# Patient Record
Sex: MALE | Race: BLACK OR AFRICAN AMERICAN | Employment: OTHER | URBAN - METROPOLITAN AREA
[De-identification: names, ages, dates, MRNs, and addresses within clinical notes are randomized per-mention and may not be internally consistent; named-entity substitution may affect disease eponyms.]

---

## 2017-03-08 ENCOUNTER — OFFICE VISIT (OUTPATIENT)
Dept: INTERNAL MEDICINE CLINIC | Age: 77
End: 2017-03-08

## 2017-03-08 VITALS
SYSTOLIC BLOOD PRESSURE: 126 MMHG | BODY MASS INDEX: 39.8 KG/M2 | HEART RATE: 95 BPM | DIASTOLIC BLOOD PRESSURE: 70 MMHG | WEIGHT: 278 LBS | TEMPERATURE: 98 F | OXYGEN SATURATION: 95 % | HEIGHT: 70 IN

## 2017-03-08 DIAGNOSIS — Z01.818 PRE-OP EXAM: ICD-10-CM

## 2017-03-08 DIAGNOSIS — I10 ESSENTIAL HYPERTENSION: ICD-10-CM

## 2017-03-08 DIAGNOSIS — H26.9 BILATERAL CATARACTS: ICD-10-CM

## 2017-03-08 DIAGNOSIS — E11.8 TYPE 2 DIABETES MELLITUS WITH COMPLICATION, WITHOUT LONG-TERM CURRENT USE OF INSULIN (HCC): ICD-10-CM

## 2017-03-08 LAB
ANION GAP SERPL CALCULATED.3IONS-SCNC: 17 MMOL/L (ref 3–16)
BUN BLDV-MCNC: 15 MG/DL (ref 7–20)
CALCIUM SERPL-MCNC: 9.7 MG/DL (ref 8.3–10.6)
CHLORIDE BLD-SCNC: 96 MMOL/L (ref 99–110)
CO2: 26 MMOL/L (ref 21–32)
CREAT SERPL-MCNC: 0.8 MG/DL (ref 0.8–1.3)
GFR AFRICAN AMERICAN: >60
GFR NON-AFRICAN AMERICAN: >60
GLUCOSE BLD-MCNC: 113 MG/DL
GLUCOSE BLD-MCNC: 96 MG/DL (ref 70–99)
HBA1C MFR BLD: 6.1 %
POTASSIUM SERPL-SCNC: 4.1 MMOL/L (ref 3.5–5.1)
SODIUM BLD-SCNC: 139 MMOL/L (ref 136–145)

## 2017-03-08 PROCEDURE — 82962 GLUCOSE BLOOD TEST: CPT | Performed by: INTERNAL MEDICINE

## 2017-03-08 PROCEDURE — 4040F PNEUMOC VAC/ADMIN/RCVD: CPT | Performed by: INTERNAL MEDICINE

## 2017-03-08 PROCEDURE — 1123F ACP DISCUSS/DSCN MKR DOCD: CPT | Performed by: INTERNAL MEDICINE

## 2017-03-08 PROCEDURE — 93000 ELECTROCARDIOGRAM COMPLETE: CPT | Performed by: INTERNAL MEDICINE

## 2017-03-08 PROCEDURE — G8417 CALC BMI ABV UP PARAM F/U: HCPCS | Performed by: INTERNAL MEDICINE

## 2017-03-08 PROCEDURE — 83036 HEMOGLOBIN GLYCOSYLATED A1C: CPT | Performed by: INTERNAL MEDICINE

## 2017-03-08 PROCEDURE — G8484 FLU IMMUNIZE NO ADMIN: HCPCS | Performed by: INTERNAL MEDICINE

## 2017-03-08 PROCEDURE — 1036F TOBACCO NON-USER: CPT | Performed by: INTERNAL MEDICINE

## 2017-03-08 PROCEDURE — 99215 OFFICE O/P EST HI 40 MIN: CPT | Performed by: INTERNAL MEDICINE

## 2017-03-08 PROCEDURE — G8427 DOCREV CUR MEDS BY ELIG CLIN: HCPCS | Performed by: INTERNAL MEDICINE

## 2017-03-08 ASSESSMENT — PATIENT HEALTH QUESTIONNAIRE - PHQ9
SUM OF ALL RESPONSES TO PHQ QUESTIONS 1-9: 0
SUM OF ALL RESPONSES TO PHQ9 QUESTIONS 1 & 2: 0
1. LITTLE INTEREST OR PLEASURE IN DOING THINGS: 0

## 2017-03-10 ENCOUNTER — TELEPHONE (OUTPATIENT)
Dept: INTERNAL MEDICINE CLINIC | Age: 77
End: 2017-03-10

## 2017-04-07 ENCOUNTER — TELEPHONE (OUTPATIENT)
Dept: INTERNAL MEDICINE CLINIC | Age: 77
End: 2017-04-07

## 2017-04-10 RX ORDER — OMEPRAZOLE 20 MG/1
20 CAPSULE, DELAYED RELEASE ORAL DAILY
Qty: 90 CAPSULE | Refills: 3 | Status: SHIPPED | OUTPATIENT
Start: 2017-04-10 | End: 2017-06-09 | Stop reason: ALTCHOICE

## 2017-04-10 RX ORDER — ALUMINUM ZIRCONIUM TRICHLOROHYDREX GLY 0.19 G/G
STICK TOPICAL
Qty: 84 TABLET | Refills: 2 | Status: SHIPPED | OUTPATIENT
Start: 2017-04-10 | End: 2018-01-23 | Stop reason: SDUPTHER

## 2017-05-08 ENCOUNTER — CARE COORDINATION (OUTPATIENT)
Dept: CARE COORDINATION | Age: 77
End: 2017-05-08

## 2017-05-12 ENCOUNTER — OFFICE VISIT (OUTPATIENT)
Dept: INTERNAL MEDICINE CLINIC | Age: 77
End: 2017-05-12

## 2017-05-12 VITALS
TEMPERATURE: 97.6 F | BODY MASS INDEX: 38.2 KG/M2 | OXYGEN SATURATION: 97 % | DIASTOLIC BLOOD PRESSURE: 78 MMHG | HEART RATE: 79 BPM | WEIGHT: 266.8 LBS | SYSTOLIC BLOOD PRESSURE: 136 MMHG | HEIGHT: 70 IN

## 2017-05-12 DIAGNOSIS — M25.571 ARTHRALGIA OF RIGHT ANKLE: ICD-10-CM

## 2017-05-12 DIAGNOSIS — L03.115 CELLULITIS OF RIGHT LOWER EXTREMITY: Primary | ICD-10-CM

## 2017-05-12 DIAGNOSIS — I10 ESSENTIAL HYPERTENSION: ICD-10-CM

## 2017-05-12 DIAGNOSIS — E11.9 TYPE 2 DIABETES MELLITUS WITHOUT COMPLICATION, WITHOUT LONG-TERM CURRENT USE OF INSULIN (HCC): ICD-10-CM

## 2017-05-12 LAB
GLUCOSE BLD-MCNC: 134 MG/DL
URIC ACID, SERUM: 6.2 MG/DL (ref 3.5–7.2)

## 2017-05-12 PROCEDURE — 4040F PNEUMOC VAC/ADMIN/RCVD: CPT | Performed by: NURSE PRACTITIONER

## 2017-05-12 PROCEDURE — 99213 OFFICE O/P EST LOW 20 MIN: CPT | Performed by: NURSE PRACTITIONER

## 2017-05-12 PROCEDURE — 1123F ACP DISCUSS/DSCN MKR DOCD: CPT | Performed by: NURSE PRACTITIONER

## 2017-05-12 PROCEDURE — G8427 DOCREV CUR MEDS BY ELIG CLIN: HCPCS | Performed by: NURSE PRACTITIONER

## 2017-05-12 PROCEDURE — G8417 CALC BMI ABV UP PARAM F/U: HCPCS | Performed by: NURSE PRACTITIONER

## 2017-05-12 PROCEDURE — 82962 GLUCOSE BLOOD TEST: CPT | Performed by: NURSE PRACTITIONER

## 2017-05-12 PROCEDURE — 1036F TOBACCO NON-USER: CPT | Performed by: NURSE PRACTITIONER

## 2017-05-12 PROCEDURE — 96372 THER/PROPH/DIAG INJ SC/IM: CPT | Performed by: NURSE PRACTITIONER

## 2017-05-12 RX ORDER — CANDESARTAN CILEXETIL AND HYDROCHLOROTHIAZIDE 32; 12.5 MG/1; MG/1
TABLET ORAL
Qty: 120 TABLET | Refills: 3 | Status: SHIPPED | OUTPATIENT
Start: 2017-05-12 | End: 2017-09-18 | Stop reason: SDUPTHER

## 2017-05-12 RX ORDER — KETOROLAC TROMETHAMINE 30 MG/ML
30 INJECTION, SOLUTION INTRAMUSCULAR; INTRAVENOUS ONCE
Qty: 1 ML | Refills: 0 | Status: ON HOLD
Start: 2017-05-12 | End: 2020-11-14 | Stop reason: CLARIF

## 2017-05-12 RX ORDER — CLINDAMYCIN HYDROCHLORIDE 150 MG/1
450 CAPSULE ORAL
COMMUNITY
Start: 2017-05-05 | End: 2017-05-15

## 2017-05-12 RX ORDER — TRIAMCINOLONE ACETONIDE 40 MG/ML
40 INJECTION, SUSPENSION INTRA-ARTICULAR; INTRAMUSCULAR ONCE
Status: COMPLETED | OUTPATIENT
Start: 2017-05-12 | End: 2017-05-12

## 2017-05-12 RX ADMIN — TRIAMCINOLONE ACETONIDE 40 MG: 40 INJECTION, SUSPENSION INTRA-ARTICULAR; INTRAMUSCULAR at 12:19

## 2017-05-12 ASSESSMENT — PATIENT HEALTH QUESTIONNAIRE - PHQ9
2. FEELING DOWN, DEPRESSED OR HOPELESS: 0
SUM OF ALL RESPONSES TO PHQ9 QUESTIONS 1 & 2: 0
1. LITTLE INTEREST OR PLEASURE IN DOING THINGS: 0
SUM OF ALL RESPONSES TO PHQ QUESTIONS 1-9: 0

## 2017-05-12 ASSESSMENT — ENCOUNTER SYMPTOMS
RESPIRATORY NEGATIVE: 1
ALLERGIC/IMMUNOLOGIC NEGATIVE: 1
EYES NEGATIVE: 1
GASTROINTESTINAL NEGATIVE: 1

## 2017-05-15 ASSESSMENT — PATIENT HEALTH QUESTIONNAIRE - PHQ9
1. LITTLE INTEREST OR PLEASURE IN DOING THINGS: 0
2. FEELING DOWN, DEPRESSED OR HOPELESS: 0
SUM OF ALL RESPONSES TO PHQ QUESTIONS 1-9: 0
SUM OF ALL RESPONSES TO PHQ9 QUESTIONS 1 & 2: 0

## 2017-05-16 ENCOUNTER — TELEPHONE (OUTPATIENT)
Dept: INTERNAL MEDICINE CLINIC | Age: 77
End: 2017-05-16

## 2017-05-17 ENCOUNTER — OFFICE VISIT (OUTPATIENT)
Dept: INTERNAL MEDICINE CLINIC | Age: 77
End: 2017-05-17

## 2017-05-17 VITALS
SYSTOLIC BLOOD PRESSURE: 140 MMHG | HEIGHT: 68 IN | TEMPERATURE: 98.4 F | WEIGHT: 260.6 LBS | DIASTOLIC BLOOD PRESSURE: 78 MMHG | HEART RATE: 72 BPM | BODY MASS INDEX: 39.5 KG/M2

## 2017-05-17 DIAGNOSIS — L03.115 CELLULITIS OF RIGHT LOWER EXTREMITY: Primary | ICD-10-CM

## 2017-05-17 DIAGNOSIS — E66.01 MORBID OBESITY WITH BMI OF 40.0-44.9, ADULT (HCC): ICD-10-CM

## 2017-05-17 DIAGNOSIS — E11.9 TYPE 2 DIABETES MELLITUS WITHOUT COMPLICATION, WITHOUT LONG-TERM CURRENT USE OF INSULIN (HCC): ICD-10-CM

## 2017-05-17 PROCEDURE — 4040F PNEUMOC VAC/ADMIN/RCVD: CPT | Performed by: NURSE PRACTITIONER

## 2017-05-17 PROCEDURE — 99213 OFFICE O/P EST LOW 20 MIN: CPT | Performed by: NURSE PRACTITIONER

## 2017-05-17 PROCEDURE — 1123F ACP DISCUSS/DSCN MKR DOCD: CPT | Performed by: NURSE PRACTITIONER

## 2017-05-17 PROCEDURE — G8417 CALC BMI ABV UP PARAM F/U: HCPCS | Performed by: NURSE PRACTITIONER

## 2017-05-17 PROCEDURE — G8427 DOCREV CUR MEDS BY ELIG CLIN: HCPCS | Performed by: NURSE PRACTITIONER

## 2017-05-17 PROCEDURE — 1036F TOBACCO NON-USER: CPT | Performed by: NURSE PRACTITIONER

## 2017-05-17 RX ORDER — MELOXICAM 15 MG/1
1 TABLET ORAL DAILY
COMMUNITY
Start: 2017-04-27 | End: 2018-10-09

## 2017-05-17 ASSESSMENT — ENCOUNTER SYMPTOMS
EYES NEGATIVE: 1
GASTROINTESTINAL NEGATIVE: 1
ALLERGIC/IMMUNOLOGIC NEGATIVE: 1
RESPIRATORY NEGATIVE: 1

## 2017-05-17 ASSESSMENT — PATIENT HEALTH QUESTIONNAIRE - PHQ9
2. FEELING DOWN, DEPRESSED OR HOPELESS: 0
SUM OF ALL RESPONSES TO PHQ QUESTIONS 1-9: 0
SUM OF ALL RESPONSES TO PHQ9 QUESTIONS 1 & 2: 0
1. LITTLE INTEREST OR PLEASURE IN DOING THINGS: 0

## 2017-06-08 ENCOUNTER — TELEPHONE (OUTPATIENT)
Dept: INTERNAL MEDICINE CLINIC | Age: 77
End: 2017-06-08

## 2017-06-09 ENCOUNTER — OFFICE VISIT (OUTPATIENT)
Dept: INTERNAL MEDICINE CLINIC | Age: 77
End: 2017-06-09

## 2017-06-09 VITALS
HEIGHT: 69 IN | BODY MASS INDEX: 39.1 KG/M2 | TEMPERATURE: 98.3 F | HEART RATE: 64 BPM | DIASTOLIC BLOOD PRESSURE: 62 MMHG | WEIGHT: 264 LBS | SYSTOLIC BLOOD PRESSURE: 116 MMHG

## 2017-06-09 DIAGNOSIS — L03.115 CELLULITIS OF RIGHT LOWER EXTREMITY: Primary | ICD-10-CM

## 2017-06-09 DIAGNOSIS — E11.9 TYPE 2 DIABETES MELLITUS WITHOUT COMPLICATION, WITHOUT LONG-TERM CURRENT USE OF INSULIN (HCC): ICD-10-CM

## 2017-06-09 LAB — GLUCOSE BLD-MCNC: 156 MG/DL

## 2017-06-09 PROCEDURE — 99213 OFFICE O/P EST LOW 20 MIN: CPT | Performed by: NURSE PRACTITIONER

## 2017-06-09 PROCEDURE — G8417 CALC BMI ABV UP PARAM F/U: HCPCS | Performed by: NURSE PRACTITIONER

## 2017-06-09 PROCEDURE — 96372 THER/PROPH/DIAG INJ SC/IM: CPT | Performed by: NURSE PRACTITIONER

## 2017-06-09 PROCEDURE — 1036F TOBACCO NON-USER: CPT | Performed by: NURSE PRACTITIONER

## 2017-06-09 PROCEDURE — 82962 GLUCOSE BLOOD TEST: CPT | Performed by: NURSE PRACTITIONER

## 2017-06-09 PROCEDURE — G8427 DOCREV CUR MEDS BY ELIG CLIN: HCPCS | Performed by: NURSE PRACTITIONER

## 2017-06-09 PROCEDURE — 1123F ACP DISCUSS/DSCN MKR DOCD: CPT | Performed by: NURSE PRACTITIONER

## 2017-06-09 PROCEDURE — 4040F PNEUMOC VAC/ADMIN/RCVD: CPT | Performed by: NURSE PRACTITIONER

## 2017-06-09 RX ORDER — SULFAMETHOXAZOLE AND TRIMETHOPRIM 800; 160 MG/1; MG/1
1 TABLET ORAL 2 TIMES DAILY
Qty: 20 TABLET | Refills: 0 | Status: SHIPPED | OUTPATIENT
Start: 2017-06-09 | End: 2017-09-21 | Stop reason: SDUPTHER

## 2017-06-09 RX ORDER — TRIAMCINOLONE ACETONIDE 40 MG/ML
40 INJECTION, SUSPENSION INTRA-ARTICULAR; INTRAMUSCULAR ONCE
Status: COMPLETED | OUTPATIENT
Start: 2017-06-09 | End: 2017-06-09

## 2017-06-09 RX ADMIN — TRIAMCINOLONE ACETONIDE 40 MG: 40 INJECTION, SUSPENSION INTRA-ARTICULAR; INTRAMUSCULAR at 11:52

## 2017-06-09 ASSESSMENT — PATIENT HEALTH QUESTIONNAIRE - PHQ9
2. FEELING DOWN, DEPRESSED OR HOPELESS: 0
1. LITTLE INTEREST OR PLEASURE IN DOING THINGS: 0
SUM OF ALL RESPONSES TO PHQ QUESTIONS 1-9: 0
SUM OF ALL RESPONSES TO PHQ9 QUESTIONS 1 & 2: 0

## 2017-06-12 ENCOUNTER — OFFICE VISIT (OUTPATIENT)
Dept: INTERNAL MEDICINE CLINIC | Age: 77
End: 2017-06-12

## 2017-06-12 VITALS
HEART RATE: 80 BPM | WEIGHT: 260 LBS | SYSTOLIC BLOOD PRESSURE: 122 MMHG | BODY MASS INDEX: 39.4 KG/M2 | HEIGHT: 68 IN | DIASTOLIC BLOOD PRESSURE: 80 MMHG | TEMPERATURE: 98.6 F

## 2017-06-12 DIAGNOSIS — L03.115 CELLULITIS OF RIGHT LOWER EXTREMITY: Primary | ICD-10-CM

## 2017-06-12 PROCEDURE — 1036F TOBACCO NON-USER: CPT | Performed by: NURSE PRACTITIONER

## 2017-06-12 PROCEDURE — G8427 DOCREV CUR MEDS BY ELIG CLIN: HCPCS | Performed by: NURSE PRACTITIONER

## 2017-06-12 PROCEDURE — 4040F PNEUMOC VAC/ADMIN/RCVD: CPT | Performed by: NURSE PRACTITIONER

## 2017-06-12 PROCEDURE — 1123F ACP DISCUSS/DSCN MKR DOCD: CPT | Performed by: NURSE PRACTITIONER

## 2017-06-12 PROCEDURE — G8417 CALC BMI ABV UP PARAM F/U: HCPCS | Performed by: NURSE PRACTITIONER

## 2017-06-12 PROCEDURE — 99213 OFFICE O/P EST LOW 20 MIN: CPT | Performed by: NURSE PRACTITIONER

## 2017-06-12 RX ORDER — CEPHALEXIN 500 MG/1
500 CAPSULE ORAL 3 TIMES DAILY
Qty: 30 CAPSULE | Refills: 0 | Status: SHIPPED | OUTPATIENT
Start: 2017-06-12 | End: 2017-09-21 | Stop reason: SDUPTHER

## 2017-06-12 ASSESSMENT — ENCOUNTER SYMPTOMS: COLOR CHANGE: 1

## 2017-06-15 ENCOUNTER — TELEPHONE (OUTPATIENT)
Dept: INTERNAL MEDICINE CLINIC | Age: 77
End: 2017-06-15

## 2017-06-19 ENCOUNTER — OFFICE VISIT (OUTPATIENT)
Dept: INTERNAL MEDICINE CLINIC | Age: 77
End: 2017-06-19

## 2017-06-19 VITALS
HEART RATE: 79 BPM | DIASTOLIC BLOOD PRESSURE: 70 MMHG | SYSTOLIC BLOOD PRESSURE: 102 MMHG | OXYGEN SATURATION: 94 % | BODY MASS INDEX: 39.66 KG/M2 | WEIGHT: 257 LBS

## 2017-06-19 DIAGNOSIS — D64.9 CHRONIC ANEMIA: ICD-10-CM

## 2017-06-19 DIAGNOSIS — Z23 NEED FOR PNEUMOCOCCAL VACCINE: ICD-10-CM

## 2017-06-19 DIAGNOSIS — R35.89 POLYURIA: ICD-10-CM

## 2017-06-19 DIAGNOSIS — L03.115 CELLULITIS OF RIGHT LOWER EXTREMITY: ICD-10-CM

## 2017-06-19 DIAGNOSIS — E11.8 CONTROLLED TYPE 2 DIABETES MELLITUS WITH COMPLICATION, WITHOUT LONG-TERM CURRENT USE OF INSULIN (HCC): Primary | ICD-10-CM

## 2017-06-19 DIAGNOSIS — E78.49 OTHER HYPERLIPIDEMIA: ICD-10-CM

## 2017-06-19 DIAGNOSIS — I10 ESSENTIAL HYPERTENSION: ICD-10-CM

## 2017-06-19 LAB
BILIRUBIN URINE: NEGATIVE
BLOOD, URINE: NEGATIVE
CHOLESTEROL, TOTAL: 199 MG/DL (ref 0–199)
CLARITY: CLEAR
COLOR: YELLOW
CREATININE URINE: 58.3 MG/DL (ref 39–259)
FERRITIN: 111.6 NG/ML (ref 30–400)
GLUCOSE BLD-MCNC: 128 MG/DL
GLUCOSE URINE: NEGATIVE MG/DL
HBA1C MFR BLD: 6.2 %
HDLC SERPL-MCNC: 79 MG/DL (ref 40–60)
KETONES, URINE: NEGATIVE MG/DL
LDL CHOLESTEROL CALCULATED: 105 MG/DL
LEUKOCYTE ESTERASE, URINE: NEGATIVE
MICROALBUMIN UR-MCNC: 1.7 MG/DL
MICROALBUMIN/CREAT UR-RTO: 29.2 MG/G (ref 0–30)
MICROSCOPIC EXAMINATION: NORMAL
NITRITE, URINE: NEGATIVE
PH UA: 6
PROTEIN UA: NEGATIVE MG/DL
SPECIFIC GRAVITY UA: 1.01
TRIGL SERPL-MCNC: 73 MG/DL (ref 0–150)
URINE TYPE: NORMAL
UROBILINOGEN, URINE: 0.2 E.U./DL
VLDLC SERPL CALC-MCNC: 15 MG/DL

## 2017-06-19 PROCEDURE — 4040F PNEUMOC VAC/ADMIN/RCVD: CPT | Performed by: INTERNAL MEDICINE

## 2017-06-19 PROCEDURE — G8427 DOCREV CUR MEDS BY ELIG CLIN: HCPCS | Performed by: INTERNAL MEDICINE

## 2017-06-19 PROCEDURE — 90670 PCV13 VACCINE IM: CPT | Performed by: INTERNAL MEDICINE

## 2017-06-19 PROCEDURE — 82962 GLUCOSE BLOOD TEST: CPT | Performed by: INTERNAL MEDICINE

## 2017-06-19 PROCEDURE — 1123F ACP DISCUSS/DSCN MKR DOCD: CPT | Performed by: INTERNAL MEDICINE

## 2017-06-19 PROCEDURE — G0009 ADMIN PNEUMOCOCCAL VACCINE: HCPCS | Performed by: INTERNAL MEDICINE

## 2017-06-19 PROCEDURE — 99214 OFFICE O/P EST MOD 30 MIN: CPT | Performed by: INTERNAL MEDICINE

## 2017-06-19 PROCEDURE — G8417 CALC BMI ABV UP PARAM F/U: HCPCS | Performed by: INTERNAL MEDICINE

## 2017-06-19 PROCEDURE — 1036F TOBACCO NON-USER: CPT | Performed by: INTERNAL MEDICINE

## 2017-06-19 PROCEDURE — 83036 HEMOGLOBIN GLYCOSYLATED A1C: CPT | Performed by: INTERNAL MEDICINE

## 2017-06-24 ASSESSMENT — ENCOUNTER SYMPTOMS
RESPIRATORY NEGATIVE: 1
GASTROINTESTINAL NEGATIVE: 1
EYES NEGATIVE: 1

## 2017-09-19 RX ORDER — CANDESARTAN CILEXETIL AND HYDROCHLOROTHIAZIDE 32; 12.5 MG/1; MG/1
TABLET ORAL
Qty: 90 TABLET | Refills: 3 | Status: SHIPPED | OUTPATIENT
Start: 2017-09-19 | End: 2018-08-26 | Stop reason: SDUPTHER

## 2017-09-21 ENCOUNTER — OFFICE VISIT (OUTPATIENT)
Dept: INTERNAL MEDICINE CLINIC | Age: 77
End: 2017-09-21

## 2017-09-21 VITALS
HEIGHT: 70 IN | DIASTOLIC BLOOD PRESSURE: 68 MMHG | RESPIRATION RATE: 20 BRPM | TEMPERATURE: 99.2 F | HEART RATE: 100 BPM | WEIGHT: 267 LBS | SYSTOLIC BLOOD PRESSURE: 124 MMHG | BODY MASS INDEX: 38.22 KG/M2 | OXYGEN SATURATION: 98 %

## 2017-09-21 DIAGNOSIS — L03.115 CELLULITIS OF RIGHT LOWER EXTREMITY: Primary | ICD-10-CM

## 2017-09-21 DIAGNOSIS — E11.9 TYPE 2 DIABETES MELLITUS WITHOUT COMPLICATION, WITHOUT LONG-TERM CURRENT USE OF INSULIN (HCC): ICD-10-CM

## 2017-09-21 LAB
GLUCOSE BLD-MCNC: 204 MG/DL
HBA1C MFR BLD: 5.9 %

## 2017-09-21 PROCEDURE — G8417 CALC BMI ABV UP PARAM F/U: HCPCS | Performed by: NURSE PRACTITIONER

## 2017-09-21 PROCEDURE — 1036F TOBACCO NON-USER: CPT | Performed by: NURSE PRACTITIONER

## 2017-09-21 PROCEDURE — 4040F PNEUMOC VAC/ADMIN/RCVD: CPT | Performed by: NURSE PRACTITIONER

## 2017-09-21 PROCEDURE — G8427 DOCREV CUR MEDS BY ELIG CLIN: HCPCS | Performed by: NURSE PRACTITIONER

## 2017-09-21 PROCEDURE — 99213 OFFICE O/P EST LOW 20 MIN: CPT | Performed by: NURSE PRACTITIONER

## 2017-09-21 PROCEDURE — 82962 GLUCOSE BLOOD TEST: CPT | Performed by: NURSE PRACTITIONER

## 2017-09-21 PROCEDURE — 1123F ACP DISCUSS/DSCN MKR DOCD: CPT | Performed by: NURSE PRACTITIONER

## 2017-09-21 PROCEDURE — 83036 HEMOGLOBIN GLYCOSYLATED A1C: CPT | Performed by: NURSE PRACTITIONER

## 2017-09-21 RX ORDER — CEPHALEXIN 500 MG/1
500 CAPSULE ORAL 3 TIMES DAILY
Qty: 30 CAPSULE | Refills: 0 | Status: SHIPPED | OUTPATIENT
Start: 2017-09-21 | End: 2017-12-21

## 2017-09-21 RX ORDER — CLINDAMYCIN HYDROCHLORIDE 150 MG/1
150 CAPSULE ORAL 3 TIMES DAILY
Qty: 30 CAPSULE | Refills: 0 | Status: SHIPPED | OUTPATIENT
Start: 2017-09-21 | End: 2017-09-21 | Stop reason: ALTCHOICE

## 2017-09-21 RX ORDER — SULFAMETHOXAZOLE AND TRIMETHOPRIM 800; 160 MG/1; MG/1
1 TABLET ORAL 2 TIMES DAILY
Qty: 20 TABLET | Refills: 0 | Status: SHIPPED | OUTPATIENT
Start: 2017-09-21 | End: 2018-09-12 | Stop reason: SDUPTHER

## 2017-09-21 ASSESSMENT — ENCOUNTER SYMPTOMS
SHORTNESS OF BREATH: 0
COLOR CHANGE: 1
CHEST TIGHTNESS: 0
APNEA: 0

## 2017-09-22 ENCOUNTER — TELEPHONE (OUTPATIENT)
Dept: INTERNAL MEDICINE CLINIC | Age: 77
End: 2017-09-22

## 2017-09-26 ENCOUNTER — OFFICE VISIT (OUTPATIENT)
Dept: INTERNAL MEDICINE CLINIC | Age: 77
End: 2017-09-26

## 2017-09-26 VITALS
BODY MASS INDEX: 38.11 KG/M2 | DIASTOLIC BLOOD PRESSURE: 60 MMHG | RESPIRATION RATE: 18 BRPM | HEART RATE: 60 BPM | TEMPERATURE: 98.3 F | HEIGHT: 70 IN | WEIGHT: 266.2 LBS | SYSTOLIC BLOOD PRESSURE: 100 MMHG

## 2017-09-26 DIAGNOSIS — E11.9 TYPE 2 DIABETES MELLITUS WITHOUT COMPLICATION, WITHOUT LONG-TERM CURRENT USE OF INSULIN (HCC): ICD-10-CM

## 2017-09-26 DIAGNOSIS — L03.115 CELLULITIS OF RIGHT LOWER EXTREMITY: Primary | ICD-10-CM

## 2017-09-26 DIAGNOSIS — R60.0 EDEMA OF BOTH FEET: ICD-10-CM

## 2017-09-26 LAB — GLUCOSE BLD-MCNC: 131 MG/DL

## 2017-09-26 PROCEDURE — 4040F PNEUMOC VAC/ADMIN/RCVD: CPT | Performed by: NURSE PRACTITIONER

## 2017-09-26 PROCEDURE — 1123F ACP DISCUSS/DSCN MKR DOCD: CPT | Performed by: NURSE PRACTITIONER

## 2017-09-26 PROCEDURE — 1036F TOBACCO NON-USER: CPT | Performed by: NURSE PRACTITIONER

## 2017-09-26 PROCEDURE — G8417 CALC BMI ABV UP PARAM F/U: HCPCS | Performed by: NURSE PRACTITIONER

## 2017-09-26 PROCEDURE — G8427 DOCREV CUR MEDS BY ELIG CLIN: HCPCS | Performed by: NURSE PRACTITIONER

## 2017-09-26 PROCEDURE — 96372 THER/PROPH/DIAG INJ SC/IM: CPT | Performed by: NURSE PRACTITIONER

## 2017-09-26 PROCEDURE — 99213 OFFICE O/P EST LOW 20 MIN: CPT | Performed by: NURSE PRACTITIONER

## 2017-09-26 PROCEDURE — 82962 GLUCOSE BLOOD TEST: CPT | Performed by: NURSE PRACTITIONER

## 2017-09-26 RX ORDER — FUROSEMIDE 20 MG/1
20 TABLET ORAL DAILY
Qty: 10 TABLET | Refills: 0 | Status: SHIPPED | OUTPATIENT
Start: 2017-09-26 | End: 2017-12-21

## 2017-09-26 RX ORDER — TRIAMCINOLONE ACETONIDE 40 MG/ML
40 INJECTION, SUSPENSION INTRA-ARTICULAR; INTRAMUSCULAR ONCE
Status: COMPLETED | OUTPATIENT
Start: 2017-09-26 | End: 2017-09-26

## 2017-09-26 RX ADMIN — TRIAMCINOLONE ACETONIDE 40 MG: 40 INJECTION, SUSPENSION INTRA-ARTICULAR; INTRAMUSCULAR at 13:58

## 2017-09-26 ASSESSMENT — ENCOUNTER SYMPTOMS
RESPIRATORY NEGATIVE: 1
COLOR CHANGE: 1

## 2017-10-03 ENCOUNTER — OFFICE VISIT (OUTPATIENT)
Dept: INTERNAL MEDICINE CLINIC | Age: 77
End: 2017-10-03

## 2017-10-03 VITALS
TEMPERATURE: 98.2 F | BODY MASS INDEX: 37.08 KG/M2 | WEIGHT: 259 LBS | HEIGHT: 70 IN | RESPIRATION RATE: 18 BRPM | HEART RATE: 60 BPM | SYSTOLIC BLOOD PRESSURE: 116 MMHG | DIASTOLIC BLOOD PRESSURE: 68 MMHG

## 2017-10-03 DIAGNOSIS — L03.115 CELLULITIS OF RIGHT LOWER EXTREMITY: Primary | ICD-10-CM

## 2017-10-03 DIAGNOSIS — Z23 FLU VACCINE NEED: ICD-10-CM

## 2017-10-03 DIAGNOSIS — E11.9 TYPE 2 DIABETES MELLITUS WITHOUT COMPLICATION, WITHOUT LONG-TERM CURRENT USE OF INSULIN (HCC): ICD-10-CM

## 2017-10-03 PROCEDURE — G8484 FLU IMMUNIZE NO ADMIN: HCPCS | Performed by: NURSE PRACTITIONER

## 2017-10-03 PROCEDURE — 82962 GLUCOSE BLOOD TEST: CPT | Performed by: NURSE PRACTITIONER

## 2017-10-03 PROCEDURE — G0008 ADMIN INFLUENZA VIRUS VAC: HCPCS | Performed by: NURSE PRACTITIONER

## 2017-10-03 PROCEDURE — 1036F TOBACCO NON-USER: CPT | Performed by: NURSE PRACTITIONER

## 2017-10-03 PROCEDURE — G8417 CALC BMI ABV UP PARAM F/U: HCPCS | Performed by: NURSE PRACTITIONER

## 2017-10-03 PROCEDURE — 90662 IIV NO PRSV INCREASED AG IM: CPT | Performed by: NURSE PRACTITIONER

## 2017-10-03 PROCEDURE — 1123F ACP DISCUSS/DSCN MKR DOCD: CPT | Performed by: NURSE PRACTITIONER

## 2017-10-03 PROCEDURE — 4040F PNEUMOC VAC/ADMIN/RCVD: CPT | Performed by: NURSE PRACTITIONER

## 2017-10-03 PROCEDURE — G8427 DOCREV CUR MEDS BY ELIG CLIN: HCPCS | Performed by: NURSE PRACTITIONER

## 2017-10-03 PROCEDURE — 99213 OFFICE O/P EST LOW 20 MIN: CPT | Performed by: NURSE PRACTITIONER

## 2017-10-03 ASSESSMENT — ENCOUNTER SYMPTOMS
WHEEZING: 0
APNEA: 0
CHEST TIGHTNESS: 0
EYES NEGATIVE: 1
SHORTNESS OF BREATH: 0
COLOR CHANGE: 0

## 2017-10-03 NOTE — PROGRESS NOTES
normal.   Nursing note and vitals reviewed. Assessment:    Cellulitis RLE Resolved with ATB Therapy. HTN, controlled Atacand HCT, 32-13.5 mg daily. Need for Flu vaccine. Juvenal Antunez received counseling on the following healthy behaviors: nutrition, exercise and medication adherence    Patient given educational materials on Diabetes, Nutrition, Exercise and Hypertension    I have instructed Juvenal Antunez to complete a self tracking handout on Blood Sugars  and Blood Pressures  and instructed them to bring it with them to his next appointment. Discussed use, benefit, and side effects of prescribed medications. Barriers to medication compliance addressed. All patient questions answered. Pt voiced understanding. Patient also educated on the importance of being compliant with routine office visits in order to assess chronic illness progression, medication regimen/side effects, and effectiveness of plan of care. Patient was able to verbalize understanding. More than half the time spent on counseling. Patient is in no distress at time of departure. Plan:       Dash Diet  Diet and Exercise. Drink 64 ounces of water daily. Eat 3-5 servings of vegetables and fruits daily. Take all medications as prescribed. Flu vaccine  Call 911 or go to the nearest emergency room for chest pain or shortness of breath. Return in 2 month f/u HTN.

## 2017-10-03 NOTE — PATIENT INSTRUCTIONS
medicine. To prevent cellulitis in the future  · Try to prevent cuts, scrapes, or other injuries to your skin. Cellulitis most often occurs where there is a break in the skin. · If you get a scrape, cut, mild burn, or bite, wash the wound with clean water as soon as you can to help avoid infection. Don't use hydrogen peroxide or alcohol, which can slow healing. · If you have swelling in your legs (edema), support stockings and good skin care may help prevent leg sores and cellulitis. · Take care of your feet, especially if you have diabetes or other conditions that increase the risk of infection. Wear shoes and socks. Do not go barefoot. If you have athlete's foot or other skin problems on your feet, talk to your doctor about how to treat them. When should you call for help? Call your doctor now or seek immediate medical care if:  · You have signs that your infection is getting worse, such as:  ¨ Increased pain, swelling, warmth, or redness. ¨ Red streaks leading from the area. ¨ Pus draining from the area. ¨ A fever. · You get a rash. Watch closely for changes in your health, and be sure to contact your doctor if:  · You are not getting better after 1 day (24 hours). · You do not get better as expected. Where can you learn more? Go to https://Charles River [email or web address] Systemseb.Tynker. org and sign in to your [employer] account. Enter A211 in the [facility] box to learn more about \"Cellulitis: Care Instructions. \"     If you do not have an account, please click on the \"Sign Up Now\" link. Current as of: October 13, 2016  Content Version: 11.3  © 1477-0580 [employer]. Care instructions adapted under license by [facility] [facility] ([facility]). If you have questions about a medical condition or this instruction, always ask your healthcare professional. [address] any warranty or liability for your use of this information.        Learning About Diabetes Food Guidelines  Your Care spread carbs throughout the day. ¨ Divide your plate by types of foods. Put non-starchy vegetables on half the plate, meat or other protein food on one-quarter of the plate, and a grain or starchy vegetable in the final quarter of the plate. To this you can add a small piece of fruit and 1 cup of milk or yogurt, depending on how many carbs you are supposed to eat at a meal.  · Try to eat about the same amount of carbs at each meal. Do not \"save up\" your daily allowance of carbs to eat at one meal.  · Proteins have very little or no carbs per serving. Examples of proteins are beef, chicken, turkey, fish, eggs, tofu, cheese, cottage cheese, and peanut butter. A serving size of meat is 3 ounces, which is about the size of a deck of cards. Examples of meat substitute serving sizes (equal to 1 ounce of meat) are 1/4 cup of cottage cheese, 1 egg, 1 tablespoon of peanut butter, and ½ cup of tofu. How can you eat out and still eat healthy? · Learn to estimate the serving sizes of foods that have carbohydrate. If you measure food at home, it will be easier to estimate the amount in a serving of restaurant food. · If the meal you order has too much carbohydrate (such as potatoes, corn, or baked beans), ask to have a low-carbohydrate food instead. Ask for a salad or green vegetables. · If you use insulin, check your blood sugar before and after eating out to help you plan how much to eat in the future. · If you eat more carbohydrate at a meal than you had planned, take a walk or do other exercise. This will help lower your blood sugar. What else should you know? · Limit saturated fat, such as the fat from meat and dairy products. This is a healthy choice because people who have diabetes are at higher risk of heart disease. So choose lean cuts of meat and nonfat or low-fat dairy products. Use olive or canola oil instead of butter or shortening when cooking. · Don't skip meals.  Your blood sugar may drop too low if you skip meals and take insulin or certain medicines for diabetes. · Check with your doctor before you drink alcohol. Alcohol can cause your blood sugar to drop too low. Alcohol can also cause a bad reaction if you take certain diabetes medicines. Follow-up care is a key part of your treatment and safety. Be sure to make and go to all appointments, and call your doctor if you are having problems. It's also a good idea to know your test results and keep a list of the medicines you take. Where can you learn more? Go to https://BiotherapeuticspeScanSafe.Republic Project. org and sign in to your [employer] account. Enter H432 in the [employer] box to learn more about \"Learning About Diabetes Food Guidelines. \"     If you do not have an account, please click on the \"Sign Up Now\" link. Current as of: March 13, 2017  Content Version: 11.3  © 9894-6787 [employer]. Care instructions adapted under license by [facility] ([facility]). If you have questions about a medical condition or this instruction, always ask your healthcare professional. [address] any warranty or liability for your use of this information.

## 2017-10-03 NOTE — MR AVS SNAPSHOT
your BMI, the greater your risk of heart disease, high blood pressure, type 2 diabetes, stroke, gallstones, arthritis, sleep apnea, and certain cancers. BMI is not perfect. It may overestimate body fat in athletes and people who are more muscular. Even a small weight loss (between 5 and 10 percent of your current weight) by decreasing your calorie intake and becoming more physically active will help lower your risk of developing or worsening diseases associated with obesity. Learn more at: [employer].uk          Instructions    Dash Diet  Diet and Exercise. Drink 64 ounces of water daily. Eat 3-5 servings of vegetables and fruits daily. Take all medications as prescribed. Call 911 or go to the nearest emergency room for chest pain or shortness of breath. Return in 2 month f/u HTN. Cellulitis: Care Instructions  Your Care Instructions    Cellulitis is a skin infection. It often occurs after a break in the skin from a scrape, cut, bite, or puncture, or after a rash. The doctor has checked you carefully, but problems can develop later. If you notice any problems or new symptoms, get medical treatment right away. Follow-up care is a key part of your treatment and safety. Be sure to make and go to all appointments, and call your doctor if you are having problems. It's also a good idea to know your test results and keep a list of the medicines you take. How can you care for yourself at home? · Take your antibiotics as directed. Do not stop taking them just because you feel better. You need to take the full course of antibiotics. · Prop up the infected area on pillows to reduce pain and swelling. Try to keep the area above the level of your heart as often as you can. · If your doctor told you how to care for your wound, follow your doctor's instructions.  If you did not get instructions, follow this general advice: account. Enter R146 in the [facility] box to learn more about \"Cellulitis: Care Instructions. \"     If you do not have an account, please click on the \"Sign Up Now\" link. Current as of: October 13, 2016  Content Version: 11.3  © 5266-6945 [employer]. Care instructions adapted under license by [facility] ([facility]). If you have questions about a medical condition or this instruction, always ask your healthcare professional. [address] any warranty or liability for your use of this information. Learning About Diabetes Food Guidelines  Your Care Instructions  Meal planning is important to manage diabetes. It helps keep your blood sugar at a target level (which you set with your doctor). You don't have to eat special foods. You can eat what your family eats, including sweets once in a while. But you do have to pay attention to how often you eat and how much you eat of certain foods. You may want to work with a dietitian or a certified diabetes educator (CDE) to help you plan meals and snacks. A dietitian or CDE can also help you lose weight if that is one of your goals. What should you know about eating carbs? Managing the amount of carbohydrate (carbs) you eat is an important part of healthy meals when you have diabetes. Carbohydrate is found in many foods. · Learn which foods have carbs. And learn the amounts of carbs in different foods. ¨ Bread, cereal, pasta, and rice have about 15 grams of carbs in a serving. A serving is 1 slice of bread (1 ounce), ½ cup of cooked cereal, or 1/3 cup of cooked pasta or rice. ¨ Fruits have 15 grams of carbs in a serving. A serving is 1 small fresh fruit, such as an apple or orange; ½ of a banana; ½ cup of cooked or canned fruit; ½ cup of fruit juice; 1 cup of melon or raspberries; or 2 tablespoons of dried fruit. ¨ Milk and no-sugar-added yogurt have 15 grams of carbs in a serving.  A

## 2017-12-21 ENCOUNTER — OFFICE VISIT (OUTPATIENT)
Dept: INTERNAL MEDICINE CLINIC | Age: 77
End: 2017-12-21

## 2017-12-21 VITALS
BODY MASS INDEX: 38.08 KG/M2 | DIASTOLIC BLOOD PRESSURE: 80 MMHG | HEIGHT: 70 IN | SYSTOLIC BLOOD PRESSURE: 138 MMHG | WEIGHT: 266 LBS

## 2017-12-21 DIAGNOSIS — E11.9 TYPE 2 DIABETES MELLITUS WITHOUT COMPLICATION, WITHOUT LONG-TERM CURRENT USE OF INSULIN (HCC): Primary | ICD-10-CM

## 2017-12-21 DIAGNOSIS — E78.5 DYSLIPIDEMIA: ICD-10-CM

## 2017-12-21 DIAGNOSIS — I10 ESSENTIAL HYPERTENSION: ICD-10-CM

## 2017-12-21 PROBLEM — L03.115 CELLULITIS OF RIGHT LOWER EXTREMITY: Status: RESOLVED | Noted: 2017-06-09 | Resolved: 2017-12-21

## 2017-12-21 LAB
ANION GAP SERPL CALCULATED.3IONS-SCNC: 15 MMOL/L (ref 3–16)
BUN BLDV-MCNC: 17 MG/DL (ref 7–20)
CALCIUM SERPL-MCNC: 10.3 MG/DL (ref 8.3–10.6)
CHLORIDE BLD-SCNC: 100 MMOL/L (ref 99–110)
CHOLESTEROL, TOTAL: 218 MG/DL (ref 0–199)
CO2: 28 MMOL/L (ref 21–32)
CREAT SERPL-MCNC: 0.9 MG/DL (ref 0.8–1.3)
GFR AFRICAN AMERICAN: >60
GFR NON-AFRICAN AMERICAN: >60
GLUCOSE BLD-MCNC: 100 MG/DL
GLUCOSE BLD-MCNC: 87 MG/DL (ref 70–99)
HBA1C MFR BLD: 5.9 %
HCT VFR BLD CALC: 42.2 % (ref 40.5–52.5)
HDLC SERPL-MCNC: 115 MG/DL (ref 40–60)
HEMOGLOBIN: 13.9 G/DL (ref 13.5–17.5)
LDL CHOLESTEROL CALCULATED: 87 MG/DL
MCH RBC QN AUTO: 33.3 PG (ref 26–34)
MCHC RBC AUTO-ENTMCNC: 33 G/DL (ref 31–36)
MCV RBC AUTO: 101.1 FL (ref 80–100)
PDW BLD-RTO: 15.9 % (ref 12.4–15.4)
PLATELET # BLD: 216 K/UL (ref 135–450)
PMV BLD AUTO: 7.8 FL (ref 5–10.5)
POTASSIUM SERPL-SCNC: 4.4 MMOL/L (ref 3.5–5.1)
RBC # BLD: 4.17 M/UL (ref 4.2–5.9)
SODIUM BLD-SCNC: 143 MMOL/L (ref 136–145)
TRIGL SERPL-MCNC: 82 MG/DL (ref 0–150)
VLDLC SERPL CALC-MCNC: 16 MG/DL
WBC # BLD: 5.7 K/UL (ref 4–11)

## 2017-12-21 PROCEDURE — G8427 DOCREV CUR MEDS BY ELIG CLIN: HCPCS | Performed by: NURSE PRACTITIONER

## 2017-12-21 PROCEDURE — 82962 GLUCOSE BLOOD TEST: CPT | Performed by: NURSE PRACTITIONER

## 2017-12-21 PROCEDURE — 1036F TOBACCO NON-USER: CPT | Performed by: NURSE PRACTITIONER

## 2017-12-21 PROCEDURE — G8484 FLU IMMUNIZE NO ADMIN: HCPCS | Performed by: NURSE PRACTITIONER

## 2017-12-21 PROCEDURE — 99214 OFFICE O/P EST MOD 30 MIN: CPT | Performed by: NURSE PRACTITIONER

## 2017-12-21 PROCEDURE — G8417 CALC BMI ABV UP PARAM F/U: HCPCS | Performed by: NURSE PRACTITIONER

## 2017-12-21 PROCEDURE — 4040F PNEUMOC VAC/ADMIN/RCVD: CPT | Performed by: NURSE PRACTITIONER

## 2017-12-21 PROCEDURE — 1123F ACP DISCUSS/DSCN MKR DOCD: CPT | Performed by: NURSE PRACTITIONER

## 2017-12-21 PROCEDURE — 83036 HEMOGLOBIN GLYCOSYLATED A1C: CPT | Performed by: NURSE PRACTITIONER

## 2017-12-21 ASSESSMENT — ENCOUNTER SYMPTOMS
SHORTNESS OF BREATH: 0
GASTROINTESTINAL NEGATIVE: 1
VISUAL CHANGE: 0
EYES NEGATIVE: 1
RESPIRATORY NEGATIVE: 1

## 2017-12-21 NOTE — PATIENT INSTRUCTIONS
Dash Diet  Diet and Exercise. Drink 64 ounces of water daily. Take all medications as prescribed. Call 911 or go to the nearest emergency room for chest pain or shortness of breath. Return in 6 months f/u DM/ HTN. Patient Education        Diabetes Foot Health: Care Instructions  Your Care Instructions    When you have diabetes, your feet need extra care and attention. Diabetes can damage the nerve endings and blood vessels in your feet, making you less likely to notice when your feet are injured. Diabetes also limits your body's ability to fight infection and get blood to areas that need it. If you get a minor foot injury, it could become an ulcer or a serious infection. With good foot care, you can prevent most of these problems. Caring for your feet can be quick and easy. Most of the care can be done when you are bathing or getting ready for bed. Follow-up care is a key part of your treatment and safety. Be sure to make and go to all appointments, and call your doctor if you are having problems. It's also a good idea to know your test results and keep a list of the medicines you take. How can you care for yourself at home? · Keep your blood sugar close to normal by watching what and how much you eat, monitoring blood sugar, taking medicines if prescribed, and getting regular exercise. · Do not smoke. Smoking affects blood flow and can make foot problems worse. If you need help quitting, talk to your doctor about stop-smoking programs and medicines. These can increase your chances of quitting for good. · Eat a diet that is low in fats. High fat intake can cause fat to build up in your blood vessels and decrease blood flow. · Inspect your feet daily for blisters, cuts, cracks, or sores. If you cannot see well, use a mirror or have someone help you. · Take care of your feet:  [facility] your feet every day. Use warm (not hot) water.  Check the water temperature with your wrists or other part of your body, not your feet. ¨ Dry your feet well. Pat them dry. Do not rub the skin on your feet too hard. Dry well between your toes. If the skin on your feet stays moist, bacteria or a fungus can grow, which can lead to infection. ¨ Keep your skin soft. Use moisturizing skin cream to keep the skin on your feet soft and prevent calluses and cracks. But do not put the cream between your toes, and stop using any cream that causes a rash. ¨ Clean underneath your toenails carefully. Do not use a sharp object to clean underneath your toenails. Use the blunt end of a nail file or other rounded tool. ¨ Trim and file your toenails straight across to prevent ingrown toenails. Use a nail clipper, not scissors. Use an emery board to smooth the edges. · Change socks daily. Socks without seams are best, because seams often rub the feet. You can find socks for people with diabetes from specialty catalogs. · Look inside your shoes every day for things like gravel or torn linings, which could cause blisters or sores. · Buy shoes that fit well:  ¨ Look for shoes that have plenty of space around the toes. This helps prevent bunions and blisters. ¨ Try on shoes while wearing the kind of socks you will usually wear with the shoes. ¨ Avoid plastic shoes. They may rub your feet and cause blisters. Good shoes should be made of materials that are flexible and breathable, such as leather or cloth. ¨ Break in new shoes slowly by wearing them for no more than an hour a day for several days. Take extra time to check your feet for red areas, blisters, or other problems after you wear new shoes. · Do not go barefoot. Do not wear sandals, and do not wear shoes with very thin soles. Thin soles are easy to puncture. They also do not protect your feet from hot pavement or cold weather. · Have your doctor check your feet during each visit. If you have a foot problem, see your doctor. Do not try to treat an early foot problem at home.  Home starchy vegetable in the final quarter of the plate. To this you can add a small piece of fruit and 1 cup of milk or yogurt, depending on how many carbs you are supposed to eat at a meal.  · Try to eat about the same amount of carbs at each meal. Do not \"save up\" your daily allowance of carbs to eat at one meal.  · Proteins have very little or no carbs per serving. Examples of proteins are beef, chicken, turkey, fish, eggs, tofu, cheese, cottage cheese, and peanut butter. A serving size of meat is 3 ounces, which is about the size of a deck of cards. Examples of meat substitute serving sizes (equal to 1 ounce of meat) are 1/4 cup of cottage cheese, 1 egg, 1 tablespoon of peanut butter, and ½ cup of tofu. How can you eat out and still eat healthy? · Learn to estimate the serving sizes of foods that have carbohydrate. If you measure food at home, it will be easier to estimate the amount in a serving of restaurant food. · If the meal you order has too much carbohydrate (such as potatoes, corn, or baked beans), ask to have a low-carbohydrate food instead. Ask for a salad or green vegetables. · If you use insulin, check your blood sugar before and after eating out to help you plan how much to eat in the future. · If you eat more carbohydrate at a meal than you had planned, take a walk or do other exercise. This will help lower your blood sugar. What else should you know? · Limit saturated fat, such as the fat from meat and dairy products. This is a healthy choice because people who have diabetes are at higher risk of heart disease. So choose lean cuts of meat and nonfat or low-fat dairy products. Use olive or canola oil instead of butter or shortening when cooking. · Don't skip meals. Your blood sugar may drop too low if you skip meals and take insulin or certain medicines for diabetes. · Check with your doctor before you drink alcohol. Alcohol can cause your blood sugar to drop too low.  Alcohol can also cause a sure to make and go to all appointments, and call your doctor if you are having problems. It's also a good idea to know your test results and keep a list of the medicines you take. How can you care for yourself at home? Medical treatment  · If you stop taking your medicine, your blood pressure will go back up. You may take one or more types of medicine to lower your blood pressure. Be safe with medicines. Take your medicine exactly as prescribed. Call your doctor if you think you are having a problem with your medicine. · Talk to your doctor before you start taking aspirin every day. Aspirin can help certain people lower their risk of a heart attack or stroke. But taking aspirin isn't right for everyone, because it can cause serious bleeding. · See your doctor regularly. You may need to see the doctor more often at first or until your blood pressure comes down. · If you are taking blood pressure medicine, talk to your doctor before you take decongestants or anti-inflammatory medicine, such as ibuprofen. Some of these medicines can raise blood pressure. · Learn how to check your blood pressure at home. Lifestyle changes  · Stay at a healthy weight. This is especially important if you put on weight around the waist. Losing even 10 pounds can help you lower your blood pressure. · If your doctor recommends it, get more exercise. Walking is a good choice. Bit by bit, increase the amount you walk every day. Try for at least 30 minutes on most days of the week. You also may want to swim, bike, or do other activities. · Avoid or limit alcohol. Talk to your doctor about whether you can drink any alcohol. · Try to limit how much sodium you eat to less than 2,300 milligrams (mg) a day. Your doctor may ask you to try to eat less than 1,500 mg a day. · Eat plenty of fruits (such as bananas and oranges), vegetables, legumes, whole grains, and low-fat dairy products. · Lower the amount of saturated fat in your diet. Saturated fat is found in animal products such as milk, cheese, and meat. Limiting these foods may help you lose weight and also lower your risk for heart disease. · Do not smoke. Smoking increases your risk for heart attack and stroke. If you need help quitting, talk to your doctor about stop-smoking programs and medicines. These can increase your chances of quitting for good. When should you call for help? Call 911 anytime you think you may need emergency care. This may mean having symptoms that suggest that your blood pressure is causing a serious heart or blood vessel problem. Your blood pressure may be over 180/110. ? For example, call 911 if:  ? · You have symptoms of a heart attack. These may include:  ¨ Chest pain or pressure, or a strange feeling in the chest.  ¨ Sweating. ¨ Shortness of breath. ¨ Nausea or vomiting. ¨ Pain, pressure, or a strange feeling in the back, neck, jaw, or upper belly or in one or both shoulders or arms. ¨ Lightheadedness or sudden weakness. ¨ A fast or irregular heartbeat. ? · You have symptoms of a stroke. These may include:  ¨ Sudden numbness, tingling, weakness, or loss of movement in your face, arm, or leg, especially on only one side of your body. ¨ Sudden vision changes. ¨ Sudden trouble speaking. ¨ Sudden confusion or trouble understanding simple statements. ¨ Sudden problems with walking or balance. ¨ A sudden, severe headache that is different from past headaches. ? · You have severe back or belly pain. ?Do not wait until your blood pressure comes down on its own. Get help right away. ?Call your doctor now or seek immediate care if:  ? · Your blood pressure is much higher than normal (such as 180/110 or higher), but you don't have symptoms. ? · You think high blood pressure is causing symptoms, such as:  ¨ Severe headache. ¨ Blurry vision. ? Watch closely for changes in your health, and be sure to contact your doctor if:  ? · Your blood pressure measures 140/90 or higher at least 2 times. That means the top number is 140 or higher or the bottom number is 90 or higher, or both. ? · You think you may be having side effects from your blood pressure medicine. ? · Your blood pressure is usually normal, but it goes above normal at least 2 times. Where can you learn more? Go to https://chpepiceweb.[email or web address] org and sign in to your [employer] account. Enter O032 in the [employer] box to learn more about \"High Blood Pressure: Care Instructions. \"     If you do not have an account, please click on the \"Sign Up Now\" link. Current as of: September 21, 2016  Content Version: 11.4  © 9278-9861 [employer]. Care instructions adapted under license by [facility] [employer] [facility] ([facility]). If you have questions about a medical condition or this instruction, always ask your healthcare professional. [address] any warranty or liability for your use of this information. Patient Education        High Cholesterol: Care Instructions  Your Care Instructions    Cholesterol is a type of fat in your blood. It is needed for many body functions, such as making new cells. Cholesterol is made by your body. It also comes from food you eat. High cholesterol means that you have too much of the fat in your blood. This raises your risk of a heart attack and stroke. LDL and HDL are part of your total cholesterol. LDL is the \"bad\" cholesterol. High LDL can raise your risk for heart disease, heart attack, and stroke. HDL is the \"good\" cholesterol. It helps clear bad cholesterol from the body. High HDL is linked with a lower risk of heart disease, heart attack, and stroke. Your cholesterol levels help your doctor find out your risk for having a heart attack or stroke. You and your doctor can talk about whether you need to lower your risk and what treatment is best for you.   A heart-healthy lifestyle along with medicines can help lower your cholesterol please click on the \"Sign Up Now\" link. Current as of: September 21, 2016  Content Version: 11.4  © 6682-7008 [employer]. Care instructions adapted under license by [facility] ([facility]). If you have questions about a medical condition or this instruction, always ask your healthcare professional. [address] any warranty or liability for your use of this information.

## 2017-12-21 NOTE — PROGRESS NOTES
Subjective:      Patient ID: Marky Fontenot is a 76 y.o. male who presents for for f/u HTN, Type 2 DM / Hyperlipidema. Chief Complaint   Patient presents with    Diabetes    Hypertension    Hyperlipidemia     Vitals:    12/21/17 0840   BP: 138/80   Site: Right Arm   Position: Sitting   Cuff Size: Large Adult   Weight: 266 lb (120.7 kg)   Height: 5' 10\" (1.778 m)     Current Outpatient Prescriptions   Medication Sig Dispense Refill    candesartan-hydrochlorothiazide (ATACAND HCT) 32-12.5 MG per tablet Take 1 tablet by mouth  daily 90 tablet 3    aspirin 81 MG tablet Take 81 mg by mouth daily      Multiple Vitamins-Minerals (CENTRUM SILVER ADULT 50+ PO) Take 1 tablet by mouth daily      meloxicam (MOBIC) 15 MG tablet Take 1 tablet by mouth daily      rosuvastatin (CRESTOR) 10 MG tablet TAKE 1 TABLET BY MOUTH EVERY DAY 30 tablet 5    PRILOSEC OTC 20 MG tablet Take 1 tablet daily 84 tablet 2    pioglitazone (ACTOS) 15 MG tablet TAKE 1 TABLET BY MOUTH DAILY 90 tablet 0    Cholecalciferol (VITAMIN D3) 1000 UNITS CAPS Take 1 capsule by mouth daily.  ketorolac (TORADOL) 30 MG/ML injection Inject 1 mL into the muscle once for 1 dose 1 mL 0     No current facility-administered medications for this visit. Hypertension   This is a chronic problem. The current episode started more than 1 year ago. The problem has been gradually improving since onset. The problem is controlled. Pertinent negatives include no chest pain, palpitations, peripheral edema or shortness of breath. There are no associated agents to hypertension. Risk factors for coronary artery disease include diabetes mellitus, dyslipidemia, obesity, male gender and post-menopausal state. Past treatments include angiotensin blockers and diuretics. The current treatment provides moderate improvement. There are no compliance problems. Diabetes   He presents for his follow-up diabetic visit. He has type 2 diabetes mellitus.  No MedicAlert effects of prescribed medications. Barriers to medication compliance addressed. All patient questions answered. Pt voiced understanding. Patient also educated on the importance of being compliant with routine office visits in order to assess chronic illness progression, medication regimen/side effects, and effectiveness of plan of care. Patient was able to verbalize understanding. Patient is in no distress at time of departure. Plan:       Dash Diet  Diet and Exercise. Drink 64 ounces of water daily. Take all medications as prescribed. Call 911 or go to the nearest emergency room for chest pain or shortness of breath. Labs  Return in 6 months f/u DM/ HTN.

## 2018-01-23 ENCOUNTER — TELEPHONE (OUTPATIENT)
Dept: INTERNAL MEDICINE CLINIC | Age: 78
End: 2018-01-23

## 2018-01-23 RX ORDER — OMEPRAZOLE 20 MG/1
TABLET, DELAYED RELEASE ORAL
Qty: 90 TABLET | Refills: 0 | Status: SHIPPED | OUTPATIENT
Start: 2018-01-23 | End: 2018-02-21 | Stop reason: SDUPTHER

## 2018-02-20 ENCOUNTER — TELEPHONE (OUTPATIENT)
Dept: INTERNAL MEDICINE CLINIC | Age: 78
End: 2018-02-20

## 2018-02-22 RX ORDER — OMEPRAZOLE 20 MG/1
TABLET, DELAYED RELEASE ORAL
Qty: 90 TABLET | Refills: 0 | Status: SHIPPED | OUTPATIENT
Start: 2018-02-22 | End: 2018-05-18 | Stop reason: SDUPTHER

## 2018-02-22 NOTE — TELEPHONE ENCOUNTER
Patient last seen in office on 12/21/2018. Rx refill request sent to physician. Called and inform patient hat medication will be refiiled to pharmacy once message is received. Message sent to PCP.  Please e-scribe

## 2018-03-07 ENCOUNTER — OFFICE VISIT (OUTPATIENT)
Dept: INTERNAL MEDICINE CLINIC | Age: 78
End: 2018-03-07

## 2018-03-07 VITALS
SYSTOLIC BLOOD PRESSURE: 138 MMHG | HEART RATE: 80 BPM | HEIGHT: 70 IN | BODY MASS INDEX: 39.34 KG/M2 | DIASTOLIC BLOOD PRESSURE: 78 MMHG | WEIGHT: 274.8 LBS | RESPIRATION RATE: 20 BRPM | TEMPERATURE: 98.1 F

## 2018-03-07 DIAGNOSIS — Z01.818 PRE-OPERATIVE GENERAL PHYSICAL EXAMINATION: Primary | ICD-10-CM

## 2018-03-07 DIAGNOSIS — Z01.818 PRE-OPERATIVE GENERAL PHYSICAL EXAMINATION: ICD-10-CM

## 2018-03-07 DIAGNOSIS — I10 ESSENTIAL HYPERTENSION: ICD-10-CM

## 2018-03-07 DIAGNOSIS — E11.9 TYPE 2 DIABETES MELLITUS WITHOUT COMPLICATION, WITHOUT LONG-TERM CURRENT USE OF INSULIN (HCC): ICD-10-CM

## 2018-03-07 LAB
ANION GAP SERPL CALCULATED.3IONS-SCNC: 9 MMOL/L (ref 3–16)
BUN BLDV-MCNC: 17 MG/DL (ref 7–20)
CALCIUM SERPL-MCNC: 9.1 MG/DL (ref 8.3–10.6)
CHLORIDE BLD-SCNC: 104 MMOL/L (ref 99–110)
CO2: 29 MMOL/L (ref 21–32)
CREAT SERPL-MCNC: 0.9 MG/DL (ref 0.8–1.3)
GFR AFRICAN AMERICAN: >60
GFR NON-AFRICAN AMERICAN: >60
GLUCOSE BLD-MCNC: 110 MG/DL (ref 70–99)
GLUCOSE BLD-MCNC: 153 MG/DL
HCT VFR BLD CALC: 37.8 % (ref 40.5–52.5)
HEMOGLOBIN: 13.2 G/DL (ref 13.5–17.5)
MCH RBC QN AUTO: 34.4 PG (ref 26–34)
MCHC RBC AUTO-ENTMCNC: 35 G/DL (ref 31–36)
MCV RBC AUTO: 98.3 FL (ref 80–100)
PDW BLD-RTO: 14.3 % (ref 12.4–15.4)
PLATELET # BLD: 213 K/UL (ref 135–450)
PMV BLD AUTO: 7.5 FL (ref 5–10.5)
POTASSIUM SERPL-SCNC: 4.2 MMOL/L (ref 3.5–5.1)
RBC # BLD: 3.85 M/UL (ref 4.2–5.9)
SODIUM BLD-SCNC: 142 MMOL/L (ref 136–145)
WBC # BLD: 6.7 K/UL (ref 4–11)

## 2018-03-07 PROCEDURE — G8427 DOCREV CUR MEDS BY ELIG CLIN: HCPCS | Performed by: NURSE PRACTITIONER

## 2018-03-07 PROCEDURE — G8417 CALC BMI ABV UP PARAM F/U: HCPCS | Performed by: NURSE PRACTITIONER

## 2018-03-07 PROCEDURE — 82962 GLUCOSE BLOOD TEST: CPT | Performed by: NURSE PRACTITIONER

## 2018-03-07 PROCEDURE — G8484 FLU IMMUNIZE NO ADMIN: HCPCS | Performed by: NURSE PRACTITIONER

## 2018-03-07 PROCEDURE — 4040F PNEUMOC VAC/ADMIN/RCVD: CPT | Performed by: NURSE PRACTITIONER

## 2018-03-07 PROCEDURE — 99214 OFFICE O/P EST MOD 30 MIN: CPT | Performed by: NURSE PRACTITIONER

## 2018-03-07 RX ORDER — CETIRIZINE HYDROCHLORIDE 10 MG/1
10 TABLET ORAL DAILY
COMMUNITY

## 2018-03-07 ASSESSMENT — ENCOUNTER SYMPTOMS
RESPIRATORY NEGATIVE: 1
ALLERGIC/IMMUNOLOGIC NEGATIVE: 1
CHEST TIGHTNESS: 0
BLURRED VISION: 0
APNEA: 0
COUGH: 0
GASTROINTESTINAL NEGATIVE: 1
STRIDOR: 0
EYES NEGATIVE: 1

## 2018-03-07 NOTE — PATIENT INSTRUCTIONS
DIET / EXERCISE    DRINK 2-3 CUPS OF WATER DAILY. TAKE ALL MEDICATIONS DAILY AS PRESCRIBED. PATIENT MAY PROCEED WITH SCHEDULED SURGERY AS PLANNED. RETURN IN 2 MONTHS F/U DM/ HTN. Patient Education        Diabetes Foot Health: Care Instructions  Your Care Instructions    When you have diabetes, your feet need extra care and attention. Diabetes can damage the nerve endings and blood vessels in your feet, making you less likely to notice when your feet are injured. Diabetes also limits your body's ability to fight infection and get blood to areas that need it. If you get a minor foot injury, it could become an ulcer or a serious infection. With good foot care, you can prevent most of these problems. Caring for your feet can be quick and easy. Most of the care can be done when you are bathing or getting ready for bed. Follow-up care is a key part of your treatment and safety. Be sure to make and go to all appointments, and call your doctor if you are having problems. It's also a good idea to know your test results and keep a list of the medicines you take. How can you care for yourself at home? · Keep your blood sugar close to normal by watching what and how much you eat, monitoring blood sugar, taking medicines if prescribed, and getting regular exercise. · Do not smoke. Smoking affects blood flow and can make foot problems worse. If you need help quitting, talk to your doctor about stop-smoking programs and medicines. These can increase your chances of quitting for good. · Eat a diet that is low in fats. High fat intake can cause fat to build up in your blood vessels and decrease blood flow. · Inspect your feet daily for blisters, cuts, cracks, or sores. If you cannot see well, use a mirror or have someone help you. · Take care of your feet:  [facility] your feet every day. Use warm (not hot) water. Check the water temperature with your wrists or other part of your body, not your feet.   ¨ Dry your enough insulin, too much sugar stays in your blood. If you are overweight, get little or no exercise, or have type 2 diabetes in your family, you are more likely to have problems with the way insulin works in your body.  Americans, Hispanics, Native Americans,  Americans, and Pacific Islanders have a higher risk for type 2 diabetes. Type 2 diabetes can be prevented or delayed with a healthy lifestyle, which includes staying at a healthy weight, making smart food choices, and getting regular exercise. What can you expect with type 2 diabetes? Severo Oddi keep hearing about how important it is to keep your blood sugar within a target range. That's because over time, high blood sugar can lead to serious problems. It can:  · Harm your eyes, nerves, and kidneys. · Damage your blood vessels, leading to heart disease and stroke. · Reduce blood flow and cause nerve damage to parts of your body, especially your feet. This can cause slow healing and pain when you walk. · Make your immune system weak and less able to fight infections. When people hear the word \"diabetes,\" they often think of problems like these. But daily care and treatment can help prevent or delay these problems. The goal is to keep your blood sugar in a target range. That's the best way to reduce your chance of having more problems from diabetes. What are the symptoms? Some people who have type 2 diabetes may not have any symptoms early on. Many people with the disease don't even know they have it at first. But with time, diabetes starts to cause symptoms. You experience most symptoms of type 2 diabetes when your blood sugar is either too high or too low. The most common symptoms of high blood sugar include:  · Thirst.  · Frequent urination. · Weight loss. · Blurry vision. The symptoms of low blood sugar include:  · Sweating. · Shakiness. · Weakness. · Hunger. · Confusion. How can you prevent type 2 diabetes?   The best way to prevent or delay type 2 diabetes is to adopt healthy habits, which include:  · Staying at a healthy weight. · Exercising regularly. · Eating healthy foods. How is type 2 diabetes treated? If you have type 2 diabetes, here are the most important things you can do. · Take your diabetes medicines. · Check your blood sugar as often as your doctor recommends. Also, get a hemoglobin A1c test at least every 6 months. · Try to eat a variety of foods and to spread carbohydrate throughout the day. Carbohydrate raises blood sugar higher and more quickly than any other nutrient does. Carbohydrate is found in sugar, breads and cereals, fruit, starchy vegetables such as potatoes and corn, and milk and yogurt. · Get at least 30 minutes of exercise on most days of the week. Walking is a good choice. You also may want to do other activities, such as running, swimming, cycling, or playing tennis or team sports. If your doctor says it's okay, do muscle-strengthening exercises at least 2 times a week. · See your doctor for checkups and tests on a regular schedule. · If you have high blood pressure or high cholesterol, take the medicines as prescribed by your doctor. · Do not smoke. Smoking can make health problems worse. This includes problems you might have with type 2 diabetes. If you need help quitting, talk to your doctor about stop-smoking programs and medicines. These can increase your chances of quitting for good. Follow-up care is a key part of your treatment and safety. Be sure to make and go to all appointments, and call your doctor if you are having problems. It's also a good idea to know your test results and keep a list of the medicines you take. Where can you learn more? Go to https://daniel.Fannabee. org and sign in to your Ellie account. Enter Z085 in the [employer] box to learn more about \"Learning About Type 2 Diabetes. \"     If you do not have an account, please click on the aspirin every day. Aspirin can help certain people lower their risk of a heart attack or stroke. But taking aspirin isn't right for everyone, because it can cause serious bleeding. · See your doctor regularly. You may need to see the doctor more often at first or until your blood pressure comes down. · If you are taking blood pressure medicine, talk to your doctor before you take decongestants or anti-inflammatory medicine, such as ibuprofen. Some of these medicines can raise blood pressure. · Learn how to check your blood pressure at home. Lifestyle changes  · Stay at a healthy weight. This is especially important if you put on weight around the waist. Losing even 10 pounds can help you lower your blood pressure. · If your doctor recommends it, get more exercise. Walking is a good choice. Bit by bit, increase the amount you walk every day. Try for at least 30 minutes on most days of the week. You also may want to swim, bike, or do other activities. · Avoid or limit alcohol. Talk to your doctor about whether you can drink any alcohol. · Try to limit how much sodium you eat to less than 2,300 milligrams (mg) a day. Your doctor may ask you to try to eat less than 1,500 mg a day. · Eat plenty of fruits (such as bananas and oranges), vegetables, legumes, whole grains, and low-fat dairy products. · Lower the amount of saturated fat in your diet. Saturated fat is found in animal products such as milk, cheese, and meat. Limiting these foods may help you lose weight and also lower your risk for heart disease. · Do not smoke. Smoking increases your risk for heart attack and stroke. If you need help quitting, talk to your doctor about stop-smoking programs and medicines. These can increase your chances of quitting for good. When should you call for help? Call 911 anytime you think you may need emergency care.  This may mean having symptoms that suggest that your blood pressure is causing a serious heart or blood exercise on most days of the week. Walking is a good choice. You also may want to do other activities, such as running, swimming, cycling, or playing tennis or team sports. · Stay at a healthy weight. Lose weight if you need to. · Don't smoke. If you need help quitting, talk to your doctor about stop-smoking programs and medicines. These can increase your chances of quitting for good. How is high cholesterol treated? The goal of treatment is to reduce your chances of having a heart attack or stroke. The goal is not to lower your cholesterol numbers only. · You may make lifestyle changes, such as eating healthy foods, not smoking, losing weight, and being more active. · You may have to take medicine. Follow-up care is a key part of your treatment and safety. Be sure to make and go to all appointments, and call your doctor if you are having problems. It's also a good idea to know your test results and keep a list of the medicines you take. Where can you learn more? Go to https://Driverdo.[employer]. org and sign in to your [email or web address] account. Enter I295 in the [employer] box to learn more about \"Learning About High Cholesterol. \"     If you do not have an account, please click on the \"Sign Up Now\" link. Current as of: September 21, 2016  Content Version: 11.5  © 6410-2993 Healthwise, Incorporated. Care instructions adapted under license by [facility] ([facility]). If you have questions about a medical condition or this instruction, always ask your healthcare professional. [place] [address] any warranty or liability for your use of this information.

## 2018-03-07 NOTE — PROGRESS NOTES
well-developed and well-nourished. Non-toxic appearance. He does not have a sickly appearance. He does not appear ill. No distress. He is not intubated. HENT:   Head: Normocephalic and atraumatic. Right Ear: Hearing, tympanic membrane, external ear and ear canal normal.   Left Ear: Hearing, tympanic membrane, external ear and ear canal normal.   Nose: Right sinus exhibits no maxillary sinus tenderness and no frontal sinus tenderness. Left sinus exhibits no maxillary sinus tenderness and no frontal sinus tenderness. Mouth/Throat: Uvula is midline, oropharynx is clear and moist and mucous membranes are normal. Mucous membranes are not dry and not cyanotic. Eyes: Conjunctivae, EOM and lids are normal. Lids are everted and swept, no foreign bodies found. No foreign body present in the right eye. No foreign body present in the left eye. Neck: Trachea normal, normal range of motion, full passive range of motion without pain and phonation normal. Neck supple. No tracheal tenderness present. No Kernig's sign noted. No thyroid mass and no thyromegaly present. Cardiovascular: Normal rate, regular rhythm, S1 normal, S2 normal, normal heart sounds, intact distal pulses and normal pulses. Exam reveals no distant heart sounds, no friction rub and no decreased pulses. Pulmonary/Chest: Effort normal and breath sounds normal. No accessory muscle usage. No apnea, no tachypnea and no bradypnea. He is not intubated. No respiratory distress. He has no decreased breath sounds. He has no wheezes. He has no rhonchi. He has no rales. Abdominal: Soft. Normal appearance and bowel sounds are normal. There is no hepatosplenomegaly, splenomegaly or hepatomegaly. There is no tenderness. There is no CVA tenderness. No hernia. Hernia confirmed negative in the ventral area.    Musculoskeletal:        Right shoulder: Normal.        Left shoulder: Normal.        Right knee: Normal.        Left knee: Normal.   Lymphadenopathy:     He

## 2018-03-09 DIAGNOSIS — E11.9 TYPE 2 DIABETES MELLITUS WITHOUT COMPLICATION, WITHOUT LONG-TERM CURRENT USE OF INSULIN (HCC): Primary | ICD-10-CM

## 2018-03-09 RX ORDER — LANCETS 28 GAUGE
EACH MISCELLANEOUS
Qty: 100 EACH | Refills: 3 | Status: SHIPPED | OUTPATIENT
Start: 2018-03-09 | End: 2020-12-02 | Stop reason: SDUPTHER

## 2018-03-09 RX ORDER — BLOOD-GLUCOSE METER
KIT MISCELLANEOUS
Qty: 1 DEVICE | Refills: 0 | Status: SHIPPED | OUTPATIENT
Start: 2018-03-09

## 2018-05-21 RX ORDER — OMEPRAZOLE 20 MG/1
CAPSULE, DELAYED RELEASE ORAL
Qty: 90 CAPSULE | Refills: 0 | Status: SHIPPED | OUTPATIENT
Start: 2018-05-21 | End: 2018-08-15 | Stop reason: SDUPTHER

## 2018-06-19 ENCOUNTER — OFFICE VISIT (OUTPATIENT)
Dept: INTERNAL MEDICINE CLINIC | Age: 78
End: 2018-06-19

## 2018-06-19 VITALS
OXYGEN SATURATION: 94 % | SYSTOLIC BLOOD PRESSURE: 130 MMHG | HEIGHT: 70 IN | DIASTOLIC BLOOD PRESSURE: 84 MMHG | HEART RATE: 88 BPM | BODY MASS INDEX: 39.37 KG/M2 | WEIGHT: 275 LBS

## 2018-06-19 DIAGNOSIS — E11.9 TYPE 2 DIABETES MELLITUS WITHOUT COMPLICATION, WITHOUT LONG-TERM CURRENT USE OF INSULIN (HCC): Primary | ICD-10-CM

## 2018-06-19 DIAGNOSIS — Z23 NEED FOR PROPHYLACTIC VACCINATION AGAINST DIPHTHERIA-TETANUS-PERTUSSIS (DTP): ICD-10-CM

## 2018-06-19 DIAGNOSIS — Z23 NEED FOR PROPHYLACTIC VACCINATION AND INOCULATION AGAINST VARICELLA: ICD-10-CM

## 2018-06-19 DIAGNOSIS — E78.2 MIXED HYPERLIPIDEMIA: ICD-10-CM

## 2018-06-19 DIAGNOSIS — J30.89 OTHER ALLERGIC RHINITIS: ICD-10-CM

## 2018-06-19 DIAGNOSIS — I10 ESSENTIAL HYPERTENSION: ICD-10-CM

## 2018-06-19 LAB
GLUCOSE BLD-MCNC: 117 MG/DL
HBA1C MFR BLD: 5.8 %

## 2018-06-19 PROCEDURE — 1036F TOBACCO NON-USER: CPT | Performed by: INTERNAL MEDICINE

## 2018-06-19 PROCEDURE — 1123F ACP DISCUSS/DSCN MKR DOCD: CPT | Performed by: INTERNAL MEDICINE

## 2018-06-19 PROCEDURE — G8427 DOCREV CUR MEDS BY ELIG CLIN: HCPCS | Performed by: INTERNAL MEDICINE

## 2018-06-19 PROCEDURE — 83036 HEMOGLOBIN GLYCOSYLATED A1C: CPT | Performed by: INTERNAL MEDICINE

## 2018-06-19 PROCEDURE — G8417 CALC BMI ABV UP PARAM F/U: HCPCS | Performed by: INTERNAL MEDICINE

## 2018-06-19 PROCEDURE — 99214 OFFICE O/P EST MOD 30 MIN: CPT | Performed by: INTERNAL MEDICINE

## 2018-06-19 PROCEDURE — 82962 GLUCOSE BLOOD TEST: CPT | Performed by: INTERNAL MEDICINE

## 2018-06-19 PROCEDURE — 4040F PNEUMOC VAC/ADMIN/RCVD: CPT | Performed by: INTERNAL MEDICINE

## 2018-06-19 ASSESSMENT — PATIENT HEALTH QUESTIONNAIRE - PHQ9
SUM OF ALL RESPONSES TO PHQ9 QUESTIONS 1 & 2: 0
1. LITTLE INTEREST OR PLEASURE IN DOING THINGS: 0
2. FEELING DOWN, DEPRESSED OR HOPELESS: 0
SUM OF ALL RESPONSES TO PHQ QUESTIONS 1-9: 0

## 2018-06-20 ASSESSMENT — ENCOUNTER SYMPTOMS
EYES NEGATIVE: 1
GASTROINTESTINAL NEGATIVE: 1

## 2018-08-16 RX ORDER — OMEPRAZOLE 20 MG/1
CAPSULE, DELAYED RELEASE ORAL
Qty: 90 CAPSULE | Refills: 0 | Status: SHIPPED | OUTPATIENT
Start: 2018-08-16 | End: 2018-11-14 | Stop reason: SDUPTHER

## 2018-08-27 RX ORDER — CANDESARTAN CILEXETIL AND HYDROCHLOROTHIAZIDE 32; 12.5 MG/1; MG/1
TABLET ORAL
Qty: 120 TABLET | Refills: 1 | Status: SHIPPED | OUTPATIENT
Start: 2018-08-27 | End: 2019-04-14 | Stop reason: SDUPTHER

## 2018-09-12 ENCOUNTER — OFFICE VISIT (OUTPATIENT)
Dept: INTERNAL MEDICINE CLINIC | Age: 78
End: 2018-09-12

## 2018-09-12 VITALS
HEART RATE: 68 BPM | SYSTOLIC BLOOD PRESSURE: 118 MMHG | TEMPERATURE: 98.4 F | BODY MASS INDEX: 38.82 KG/M2 | HEIGHT: 70 IN | WEIGHT: 271.2 LBS | DIASTOLIC BLOOD PRESSURE: 70 MMHG

## 2018-09-12 DIAGNOSIS — I87.2 CHRONIC VENOUS INSUFFICIENCY: ICD-10-CM

## 2018-09-12 DIAGNOSIS — L03.115 CELLULITIS OF RIGHT LOWER EXTREMITY: Primary | ICD-10-CM

## 2018-09-12 DIAGNOSIS — Z71.3 DIETARY COUNSELING: ICD-10-CM

## 2018-09-12 DIAGNOSIS — E11.9 TYPE 2 DIABETES MELLITUS WITHOUT COMPLICATION, WITHOUT LONG-TERM CURRENT USE OF INSULIN (HCC): ICD-10-CM

## 2018-09-12 LAB — GLUCOSE BLD-MCNC: 168 MG/DL

## 2018-09-12 PROCEDURE — 1123F ACP DISCUSS/DSCN MKR DOCD: CPT | Performed by: NURSE PRACTITIONER

## 2018-09-12 PROCEDURE — 1101F PT FALLS ASSESS-DOCD LE1/YR: CPT | Performed by: NURSE PRACTITIONER

## 2018-09-12 PROCEDURE — G8417 CALC BMI ABV UP PARAM F/U: HCPCS | Performed by: NURSE PRACTITIONER

## 2018-09-12 PROCEDURE — 82962 GLUCOSE BLOOD TEST: CPT | Performed by: NURSE PRACTITIONER

## 2018-09-12 PROCEDURE — G8427 DOCREV CUR MEDS BY ELIG CLIN: HCPCS | Performed by: NURSE PRACTITIONER

## 2018-09-12 PROCEDURE — 1036F TOBACCO NON-USER: CPT | Performed by: NURSE PRACTITIONER

## 2018-09-12 PROCEDURE — 4040F PNEUMOC VAC/ADMIN/RCVD: CPT | Performed by: NURSE PRACTITIONER

## 2018-09-12 PROCEDURE — 99213 OFFICE O/P EST LOW 20 MIN: CPT | Performed by: NURSE PRACTITIONER

## 2018-09-12 RX ORDER — CEPHALEXIN 500 MG/1
500 CAPSULE ORAL 3 TIMES DAILY
Qty: 30 CAPSULE | Refills: 0 | Status: ON HOLD | OUTPATIENT
Start: 2018-09-12 | End: 2020-11-14 | Stop reason: CLARIF

## 2018-09-12 RX ORDER — SULFAMETHOXAZOLE AND TRIMETHOPRIM 800; 160 MG/1; MG/1
1 TABLET ORAL 2 TIMES DAILY
Qty: 20 TABLET | Refills: 0 | Status: SHIPPED | OUTPATIENT
Start: 2018-09-12 | End: 2018-09-22

## 2018-09-12 ASSESSMENT — ENCOUNTER SYMPTOMS: RESPIRATORY NEGATIVE: 1

## 2018-09-12 NOTE — PROGRESS NOTES
(ZYRTEC) 10 MG tablet Take 10 mg by mouth daily      aspirin 81 MG tablet Take 81 mg by mouth daily      Multiple Vitamins-Minerals (CENTRUM SILVER ADULT 50+ PO) Take 1 tablet by mouth daily      meloxicam (MOBIC) 15 MG tablet Take 1 tablet by mouth daily      rosuvastatin (CRESTOR) 10 MG tablet TAKE 1 TABLET BY MOUTH EVERY DAY 30 tablet 5    pioglitazone (ACTOS) 15 MG tablet TAKE 1 TABLET BY MOUTH DAILY 90 tablet 0    Cholecalciferol (VITAMIN D3) 1000 UNITS CAPS Take 1 capsule by mouth daily.  Blood Glucose Monitoring Suppl (FREESTYLE LITE) LISA Test twice daily 1 Device 0    ketorolac (TORADOL) 30 MG/ML injection Inject 1 mL into the muscle once for 1 dose 1 mL 0     No current facility-administered medications for this visit. Orders Placed This Encounter   Procedures   Yayo Zendejas MD     Referral Priority:   Routine     Referral Type:   Consult for Advice and Opinion     Referral Reason:   Specialty Services Required     Referred to Provider:   Chirag Watts MD     Requested Specialty:   Vascular Surgery     Number of Visits Requested:   1    POCT Glucose     HPI    Review of Systems   Constitutional: Negative. Respiratory: Negative. Endocrine: Negative for polydipsia, polyphagia and polyuria. Type 2 DM,  mg/dL. Results reviewed and discussed with patient during OV. Patient verbalized understanding. All other systems reviewed and are negative. Objective:   Physical Exam   Constitutional: He is oriented to person, place, and time. Vital signs are normal. He appears well-developed and well-nourished. He is active. Non-toxic appearance. He does not have a sickly appearance. He does not appear ill. No distress. He is not intubated. Cardiovascular: Normal rate, regular rhythm, S1 normal, S2 normal and normal heart sounds. Pulmonary/Chest: Effort normal and breath sounds normal. No apnea, no tachypnea and no bradypnea. He is not intubated.

## 2018-09-12 NOTE — PATIENT INSTRUCTIONS
DIET / EXERCISE    DRINK 64 OUNCES OF WATER DAILY. TAKE ALL MEDICATIONS DAILY AS PRESCRIBED. TAKE ALL ANTIBIOTICS DAILY AS PRESCRIBED AND UNTIL ALL GONE. WEAR SUPPORT HOSE AD ADVISED PER VASCULAR DOCTOR. PATIENT TO SCHEDULE APPOINTMENT WITH VASCULAR DOCTOR. CALL 911 OR GO TO THE NEAREST EMERGENCY ROOM FOR CHEST PAIN / SHORTNESS OF BREATH. RETURN IN 1 MONTH FOR F/U HTN / DM  / AS NEEDED. Patient Education        Cellulitis: Care Instructions  Your Care Instructions    Cellulitis is a skin infection caused by bacteria, most often strep or staph. It often occurs after a break in the skin from a scrape, cut, bite, or puncture, or after a rash. Cellulitis may be treated without doing tests to find out what caused it. But your doctor may do tests, if needed, to look for a specific bacteria, like methicillin-resistant Staphylococcus aureus (MRSA). The doctor has checked you carefully, but problems can develop later. If you notice any problems or new symptoms, get medical treatment right away. Follow-up care is a key part of your treatment and safety. Be sure to make and go to all appointments, and call your doctor if you are having problems. It's also a good idea to know your test results and keep a list of the medicines you take. How can you care for yourself at home? · Take your antibiotics as directed. Do not stop taking them just because you feel better. You need to take the full course of antibiotics. · Prop up the infected area on pillows to reduce pain and swelling. Try to keep the area above the level of your heart as often as you can. · If your doctor told you how to care for your wound, follow your doctor's instructions. If you did not get instructions, follow this general advice:  ¨ Wash the wound with clean water 2 times a day. Don't use hydrogen peroxide or alcohol, which can slow healing.   ¨ You may cover the wound with a thin layer of petroleum jelly, such as Vaseline, and a nonstick bandage. ¨ Apply more petroleum jelly and replace the bandage as needed. · Be safe with medicines. Take pain medicines exactly as directed. ¨ If the doctor gave you a prescription medicine for pain, take it as prescribed. ¨ If you are not taking a prescription pain medicine, ask your doctor if you can take an over-the-counter medicine. To prevent cellulitis in the future  · Try to prevent cuts, scrapes, or other injuries to your skin. Cellulitis most often occurs where there is a break in the skin. · If you get a scrape, cut, mild burn, or bite, wash the wound with clean water as soon as you can to help avoid infection. Don't use hydrogen peroxide or alcohol, which can slow healing. · If you have swelling in your legs (edema), support stockings and good skin care may help prevent leg sores and cellulitis. · Take care of your feet, especially if you have diabetes or other conditions that increase the risk of infection. Wear shoes and socks. Do not go barefoot. If you have athlete's foot or other skin problems on your feet, talk to your doctor about how to treat them. When should you call for help? Call your doctor now or seek immediate medical care if:    · You have signs that your infection is getting worse, such as:  ¨ Increased pain, swelling, warmth, or redness. ¨ Red streaks leading from the area. ¨ Pus draining from the area. ¨ A fever.     · You get a rash.    Watch closely for changes in your health, and be sure to contact your doctor if:    · You do not get better as expected. Where can you learn more? Go to https://nodilamarco.PhotoSolar. org and sign in to your TaleSpring account. Enter P345 in the [facility] box to learn more about \"Cellulitis: Care Instructions. \"     If you do not have an account, please click on the \"Sign Up Now\" link. Current as of: May 10, 2017  Content Version: 11.7  © 4541-6470 [employer] Inc[employer].  Care instructions adapted under license by [facility] [address] If you have questions about a medical condition or this instruction, always ask your healthcare professional. [place] [address] any warranty or liability for your use of this information. Patient Education        Learning About Diabetes Food Guidelines  Your Care Instructions    Meal planning is important to manage diabetes. It helps keep your blood sugar at a target level (which you set with your doctor). You don't have to eat special foods. You can eat what your family eats, including sweets once in a while. But you do have to pay attention to how often you eat and how much you eat of certain foods. You may want to work with a dietitian or a certified diabetes educator (CDE) to help you plan meals and snacks. A dietitian or CDE can also help you lose weight if that is one of your goals. What should you know about eating carbs? Managing the amount of carbohydrate (carbs) you eat is an important part of healthy meals when you have diabetes. Carbohydrate is found in many foods. · Learn which foods have carbs. And learn the amounts of carbs in different foods. ¨ Bread, cereal, pasta, and rice have about 15 grams of carbs in a serving. A serving is 1 slice of bread (1 ounce), ½ cup of cooked cereal, or 1/3 cup of cooked pasta or rice. ¨ Fruits have 15 grams of carbs in a serving. A serving is 1 small fresh fruit, such as an apple or orange; ½ of a banana; ½ cup of cooked or canned fruit; ½ cup of fruit juice; 1 cup of melon or raspberries; or 2 tablespoons of dried fruit. ¨ Milk and no-sugar-added yogurt have 15 grams of carbs in a serving. A serving is 1 cup of milk or 2/3 cup of no-sugar-added yogurt. ¨ Starchy vegetables have 15 grams of carbs in a serving. A serving is ½ cup of mashed potatoes or sweet potato; 1 cup winter squash; ½ of a small baked potato; ½ cup of cooked beans; or ½ cup cooked corn or green peas.   · Learn how much carbs to eat each day and

## 2018-10-09 ENCOUNTER — OFFICE VISIT (OUTPATIENT)
Dept: VASCULAR SURGERY | Age: 78
End: 2018-10-09
Payer: MEDICARE

## 2018-10-09 VITALS
SYSTOLIC BLOOD PRESSURE: 142 MMHG | WEIGHT: 269 LBS | HEIGHT: 70 IN | DIASTOLIC BLOOD PRESSURE: 72 MMHG | BODY MASS INDEX: 38.51 KG/M2

## 2018-10-09 DIAGNOSIS — I87.2 CHRONIC VENOUS INSUFFICIENCY: Primary | ICD-10-CM

## 2018-10-09 PROCEDURE — 1101F PT FALLS ASSESS-DOCD LE1/YR: CPT | Performed by: SURGERY

## 2018-10-09 PROCEDURE — 99203 OFFICE O/P NEW LOW 30 MIN: CPT | Performed by: SURGERY

## 2018-10-09 PROCEDURE — G8427 DOCREV CUR MEDS BY ELIG CLIN: HCPCS | Performed by: SURGERY

## 2018-10-09 PROCEDURE — 1123F ACP DISCUSS/DSCN MKR DOCD: CPT | Performed by: SURGERY

## 2018-10-09 PROCEDURE — 4040F PNEUMOC VAC/ADMIN/RCVD: CPT | Performed by: SURGERY

## 2018-10-09 PROCEDURE — G8484 FLU IMMUNIZE NO ADMIN: HCPCS | Performed by: SURGERY

## 2018-10-09 PROCEDURE — 1036F TOBACCO NON-USER: CPT | Performed by: SURGERY

## 2018-10-09 PROCEDURE — G8417 CALC BMI ABV UP PARAM F/U: HCPCS | Performed by: SURGERY

## 2018-10-09 RX ORDER — SULFAMETHOXAZOLE AND TRIMETHOPRIM 800; 160 MG/1; MG/1
1 TABLET ORAL 2 TIMES DAILY
Status: ON HOLD | COMMUNITY
End: 2020-11-14 | Stop reason: CLARIF

## 2018-10-09 NOTE — PROGRESS NOTES
over the right lower extremity. No significant stasis dermatitis noted over the left lower extremity. He does have evidence of sloughing skin of the right leg consistent with resolving infection. No ulceration. Palpable distal pulses. Trace to 1+ edema. MEDICAL DECISION MAKING/TESTING    10/10/17:    1. No deep vein thrombosis detected in the veins of the right lower       extremity.    2. No deep vein thrombosis detected in the veins of the left lower extremity.    3. Enlarged infrainguinal lymphnodes are noted in the right groin.    4. No superficial venous thrombus detected in the veins of the right lower       extremity.    5. No superficial venous thrombus detected in the veins of the left lower       extremity.    6. 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    7. Valvular incompetence with reflux identified in the left sapheno-femoral       junction, common femoral, femoral and popliteal veins.    8. There is reflux identified in the right great saphenous vein from SFJ       through zone 8.    9. There is reflux identified in the left great saphenous vein from 5-8. 10. Competent perforators are seen with out reflux in the bilateral lower       extremity. 11. No reflux is identified in either the right or left short saphenous vein.       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     Labs:    BMP:   Lab Results   Component Value Date     03/07/2018    K 4.2 03/07/2018     03/07/2018    CO2 29 03/07/2018    PHOS 3.1 01/23/2015    BUN 17 03/07/2018    CREATININE 0.9 03/07/2018      No components found for: Kassie Fillers results found for: MG   CBC:   Lab Results   Component Value Date    WBC 6.7 03/07/2018    HGB 13.2 03/07/2018    HCT 37.8 03/07/2018    MCV 98.3 03/07/2018     03/07/2018      Coagulation:   Lab Results   Component Value Date    INR 1.04 03/06/2015

## 2018-11-15 RX ORDER — OMEPRAZOLE 20 MG/1
CAPSULE, DELAYED RELEASE ORAL
Qty: 90 CAPSULE | Refills: 0 | Status: SHIPPED | OUTPATIENT
Start: 2018-11-15 | End: 2019-02-15 | Stop reason: SDUPTHER

## 2018-12-19 ENCOUNTER — OFFICE VISIT (OUTPATIENT)
Dept: INTERNAL MEDICINE CLINIC | Age: 78
End: 2018-12-19
Payer: MEDICARE

## 2018-12-19 VITALS
OXYGEN SATURATION: 96 % | HEIGHT: 70 IN | BODY MASS INDEX: 38.37 KG/M2 | SYSTOLIC BLOOD PRESSURE: 124 MMHG | WEIGHT: 268 LBS | DIASTOLIC BLOOD PRESSURE: 86 MMHG | HEART RATE: 78 BPM

## 2018-12-19 DIAGNOSIS — E11.9 TYPE 2 DIABETES MELLITUS WITHOUT COMPLICATION, WITHOUT LONG-TERM CURRENT USE OF INSULIN (HCC): ICD-10-CM

## 2018-12-19 DIAGNOSIS — J30.89 OTHER ALLERGIC RHINITIS: ICD-10-CM

## 2018-12-19 DIAGNOSIS — E78.2 MIXED HYPERLIPIDEMIA: ICD-10-CM

## 2018-12-19 DIAGNOSIS — Z23 FLU VACCINE NEED: ICD-10-CM

## 2018-12-19 DIAGNOSIS — E11.9 TYPE 2 DIABETES MELLITUS WITHOUT COMPLICATION, WITHOUT LONG-TERM CURRENT USE OF INSULIN (HCC): Primary | ICD-10-CM

## 2018-12-19 DIAGNOSIS — I10 ESSENTIAL HYPERTENSION: ICD-10-CM

## 2018-12-19 LAB
ANION GAP SERPL CALCULATED.3IONS-SCNC: 14 MMOL/L (ref 3–16)
BUN BLDV-MCNC: 14 MG/DL (ref 7–20)
CALCIUM SERPL-MCNC: 9.9 MG/DL (ref 8.3–10.6)
CHLORIDE BLD-SCNC: 96 MMOL/L (ref 99–110)
CHOLESTEROL, TOTAL: 217 MG/DL (ref 0–199)
CO2: 29 MMOL/L (ref 21–32)
CREAT SERPL-MCNC: 0.8 MG/DL (ref 0.8–1.3)
CREATININE URINE: 162.4 MG/DL (ref 39–259)
GFR AFRICAN AMERICAN: >60
GFR NON-AFRICAN AMERICAN: >60
GLUCOSE BLD-MCNC: 118 MG/DL (ref 70–99)
GLUCOSE BLD-MCNC: 125 MG/DL
HBA1C MFR BLD: 5.8 %
HDLC SERPL-MCNC: 115 MG/DL (ref 40–60)
LDL CHOLESTEROL CALCULATED: 79 MG/DL
MICROALBUMIN UR-MCNC: 18 MG/DL
MICROALBUMIN/CREAT UR-RTO: 110.8 MG/G (ref 0–30)
POTASSIUM SERPL-SCNC: 4 MMOL/L (ref 3.5–5.1)
SODIUM BLD-SCNC: 139 MMOL/L (ref 136–145)
TRIGL SERPL-MCNC: 116 MG/DL (ref 0–150)
VLDLC SERPL CALC-MCNC: 23 MG/DL

## 2018-12-19 PROCEDURE — 83036 HEMOGLOBIN GLYCOSYLATED A1C: CPT | Performed by: INTERNAL MEDICINE

## 2018-12-19 PROCEDURE — 99214 OFFICE O/P EST MOD 30 MIN: CPT | Performed by: INTERNAL MEDICINE

## 2018-12-19 PROCEDURE — 1123F ACP DISCUSS/DSCN MKR DOCD: CPT | Performed by: INTERNAL MEDICINE

## 2018-12-19 PROCEDURE — 1101F PT FALLS ASSESS-DOCD LE1/YR: CPT | Performed by: INTERNAL MEDICINE

## 2018-12-19 PROCEDURE — 4040F PNEUMOC VAC/ADMIN/RCVD: CPT | Performed by: INTERNAL MEDICINE

## 2018-12-19 PROCEDURE — G8417 CALC BMI ABV UP PARAM F/U: HCPCS | Performed by: INTERNAL MEDICINE

## 2018-12-19 PROCEDURE — G8482 FLU IMMUNIZE ORDER/ADMIN: HCPCS | Performed by: INTERNAL MEDICINE

## 2018-12-19 PROCEDURE — G8427 DOCREV CUR MEDS BY ELIG CLIN: HCPCS | Performed by: INTERNAL MEDICINE

## 2018-12-19 PROCEDURE — 1036F TOBACCO NON-USER: CPT | Performed by: INTERNAL MEDICINE

## 2018-12-19 PROCEDURE — 82962 GLUCOSE BLOOD TEST: CPT | Performed by: INTERNAL MEDICINE

## 2018-12-19 PROCEDURE — 90662 IIV NO PRSV INCREASED AG IM: CPT | Performed by: INTERNAL MEDICINE

## 2018-12-19 PROCEDURE — G0008 ADMIN INFLUENZA VIRUS VAC: HCPCS | Performed by: INTERNAL MEDICINE

## 2018-12-19 ASSESSMENT — PATIENT HEALTH QUESTIONNAIRE - PHQ9
SUM OF ALL RESPONSES TO PHQ QUESTIONS 1-9: 0
SUM OF ALL RESPONSES TO PHQ QUESTIONS 1-9: 0
1. LITTLE INTEREST OR PLEASURE IN DOING THINGS: 0
SUM OF ALL RESPONSES TO PHQ9 QUESTIONS 1 & 2: 0
2. FEELING DOWN, DEPRESSED OR HOPELESS: 0

## 2018-12-28 ASSESSMENT — ENCOUNTER SYMPTOMS
GASTROINTESTINAL NEGATIVE: 1
EYES NEGATIVE: 1

## 2019-02-15 RX ORDER — OMEPRAZOLE 20 MG/1
CAPSULE, DELAYED RELEASE ORAL
Qty: 90 CAPSULE | Refills: 0 | Status: SHIPPED | OUTPATIENT
Start: 2019-02-15 | End: 2019-05-16 | Stop reason: SDUPTHER

## 2019-04-15 RX ORDER — CANDESARTAN CILEXETIL AND HYDROCHLOROTHIAZIDE 32; 12.5 MG/1; MG/1
TABLET ORAL
Qty: 120 TABLET | Refills: 1 | Status: SHIPPED | OUTPATIENT
Start: 2019-04-15 | End: 2020-01-09

## 2019-05-20 RX ORDER — OMEPRAZOLE 20 MG/1
CAPSULE, DELAYED RELEASE ORAL
Qty: 90 CAPSULE | Refills: 0 | Status: SHIPPED | OUTPATIENT
Start: 2019-05-20 | End: 2019-08-13 | Stop reason: SDUPTHER

## 2019-06-19 ENCOUNTER — OFFICE VISIT (OUTPATIENT)
Dept: INTERNAL MEDICINE CLINIC | Age: 79
End: 2019-06-19
Payer: MEDICARE

## 2019-06-19 VITALS
DIASTOLIC BLOOD PRESSURE: 86 MMHG | BODY MASS INDEX: 40.83 KG/M2 | HEART RATE: 87 BPM | OXYGEN SATURATION: 96 % | SYSTOLIC BLOOD PRESSURE: 132 MMHG | HEIGHT: 68 IN | WEIGHT: 269.4 LBS

## 2019-06-19 DIAGNOSIS — N52.01 ERECTILE DYSFUNCTION DUE TO ARTERIAL INSUFFICIENCY: ICD-10-CM

## 2019-06-19 DIAGNOSIS — R80.8 OTHER PROTEINURIA: ICD-10-CM

## 2019-06-19 DIAGNOSIS — E11.9 TYPE 2 DIABETES MELLITUS WITHOUT COMPLICATION, WITHOUT LONG-TERM CURRENT USE OF INSULIN (HCC): ICD-10-CM

## 2019-06-19 DIAGNOSIS — E11.9 TYPE 2 DIABETES MELLITUS WITHOUT COMPLICATION, WITHOUT LONG-TERM CURRENT USE OF INSULIN (HCC): Primary | ICD-10-CM

## 2019-06-19 DIAGNOSIS — E78.2 MIXED HYPERLIPIDEMIA: ICD-10-CM

## 2019-06-19 DIAGNOSIS — J30.89 OTHER ALLERGIC RHINITIS: ICD-10-CM

## 2019-06-19 LAB
CHP ED QC CHECK: NORMAL
CREATININE URINE: 163.2 MG/DL (ref 39–259)
GLUCOSE BLD-MCNC: 106 MG/DL
HBA1C MFR BLD: 5.7 %
MICROALBUMIN UR-MCNC: 14.8 MG/DL
MICROALBUMIN/CREAT UR-RTO: 90.7 MG/G (ref 0–30)

## 2019-06-19 PROCEDURE — G8427 DOCREV CUR MEDS BY ELIG CLIN: HCPCS | Performed by: INTERNAL MEDICINE

## 2019-06-19 PROCEDURE — 1123F ACP DISCUSS/DSCN MKR DOCD: CPT | Performed by: INTERNAL MEDICINE

## 2019-06-19 PROCEDURE — 82962 GLUCOSE BLOOD TEST: CPT | Performed by: INTERNAL MEDICINE

## 2019-06-19 PROCEDURE — G8417 CALC BMI ABV UP PARAM F/U: HCPCS | Performed by: INTERNAL MEDICINE

## 2019-06-19 PROCEDURE — 99214 OFFICE O/P EST MOD 30 MIN: CPT | Performed by: INTERNAL MEDICINE

## 2019-06-19 PROCEDURE — 83036 HEMOGLOBIN GLYCOSYLATED A1C: CPT | Performed by: INTERNAL MEDICINE

## 2019-06-19 PROCEDURE — 4040F PNEUMOC VAC/ADMIN/RCVD: CPT | Performed by: INTERNAL MEDICINE

## 2019-06-19 PROCEDURE — 1036F TOBACCO NON-USER: CPT | Performed by: INTERNAL MEDICINE

## 2019-06-19 ASSESSMENT — PATIENT HEALTH QUESTIONNAIRE - PHQ9
SUM OF ALL RESPONSES TO PHQ QUESTIONS 1-9: 0
2. FEELING DOWN, DEPRESSED OR HOPELESS: 0
1. LITTLE INTEREST OR PLEASURE IN DOING THINGS: 0
SUM OF ALL RESPONSES TO PHQ9 QUESTIONS 1 & 2: 0
SUM OF ALL RESPONSES TO PHQ QUESTIONS 1-9: 0

## 2019-06-19 NOTE — PROGRESS NOTES
Subjective:      Patient ID: Lexi Green is a 78 y.o. male. HPI He is here for a check up. He has diabetes, hypertension, and hyperlipidemia. He is taking medication as prescribed, continues to work on a more balanced meal and more physical activity. He has sleep apnea and uses CPAP. Feels better. Complains of nasal congestion and some drainage. Experienced some low back pain. Used CBD oil topically with relief of symptoms. Review of Systems   Constitutional:        Elevated BMI at 40.7 Continues to work on a more balanced meal plan and more physical activity. HENT:        Nasal sinus congestion. See HPI. Eyes: Negative. Respiratory:        Sleep apnea, see HPI. Cardiovascular: Negative. Gastrointestinal: Negative. Endocrine:        Hyperlipidemia, treated with statin with LDL of 79. Diabetes, treated with oral agents. A1c 5.7. Genitourinary: proteinuria noted. Musculoskeletal: Negative. Skin: Negative. Neurological: Negative. Psychiatric/Behavioral: Negative. Objective:   Physical Exam   Constitutional: He is oriented to person, place, and time. He appears well-developed and well-nourished. No distress. HENT:   Head: Normocephalic and atraumatic. Right Ear: External ear normal.   Left Ear: External ear normal.   Mouth/Throat: Oropharynx is clear and moist.   Nasal edema, and rhinorrhea. Mild to moderate. Eyes: Pupils are equal, round, and reactive to light. Conjunctivae and EOM are normal. No scleral icterus. Neck: Normal range of motion. Neck supple. No thyromegaly present. Cardiovascular: Normal rate, regular rhythm, normal heart sounds and intact distal pulses. Pulmonary/Chest: Effort normal and breath sounds normal.   Abdominal: Soft. Bowel sounds are normal. He exhibits no mass. Musculoskeletal:   1 to 1-1/2 + leg edema, R>L. Lymphadenopathy:     He has no cervical adenopathy.    Neurological: He is alert and oriented to person, place, and time. He has normal reflexes. Skin: Skin is warm and dry. Psychiatric: He has a normal mood and affect. His behavior is normal. Judgment and thought content normal.       Assessment:        Diagnosis Orders   1. Type 2 diabetes mellitus without complication, without long-term current use of insulin ([facility], [facility])  Diet, physical activity   POCT Glucose    POCT glycosylated hemoglobin (Hb A1C)    MICROALBUMIN / CREATININE URINE RATIO   2. Essential hypertension  Basic Metabolic Panel  Stable. Continue same medication regimen. DASH diet discussed. 3. Mixed hyperlipidemia  Lipid Panel  Heart healthy diet and statin compliance discussed. 5. Other allergic rhinitis  Diet, nasal rinse, nasal inhaler, antihistamine, PRN. 6.      Proteinuria: risk factor control stressed. Discussed meaning. Avoidance of nephrotoxins stressed. Plan:    See plans above.

## 2019-08-13 RX ORDER — OMEPRAZOLE 20 MG/1
CAPSULE, DELAYED RELEASE ORAL
Qty: 90 CAPSULE | Refills: 0 | Status: SHIPPED | OUTPATIENT
Start: 2019-08-13 | End: 2019-11-12 | Stop reason: SDUPTHER

## 2019-09-27 ENCOUNTER — TELEPHONE (OUTPATIENT)
Dept: FAMILY MEDICINE CLINIC | Age: 79
End: 2019-09-27

## 2019-09-27 ENCOUNTER — HOSPITAL ENCOUNTER (OUTPATIENT)
Dept: VASCULAR LAB | Age: 79
Discharge: HOME | End: 2019-09-27
Payer: MEDICARE

## 2019-09-27 ENCOUNTER — NURSE TRIAGE (OUTPATIENT)
Dept: OTHER | Facility: CLINIC | Age: 79
End: 2019-09-27

## 2019-09-27 ENCOUNTER — OFFICE VISIT (OUTPATIENT)
Dept: FAMILY MEDICINE CLINIC | Age: 79
End: 2019-09-27
Payer: MEDICARE

## 2019-09-27 VITALS
OXYGEN SATURATION: 96 % | SYSTOLIC BLOOD PRESSURE: 126 MMHG | WEIGHT: 271 LBS | BODY MASS INDEX: 40.9 KG/M2 | DIASTOLIC BLOOD PRESSURE: 84 MMHG | HEART RATE: 109 BPM

## 2019-09-27 DIAGNOSIS — Z86.718 H/O DEEP VENOUS THROMBOSIS: ICD-10-CM

## 2019-09-27 DIAGNOSIS — M25.469 SWELLING OF JOINT OF LOWER LEG: ICD-10-CM

## 2019-09-27 DIAGNOSIS — M25.469 SWELLING OF JOINT OF LOWER LEG: Primary | ICD-10-CM

## 2019-09-27 PROCEDURE — 4040F PNEUMOC VAC/ADMIN/RCVD: CPT | Performed by: NURSE PRACTITIONER

## 2019-09-27 PROCEDURE — 1036F TOBACCO NON-USER: CPT | Performed by: NURSE PRACTITIONER

## 2019-09-27 PROCEDURE — 93971 EXTREMITY STUDY: CPT

## 2019-09-27 PROCEDURE — 99214 OFFICE O/P EST MOD 30 MIN: CPT | Performed by: NURSE PRACTITIONER

## 2019-09-27 PROCEDURE — G8427 DOCREV CUR MEDS BY ELIG CLIN: HCPCS | Performed by: NURSE PRACTITIONER

## 2019-09-27 PROCEDURE — G8417 CALC BMI ABV UP PARAM F/U: HCPCS | Performed by: NURSE PRACTITIONER

## 2019-09-27 PROCEDURE — 1123F ACP DISCUSS/DSCN MKR DOCD: CPT | Performed by: NURSE PRACTITIONER

## 2019-09-27 NOTE — PROGRESS NOTES
screen  12/19/2019    Potassium monitoring  12/19/2019    Creatinine monitoring  12/19/2019    DTaP/Tdap/Td vaccine (2 - Td) 07/17/2028    Shingles Vaccine  Completed    Pneumococcal 65+ years Vaccine  Completed        Diagnosis Orders   1. Swelling of joint of lower leg  VL Extremity Venous Right   2. H/O deep venous thrombosis  VL Extremity Venous Right       Swelling of joint of lower leg  DVT neg for acute thrombosis  Suspect traumatic hematoma from hit on step  Instructed to keep foot elevated with compression stocking this weekend. If pain or swelling continues to follow up with PCP on Monday. .Vl Extremity Venous Right    Result Date: 9/28/2019  Lower Extremities DVT Study  Demographics   Patient Name        Christophe Fleischer   Date of Study       09/27/2019      Gender               Male   Patient Number      [id number]      Date of Birth        1940   Visit Number        [id number]       Age                  78 year(s)   Accession Number    [id number]       Room Number          OP   Corporate ID        [id number]         Sonographer          Ninoska Sahni RVT,                                                           [facility]   Ordering Physician  Emmy Gerardo  Interpreting         [facility]                                      Physician            Readers                                                           Mann Conrad MD  Procedure Type of Study:   Veins:Lower Extremities DVT Study, VL EXTREMITY VENOUS DUPLEX RIGHT. Vascular Sonographer Report  Indications for Study:Pain, edema, discoloration. Additional Indications:Patient presents for pain, redness, and edema of the right calf and ankle that began 3 days ago. He states he fell and injured the right leg approximately 2 weeks ago. He has a prior H/O DVT in the left lower extremity 20+ years ago. He is S/P venous ablation to the right GSV a few years ago.  Venous Duplex Scan: B-mode imaging of the deep and superficial veins, with compression maneuvers, including color and Doppler spectral waveform analysis. Impressions Right Impression Limited visualization of the calf veins due to edema/depth of vessels. Within the limits of this exam, there is no evidence of acute deep or superficial venous thrombosis. 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 Left Impression There is no evidence of deep venous thrombosis involving the common femoral vein. There are no previous studies for comparison. Conclusions   Summary   No evidence of acute deep vein or superficial thrombosis involving the right  lower extremity and the contralateral proximal common femoral vein. Small,  isolated calf thrombi cannot be excluded. There is evidence of chronic phlebitic changes involving the right great  saphenous vein in the thigh, consistent with previous venous ablation  procedure. Signature   ------------------------------------------------------------------  Electronically signed by Rayray Rossi MD (Interpreting  physician) on 09/28/2019 at 12:27 AM  ------------------------------------------------------------------  Patient Status:STAT. Study [address] [place] [address] - Vascular Lab. Technical Quality:Limited visualization due to swelling.   - Results were reported to:Zonia at [facility] office at 2:35 pm on     9/27/19. Risk Factors History +---------+----+------------------------------------+ ! Diagnosis! Date! Comments                            ! +---------+----+------------------------------------+ ! Other    ! !H/O OA, chronic venous insufficiency! +---------+----+------------------------------------+   - The patient's risk factor(s) include: diabetes mellitus, dyslipidemia and     arterial hypertension.  Velocities are measured in cm/s ; Diameters are measured in mm Right Lower

## 2019-09-28 PROBLEM — Z86.718 H/O DEEP VENOUS THROMBOSIS: Status: ACTIVE | Noted: 2019-09-28

## 2019-09-28 PROBLEM — M25.469 SWELLING OF JOINT OF LOWER LEG: Status: ACTIVE | Noted: 2019-09-28

## 2019-09-30 ENCOUNTER — TELEPHONE (OUTPATIENT)
Dept: FAMILY MEDICINE CLINIC | Age: 79
End: 2019-09-30

## 2019-09-30 RX ORDER — CEPHALEXIN 500 MG/1
500 CAPSULE ORAL 3 TIMES DAILY
Qty: 21 CAPSULE | Refills: 0 | Status: SHIPPED | OUTPATIENT
Start: 2019-09-30 | End: 2019-10-07

## 2019-11-15 RX ORDER — OMEPRAZOLE 20 MG/1
CAPSULE, DELAYED RELEASE ORAL
Qty: 90 CAPSULE | Refills: 0 | Status: SHIPPED | OUTPATIENT
Start: 2019-11-15 | End: 2020-01-30

## 2019-12-19 ENCOUNTER — OFFICE VISIT (OUTPATIENT)
Dept: INTERNAL MEDICINE CLINIC | Age: 79
End: 2019-12-19
Payer: MEDICARE

## 2019-12-19 VITALS
SYSTOLIC BLOOD PRESSURE: 132 MMHG | BODY MASS INDEX: 45.12 KG/M2 | DIASTOLIC BLOOD PRESSURE: 84 MMHG | HEIGHT: 65 IN | WEIGHT: 270.8 LBS | HEART RATE: 90 BPM | OXYGEN SATURATION: 95 %

## 2019-12-19 LAB
ANION GAP SERPL CALCULATED.3IONS-SCNC: 16 MMOL/L (ref 3–16)
BUN BLDV-MCNC: 16 MG/DL (ref 7–20)
CALCIUM SERPL-MCNC: 9.8 MG/DL (ref 8.3–10.6)
CHLORIDE BLD-SCNC: 100 MMOL/L (ref 99–110)
CHOLESTEROL, TOTAL: 221 MG/DL (ref 0–199)
CHP ED QC CHECK: NORMAL
CO2: 27 MMOL/L (ref 21–32)
CREAT SERPL-MCNC: 0.9 MG/DL (ref 0.8–1.3)
CREATININE URINE: 177.2 MG/DL (ref 39–259)
GFR AFRICAN AMERICAN: >60
GFR NON-AFRICAN AMERICAN: >60
GLUCOSE BLD-MCNC: 104 MG/DL
GLUCOSE BLD-MCNC: 96 MG/DL (ref 70–99)
HBA1C MFR BLD: 5.9 %
HDLC SERPL-MCNC: 120 MG/DL (ref 40–60)
LDL CHOLESTEROL CALCULATED: 86 MG/DL
MICROALBUMIN UR-MCNC: 29 MG/DL
MICROALBUMIN/CREAT UR-RTO: 163.7 MG/G (ref 0–30)
POTASSIUM SERPL-SCNC: 4.3 MMOL/L (ref 3.5–5.1)
SODIUM BLD-SCNC: 143 MMOL/L (ref 136–145)
TRIGL SERPL-MCNC: 76 MG/DL (ref 0–150)
VLDLC SERPL CALC-MCNC: 15 MG/DL

## 2019-12-19 PROCEDURE — 1123F ACP DISCUSS/DSCN MKR DOCD: CPT | Performed by: INTERNAL MEDICINE

## 2019-12-19 PROCEDURE — G0438 PPPS, INITIAL VISIT: HCPCS | Performed by: INTERNAL MEDICINE

## 2019-12-19 PROCEDURE — G8484 FLU IMMUNIZE NO ADMIN: HCPCS | Performed by: INTERNAL MEDICINE

## 2019-12-19 PROCEDURE — 82962 GLUCOSE BLOOD TEST: CPT | Performed by: INTERNAL MEDICINE

## 2019-12-19 PROCEDURE — 83036 HEMOGLOBIN GLYCOSYLATED A1C: CPT | Performed by: INTERNAL MEDICINE

## 2019-12-19 PROCEDURE — 4040F PNEUMOC VAC/ADMIN/RCVD: CPT | Performed by: INTERNAL MEDICINE

## 2019-12-19 ASSESSMENT — LIFESTYLE VARIABLES
HOW OFTEN DO YOU HAVE SIX OR MORE DRINKS ON ONE OCCASION: 0
HOW OFTEN DO YOU HAVE A DRINK CONTAINING ALCOHOL: 4
HOW OFTEN DURING THE LAST YEAR HAVE YOU FOUND THAT YOU WERE NOT ABLE TO STOP DRINKING ONCE YOU HAD STARTED: 0
AUDIT-C TOTAL SCORE: 4
HOW OFTEN DURING THE LAST YEAR HAVE YOU HAD A FEELING OF GUILT OR REMORSE AFTER DRINKING: 0
HOW OFTEN DURING THE LAST YEAR HAVE YOU NEEDED AN ALCOHOLIC DRINK FIRST THING IN THE MORNING TO GET YOURSELF GOING AFTER A NIGHT OF HEAVY DRINKING: 0
AUDIT TOTAL SCORE: 4
HOW OFTEN DURING THE LAST YEAR HAVE YOU FAILED TO DO WHAT WAS NORMALLY EXPECTED FROM YOU BECAUSE OF DRINKING: 0
HAS A RELATIVE, FRIEND, DOCTOR, OR ANOTHER HEALTH PROFESSIONAL EXPRESSED CONCERN ABOUT YOUR DRINKING OR SUGGESTED YOU CUT DOWN: 0
HAVE YOU OR SOMEONE ELSE BEEN INJURED AS A RESULT OF YOUR DRINKING: 0
HOW MANY STANDARD DRINKS CONTAINING ALCOHOL DO YOU HAVE ON A TYPICAL DAY: 0
HOW OFTEN DURING THE LAST YEAR HAVE YOU BEEN UNABLE TO REMEMBER WHAT HAPPENED THE NIGHT BEFORE BECAUSE YOU HAD BEEN DRINKING: 0

## 2019-12-19 ASSESSMENT — PATIENT HEALTH QUESTIONNAIRE - PHQ9
SUM OF ALL RESPONSES TO PHQ QUESTIONS 1-9: 0
SUM OF ALL RESPONSES TO PHQ QUESTIONS 1-9: 0

## 2019-12-19 NOTE — PROGRESS NOTES
Subjective:      Patient ID: Gabriel Brooks is a 78 y.o. male. HPIsee below    Review of Systems see below. Objective:   Physical Exam see below. Assessment:    see below. Plan:    see below. Rabon Gift, [facility] Annual Wellness Visit  Name: Toney Harper Date: 2019   MRN: [id number] Sex: Male   Age: 78 y.o. Ethnicity: Non-/Non    : 1940 Race: Lilly Molina is here for Medicare AWV    Screenings for behavioral, psychosocial and functional/safety risks, and cognitive dysfunction are all negative except as indicated below. These results, as well as other patient data from the [address] form, are documented in Flowsheets linked to this Encounter. No Known Allergies    Prior to Visit Medications    Medication Sig Taking?  Authorizing Provider   omeprazole (PRILOSEC) 20 MG delayed release capsule TAKE ONE CAPSULE BY MOUTH DAILY  Alex Miranda MD   candesartan-hydrochlorothiazide (ATACAND HCT) 32-12.5 MG per tablet TAKE 1 TABLET BY MOUTH  DAILY  Alex Miranda MD   pioglitazone (ACTOS) 15 MG tablet TAKE 1 TABLET BY MOUTH DAILY  Alex Miranda MD   rosuvastatin (CRESTOR) 10 MG tablet TAKE 1 TABLET BY MOUTH EVERY DAY  Alex Miranda MD   pioglitazone (ACTOS) 15 MG tablet TAKE 1 TABLET BY MOUTH DAILY  Alex Miranda MD   sulfamethoxazole-trimethoprim (BACTRIM DS;SEPTRA DS) 800-160 MG per tablet Take 1 tablet by mouth 2 times daily  Historical Provider, MD   cephALEXin (KEFLEX) 500 MG capsule Take 1 capsule by mouth 3 times daily  Claudine Paula APRN - CNP   FREESTYLE LANCETS MISC Test twice daily  Alex Miranda MD   Blood Glucose Monitoring Suppl (FREESTYLE LITE) LISA Test twice daily  Alex Miranda MD   glucose blood VI test strips (FREESTYLE LITE) strip Test twice daily  Alex Miranda MD   cetirizine (ZYRTEC) 10 MG tablet Take 10 mg by mouth daily  Historical Provider, MD   aspirin 81 MG tablet Take 81 mg by mouth daily direct observation of the patient, evaluation of cognition reveals normal memory and cognitive function. Patient's complete Health Risk Assessment and screening values have been reviewed and are found in Flowsheets. The following problems were reviewed today and where indicated follow up appointments were made and/or referrals ordered. Positive Risk Factor Screenings with Interventions:     Fall Risk:  Timed Up and Go Test > 12 seconds? (Complete if either Fall Risk answers are Yes): no  2 or more falls in past year?: no  Fall with injury in past year?: (!) yes  Fall Risk Intervention  Given literature and exercises for balance. Discussed PT if no improvement. General Health:  General  In general, how would you say your health is?: Good  In the past 7 days, have you experienced any of the following? New or Increased Pain, New or Increased Fatigue, Loneliness, Social Isolation, Stress or Anger?: None of These  Do you get the social and emotional support that you need?: Yes  Do you have a Living Will?: (!) No  General Health Risk Interventions:  · Discussed creating living will. · Literature given. Health Habits/Nutrition:  Health Habits/Nutrition  Do you exercise for at least 20 minutes 2-3 times per week?: (!) No  Have you lost any weight without trying in the past 3 months?: No  Do you eat fewer than 2 meals per day?: No  Have you seen a dentist within the past year?: Yes  Body mass index is 45.06 kg/m². Health Habits/Nutrition Interventions:  · Health and wellness advice given. · Literature provided. · Questions answered.      Personalized Preventive Plan   Current Health Maintenance Status  Immunization History   Administered Date(s) Administered    Influenza 11/16/2010, 11/16/2011    Influenza Virus Vaccine 01/22/2015    Influenza, High Dose (Fluzone 65 yrs and older) 12/18/2015, 10/03/2017, 12/19/2018    Pneumococcal Conjugate 13-valent (Dimfbpr66) 06/19/2017    Pneumococcal Polysaccharide (Ebfyadnmt22) 08/30/2014        Health Maintenance   Topic Date Due    Annual Wellness Visit (AWV)  06/19/2019    Flu vaccine (1) 09/01/2019    Potassium monitoring  12/19/2019    Creatinine monitoring  12/19/2019    Lipid screen  12/19/2019    DTaP/Tdap/Td vaccine (2 - Td) 07/17/2028    Shingles Vaccine  Completed    Pneumococcal 65+ years Vaccine  Completed     Recommendations for Preventive Services Due: see orders and patient instructions/AVS.  . Recommended screening schedule for the next 5-10 years is provided to the patient in written form: see Patient Instructions/AVS.    Janessa De Jesus was seen today for medicare awv. Diagnoses and all orders for this visit:    Type 2 diabetes mellitus without complication, without long-term current use of insulin (HCC)  -     POCT Glucose  -     POCT glycosylated hemoglobin (Hb A1C)                Advance Care Planning   Advanced Care Planning: Discussed the patients choices for care and treatment in case of a health event that adversely affects decision-making abilities. Also discussed the patients long-term treatment options. Reviewed with the patient the [address] [place] [address] Declaration forms  Reviewed the process of designating a competent adult as an Agent (or -in-fact) that could take make health care decisions for the patient if incompetent. Patient was asked to complete the declaration forms, either acknowledge the forms by a public notary or an eligible witness and provide a signed copy to the practice office. Time spent (minutes): 5 minutes.

## 2019-12-19 NOTE — PATIENT INSTRUCTIONS
Learning About Healthy Weight  What is a healthy weight? A healthy weight is the weight at which you feel good about yourself and have energy for work and play. It's also one that lowers your risk for health problems. What can you do to stay at a healthy weight? It can be hard to stay at a healthy weight, especially when fast food, vending-machine snacks, and processed foods are so easy to find. And with your busy lifestyle, activity may be low on your list of things to do. But staying at a healthy weight may be easier than you think. Here are some dos and don'ts for staying at a healthy weight:  Do eat healthy foods  The kinds of foods you eat have a big impact on both your weight and your health. Reaching and staying at a healthy weight is not about going on a diet. It's about making healthier food choices every day and changing your diet for good. Healthy eating means eating a variety of foods so that you get all the nutrients you need. Your body needs protein, carbohydrate, and fats for energy. They keep your heart beating, your brain active, and your muscles working. On most days, try to eat from each food group. This means eating a variety of:  · Whole grains, such as whole wheat breads and pastas. · Fruits and vegetables. · Dairy products, such as low-fat milk, yogurt, and cheese. · Lean proteins, such as all types of fish, chicken without the skin, and beans. Don't have too much or too little of one thing. All foods, if eaten in moderation, can be part of healthy eating. Even sweets can be okay. If your favorite foods are high in fat, salt, sugar, or calories, limit how often you eat them. Eat smaller servings, or look for healthy substitutes. Do watch what you eat  Many people eat more than their bodies need. Part of staying at a healthy weight means learning how much food you really need from day to day and not eating more than that.  Even with healthy foods, eating too much can make you gain weight. Having a well-balanced diet means that you eat enough, but not too much, and that your food gives you the nutrients you need to stay healthy. So listen to your body. Eat when you're hungry. Stop when you feel satisfied. It's a good idea to have healthy snacks ready for when you get hungry. Keep healthy snacks with you at work, in your car, and at home. If you have a healthy snack easily available, you'll be less likely to pick a candy bar or bag of chips from a vending machine instead. Some healthy snacks you might want to keep on hand are fruit, low-fat yogurt, string cheese, low-fat microwave popcorn, raisins and other dried fruit, nuts, whole wheat crackers, pretzels, carrots, celery sticks, and broccoli. Do some physical activity  A big part of reaching and staying at a healthy weight is being active. When you're active, you burn calories. This makes it easier to reach and stay at a healthy weight. When you're active on a regular basis, your body burns more calories, even when you're at rest. Being active helps you lose fat and build lean muscle. Try to be active for at least 1 hour every day. This may sound like a lot, but it's okay to be active in smaller blocks of time that add up to 1 hour a day. Any activity that makes your heart beat faster and keeps it there for a while counts. A brisk walk, run, or swim will get your heart beating faster. So will climbing stairs, shooting baskets, or cycling. Even some household chores like vacuuming and mowing the lawn will get your heart rate up. Pick activities that you enjoy--ones that make your heart beat faster, your muscles stronger, and your muscles and joints more flexible. If you find more than one thing you like doing, do them all. You don't have to do the same thing every day. Don't diet  Diets don't work. Diets are temporary. Because you give up so much when you diet, you may be hungry and think about food all the time.  And after you stop dieting, you also may overeat to make up for what you missed. Most people who diet end up gaining back the pounds they lost--and more. Remember that healthy bodies come in lots of shapes and sizes. Everyone can get healthier by eating better and being more active. Where can you learn more? Go to https://chpepiceweb.[employer]. org and sign in to your [employer] account. Enter [id number] in the [employer] box to learn more about \"Learning About Healthy Weight. \"     If you do not have an account, please click on the \"Sign Up Now\" link. Current as of: March 28, 2019  Content Version: 12.1  © 1329-9129 Healthwise, [employer]. Care instructions adapted under license by [facility] ([facility]). If you have questions about a medical condition or this instruction, always ask your healthcare professional. [address] any warranty or liability for your use of this information. Eating Healthy Foods: Care Instructions  Your Care Instructions    Eating healthy foods can help lower your risk for disease. Healthy food gives you energy and keeps your heart strong, your brain active, your muscles working, and your bones strong. A healthy diet includes a variety of foods from the basic food groups: grains, vegetables, fruits, milk and milk products, and meat and beans. Some people may eat more of their favorite foods from only one food group and, as a result, miss getting the nutrients they need. So, it is important to pay attention not only to what you eat but also to what you are missing from your diet. You can eat a healthy, balanced diet by making a few small changes. Follow-up care is a key part of your treatment and safety. Be sure to make and go to all appointments, and call your doctor if you are having problems. It's also a good idea to know your test results and keep a list of the medicines you take. How can you care for yourself at home?   Look at what you eat  · Keep a food diary for a week or two and record everything you eat or drink. Track the number of servings you eat from each food group. · For a balanced diet every day, eat a variety of:  ? 6 or more ounce-equivalents of grains, such as cereals, breads, crackers, rice, or pasta, every day. An ounce-equivalent is 1 slice of bread, 1 cup of ready-to-eat cereal, or ½ cup of cooked rice, cooked pasta, or cooked cereal.  ? 2½ cups of vegetables, especially:  § Dark-green vegetables such as broccoli and spinach. § Orange vegetables such as carrots and sweet potatoes. § Dry beans (such as stone and kidney beans) and peas (such as lentils). ? 2 cups of fresh, frozen, or canned fruit. A small apple or 1 banana or orange equals 1 cup. ? 3 cups of nonfat or low-fat milk, yogurt, or other milk products. ? 5½ ounces of meat and beans, such as chicken, fish, lean meat, beans, nuts, and seeds. One egg, 1 tablespoon of peanut butter, ½ ounce nuts or seeds, or ¼ cup of cooked beans equals 1 ounce of meat. · Learn how to read food labels for serving sizes and ingredients. Fast-food and convenience-food meals often contain few or no fruits or vegetables. Make sure you eat some fruits and vegetables to make the meal more nutritious. · Look at your food diary. For each food group, add up what you have eaten and then divide the total by the number of days. This will give you an idea of how much you are eating from each food group. See if you can find some ways to change your diet to make it more healthy. Start small  · Do not try to make dramatic changes to your diet all at once. You might feel that you are missing out on your favorite foods and then be more likely to fail. · Start slowly, and gradually change your habits. Try some of the following:  ? Use whole wheat bread instead of white bread. ? Use nonfat or low-fat milk instead of whole milk.   ? Eat brown rice instead of white rice, and eat whole wheat pasta instead of white-flour If you have questions about a medical condition or this instruction, always ask your healthcare professional. [place] [address] any warranty or liability for your use of this information. Personalized Preventive Plan for Lindsey Smith - 12/19/2019  Medicare offers a range of preventive health benefits. Some of the tests and screenings are paid in full while other may be subject to a deductible, co-insurance, and/or copay. Some of these benefits include a comprehensive review of your medical history including lifestyle, illnesses that may run in your family, and various assessments and screenings as appropriate. After reviewing your medical record and screening and assessments performed today your provider may have ordered immunizations, labs, imaging, and/or referrals for you. A list of these orders (if applicable) as well as your Preventive Care list are included within your After Visit Summary for your review. Other Preventive Recommendations:    · A preventive eye exam performed by an eye specialist is recommended every 1-2 years to screen for glaucoma; cataracts, macular degeneration, and other eye disorders. · A preventive dental visit is recommended every 6 months. · Try to get at least 150 minutes of exercise per week or 10,000 steps per day on a pedometer . · Order or download the FREE \"Exercise & Physical Activity: Your Everyday Guide\" from [employer]. Call 5-383.575.9196 or search [employer] online. · You need 5197-8864 mg of calcium and 1194-3300 IU of vitamin D per day. It is possible to meet your calcium requirement with diet alone, but a vitamin D supplement is usually necessary to meet this goal.  · When exposed to the sun, use a sunscreen that protects against both UVA and UVB radiation with an SPF of 30 or greater. Reapply every 2 to 3 hours or after sweating, drying off with a towel, or swimming.   · Always wear a seat belt when traveling in a car. Always wear a helmet when riding a bicycle or motorcycle.

## 2020-01-09 RX ORDER — CANDESARTAN CILEXETIL AND HYDROCHLOROTHIAZIDE 32; 12.5 MG/1; MG/1
TABLET ORAL
Qty: 120 TABLET | Refills: 1 | Status: SHIPPED | OUTPATIENT
Start: 2020-01-09 | End: 2020-07-29

## 2020-01-16 ENCOUNTER — TELEPHONE (OUTPATIENT)
Dept: ADMINISTRATIVE | Age: 80
End: 2020-01-16

## 2020-01-16 NOTE — TELEPHONE ENCOUNTER
Pt called, wanted a message to be sent to Dr Cordell Whelan, concerning SOB issues on exertion, notices this when walking longer distances, needs to stop and rest. Denies any chest pain or other issues. [facility] offered to schedule an OV for pt to be seen, he declined, wanted message to be sent instead. Please contact him concerning this matter.  [phone]

## 2020-01-24 ENCOUNTER — HOSPITAL ENCOUNTER (OUTPATIENT)
Dept: CT IMAGING | Age: 80
Discharge: HOME | End: 2020-01-24
Payer: MEDICARE

## 2020-01-24 DIAGNOSIS — R06.02 SOB (SHORTNESS OF BREATH): Primary | ICD-10-CM

## 2020-01-24 LAB
GFR AFRICAN AMERICAN: >60
GFR NON-AFRICAN AMERICAN: >60
PERFORMED ON: NORMAL
POC CREATININE: 1.1 MG/DL (ref 0.8–1.3)
POC SAMPLE TYPE: NORMAL

## 2020-01-24 PROCEDURE — 6360000004 HC RX CONTRAST MEDICATION: Performed by: INTERNAL MEDICINE

## 2020-01-24 PROCEDURE — 82565 ASSAY OF CREATININE: CPT

## 2020-01-24 PROCEDURE — 71275 CT ANGIOGRAPHY CHEST: CPT

## 2020-01-24 RX ADMIN — IOPAMIDOL 80 ML: 755 INJECTION, SOLUTION INTRAVENOUS at 15:24

## 2020-02-01 ENCOUNTER — OFFICE VISIT (OUTPATIENT)
Dept: INTERNAL MEDICINE CLINIC | Age: 80
End: 2020-02-01
Payer: MEDICARE

## 2020-02-01 VITALS
HEART RATE: 96 BPM | SYSTOLIC BLOOD PRESSURE: 138 MMHG | DIASTOLIC BLOOD PRESSURE: 76 MMHG | HEIGHT: 65 IN | OXYGEN SATURATION: 91 % | WEIGHT: 271 LBS | BODY MASS INDEX: 45.15 KG/M2

## 2020-02-01 PROCEDURE — 4040F PNEUMOC VAC/ADMIN/RCVD: CPT | Performed by: INTERNAL MEDICINE

## 2020-02-01 PROCEDURE — G8417 CALC BMI ABV UP PARAM F/U: HCPCS | Performed by: INTERNAL MEDICINE

## 2020-02-01 PROCEDURE — G8427 DOCREV CUR MEDS BY ELIG CLIN: HCPCS | Performed by: INTERNAL MEDICINE

## 2020-02-01 PROCEDURE — 1036F TOBACCO NON-USER: CPT | Performed by: INTERNAL MEDICINE

## 2020-02-01 PROCEDURE — G8484 FLU IMMUNIZE NO ADMIN: HCPCS | Performed by: INTERNAL MEDICINE

## 2020-02-01 PROCEDURE — 99214 OFFICE O/P EST MOD 30 MIN: CPT | Performed by: INTERNAL MEDICINE

## 2020-02-01 PROCEDURE — 1123F ACP DISCUSS/DSCN MKR DOCD: CPT | Performed by: INTERNAL MEDICINE

## 2020-02-01 ASSESSMENT — PATIENT HEALTH QUESTIONNAIRE - PHQ9
SUM OF ALL RESPONSES TO PHQ QUESTIONS 1-9: 0
SUM OF ALL RESPONSES TO PHQ QUESTIONS 1-9: 0
1. LITTLE INTEREST OR PLEASURE IN DOING THINGS: 0
2. FEELING DOWN, DEPRESSED OR HOPELESS: 0
SUM OF ALL RESPONSES TO PHQ9 QUESTIONS 1 & 2: 0

## 2020-02-01 NOTE — PROGRESS NOTES
Subjective:      Patient ID: Domingo Mayberry is a 78 y.o. male. HPI He is here for f/u on SOB at rest as detailed in prior note. ECHO demonstrated  mild LVH, normal LVEF, normal diastolic function, mild to moderate TR. Pulmonary artery systolic pressure was [id number]. Prior CTA negative for PE. He also complains of a right breast lump for about 1 week. It is tender. No trauma history. Review of Systems   Constitutional: Negative. HENT: Negative. Eyes: Negative. Respiratory: Positive for shortness of breath. Negative for chest tightness and wheezing. See HPI. Cardiovascular: Negative for chest pain. See HPI. Gastrointestinal: Negative. Genitourinary: Negative. Musculoskeletal:        Breast lump, see HPI. Skin: Negative. Neurological: Negative. Psychiatric/Behavioral: Negative. Objective:   Physical Exam  Constitutional:       General: He is not in acute distress. Appearance: He is well-developed. He is obese. HENT:      Head: Normocephalic and atraumatic. Right Ear: External ear normal.      Left Ear: External ear normal.      Nose: Nose normal.   Eyes:      General: No scleral icterus. Conjunctiva/sclera: Conjunctivae normal.      Pupils: Pupils are equal, round, and reactive to light. Neck:      Musculoskeletal: Normal range of motion and neck supple. Thyroid: No thyromegaly. Cardiovascular:      Rate and Rhythm: Normal rate and regular rhythm. Heart sounds: Normal heart sounds. Comments: 02 sat 91 %. Pulmonary:      Effort: Pulmonary effort is normal.      Breath sounds: Normal breath sounds. Abdominal:      General: Bowel sounds are normal.      Palpations: Abdomen is soft. There is no mass. Musculoskeletal:      Comments: Right breast mass. Tender to palpate. Not fixed. Lymphadenopathy:      Cervical: No cervical adenopathy. Skin:     General: Skin is warm and dry.    Neurological:      Mental Status: He is alert and oriented to person, place, and time. Deep Tendon Reflexes: Reflexes are normal and symmetric. Psychiatric:         Behavior: Behavior normal.         Thought Content: Thought content normal.         Judgment: Judgment normal.         Assessment:        Diagnosis Orders   1. SOB (shortness of breath)  At rest. Unclear cause. Elevated pulmonary artery pressure needs ariza. See below. Will need stress test.   2. Pulmonary hypertension [facility])  Cardiology and pulmonary consultation. Stephanie Waterman MD, Cardiology, [facility]   3. Breast lump  BONNIE DIGITAL SCREEN W OR WO CAD BILATERAL  Surgical f/u.         4. LVH (left ventricular hypertrophy)  Risk factor control stressed. Plan:    See plans above.         Nelly Rodriguez MD

## 2020-02-03 ENCOUNTER — TELEPHONE (OUTPATIENT)
Dept: INTERNAL MEDICINE CLINIC | Age: 80
End: 2020-02-03

## 2020-02-03 NOTE — TELEPHONE ENCOUNTER
Spoke with pt, per physician, pt can keep March appt with DR. Davalos, if pt needs to be seen sooner, we will call.

## 2020-02-06 ENCOUNTER — HOSPITAL ENCOUNTER (OUTPATIENT)
Dept: NON INVASIVE DIAGNOSTICS | Age: 80
Discharge: HOME | End: 2020-02-06
Payer: MEDICARE

## 2020-02-06 LAB
LV EF: 60 %
LVEF MODALITY: NORMAL

## 2020-02-06 PROCEDURE — 93306 TTE W/DOPPLER COMPLETE: CPT

## 2020-02-10 ENCOUNTER — TELEPHONE (OUTPATIENT)
Dept: INTERNAL MEDICINE CLINIC | Age: 80
End: 2020-02-10

## 2020-02-13 ENCOUNTER — OFFICE VISIT (OUTPATIENT)
Dept: SURGERY | Age: 80
End: 2020-02-13
Payer: MEDICARE

## 2020-02-13 ENCOUNTER — HOSPITAL ENCOUNTER (OUTPATIENT)
Dept: MAMMOGRAPHY | Age: 80
Discharge: HOME | End: 2020-02-13
Payer: MEDICARE

## 2020-02-13 VITALS
WEIGHT: 265 LBS | HEART RATE: 80 BPM | BODY MASS INDEX: 44.15 KG/M2 | DIASTOLIC BLOOD PRESSURE: 82 MMHG | TEMPERATURE: 98.4 F | HEIGHT: 65 IN | SYSTOLIC BLOOD PRESSURE: 148 MMHG | RESPIRATION RATE: 16 BRPM | OXYGEN SATURATION: 97 %

## 2020-02-13 DIAGNOSIS — N62 GYNECOMASTIA: ICD-10-CM

## 2020-02-13 LAB
ALBUMIN SERPL-MCNC: 4.5 G/DL (ref 3.4–5)
ALP BLD-CCNC: 78 U/L (ref 40–129)
ALT SERPL-CCNC: 12 U/L (ref 10–40)
AST SERPL-CCNC: 17 U/L (ref 15–37)
BILIRUB SERPL-MCNC: 0.4 MG/DL (ref 0–1)
BILIRUBIN DIRECT: <0.2 MG/DL (ref 0–0.3)
BILIRUBIN, INDIRECT: NORMAL MG/DL (ref 0–1)
TOTAL PROTEIN: 7.5 G/DL (ref 6.4–8.2)

## 2020-02-13 PROCEDURE — 99203 OFFICE O/P NEW LOW 30 MIN: CPT | Performed by: SURGERY

## 2020-02-13 PROCEDURE — G8484 FLU IMMUNIZE NO ADMIN: HCPCS | Performed by: SURGERY

## 2020-02-13 PROCEDURE — G8417 CALC BMI ABV UP PARAM F/U: HCPCS | Performed by: SURGERY

## 2020-02-13 PROCEDURE — 4040F PNEUMOC VAC/ADMIN/RCVD: CPT | Performed by: SURGERY

## 2020-02-13 PROCEDURE — 77066 DX MAMMO INCL CAD BI: CPT

## 2020-02-13 PROCEDURE — 1036F TOBACCO NON-USER: CPT | Performed by: SURGERY

## 2020-02-13 PROCEDURE — 1123F ACP DISCUSS/DSCN MKR DOCD: CPT | Performed by: SURGERY

## 2020-02-13 PROCEDURE — G8427 DOCREV CUR MEDS BY ELIG CLIN: HCPCS | Performed by: SURGERY

## 2020-02-13 ASSESSMENT — ENCOUNTER SYMPTOMS
ABDOMINAL PAIN: 0
SHORTNESS OF BREATH: 1
ABDOMINAL DISTENTION: 0
COUGH: 0
CHEST TIGHTNESS: 0

## 2020-02-13 NOTE — PROGRESS NOTES
Procedure Laterality Date    COLONOSCOPY      FOOT SURGERY      HAND SURGERY      JOINT REPLACEMENT Left 8/29/2014    TOTAL KNEE REPLACEMENT    KNEE ARTHROPLASTY Right 1/21/2015    OTHER SURGICAL HISTORY  See note 12/30/14    Was unable to intubate with a peacock/Used a glidescope per Dr Malaika Etienne dated 12/30/14    OTHER SURGICAL HISTORY Right 03/06/2015    REPAIR OF RUPTURED PATELLAR TENDON RIGHT KNEE,USING TIBIAL       Allergies as of 02/13/2020    (No Known Allergies)     Social History     Tobacco Use    Smoking status: Never Smoker    Smokeless tobacco: Never Used   Substance Use Topics    Alcohol use: Yes     Alcohol/week: 11.7 standard drinks     Types: 14 Standard drinks or equivalent per week     Comment: Drinks ETOH daily /Last was WED    Drug use: No       Review of Systems   Constitutional: Negative for chills and fever. Respiratory: Positive for shortness of breath. Negative for cough and chest tightness. Cardiovascular: Negative for chest pain and leg swelling. Gastrointestinal: Negative for abdominal distention and abdominal pain. Genitourinary: Negative for difficulty urinating and dysuria. Allergic/Immunologic: Negative for environmental allergies and food allergies. Neurological: Negative for seizures and facial asymmetry. Hematological: Negative for adenopathy. Does not bruise/bleed easily. Psychiatric/Behavioral: Negative for decreased concentration. The patient is not nervous/anxious.         Current Outpatient Medications on File Prior to Visit   Medication Sig Dispense Refill    omeprazole (PRILOSEC) 20 MG delayed release capsule TAKE ONE CAPSULE BY MOUTH DAILY 90 capsule 1    candesartan-hydrochlorothiazide (ATACAND HCT) 32-12.5 MG per tablet TAKE 1 TABLET BY MOUTH  DAILY 120 tablet 1    rosuvastatin (CRESTOR) 10 MG tablet TAKE 1 TABLET BY MOUTH EVERY DAY 90 tablet 3    FREESTYLE LANCETS MISC Test twice daily 100 each 3    Blood Glucose Monitoring Suppl (FREESTYLE LITE) LISA Test twice daily 1 Device 0    glucose blood VI test strips (FREESTYLE LITE) strip Test twice daily 100 strip 5    cetirizine (ZYRTEC) 10 MG tablet Take 10 mg by mouth daily      aspirin 81 MG tablet Take 81 mg by mouth daily      Multiple Vitamins-Minerals (CENTRUM SILVER ADULT 50+ PO) Take 1 tablet by mouth daily      Cholecalciferol (VITAMIN D3) 1000 UNITS CAPS Take 1 capsule by mouth daily.  pioglitazone (ACTOS) 15 MG tablet TAKE 1 TABLET BY MOUTH DAILY 90 tablet 3    pioglitazone (ACTOS) 15 MG tablet TAKE 1 TABLET BY MOUTH DAILY 90 tablet 1    sulfamethoxazole-trimethoprim (BACTRIM DS;SEPTRA DS) 800-160 MG per tablet Take 1 tablet by mouth 2 times daily      cephALEXin (KEFLEX) 500 MG capsule Take 1 capsule by mouth 3 times daily (Patient not taking: Reported on 2/13/2020) 30 capsule 0    ketorolac (TORADOL) 30 MG/ML injection Inject 1 mL into the muscle once for 1 dose 1 mL 0     No current facility-administered medications on file prior to visit. Exam:  BP (!) 148/82 (Site: Left Upper Arm, Position: Sitting, Cuff Size: Large Adult)   Pulse 80   Temp 98.4 °F (36.9 °C) (Oral)   Resp 16   Ht 5' 5\" (1.651 m)   Wt 265 lb (120.2 kg)   SpO2 97%   BMI 44.10 kg/m²   Physical Exam  Constitutional: She appears well-nourished. No apparent distress. Head: Normocephalic and atraumatic. Eyes: EOM are normal. Pupils are equal, round, and reactive to light. Neck: Neck supple. No tracheal deviation present. Cardiovascular: Regular rate and rhythm. Pulmonary: Respirations are non-labored no wheezing. Lymphatics: No palpable cervical, supraclavicular, or axillary lymphadenopathy. Breast:  Right: Retroareolar prominent, tender breast tissue, 5.5cm x 4.5cm  Left: No masses, nipple discharge, or overlying skin changes. There is no axillary lymphadenopathy palpated bilaterally. Skin: No rash noted.  Multiple nevi, back, chest, UEs, none concerning  Extremities: Well perfused, no edema. Neurologic: Alert and oriented. Assessment and Plan:  Right gynecomastia, likely benign. We discussed common causes, including increasing age and obesity. He is not on any typical medications which cause this, and has not had recent med changes. Liver dysfunction can contribute. He has no physical findings of liver disease, but does use alcohol heavily, so will check LFTs. Follow up in 3 months for exam.   Reassured him that this is usually self limiting.

## 2020-02-18 ENCOUNTER — HOSPITAL ENCOUNTER (OUTPATIENT)
Dept: NON INVASIVE DIAGNOSTICS | Age: 80
Discharge: HOME | End: 2020-02-18
Payer: MEDICARE

## 2020-02-18 LAB
LV EF: 72 %
LVEF MODALITY: NORMAL

## 2020-02-18 PROCEDURE — A9502 TC99M TETROFOSMIN: HCPCS | Performed by: INTERNAL MEDICINE

## 2020-02-18 PROCEDURE — 2580000003 HC RX 258: Performed by: INTERNAL MEDICINE

## 2020-02-18 PROCEDURE — 93017 CV STRESS TEST TRACING ONLY: CPT

## 2020-02-18 PROCEDURE — 78452 HT MUSCLE IMAGE SPECT MULT: CPT

## 2020-02-18 PROCEDURE — 3430000000 HC RX DIAGNOSTIC RADIOPHARMACEUTICAL: Performed by: INTERNAL MEDICINE

## 2020-02-18 RX ORDER — SODIUM CHLORIDE 0.9 % (FLUSH) 0.9 %
10 SYRINGE (ML) INJECTION 2 TIMES DAILY
Status: DISCONTINUED | OUTPATIENT
Start: 2020-02-18 | End: 2020-02-19 | Stop reason: HOSPADM

## 2020-02-18 RX ADMIN — TETROFOSMIN 10 MILLICURIE: 1.38 INJECTION, POWDER, LYOPHILIZED, FOR SOLUTION INTRAVENOUS at 09:51

## 2020-02-18 RX ADMIN — TETROFOSMIN 34.4 MILLICURIE: 1.38 INJECTION, POWDER, LYOPHILIZED, FOR SOLUTION INTRAVENOUS at 10:59

## 2020-02-18 RX ADMIN — Medication 10 ML: at 09:31

## 2020-02-18 RX ADMIN — Medication 10 ML: at 10:59

## 2020-03-02 ENCOUNTER — OFFICE VISIT (OUTPATIENT)
Dept: CARDIOLOGY CLINIC | Age: 80
End: 2020-03-02
Payer: MEDICARE

## 2020-03-02 VITALS
HEART RATE: 90 BPM | DIASTOLIC BLOOD PRESSURE: 72 MMHG | HEIGHT: 69 IN | BODY MASS INDEX: 40.23 KG/M2 | SYSTOLIC BLOOD PRESSURE: 128 MMHG | OXYGEN SATURATION: 92 % | WEIGHT: 271.6 LBS

## 2020-03-02 PROBLEM — I27.20 PULMONARY HTN (HCC): Status: ACTIVE | Noted: 2020-03-02

## 2020-03-02 LAB — PRO-BNP: 111 PG/ML (ref 0–449)

## 2020-03-02 PROCEDURE — 93000 ELECTROCARDIOGRAM COMPLETE: CPT | Performed by: INTERNAL MEDICINE

## 2020-03-02 PROCEDURE — G8417 CALC BMI ABV UP PARAM F/U: HCPCS | Performed by: INTERNAL MEDICINE

## 2020-03-02 PROCEDURE — 99204 OFFICE O/P NEW MOD 45 MIN: CPT | Performed by: INTERNAL MEDICINE

## 2020-03-02 PROCEDURE — G8484 FLU IMMUNIZE NO ADMIN: HCPCS | Performed by: INTERNAL MEDICINE

## 2020-03-02 PROCEDURE — G8427 DOCREV CUR MEDS BY ELIG CLIN: HCPCS | Performed by: INTERNAL MEDICINE

## 2020-03-02 ASSESSMENT — ENCOUNTER SYMPTOMS
WHEEZING: 0
SHORTNESS OF BREATH: 1
EYES NEGATIVE: 1
GASTROINTESTINAL NEGATIVE: 1
CHEST TIGHTNESS: 0

## 2020-03-02 NOTE — PROGRESS NOTES
[facility] [place] [id number]     Outpatient Cardiology Consult  Consulting Cardiologist Shukri Alonso M.D., [facility]  Referring Provider:  Jose Licea MD    3/2/2020,11:12 AM    Chief Complaint   Patient presents with    New Patient     s/p stress test,echo and cardiac CTA    Shortness of Breath     exertional    Leg Swelling     BLLE           Asked by No admitting provider for patient encounter./Heath Mendoza MD  to evaluate and assess this patient's shortness of breath and dyspnea    History of Present Illness: Marilee Harper is a 78 y.o. male is being referred for evaluation of his cardiac and pulmonary status regarding shortness of breath and dyspnea. Apparently while at [facility] back in December he became acutely and severely short of breath and no apparently good reason. No wheezing no palpitations no chest pains. No known history of coronary disease or lung disease. He has been an  at most of his life and no exposures to significant dust fumes or particulates. Does have hypertension and has obstructive sleep apnea and uses CPAP. He and his wife are here and they deny wheezing. Seems that there is been some more difficulty with breathing as he is gotten little bit older perhaps some degree of age appropriate arthritis. Work-up has included a nuclear stress test that was negative with ejection fraction of 70%. CT of the chest in January to look for pulmonary emboli was negative but did show some granulomatous lung disease. Echocardiograph was unremarkable with normal LV size and valvular function and ejection fraction of 70%.      Obstructive sleep apnea on CPAP  Hypertension  Hyperlipidemia  Osteoarthritis  Past Medical History:   has a past medical history of AR (allergic rhinitis), Dermatitis, Diabetes, Diabetes ([address] DJD (degenerative joint disease), Dyslipidemia, ED (erectile dysfunction), GERD (gastroesophageal reflux disease), HTN (hypertension), Hx of blood clots, Hyperlipidemia, Sleep apnea, and Vitamin D deficiency. Surgical History:   has a past surgical history that includes Hand surgery; Foot surgery; Colonoscopy; joint replacement (Left, 8/29/2014); Knee Arthroplasty (Right, 1/21/2015); other surgical history (See note 12/30/14); and other surgical history (Right, 03/06/2015). Social History:   reports that he has never smoked. He has never used smokeless tobacco. He reports current alcohol use of about 11.7 standard drinks of alcohol per week. He reports that he does not use drugs. Family History:  family history is not on file. Home Medications:  Prior to Admission medications    Medication Sig Start Date End Date Taking? Authorizing Provider   omeprazole (PRILOSEC) 20 MG delayed release capsule TAKE ONE CAPSULE BY MOUTH DAILY 1/30/20  Yes Jessica Rockwell MD   candesartan-hydrochlorothiazide (ATACAND HCT) 32-12.5 MG per tablet TAKE 1 TABLET BY MOUTH  DAILY 1/9/20  Yes Jessica Rockwell MD   rosuvastatin (CRESTOR) 10 MG tablet TAKE 1 TABLET BY MOUTH EVERY DAY 4/9/19  Yes Jessica Rockwell MD   pioglitazone (ACTOS) 15 MG tablet TAKE 1 TABLET BY MOUTH DAILY 10/11/18 3/2/20 Yes Jessica Rockwell MD   FREESTYLE LANCETS MISC Test twice daily 3/9/18  Yes Jessica Rockwell MD   Blood Glucose Monitoring Suppl (FREESTYLE LITE) LISA Test twice daily 3/9/18  Yes Jessica Rockwell MD   glucose blood VI test strips (FREESTYLE LITE) strip Test twice daily 3/9/18  Yes Jessica Rockwell MD   cetirizine (ZYRTEC) 10 MG tablet Take 10 mg by mouth daily   Yes Historical Provider, MD   aspirin 81 MG tablet Take 81 mg by mouth daily   Yes Historical Provider, MD   Multiple Vitamins-Minerals (CENTRUM SILVER ADULT 50+ PO) Take 1 tablet by mouth daily   Yes Historical Provider, MD   Cholecalciferol (VITAMIN D3) 1000 UNITS CAPS Take 1 capsule by mouth daily.    Yes Historical Provider, MD   pioglitazone (ACTOS) 15 MG tablet TAKE 1 TABLET BY MOUTH DAILY 4/9/19 7/8/19  Efrain Monroy France Kim MD   sulfamethoxazole-trimethoprim (BACTRIM DS;SEPTRA DS) 800-160 MG per tablet Take 1 tablet by mouth 2 times daily    Historical Provider, MD   cephALEXin (KEFLEX) 500 MG capsule Take 1 capsule by mouth 3 times daily  Patient not taking: Reported on 3/2/2020 9/12/18   PILLO Porter CNP   ketorolac (TORADOL) 30 MG/ML injection Inject 1 mL into the muscle once for 1 dose 5/12/17 5/12/17  PILLO Porter CNP           Allergies:  Patient has no known allergies. Review of Systems:   · Constitutional: there has been no unanticipated weight loss. · Eyes: No visual changes or diplopia. No scleral icterus. · ENT: No Headaches, hearing loss or vertigo. No mouth sores or sore throat. · Cardiovascular: Reviewed in HPI  ·  Pulmonary:  no cough or sputum production. · Gastrointestinal: No abdominal pain, appetite loss, blood in stools. No change in bowel or bladder habits. · Genitourinary: No dysuria, trouble voiding, or hematuria. · Musculoskeletal:  No gait disturbance, weakness or joint complaints. · Integumentary: No rash or pruritis. Endocrine: No malaise, fatigue or temperature intolerance. Hematologic/Lymphatic: No abnormal bruising or bleeding, blood clots or swollen lymph nodes. · Allergic/Immunologic: No nasal congestion or hives. · All other ROS are reviewed and are unremarkable. Physical Examination:      Wt Readings from Last 3 Encounters:   03/02/20 271 lb 9.6 oz (123.2 kg)   02/13/20 265 lb (120.2 kg)   02/01/20 271 lb (122.9 kg)       BP Readings from Last 3 Encounters:   03/02/20 128/72   02/13/20 (!) 148/82   02/01/20 138/76       Pulse Readings from Last 3 Encounters:   03/02/20 90   02/13/20 80   02/01/20 96       Constitutional and General Appearance:  Healthy. And alert . He is alert but very strong. HEENT: eyes and ears intact.  No nasal masses  THYROID: not enlarged  LUNGS:  · Clear to auscultation and percussion  HEART and VASCULAR:  · The apical 6-minute walk test and the O2 sats went from 93% at rest to 91%. Heart rate from 82-1 23. He did 4.5 laps and 4 minutes. And there was some wheezing at peak. Lasix 20 mg/day    Thanks for allowing us the opportunity  to participate in the evaluation and care of your patients.  Please call if we may assist further [phone]    This note was likely completed using voice recognition technology and may contain unintended errors  Selvin Zayas M.D., [facility]  3/2/140938:12 AM

## 2020-03-03 LAB
ALBUMIN SERPL-MCNC: 4.2 G/DL (ref 3.4–5)
ANION GAP SERPL CALCULATED.3IONS-SCNC: 19 MMOL/L (ref 3–16)
BUN BLDV-MCNC: 14 MG/DL (ref 7–20)
CALCIUM SERPL-MCNC: 9.9 MG/DL (ref 8.3–10.6)
CHLORIDE BLD-SCNC: 97 MMOL/L (ref 99–110)
CO2: 23 MMOL/L (ref 21–32)
CREAT SERPL-MCNC: 0.9 MG/DL (ref 0.8–1.3)
GFR AFRICAN AMERICAN: >60
GFR NON-AFRICAN AMERICAN: >60
GLUCOSE BLD-MCNC: 116 MG/DL (ref 70–99)
PHOSPHORUS: 3.4 MG/DL (ref 2.5–4.9)
POTASSIUM SERPL-SCNC: 3.9 MMOL/L (ref 3.5–5.1)
SODIUM BLD-SCNC: 139 MMOL/L (ref 136–145)

## 2020-03-03 RX ORDER — FUROSEMIDE 40 MG/1
20 TABLET ORAL DAILY
Qty: 30 TABLET | Refills: 5 | Status: SHIPPED | OUTPATIENT
Start: 2020-03-03 | End: 2021-03-17 | Stop reason: SDUPTHER

## 2020-03-17 ENCOUNTER — HOSPITAL ENCOUNTER (OUTPATIENT)
Dept: PULMONOLOGY | Age: 80
End: 2020-03-17
Payer: MEDICARE

## 2020-04-27 RX ORDER — ROSUVASTATIN CALCIUM 10 MG/1
TABLET, COATED ORAL
Qty: 90 TABLET | Refills: 3 | Status: SHIPPED | OUTPATIENT
Start: 2020-04-27 | End: 2021-05-11

## 2020-05-13 ASSESSMENT — ENCOUNTER SYMPTOMS
ABDOMINAL DISTENTION: 0
ABDOMINAL PAIN: 0
SHORTNESS OF BREATH: 1
COUGH: 0
CHEST TIGHTNESS: 0

## 2020-05-13 NOTE — PROGRESS NOTES
discharge, or overlying skin changes. There is no axillary lymphadenopathy palpated bilaterally. Skin: No rash noted. Extremities: Well perfused, no edema. Neurologic: Alert and oriented. Assessment and Plan:  Right gynecomastia, resolving without intervention. F/u as needed. Continue distancing, SOB workup as he is able. He understands he will need negative COVID test for PFTs. He will call if symptoms recur.

## 2020-05-14 ENCOUNTER — OFFICE VISIT (OUTPATIENT)
Dept: SURGERY | Age: 80
End: 2020-05-14
Payer: MEDICARE

## 2020-05-14 VITALS
HEIGHT: 69 IN | BODY MASS INDEX: 38.51 KG/M2 | RESPIRATION RATE: 16 BRPM | SYSTOLIC BLOOD PRESSURE: 110 MMHG | WEIGHT: 260 LBS | HEART RATE: 109 BPM | DIASTOLIC BLOOD PRESSURE: 59 MMHG | TEMPERATURE: 98.3 F

## 2020-05-14 PROCEDURE — 1036F TOBACCO NON-USER: CPT | Performed by: SURGERY

## 2020-05-14 PROCEDURE — G8417 CALC BMI ABV UP PARAM F/U: HCPCS | Performed by: SURGERY

## 2020-05-14 PROCEDURE — 99213 OFFICE O/P EST LOW 20 MIN: CPT | Performed by: SURGERY

## 2020-05-14 PROCEDURE — 1123F ACP DISCUSS/DSCN MKR DOCD: CPT | Performed by: SURGERY

## 2020-05-14 PROCEDURE — G8427 DOCREV CUR MEDS BY ELIG CLIN: HCPCS | Performed by: SURGERY

## 2020-05-14 PROCEDURE — 4040F PNEUMOC VAC/ADMIN/RCVD: CPT | Performed by: SURGERY

## 2020-05-19 ENCOUNTER — OFFICE VISIT (OUTPATIENT)
Dept: PRIMARY CARE CLINIC | Age: 80
End: 2020-05-19
Payer: MEDICARE

## 2020-05-19 PROCEDURE — 99213 OFFICE O/P EST LOW 20 MIN: CPT | Performed by: NURSE PRACTITIONER

## 2020-05-19 PROCEDURE — 1123F ACP DISCUSS/DSCN MKR DOCD: CPT | Performed by: NURSE PRACTITIONER

## 2020-05-19 PROCEDURE — G8417 CALC BMI ABV UP PARAM F/U: HCPCS | Performed by: NURSE PRACTITIONER

## 2020-05-19 PROCEDURE — G8428 CUR MEDS NOT DOCUMENT: HCPCS | Performed by: NURSE PRACTITIONER

## 2020-05-19 PROCEDURE — 4040F PNEUMOC VAC/ADMIN/RCVD: CPT | Performed by: NURSE PRACTITIONER

## 2020-05-19 PROCEDURE — 1036F TOBACCO NON-USER: CPT | Performed by: NURSE PRACTITIONER

## 2020-05-20 LAB
SARS-COV-2: NOT DETECTED
SOURCE: NORMAL

## 2020-05-26 ENCOUNTER — HOSPITAL ENCOUNTER (OUTPATIENT)
Dept: PULMONOLOGY | Age: 80
Discharge: HOME | End: 2020-05-26
Payer: MEDICARE

## 2020-05-26 PROCEDURE — 94761 N-INVAS EAR/PLS OXIMETRY MLT: CPT

## 2020-05-26 PROCEDURE — 6360000002 HC RX W HCPCS: Performed by: INTERNAL MEDICINE

## 2020-05-26 PROCEDURE — 94729 DIFFUSING CAPACITY: CPT

## 2020-05-26 PROCEDURE — 94618 PULMONARY STRESS TESTING: CPT

## 2020-05-26 PROCEDURE — 94726 PLETHYSMOGRAPHY LUNG VOLUMES: CPT

## 2020-05-26 PROCEDURE — 94060 EVALUATION OF WHEEZING: CPT

## 2020-05-26 PROCEDURE — 94664 DEMO&/EVAL PT USE INHALER: CPT

## 2020-05-26 RX ORDER — ALBUTEROL SULFATE 2.5 MG/3ML
2.5 SOLUTION RESPIRATORY (INHALATION) ONCE
Status: COMPLETED | OUTPATIENT
Start: 2020-05-26 | End: 2020-05-26

## 2020-05-26 RX ADMIN — ALBUTEROL SULFATE 2.5 MG: 2.5 SOLUTION RESPIRATORY (INHALATION) at 11:15

## 2020-05-26 NOTE — PROGRESS NOTES
Six Minute Walk     []  Desaturation Screening []  Endurance Test       Name:  Donna Keyes Record Number:  [id number]  Age: 78 y.o. Gender: male  : 1940  Today's Date:  2020  Pulmonary History:No history  Home Oxygen Therapy:  room air  Home Respiratory Therapy:None                    6 MINUTE WALK DATA    Modality Time (Minutes) SPO2 RR B/P Heart Rate O2 SOB Pain Level   Rest 2 96 18 141/87 76 RA 0    Walk 1 92   118 RA 2    Walk 2 88   128 RA 3    Walk 3 85   138 RA 4    Walk 4 92   128 2l 1    Walk 5 98   134 2 2    Walk 6 99   106 2 3    Recovery 2 98 18 161/88 92 RA 1      [place]boo SOB Scale:  0=Nothing at all; 0.5=Very, very slight; 1=Very slight; 2=Slight; 3=Moderate; 4=Somewhat severe; 5=Severe; 7=Very Severe; 10=Very, very Severe    Exercise Summary:  Distance Walked (feet): 960  Lowest SpO2 During Walk:  85  (FIO2)  RA  Walking Pace (Steps per Minute)  82  Stopped or Paused during Walk:  Yes to apply NO2. Comments / Reason:  Very good effort by patient.      Electronically signed by Christy Fitzgerald RCP on 2020 at 5:59 PM

## 2020-05-29 ENCOUNTER — TELEPHONE (OUTPATIENT)
Dept: CARDIOLOGY CLINIC | Age: 80
End: 2020-05-29

## 2020-05-29 NOTE — PROCEDURES
[address]               [address] [place], [address]                               PULMONARY FUNCTION    PATIENT NAME: Tamara Moreno                    :        1940  MED REC NO:   [id number]                          ROOM:  ACCOUNT NO:   [de-identified]                           ADMIT DATE: 2020  PROVIDER:     Ayesha Martinez MD    DATE OF PROCEDURE:  2020    INDICATION:  Shortness of breath. BMI:  38.4. TOBACCO HISTORY:  Never smoked. Spirometry data is acceptable and reproducible. Lung volumes performed via plethysmography. Room air oxygen saturation was 96%. SPIROMETRY:  FEV1 to FVC ratio is 74%. Prebronchodilator FEV1 is 1.91,  which is 72% of predicted. Prebronchodilator FVC is 2.59, which is 68%  of predicted. Prebronchodilator FEF 25% to 75% is 1.39 which is 77% of  predicted. Postbronchodilator FEF 25% to 75% is 1.94 which is 108% of  predicted, which is an increase of 39%. LUNG VOLUMES:  Show total lung capacity of 5.45, which is 79% of  predicted. Residual volume is 2.72, which is 102% of predicted. Diffusion capacity is 17.06, which is 59% of predicted. IMPRESSION:  1. No obstructive defect. 2.  There is significant response to bronchodilator in smaller airways  only. 3.  Mild obstructive defect. 4.  Mild decrease in diffusion capacity.         Sheri Farooq MD    D: 2020 13:35:03       T: 2020 15:54:03     [email or web address]  Job#: [id number]     Doc#: [id number]    CC:

## 2020-06-08 ENCOUNTER — OFFICE VISIT (OUTPATIENT)
Dept: CARDIOLOGY CLINIC | Age: 80
End: 2020-06-08
Payer: MEDICARE

## 2020-06-08 VITALS
WEIGHT: 271 LBS | HEART RATE: 82 BPM | SYSTOLIC BLOOD PRESSURE: 130 MMHG | TEMPERATURE: 97.2 F | BODY MASS INDEX: 40 KG/M2 | DIASTOLIC BLOOD PRESSURE: 68 MMHG

## 2020-06-08 PROCEDURE — 99214 OFFICE O/P EST MOD 30 MIN: CPT | Performed by: INTERNAL MEDICINE

## 2020-06-08 PROCEDURE — 1036F TOBACCO NON-USER: CPT | Performed by: INTERNAL MEDICINE

## 2020-06-08 PROCEDURE — 1123F ACP DISCUSS/DSCN MKR DOCD: CPT | Performed by: INTERNAL MEDICINE

## 2020-06-08 PROCEDURE — G8417 CALC BMI ABV UP PARAM F/U: HCPCS | Performed by: INTERNAL MEDICINE

## 2020-06-08 PROCEDURE — 4040F PNEUMOC VAC/ADMIN/RCVD: CPT | Performed by: INTERNAL MEDICINE

## 2020-06-08 PROCEDURE — G8428 CUR MEDS NOT DOCUMENT: HCPCS | Performed by: INTERNAL MEDICINE

## 2020-06-08 NOTE — PROGRESS NOTES
Tobacco Use   Smoking Status Never Smoker   Smokeless Tobacco Never Used     Current Medications:  Prior to Visit Medications    Medication Sig Taking? Authorizing Provider   rosuvastatin (CRESTOR) 10 MG tablet TAKE 1 TABLET BY MOUTH EVERY DAY  Henny Hill MD   pioglitazone (ACTOS) 15 MG tablet TAKE 1 TABLET BY MOUTH DAILY  Henny Hill MD   furosemide (LASIX) 40 MG tablet Take 0.5 tablets by mouth daily  Osmani Lee MD   omeprazole (PRILOSEC) 20 MG delayed release capsule TAKE ONE CAPSULE BY MOUTH DAILY  Henny Hill MD   candesartan-hydrochlorothiazide (ATACAND HCT) 32-12.5 MG per tablet TAKE 1 TABLET BY MOUTH  DAILY  Henny Hill MD   pioglitazone (ACTOS) 15 MG tablet TAKE 1 TABLET BY MOUTH DAILY  Henny Hill MD   sulfamethoxazole-trimethoprim (BACTRIM DS;SEPTRA DS) 800-160 MG per tablet Take 1 tablet by mouth 2 times daily  Historical Provider, MD   cephALEXin (KEFLEX) 500 MG capsule Take 1 capsule by mouth 3 times daily  Patient not taking: Reported on 3/2/2020  PILLO Harrison CNP   FREESTYLE LANCETS MISC Test twice daily  Henny Hill MD   Blood Glucose Monitoring Suppl (FREESTYLE LITE) LISA Test twice daily  Henny Hill MD   glucose blood VI test strips (FREESTYLE LITE) strip Test twice daily  Henny Hill MD   cetirizine (ZYRTEC) 10 MG tablet Take 10 mg by mouth daily  Historical Provider, MD   aspirin 81 MG tablet Take 81 mg by mouth daily  Historical Provider, MD   Multiple Vitamins-Minerals (CENTRUM SILVER ADULT 50+ PO) Take 1 tablet by mouth daily  Historical Provider, MD   ketorolac (TORADOL) 30 MG/ML injection Inject 1 mL into the muscle once for 1 dose  PILLO Harrison CNP   Cholecalciferol (VITAMIN D3) 1000 UNITS CAPS Take 1 capsule by mouth daily. Historical Provider, MD     Family History  No family history on file.     Current Medications  Current Outpatient Medications   Medication Sig Dispense Refill    rosuvastatin (CRESTOR) 10 MG tablet TAKE 1 TIA-like symptoms. PSYCHIATRIC: No anxiety, pain, insomnia or depression    Objective:   PHYSICAL EXAM:        VITALS:    Wt Readings from Last 3 Encounters:   06/08/20 271 lb (122.9 kg)   05/14/20 260 lb (117.9 kg)   03/02/20 271 lb 9.6 oz (123.2 kg)     BP Readings from Last 3 Encounters:   06/08/20 130/68   05/14/20 (!) 110/59   03/02/20 128/72     Pulse Readings from Last 3 Encounters:   06/08/20 82   05/14/20 109   03/02/20 90       CONSTITUTIONAL: Cooperative, no apparent distress, and appears well nourished / developed  NEUROLOGIC:  Awake and orientated to person, place and time. PSYCH: Calm affect. SKIN: Warm and dry. HEENT: Sclera non-icteric, normocephalic, neck supple, no elevation of JVP, normal carotid pulses with no bruits and thyroid normal size. LUNGS:  No increased work of breathing and clear to auscultation, no crackles or wheezing  CARDIOVASCULAR:  Regular rate and rhythm with no murmurs, gallops, rubs, or abnormal heart sounds, normal PMI. The apical impulses not displaced  Heart tones are crisp and normal  Cervical veins are not engorged  The carotid upstroke is normal in amplitude and contour without delay or bruit  JVP is not elevated  ABDOMEN:  Normal bowel sounds, non-distended and non-tender to palpation  EXT: Moderate bilateral edema, no calf tenderness. Pulses are present bilaterally.     DATA:    Lab Results   Component Value Date    ALT 12 02/13/2020    AST 17 02/13/2020    ALKPHOS 78 02/13/2020    BILITOT 0.4 02/13/2020     Lab Results   Component Value Date    CREATININE 0.9 03/02/2020    BUN 14 03/02/2020     03/02/2020    K 3.9 03/02/2020    CL 97 (L) 03/02/2020    CO2 23 03/02/2020     Lab Results   Component Value Date    TSH 2.33 03/26/2013     Lab Results   Component Value Date    WBC 6.7 03/07/2018    HGB 13.2 (L) 03/07/2018    HCT 37.8 (L) 03/07/2018    MCV 98.3 03/07/2018     03/07/2018     No components found for: CHLPL  Lab Results   Component Value Date

## 2020-06-10 ENCOUNTER — TELEPHONE (OUTPATIENT)
Dept: CARDIOLOGY CLINIC | Age: 80
End: 2020-06-10

## 2020-06-10 NOTE — TELEPHONE ENCOUNTER
The patient called stating that Dr. Dia Eastman was supposed to call a prescription in for an inhaler to his pharmacy, but no one was called in. Please call the patient back at [phone] once the prescription is called in.

## 2020-06-16 ENCOUNTER — VIRTUAL VISIT (OUTPATIENT)
Dept: INTERNAL MEDICINE CLINIC | Age: 80
End: 2020-06-16
Payer: MEDICARE

## 2020-06-16 PROCEDURE — G0439 PPPS, SUBSEQ VISIT: HCPCS | Performed by: INTERNAL MEDICINE

## 2020-06-16 PROCEDURE — 1123F ACP DISCUSS/DSCN MKR DOCD: CPT | Performed by: INTERNAL MEDICINE

## 2020-06-16 PROCEDURE — 4040F PNEUMOC VAC/ADMIN/RCVD: CPT | Performed by: INTERNAL MEDICINE

## 2020-06-16 RX ORDER — BUDESONIDE AND FORMOTEROL FUMARATE DIHYDRATE 160; 4.5 UG/1; UG/1
2 AEROSOL RESPIRATORY (INHALATION) 2 TIMES DAILY
Qty: 3 INHALER | Refills: 1 | Status: SHIPPED | OUTPATIENT
Start: 2020-06-16 | End: 2020-08-14 | Stop reason: SDUPTHER

## 2020-06-25 LAB
CATARACTS: NEGATIVE
DIABETIC RETINOPATHY: NEGATIVE
GLAUCOMA: NEGATIVE
INTRAOCULAR PRESSURE EYE: NORMAL
VISUAL ACUITY DISTANCE LEFT EYE: NORMAL
VISUAL ACUITY DISTANCE RIGHT EYE: NORMAL

## 2020-07-16 NOTE — PATIENT INSTRUCTIONS
you gain weight. Having a well-balanced diet means that you eat enough, but not too much, and that your food gives you the nutrients you need to stay healthy. So listen to your body. Eat when you're hungry. Stop when you feel satisfied. It's a good idea to have healthy snacks ready for when you get hungry. Keep healthy snacks with you at work, in your car, and at home. If you have a healthy snack easily available, you'll be less likely to pick a candy bar or bag of chips from a vending machine instead. Some healthy snacks you might want to keep on hand are fruit, low-fat yogurt, string cheese, low-fat microwave popcorn, raisins and other dried fruit, nuts, whole wheat crackers, pretzels, carrots, celery sticks, and broccoli. Do some physical activity   A big part of reaching and staying at a healthy weight is being active. When you're active, you burn calories. This makes it easier to reach and stay at a healthy weight. When you're active on a regular basis, your body burns more calories, even when you're at rest. Being active helps you lose fat and build lean muscle. Try to be active for at least 1 hour every day. This may sound like a lot, but it's okay to be active in smaller blocks of time that add up to 1 hour a day. Any activity that makes your heart beat faster and keeps it there for a while counts. A brisk walk, run, or swim will get your heart beating faster. So will climbing stairs, shooting baskets, or cycling. Even some household chores like vacuuming and mowing the lawn will get your heart rate up. Pick activities that you enjoy--ones that make your heart beat faster, your muscles stronger, and your muscles and joints more flexible. If you find more than one thing you like doing, do them all. You don't have to do the same thing every day. Don't diet  Diets don't work. Diets are temporary. Because you give up so much when you diet, you may be hungry and think about food all the time.  And after eat  · Keep a food diary for a week or two and record everything you eat or drink. Track the number of servings you eat from each food group. · For a balanced diet every day, eat a variety of:  ? 6 or more ounce-equivalents of grains, such as cereals, breads, crackers, rice, or pasta, every day. An ounce-equivalent is 1 slice of bread, 1 cup of ready-to-eat cereal, or ½ cup of cooked rice, cooked pasta, or cooked cereal.  ? 2½ cups of vegetables, especially:  § Dark-green vegetables such as broccoli and spinach. § Orange vegetables such as carrots and sweet potatoes. § Dry beans (such as stone and kidney beans) and peas (such as lentils). ? 2 cups of fresh, frozen, or canned fruit. A small apple or 1 banana or orange equals 1 cup. ? 3 cups of nonfat or low-fat milk, yogurt, or other milk products. ? 5½ ounces of meat and beans, such as chicken, fish, lean meat, beans, nuts, and seeds. One egg, 1 tablespoon of peanut butter, ½ ounce nuts or seeds, or ¼ cup of cooked beans equals 1 ounce of meat. · Learn how to read food labels for serving sizes and ingredients. Fast-food and convenience-food meals often contain few or no fruits or vegetables. Make sure you eat some fruits and vegetables to make the meal more nutritious. · Look at your food diary. For each food group, add up what you have eaten and then divide the total by the number of days. This will give you an idea of how much you are eating from each food group. See if you can find some ways to change your diet to make it more healthy. Start small  · Do not try to make dramatic changes to your diet all at once. You might feel that you are missing out on your favorite foods and then be more likely to fail. · Start slowly, and gradually change your habits. Try some of the following:  ? Use whole wheat bread instead of white bread. ? Use nonfat or low-fat milk instead of whole milk.   ? Eat brown rice instead of white rice, and eat whole wheat pasta instead of white-flour pasta. ? Try low-fat cheeses and low-fat yogurt. ? Add more fruits and vegetables to meals and have them for snacks. ? Add lettuce, tomato, cucumber, and onion to sandwiches. ? Add fruit to yogurt and cereal.  Enjoy food  · You can still eat your favorite foods. You just may need to eat less of them. If your favorite foods are high in fat, salt, and sugar, limit how often you eat them, but do not cut them out entirely. · Eat a wide variety of foods. Make healthy choices when eating out  · The type of restaurant you choose can help you make healthy choices. Even fast-food chains are now offering more low-fat or healthier choices on the menu. · Choose smaller portions, or take half of your meal home. · When eating out, try:  ? A veggie pizza with a whole wheat crust or grilled chicken (instead of sausage or pepperoni). ? Pasta with roasted vegetables, grilled chicken, or marinara sauce instead of cream sauce. ? A vegetable wrap or grilled chicken wrap. ? Broiled or poached food instead of fried or breaded items. Make healthy choices easy  · Buy packaged, prewashed, ready-to-eat fresh vegetables and fruits, such as baby carrots, salad mixes, and chopped or shredded broccoli and cauliflower. · Buy packaged, presliced fruits, such as melon or pineapple. · Choose 100% fruit or vegetable juice instead of soda. Limit juice intake to 4 to 6 oz (½ to ¾ cup) a day. · Blend low-fat yogurt, fruit juice, and canned or frozen fruit to make a smoothie for breakfast or a snack. Where can you learn more? Go to https://[email or web address] [email or web address] and sign in to your [employer] account. Enter T192 in the [employer] box to learn more about \"Eating Healthy Foods: Care Instructions. \"     If you do not have an account, please click on the \"Sign Up Now\" link. Current as of: August 22, 2019               Content Version: 12.5  © 9594-1311 Healthwise, Incorporated.    Care instructions adapted under license by [facility] ([facility]). If you have questions about a medical condition or this instruction, always ask your healthcare professional. [place] [address] any warranty or liability for your use of this information. Personalized Preventive Plan for Adilene Flores - 6/16/2020  Medicare offers a range of preventive health benefits. Some of the tests and screenings are paid in full while other may be subject to a deductible, co-insurance, and/or copay. Some of these benefits include a comprehensive review of your medical history including lifestyle, illnesses that may run in your family, and various assessments and screenings as appropriate. After reviewing your medical record and screening and assessments performed today your provider may have ordered immunizations, labs, imaging, and/or referrals for you. A list of these orders (if applicable) as well as your Preventive Care list are included within your After Visit Summary for your review. Other Preventive Recommendations:    · A preventive eye exam performed by an eye specialist is recommended every 1-2 years to screen for glaucoma; cataracts, macular degeneration, and other eye disorders. · A preventive dental visit is recommended every 6 months. · Try to get at least 150 minutes of exercise per week or 10,000 steps per day on a pedometer . · Order or download the FREE \"Exercise & Physical Activity: Your Everyday Guide\" from [employer]. Call 8-542.685.1146 or search [employer] online. · You need 1715-0659 mg of calcium and 7666-3115 IU of vitamin D per day. It is possible to meet your calcium requirement with diet alone, but a vitamin D supplement is usually necessary to meet this goal.  · When exposed to the sun, use a sunscreen that protects against both UVA and UVB radiation with an SPF of 30 or greater.  Reapply every 2 to 3 hours or after sweating, drying off with a towel, or swimming. · Always wear a seat belt when traveling in a car. Always wear a helmet when riding a bicycle or motorcycle.

## 2020-07-16 NOTE — PROGRESS NOTES
by mouth daily  Historical Provider, MD   Multiple Vitamins-Minerals (CENTRUM SILVER ADULT 50+ PO) Take 1 tablet by mouth daily  Historical Provider, MD   ketorolac (TORADOL) 30 MG/ML injection Inject 1 mL into the muscle once for 1 dose  PILLO Black CNP   Cholecalciferol (VITAMIN D3) 1000 UNITS CAPS Take 1 capsule by mouth daily. Historical Provider, MD       Past Medical History:   Diagnosis Date    AR (allergic rhinitis)     Dermatitis     Diabetes     Diabetes ([address]     oral medications    DJD (degenerative joint disease)     knees    Dyslipidemia     ED (erectile dysfunction)     GERD (gastroesophageal reflux disease)     HTN (hypertension)     Hx of blood clots     x 1 after traveling    Hyperlipidemia     Sleep apnea     cpap set at 12    Vitamin D deficiency        Past Surgical History:   Procedure Laterality Date    COLONOSCOPY      FOOT SURGERY      HAND SURGERY      JOINT REPLACEMENT Left 8/29/2014    TOTAL KNEE REPLACEMENT    KNEE ARTHROPLASTY Right 1/21/2015    OTHER SURGICAL HISTORY  See note 12/30/14    Was unable to intubate with a peacock/Used a glidescope per Dr Manolo Graf dated 12/30/14    OTHER SURGICAL HISTORY Right 03/06/2015    REPAIR OF RUPTURED PATELLAR TENDON RIGHT KNEE,USING TIBIAL       No family history on file. CareTeam (Including outside providers/suppliers regularly involved in providing care):   Patient Care Team:  Meryle Sluder, MD as PCP - General (Internal Medicine)  Meryle Sluder, MD as PCP [facility] Provider  Jay Valdez MD as Consulting Physician (Vascular Surgery)  Aida Jama MD as Consulting Physician (Surgical Oncology)    Wt Readings from Last 3 Encounters:   06/08/20 271 lb (122.9 kg)   05/14/20 260 lb (117.9 kg)   03/02/20 271 lb 9.6 oz (123.2 kg)     There were no vitals filed for this visit. There is no height or weight on file to calculate BMI.     Based upon direct observation of the patient, evaluation of cognition reveals normal memory and cognitive performance. Pablo clock with accurate time. Patient's complete Health Risk Assessment and screening values have been reviewed and are found in Flowsheets. The following problems were reviewed today and where indicated follow up appointments were made and/or referrals ordered. Positive Risk Factor Screenings with Interventions:     Health Habits/Nutrition:     There is no height or weight on file to calculate BMI. Health Habits/Nutrition Interventions:  · Health and wellness advice given. · Questions answered. · Literature references provided. Personalized Preventive Plan   Current Health Maintenance Status  Immunization History   Administered Date(s) Administered    Influenza 11/16/2010, 11/16/2011    Influenza Virus Vaccine 01/22/2015    Influenza, High Dose (Fluzone 65 yrs and older) 12/18/2015, 10/03/2017, 12/19/2018    Pneumococcal Conjugate 13-valent ([id number]) 06/19/2017    Pneumococcal Polysaccharide ([id number]) 08/30/2014        Health Maintenance   Topic Date Due    Flu vaccine (1) 09/01/2020    Lipid screen  12/19/2020    Annual Wellness Visit (AWV)  12/19/2020    Potassium monitoring  03/02/2021    Creatinine monitoring  03/02/2021    DTaP/Tdap/Td vaccine (2 - Td) 07/17/2028    Shingles Vaccine  Completed    Pneumococcal 65+ years Vaccine  Completed    Hepatitis A vaccine  Aged Out    Hib vaccine  Aged Out    Meningococcal (ACWY) vaccine  Aged Out     Recommendations for BABADU Due: see orders and patient instructions/AVS.  . Recommended screening schedule for the next 5-10 years is provided to the patient in written form: see Patient Instructions/AVS.    HTN: continue same medication regimen. DASH diet discussed. HLD: heart healthy diet and statin compliance discussed. DM, T2: diet, physical activity and weight reduction discussed.          Advance Care Planning   Advanced Care Planning: Discussed the patients choices for care and treatment in case of a health event that adversely affects decision-making abilities. Also discussed the patients long-term treatment options. Reviewed with the patient the [address] [place] [address] & [employer] [place] Will Declaration forms  Reviewed the process of designating a competent adult as an Agent (or -in-fact) that could take make health care decisions for the patient if incompetent. Patient was asked to complete the declaration forms, either acknowledge the forms by a public notary or an eligible witness and provide a signed copy to the practice office. Time spent (minutes): 6 minutes. Cardiovascular Disease Risk Counseling: Assessed the patient's risk to develop cardiovascular disease and reviewed main risk factors. Reviewed steps to reduce disease risk including:   · Quitting tobacco use, reducing amount smoked, or not starting the habit  · Making healthy food choices  · Being physically active and gradualy increasing activity levels   · Reduce weight and determine a healthy BMI goal  · Monitor blood pressure and treat if higher than 140/90 mmHg  · Maintain blood total cholesterol levels under 5 mmol/l or 190 mg/dl  · Maintain LDL cholesterol levels under 3.0 mmol/l or 115 mg/dl   · Control blood glucose levels  · Consider taking aspirin (75 mg daily), once blood pressure is controlled   Provided a follow up plan. Time spent (minutes): 10 minutes. 2020    TELEHEALTH EVALUATION -- Audio/Visual (During [id number] public health emergency)    HPI:    Daisy Ledezma (:  1940) has requested an audio/video evaluation for the following concern(s):        Review of Systems    Prior to Visit Medications    Medication Sig Taking?  Authorizing Provider   candesartan-hydrochlorothiazide (ATACAND HCT) 32-12.5 MG per tablet Take 1 tablet by mouth daily  Francesco Burk MD   budesonide-formoterol (SYMBICORT) 160-4.5 MCG/ACT AERO Inhale 2 puffs into the lungs 2 times

## 2020-07-24 RX ORDER — OMEPRAZOLE 20 MG/1
CAPSULE, DELAYED RELEASE ORAL
Qty: 90 CAPSULE | Refills: 1 | Status: SHIPPED | OUTPATIENT
Start: 2020-07-24 | End: 2021-02-18 | Stop reason: SDUPTHER

## 2020-07-29 RX ORDER — CANDESARTAN CILEXETIL AND HYDROCHLOROTHIAZIDE 32; 12.5 MG/1; MG/1
TABLET ORAL
Qty: 120 TABLET | Refills: 2 | Status: SHIPPED | OUTPATIENT
Start: 2020-07-29 | End: 2020-07-31 | Stop reason: SDUPTHER

## 2020-07-30 ENCOUNTER — TELEPHONE (OUTPATIENT)
Dept: INTERNAL MEDICINE CLINIC | Age: 80
End: 2020-07-30

## 2020-07-30 NOTE — TELEPHONE ENCOUNTER
Patient is requesting short fill for candesartan hydrochlorothiazide till mailord comes end of next week please advise.

## 2020-07-31 RX ORDER — CANDESARTAN CILEXETIL AND HYDROCHLOROTHIAZIDE 32; 12.5 MG/1; MG/1
1 TABLET ORAL DAILY
Qty: 10 TABLET | Refills: 2 | Status: SHIPPED | OUTPATIENT
Start: 2020-07-31 | End: 2020-09-01 | Stop reason: SDUPTHER

## 2020-08-12 ENCOUNTER — TELEPHONE (OUTPATIENT)
Dept: INTERNAL MEDICINE CLINIC | Age: 80
End: 2020-08-12

## 2020-08-12 NOTE — TELEPHONE ENCOUNTER
----- Message from [employer] sent at 8/12/2020  4:17 PM EDT -----  Subject: Message to Provider    QUESTIONS  Information for Provider? Patient is requesting to speak with Dr. Miguel Angel Amaro   to ask questions on whether or not he should continue with the temporary   medication changes that were made at his last appointment. And should he   continue taking Symbicort.  ---------------------------------------------------------------------------  --------------  CALL BACK INFO  What is the best way for the office to contact you? OK to leave message on   voicemail  Preferred Call Back Phone Number? [phone]  ---------------------------------------------------------------------------  --------------  SCRIPT ANSWERS  Relationship to Patient?  Self

## 2020-08-14 ENCOUNTER — TELEPHONE (OUTPATIENT)
Dept: INTERNAL MEDICINE CLINIC | Age: 80
End: 2020-08-14

## 2020-08-14 RX ORDER — BUDESONIDE AND FORMOTEROL FUMARATE DIHYDRATE 160; 4.5 UG/1; UG/1
2 AEROSOL RESPIRATORY (INHALATION) 2 TIMES DAILY
Qty: 3 INHALER | Refills: 1 | Status: SHIPPED | OUTPATIENT
Start: 2020-08-14

## 2020-08-14 NOTE — TELEPHONE ENCOUNTER
Patient is calling back about medication actos if physician wants him to continue taking only on Monday Wednesday and Friday and if he wants patient to continue taking the sybicort patient has no refills please advise.

## 2020-09-01 ENCOUNTER — TELEPHONE (OUTPATIENT)
Dept: INTERNAL MEDICINE CLINIC | Age: 80
End: 2020-09-01

## 2020-09-01 RX ORDER — CANDESARTAN CILEXETIL AND HYDROCHLOROTHIAZIDE 32; 12.5 MG/1; MG/1
1 TABLET ORAL DAILY
Qty: 90 TABLET | Refills: 2 | Status: SHIPPED | OUTPATIENT
Start: 2020-09-01 | End: 2021-08-31 | Stop reason: SDUPTHER

## 2020-11-14 ENCOUNTER — NURSE TRIAGE (OUTPATIENT)
Dept: OTHER | Facility: CLINIC | Age: 80
End: 2020-11-14

## 2020-11-14 ENCOUNTER — APPOINTMENT (OUTPATIENT)
Dept: GENERAL RADIOLOGY | Age: 80
DRG: 872 | End: 2020-11-14
Payer: MEDICARE

## 2020-11-14 ENCOUNTER — HOSPITAL ENCOUNTER (INPATIENT)
Age: 80
LOS: 2 days | Discharge: HOME | DRG: 872 | End: 2020-11-16
Attending: EMERGENCY MEDICINE | Admitting: INTERNAL MEDICINE
Payer: MEDICARE

## 2020-11-14 PROBLEM — L03.90 CELLULITIS: Status: ACTIVE | Noted: 2020-11-14

## 2020-11-14 LAB
A/G RATIO: 1.2 (ref 1.1–2.2)
ALBUMIN SERPL-MCNC: 4.1 G/DL (ref 3.4–5)
ALP BLD-CCNC: 59 U/L (ref 40–129)
ALT SERPL-CCNC: 16 U/L (ref 10–40)
ANION GAP SERPL CALCULATED.3IONS-SCNC: 12 MMOL/L (ref 3–16)
AST SERPL-CCNC: 37 U/L (ref 15–37)
BACTERIA: ABNORMAL /HPF
BASOPHILS ABSOLUTE: 0.1 K/UL (ref 0–0.2)
BASOPHILS RELATIVE PERCENT: 0.4 %
BILIRUB SERPL-MCNC: 0.8 MG/DL (ref 0–1)
BILIRUBIN URINE: NEGATIVE
BLOOD, URINE: ABNORMAL
BUN BLDV-MCNC: 20 MG/DL (ref 7–20)
CALCIUM SERPL-MCNC: 9.4 MG/DL (ref 8.3–10.6)
CHLORIDE BLD-SCNC: 97 MMOL/L (ref 99–110)
CLARITY: ABNORMAL
CO2: 26 MMOL/L (ref 21–32)
COLOR: ABNORMAL
CREAT SERPL-MCNC: 1.1 MG/DL (ref 0.8–1.3)
EKG ATRIAL RATE: 99 BPM
EKG DIAGNOSIS: NORMAL
EKG P AXIS: 4 DEGREES
EKG P-R INTERVAL: 184 MS
EKG Q-T INTERVAL: 344 MS
EKG QRS DURATION: 78 MS
EKG QTC CALCULATION (BAZETT): 441 MS
EKG R AXIS: 16 DEGREES
EKG T AXIS: -8 DEGREES
EKG VENTRICULAR RATE: 99 BPM
EOSINOPHILS ABSOLUTE: 0 K/UL (ref 0–0.6)
EOSINOPHILS RELATIVE PERCENT: 0.1 %
EPITHELIAL CELLS, UA: ABNORMAL /HPF (ref 0–5)
GFR AFRICAN AMERICAN: >60
GFR NON-AFRICAN AMERICAN: >60
GLOBULIN: 3.3 G/DL
GLUCOSE BLD-MCNC: 128 MG/DL (ref 70–99)
GLUCOSE BLD-MCNC: 147 MG/DL (ref 70–99)
GLUCOSE BLD-MCNC: 154 MG/DL (ref 70–99)
GLUCOSE URINE: NEGATIVE MG/DL
HCT VFR BLD CALC: 34.8 % (ref 40.5–52.5)
HEMOGLOBIN: 11.6 G/DL (ref 13.5–17.5)
KETONES, URINE: NEGATIVE MG/DL
LACTIC ACID: 1.4 MMOL/L (ref 0.4–2)
LACTIC ACID: 1.9 MMOL/L (ref 0.4–2)
LEUKOCYTE ESTERASE, URINE: NEGATIVE
LYMPHOCYTES ABSOLUTE: 0.7 K/UL (ref 1–5.1)
LYMPHOCYTES RELATIVE PERCENT: 5.5 %
MCH RBC QN AUTO: 33.7 PG (ref 26–34)
MCHC RBC AUTO-ENTMCNC: 33.3 G/DL (ref 31–36)
MCV RBC AUTO: 101.1 FL (ref 80–100)
MICROSCOPIC EXAMINATION: YES
MONOCYTES ABSOLUTE: 0.8 K/UL (ref 0–1.3)
MONOCYTES RELATIVE PERCENT: 6.5 %
NEUTROPHILS ABSOLUTE: 11.2 K/UL (ref 1.7–7.7)
NEUTROPHILS RELATIVE PERCENT: 87.5 %
NITRITE, URINE: NEGATIVE
PDW BLD-RTO: 14.6 % (ref 12.4–15.4)
PERFORMED ON: ABNORMAL
PERFORMED ON: ABNORMAL
PH UA: 7 (ref 5–8)
PLATELET # BLD: 194 K/UL (ref 135–450)
PMV BLD AUTO: 7.6 FL (ref 5–10.5)
POTASSIUM REFLEX MAGNESIUM: 5.1 MMOL/L (ref 3.5–5.1)
PROTEIN UA: 100 MG/DL
RBC # BLD: 3.44 M/UL (ref 4.2–5.9)
RBC UA: ABNORMAL /HPF (ref 0–4)
SODIUM BLD-SCNC: 135 MMOL/L (ref 136–145)
SPECIFIC GRAVITY UA: 1.02 (ref 1–1.03)
TOTAL PROTEIN: 7.4 G/DL (ref 6.4–8.2)
URINE TYPE: ABNORMAL
UROBILINOGEN, URINE: 1 E.U./DL
WBC # BLD: 12.8 K/UL (ref 4–11)
WBC UA: ABNORMAL /HPF (ref 0–5)

## 2020-11-14 PROCEDURE — 96365 THER/PROPH/DIAG IV INF INIT: CPT

## 2020-11-14 PROCEDURE — 94761 N-INVAS EAR/PLS OXIMETRY MLT: CPT

## 2020-11-14 PROCEDURE — 2580000003 HC RX 258: Performed by: INTERNAL MEDICINE

## 2020-11-14 PROCEDURE — 96372 THER/PROPH/DIAG INJ SC/IM: CPT

## 2020-11-14 PROCEDURE — 85025 COMPLETE CBC W/AUTO DIFF WBC: CPT

## 2020-11-14 PROCEDURE — 36415 COLL VENOUS BLD VENIPUNCTURE: CPT

## 2020-11-14 PROCEDURE — 83605 ASSAY OF LACTIC ACID: CPT

## 2020-11-14 PROCEDURE — 6360000002 HC RX W HCPCS: Performed by: EMERGENCY MEDICINE

## 2020-11-14 PROCEDURE — 6360000002 HC RX W HCPCS: Performed by: INTERNAL MEDICINE

## 2020-11-14 PROCEDURE — 93005 ELECTROCARDIOGRAM TRACING: CPT | Performed by: EMERGENCY MEDICINE

## 2020-11-14 PROCEDURE — 80053 COMPREHEN METABOLIC PANEL: CPT

## 2020-11-14 PROCEDURE — 83036 HEMOGLOBIN GLYCOSYLATED A1C: CPT

## 2020-11-14 PROCEDURE — 96366 THER/PROPH/DIAG IV INF ADDON: CPT

## 2020-11-14 PROCEDURE — 2500000003 HC RX 250 WO HCPCS: Performed by: EMERGENCY MEDICINE

## 2020-11-14 PROCEDURE — 87040 BLOOD CULTURE FOR BACTERIA: CPT

## 2020-11-14 PROCEDURE — 2580000003 HC RX 258: Performed by: EMERGENCY MEDICINE

## 2020-11-14 PROCEDURE — 94660 CPAP INITIATION&MGMT: CPT

## 2020-11-14 PROCEDURE — 94640 AIRWAY INHALATION TREATMENT: CPT

## 2020-11-14 PROCEDURE — 6370000000 HC RX 637 (ALT 250 FOR IP): Performed by: INTERNAL MEDICINE

## 2020-11-14 PROCEDURE — 6370000000 HC RX 637 (ALT 250 FOR IP): Performed by: EMERGENCY MEDICINE

## 2020-11-14 PROCEDURE — 81001 URINALYSIS AUTO W/SCOPE: CPT

## 2020-11-14 PROCEDURE — 99283 EMERGENCY DEPT VISIT LOW MDM: CPT

## 2020-11-14 PROCEDURE — 1200000000 HC SEMI PRIVATE

## 2020-11-14 PROCEDURE — 71046 X-RAY EXAM CHEST 2 VIEWS: CPT

## 2020-11-14 RX ORDER — ACETAMINOPHEN 325 MG/1
650 TABLET ORAL EVERY 6 HOURS PRN
Status: DISCONTINUED | OUTPATIENT
Start: 2020-11-14 | End: 2020-11-16 | Stop reason: HOSPADM

## 2020-11-14 RX ORDER — INSULIN LISPRO 100 [IU]/ML
0-6 INJECTION, SOLUTION INTRAVENOUS; SUBCUTANEOUS NIGHTLY
Status: DISCONTINUED | OUTPATIENT
Start: 2020-11-14 | End: 2020-11-16 | Stop reason: HOSPADM

## 2020-11-14 RX ORDER — CANDESARTAN CILEXETIL AND HYDROCHLOROTHIAZIDE 32; 12.5 MG/1; MG/1
1 TABLET ORAL DAILY
Status: DISCONTINUED | OUTPATIENT
Start: 2020-11-14 | End: 2020-11-14

## 2020-11-14 RX ORDER — ASPIRIN 81 MG/1
81 TABLET, CHEWABLE ORAL DAILY
Status: DISCONTINUED | OUTPATIENT
Start: 2020-11-14 | End: 2020-11-16 | Stop reason: HOSPADM

## 2020-11-14 RX ORDER — DEXTROSE MONOHYDRATE 25 G/50ML
12.5 INJECTION, SOLUTION INTRAVENOUS PRN
Status: DISCONTINUED | OUTPATIENT
Start: 2020-11-14 | End: 2020-11-16 | Stop reason: HOSPADM

## 2020-11-14 RX ORDER — SODIUM CHLORIDE 0.9 % (FLUSH) 0.9 %
10 SYRINGE (ML) INJECTION PRN
Status: DISCONTINUED | OUTPATIENT
Start: 2020-11-14 | End: 2020-11-16 | Stop reason: HOSPADM

## 2020-11-14 RX ORDER — CETIRIZINE HYDROCHLORIDE 10 MG/1
10 TABLET ORAL DAILY
Status: DISCONTINUED | OUTPATIENT
Start: 2020-11-14 | End: 2020-11-16 | Stop reason: HOSPADM

## 2020-11-14 RX ORDER — VITAMIN B COMPLEX
25 TABLET ORAL DAILY
Status: DISCONTINUED | OUTPATIENT
Start: 2020-11-14 | End: 2020-11-16 | Stop reason: HOSPADM

## 2020-11-14 RX ORDER — SODIUM CHLORIDE 9 MG/ML
INJECTION, SOLUTION INTRAVENOUS
Status: DISPENSED
Start: 2020-11-14 | End: 2020-11-15

## 2020-11-14 RX ORDER — ONDANSETRON 2 MG/ML
4 INJECTION INTRAMUSCULAR; INTRAVENOUS EVERY 6 HOURS PRN
Status: DISCONTINUED | OUTPATIENT
Start: 2020-11-14 | End: 2020-11-16 | Stop reason: HOSPADM

## 2020-11-14 RX ORDER — BUDESONIDE 0.5 MG/2ML
0.5 INHALANT ORAL 2 TIMES DAILY
Status: DISCONTINUED | OUTPATIENT
Start: 2020-11-14 | End: 2020-11-16 | Stop reason: HOSPADM

## 2020-11-14 RX ORDER — PROMETHAZINE HYDROCHLORIDE 25 MG/1
12.5 TABLET ORAL EVERY 6 HOURS PRN
Status: DISCONTINUED | OUTPATIENT
Start: 2020-11-14 | End: 2020-11-16 | Stop reason: HOSPADM

## 2020-11-14 RX ORDER — LOSARTAN POTASSIUM AND HYDROCHLOROTHIAZIDE 12.5; 5 MG/1; MG/1
1 TABLET ORAL ONCE
Status: DISCONTINUED | OUTPATIENT
Start: 2020-11-14 | End: 2020-11-14

## 2020-11-14 RX ORDER — ACETAMINOPHEN 650 MG/1
650 SUPPOSITORY RECTAL EVERY 6 HOURS PRN
Status: DISCONTINUED | OUTPATIENT
Start: 2020-11-14 | End: 2020-11-16 | Stop reason: HOSPADM

## 2020-11-14 RX ORDER — FUROSEMIDE 20 MG/1
20 TABLET ORAL DAILY
Status: DISCONTINUED | OUTPATIENT
Start: 2020-11-14 | End: 2020-11-16 | Stop reason: HOSPADM

## 2020-11-14 RX ORDER — INSULIN LISPRO 100 [IU]/ML
0-12 INJECTION, SOLUTION INTRAVENOUS; SUBCUTANEOUS
Status: DISCONTINUED | OUTPATIENT
Start: 2020-11-14 | End: 2020-11-16 | Stop reason: HOSPADM

## 2020-11-14 RX ORDER — ROSUVASTATIN CALCIUM 10 MG/1
10 TABLET, COATED ORAL DAILY
Status: DISCONTINUED | OUTPATIENT
Start: 2020-11-14 | End: 2020-11-16 | Stop reason: HOSPADM

## 2020-11-14 RX ORDER — PANTOPRAZOLE SODIUM 40 MG/1
40 TABLET, DELAYED RELEASE ORAL
Status: DISCONTINUED | OUTPATIENT
Start: 2020-11-15 | End: 2020-11-16 | Stop reason: HOSPADM

## 2020-11-14 RX ORDER — POTASSIUM CHLORIDE 7.45 MG/ML
10 INJECTION INTRAVENOUS PRN
Status: DISCONTINUED | OUTPATIENT
Start: 2020-11-14 | End: 2020-11-16 | Stop reason: HOSPADM

## 2020-11-14 RX ORDER — HYDROCHLOROTHIAZIDE 12.5 MG/1
12.5 CAPSULE, GELATIN COATED ORAL DAILY
Status: DISCONTINUED | OUTPATIENT
Start: 2020-11-14 | End: 2020-11-16 | Stop reason: HOSPADM

## 2020-11-14 RX ORDER — PIOGLITAZONEHYDROCHLORIDE 15 MG/1
15 TABLET ORAL DAILY
COMMUNITY
End: 2021-11-03

## 2020-11-14 RX ORDER — VALSARTAN 160 MG/1
160 TABLET ORAL DAILY
Status: DISCONTINUED | OUTPATIENT
Start: 2020-11-14 | End: 2020-11-16 | Stop reason: HOSPADM

## 2020-11-14 RX ORDER — DEXTROSE MONOHYDRATE 50 MG/ML
100 INJECTION, SOLUTION INTRAVENOUS PRN
Status: DISCONTINUED | OUTPATIENT
Start: 2020-11-14 | End: 2020-11-16 | Stop reason: HOSPADM

## 2020-11-14 RX ORDER — BUDESONIDE AND FORMOTEROL FUMARATE DIHYDRATE 160; 4.5 UG/1; UG/1
2 AEROSOL RESPIRATORY (INHALATION) 2 TIMES DAILY
Status: DISCONTINUED | OUTPATIENT
Start: 2020-11-14 | End: 2020-11-14

## 2020-11-14 RX ORDER — HYDROCHLOROTHIAZIDE 25 MG/1
25 TABLET ORAL ONCE
Status: COMPLETED | OUTPATIENT
Start: 2020-11-14 | End: 2020-11-14

## 2020-11-14 RX ORDER — ARFORMOTEROL TARTRATE 15 UG/2ML
15 SOLUTION RESPIRATORY (INHALATION) 2 TIMES DAILY
Status: DISCONTINUED | OUTPATIENT
Start: 2020-11-14 | End: 2020-11-16 | Stop reason: HOSPADM

## 2020-11-14 RX ORDER — ACETAMINOPHEN 500 MG
1000 TABLET ORAL ONCE
Status: COMPLETED | OUTPATIENT
Start: 2020-11-14 | End: 2020-11-14

## 2020-11-14 RX ORDER — MAGNESIUM SULFATE IN WATER 40 MG/ML
2 INJECTION, SOLUTION INTRAVENOUS PRN
Status: DISCONTINUED | OUTPATIENT
Start: 2020-11-14 | End: 2020-11-16 | Stop reason: HOSPADM

## 2020-11-14 RX ORDER — NICOTINE POLACRILEX 4 MG
15 LOZENGE BUCCAL PRN
Status: DISCONTINUED | OUTPATIENT
Start: 2020-11-14 | End: 2020-11-16 | Stop reason: HOSPADM

## 2020-11-14 RX ORDER — SODIUM CHLORIDE 0.9 % (FLUSH) 0.9 %
10 SYRINGE (ML) INJECTION EVERY 12 HOURS SCHEDULED
Status: DISCONTINUED | OUTPATIENT
Start: 2020-11-14 | End: 2020-11-16 | Stop reason: HOSPADM

## 2020-11-14 RX ADMIN — BUDESONIDE INHALATION 500 MCG: 0.5 SUSPENSION RESPIRATORY (INHALATION) at 20:29

## 2020-11-14 RX ADMIN — Medication 1000 UNITS: at 21:01

## 2020-11-14 RX ADMIN — HYDROCHLOROTHIAZIDE 25 MG: 25 TABLET ORAL at 13:12

## 2020-11-14 RX ADMIN — ROSUVASTATIN 10 MG: 10 TABLET, FILM COATED ORAL at 21:01

## 2020-11-14 RX ADMIN — ARFORMOTEROL TARTRATE 15 MCG: 15 SOLUTION RESPIRATORY (INHALATION) at 20:29

## 2020-11-14 RX ADMIN — ENOXAPARIN SODIUM 40 MG: 40 INJECTION SUBCUTANEOUS at 21:01

## 2020-11-14 RX ADMIN — ASPIRIN 81 MG: 81 TABLET, CHEWABLE ORAL at 21:01

## 2020-11-14 RX ADMIN — VALSARTAN 160 MG: 160 TABLET ORAL at 21:01

## 2020-11-14 RX ADMIN — CETIRIZINE HYDROCHLORIDE 10 MG: 10 TABLET, FILM COATED ORAL at 21:01

## 2020-11-14 RX ADMIN — VANCOMYCIN HYDROCHLORIDE 1000 MG: 1 INJECTION, POWDER, LYOPHILIZED, FOR SOLUTION INTRAVENOUS at 13:13

## 2020-11-14 RX ADMIN — FUROSEMIDE 20 MG: 20 TABLET ORAL at 21:01

## 2020-11-14 RX ADMIN — VANCOMYCIN HYDROCHLORIDE 1000 MG: 1 INJECTION, POWDER, LYOPHILIZED, FOR SOLUTION INTRAVENOUS at 14:56

## 2020-11-14 RX ADMIN — HYDROCHLOROTHIAZIDE 12.5 MG: 12.5 CAPSULE ORAL at 21:01

## 2020-11-14 RX ADMIN — INSULIN LISPRO 1 UNITS: 100 INJECTION, SOLUTION INTRAVENOUS; SUBCUTANEOUS at 21:02

## 2020-11-14 RX ADMIN — ACETAMINOPHEN 1000 MG: 500 TABLET ORAL at 13:12

## 2020-11-14 RX ADMIN — Medication 10 ML: at 21:02

## 2020-11-14 RX ADMIN — INSULIN GLARGINE 5 UNITS: 100 INJECTION, SOLUTION SUBCUTANEOUS at 21:45

## 2020-11-14 RX ADMIN — PIPERACILLIN SODIUM AND TAZOBACTAM SODIUM 4.5 G: 4; .5 INJECTION, POWDER, LYOPHILIZED, FOR SOLUTION INTRAVENOUS at 12:13

## 2020-11-14 ASSESSMENT — PAIN SCALES - GENERAL
PAINLEVEL_OUTOF10: 0
PAINLEVEL_OUTOF10: 7
PAINLEVEL_OUTOF10: 0
PAINLEVEL_OUTOF10: 0

## 2020-11-14 ASSESSMENT — PAIN DESCRIPTION - ORIENTATION: ORIENTATION: LEFT

## 2020-11-14 ASSESSMENT — PAIN DESCRIPTION - LOCATION: LOCATION: LEG

## 2020-11-14 ASSESSMENT — PAIN DESCRIPTION - PAIN TYPE: TYPE: ACUTE PAIN

## 2020-11-14 NOTE — H&P
Hospital Medicine History & Physical      PCP: Kirstie Davis MD    Date of Admission: 11/14/2020    Chief Complaint: Right lower extremity swelling    History Of Present Illness:  Patient is a 49-year-old male with past medical history of diabetes mellitus, hypertension, GERD, hyperlipidemia, obstructive sleep apnea who presents to the hospital for right lower leg swelling and redness. According to the patient this is started for 2 days and it has been getting worse, right lower leg swelling is associated with redness, mild pain and swelling. Patient also mentions he had similar episodes in the past when he was treated with IV antibiotics. Patient denies on being antibiotics before coming to the hospital this time. Patient denies chest pain shortness of breath nausea vomiting diarrhea constipation dysuria    Past Medical History:          Diagnosis Date    AR (allergic rhinitis)     Dermatitis     Diabetes     Diabetes ([address]     oral medications    DJD (degenerative joint disease)     knees    Dyslipidemia     ED (erectile dysfunction)     GERD (gastroesophageal reflux disease)     HTN (hypertension)     Hx of blood clots     x 1 after traveling    Hyperlipidemia     Sleep apnea     cpap set at 12    Vitamin D deficiency        Past Surgical History:          Procedure Laterality Date    COLONOSCOPY      FOOT SURGERY      HAND SURGERY      JOINT REPLACEMENT Left 8/29/2014    TOTAL KNEE REPLACEMENT    KNEE ARTHROPLASTY Right 1/21/2015    OTHER SURGICAL HISTORY  See note 12/30/14    Was unable to intubate with a peacock/Used a glidescope per Dr Aditi Lofton dated 12/30/14    OTHER SURGICAL HISTORY Right 03/06/2015    REPAIR OF RUPTURED PATELLAR TENDON RIGHT KNEE,USING TIBIAL       Medications Prior to Admission:      Prior to Admission medications    Medication Sig Start Date End Date Taking?  Authorizing Provider   candesartan-hydrochlorothiazide (ATACAND HCT) 32-12.5 MG per tablet Take 1 tablet by mouth daily 9/1/20 5/29/21  Bryon Canchola MD   budesonide-formoterol [facility]) 160-4.5 MCG/ACT AERO Inhale 2 puffs into the lungs 2 times daily 8/14/20   Bryon Canchola MD   omeprazole (PRILOSEC) 20 MG delayed release capsule TAKE 1 CAPSULE BY MOUTH DAILY 7/24/20   Bryon Canchola MD   rosuvastatin (CRESTOR) 10 MG tablet TAKE 1 TABLET BY MOUTH EVERY DAY 4/27/20   Bryon Canchola MD   pioglitazone (ACTOS) 15 MG tablet TAKE 1 TABLET BY MOUTH DAILY 4/27/20 7/26/20  Bryon Canchola MD   furosemide (LASIX) 40 MG tablet Take 0.5 tablets by mouth daily 3/3/20   Ena Giles MD   pioglitazone (ACTOS) 15 MG tablet TAKE 1 TABLET BY MOUTH DAILY 10/11/18 3/2/20  Bryon Canchola MD   sulfamethoxazole-trimethoprim (BACTRIM DS;SEPTRA DS) 800-160 MG per tablet Take 1 tablet by mouth 2 times daily    Historical Provider, MD   cephALEXin (KEFLEX) 500 MG capsule Take 1 capsule by mouth 3 times daily  Patient not taking: Reported on 3/2/2020 9/12/18   PILLO Pritchett CNP   FREESTYLE LANCETS MISC Test twice daily 3/9/18   Bryon Canchola MD   Blood Glucose Monitoring Suppl (FREESTYLE LITE) LISA Test twice daily 3/9/18   Bryon Canchola MD   glucose blood VI test strips (FREESTYLE LITE) strip Test twice daily 3/9/18   Bryon Canchola MD   cetirizine (ZYRTEC) 10 MG tablet Take 10 mg by mouth daily    Historical Provider, MD   aspirin 81 MG tablet Take 81 mg by mouth daily    Historical Provider, MD   Multiple Vitamins-Minerals (CENTRUM SILVER ADULT 50+ PO) Take 1 tablet by mouth daily    Historical Provider, MD   ketorolac (TORADOL) 30 MG/ML injection Inject 1 mL into the muscle once for 1 dose 5/12/17 5/12/17  PILLO Pritchett CNP   Cholecalciferol (VITAMIN D3) 1000 UNITS CAPS Take 1 capsule by mouth daily. Historical Provider, MD       Allergies:  Patient has no known allergies. Social History:      TOBACCO:   reports that he has never smoked.  He has never used smokeless tobacco.  ETOH:   reports current alcohol use of about 17.0 standard drinks of alcohol per week. Family History:       Reviewed in detail and non contributory      History reviewed. No pertinent family history. REVIEW OF SYSTEMS:   Pertinent positives as noted in the HPI. All other systems reviewed and negative. PHYSICAL EXAM PERFORMED:    BP (!) 147/81   Pulse 90   Temp 98 °F (36.7 °C) (Oral)   Resp 16   Ht 5' 9\" (1.753 m)   Wt 267 lb 11.2 oz (121.4 kg)   SpO2 95%   BMI 39.53 kg/m²     General appearance:  No apparent distress, cooperative. HEENT:  Normal cephalic, atraumatic without obvious deformity. Conjunctivae/corneas clear. Neck: Supple, with full range of motion. No cervical lymphadenopathy  Respiratory:  Normal respiratory effort. Clear to auscultation, bilaterally without Rales/Wheezes/Rhonchi. Cardiovascular:  Regular rate and rhythm with normal S1/S2 without murmurs, rubs or gallops. Abdomen: Soft, non-tender, non-distended, normal bowel sounds. Musculoskeletal: Right lower extremity is swollen, red, mild tenderness noted, swelling extends from right lower extremity ankle to below the knee, left leg unremarkable  Skin: no rash visible  Neurologic:  Neurologically intact without any focal sensory/motor deficits. grossly non-focal.  Psychiatric:  Alert and oriented, normal mood  Peripheral Pulses: +2 palpable, equal bilaterally       Labs:     Recent Labs     11/14/20  1203   WBC 12.8*   HGB 11.6*   HCT 34.8*        Recent Labs     11/14/20  1203   *   K 5.1   CL 97*   CO2 26   BUN 20   CREATININE 1.1   CALCIUM 9.4     Recent Labs     11/14/20  1203   AST 37   ALT 16   BILITOT 0.8   ALKPHOS 59     No results for input(s): INR in the last 72 hours. No results for input(s): Ronnell Nixon in the last 72 hours.     Urinalysis:      Lab Results   Component Value Date    NITRU Negative 11/14/2020    WBCUA 3-5 11/14/2020    BACTERIA 1+ 11/14/2020    RBCUA 5-10 11/14/2020    BLOODU TRACE-INTACT 11/14/2020 SPECGRAV 1.020 11/14/2020    GLUCOSEU Negative 11/14/2020       Radiology:       XR CHEST (2 VW)   Final Result      Cardiac enlargement with no acute process. Tortuous aorta or prominent pulmonary arteries. Active Hospital Problems    Diagnosis Date Noted    Cellulitis [L03.90] 11/14/2020       Patient is a 78-year-old male with past medical history of diabetes mellitus, hypertension, GERD, hyperlipidemia, obstructive sleep apnea who presents to the hospital for right lower leg swelling and redness. According to the patient this is started for 2 days and it has been getting worse, right lower leg swelling is associated with redness, mild pain and swelling. Patient also mentions he had similar episodes in the past when he was treated with IV antibiotics. Patient denies on being antibiotics before coming to the hospital this time. Assessment  Right lower extremity cellulitis  Hyperglycemia  Diabetes mellitus 2  Hypertension  Hyperlipidemia  GERD  Obstructive sleep apnea      Plan  Will start vancomycin, patient was given vancomycin, Zosyn at outside ED  Insulin sliding scale, Lantus 5 units nightly  Resume home statin, aspirin, Lasix  DVT prophylaxis with Lovenox  CPAP at night  Diet: DIET CARB CONTROL;  Code Status: Full Code    PT/OT Eval Status: ordered    Dispo - pending clinical improvement       Verl Klinefelter, MD    The note was completed using EMR and Dragon dictation system. Every effort was made to ensure accuracy; however, inadvertent computerized transcription errors may be present. Thank you Bryon Canchola MD for the opportunity to be involved in this patient's care. If you have any questions or concerns please feel free to contact me at [phone].     Verl Klinefelter, MD

## 2020-11-14 NOTE — ED PROVIDER NOTES
TRIAGE CHIEF COMPLAINT:   Chief Complaint   Patient presents with    Leg Pain    Cellulitis       HPI: Ignacio Dobbs is a 78 y.o. male who presents to the emergency department with complaint of right lower leg redness and pain that started yesterday morning. He states he feels like he has cellulitis again and this would be his fifth or sixth episode of cellulitis in this leg. He denies fever or chills. No cough or URI symptoms. Denies chest pain or shortness of breath. He states his legs are chronically swollen. Denies nausea, vomiting or bowel complaint. No urinary symptoms. He states this episode seemed to start faster than previous episodes of cellulitis. The patient has a history of recurrent cellulitis in his right leg, hypertension, pulmonary hypertension, type 2 diabetes, sleep apnea, chronic venous insufficiency and hyperlipidemia. REVIEW OF SYSTEMS:   10 systems reviewed. Pertinent positives per HPI. Otherwise noted to be negative. I have reviewed the triage/nursing documentation and agree unless otherwise noted below.     PAST MEDICAL HISTORY:   Past Medical History:   Diagnosis Date    AR (allergic rhinitis)     Dermatitis     Diabetes     Diabetes ([facility] [address]     oral medications    DJD (degenerative joint disease)     knees    Dyslipidemia     ED (erectile dysfunction)     GERD (gastroesophageal reflux disease)     HTN (hypertension)     Hx of blood clots     x 1 after traveling    Hyperlipidemia     Sleep apnea     cpap set at 12    Vitamin D deficiency         CURRENT MEDICATIONS:   Patient's Medications   New Prescriptions    No medications on file   Previous Medications    ASPIRIN 81 MG TABLET    Take 81 mg by mouth daily    BLOOD GLUCOSE MONITORING SUPPL (FREESTYLE LITE) LISA    Test twice daily    BUDESONIDE-FORMOTEROL (SYMBICORT) 160-4.5 MCG/ACT AERO    Inhale 2 puffs into the lungs 2 times daily    CANDESARTAN-HYDROCHLOROTHIAZIDE (ATACAND HCT) 32-12.5 MG PER TABLET    Take 1 tablet by mouth daily    CEPHALEXIN (KEFLEX) 500 MG CAPSULE    Take 1 capsule by mouth 3 times daily    CETIRIZINE (ZYRTEC) 10 MG TABLET    Take 10 mg by mouth daily    CHOLECALCIFEROL (VITAMIN D3) 1000 UNITS CAPS    Take 1 capsule by mouth daily. FREESTYLE LANCETS MISC    Test twice daily    FUROSEMIDE (LASIX) 40 MG TABLET    Take 0.5 tablets by mouth daily    GLUCOSE BLOOD VI TEST STRIPS (FREESTYLE LITE) STRIP    Test twice daily    KETOROLAC (TORADOL) 30 MG/ML INJECTION    Inject 1 mL into the muscle once for 1 dose    MULTIPLE VITAMINS-MINERALS (CENTRUM SILVER ADULT 50+ PO)    Take 1 tablet by mouth daily    OMEPRAZOLE (PRILOSEC) 20 MG DELAYED RELEASE CAPSULE    TAKE 1 CAPSULE BY MOUTH DAILY    PIOGLITAZONE (ACTOS) 15 MG TABLET    TAKE 1 TABLET BY MOUTH DAILY    PIOGLITAZONE (ACTOS) 15 MG TABLET    TAKE 1 TABLET BY MOUTH DAILY    ROSUVASTATIN (CRESTOR) 10 MG TABLET    TAKE 1 TABLET BY MOUTH EVERY DAY    SULFAMETHOXAZOLE-TRIMETHOPRIM (BACTRIM DS;SEPTRA DS) 800-160 MG PER TABLET    Take 1 tablet by mouth 2 times daily   Modified Medications    No medications on file   Discontinued Medications    No medications on file        SURGICAL HISTORY:       Procedure Laterality Date    COLONOSCOPY      FOOT SURGERY      HAND SURGERY      JOINT REPLACEMENT Left 8/29/2014    TOTAL KNEE REPLACEMENT    KNEE ARTHROPLASTY Right 1/21/2015    OTHER SURGICAL HISTORY  See note 12/30/14    Was unable to intubate with a peacock/Used a glidescope per Dr Elias Client dated 12/30/14    OTHER SURGICAL HISTORY Right 03/06/2015    REPAIR OF RUPTURED PATELLAR TENDON RIGHT KNEE,USING TIBIAL        FAMILY HISTORY:   History reviewed. No pertinent family history. SOCIAL HISTORY:    reports that he has never smoked. He has never used smokeless tobacco. He reports current alcohol use of about 11.7 standard drinks of alcohol per week. He reports that he does not use drugs.     ALLERGIES: No Known Allergies    PHYSICAL EXAM:  VITAL SIGNS: Initial temperature was 99.3 and recheck temperature was 100.9. He was given Tylenol. Options were discussed with the patient and since he may be septic I recommended admission. Possibility of sepsis noted at 11:45 AM.  Patient was agreeable to admission. IV Zosyn and IV vancomycin were started after blood cultures were obtained. WBC was elevated at 12.8 with left shift. Hemoglobin was 11.6. Lactic acid was normal.  CMP was normal with the exception of a sodium of 135, chloride 97 and blood sugar 154. Urinalysis shows protein of 100 and otherwise is negative. Chest x-ray read by the radiologist and reviewed by myself shows stable cardiomegaly with tortuous aorta versus prominent pulmonary arteries. I discussed the case with Dr. Audrey Rivera hospitalist on duty at [facility]. Patient was accepted for inpatient care on [facility]. He is agreeable to admission and is stable for transfer. The patient did not take his blood pressure medications at home today. I do not have ARB's here at [place] so gave the patient hydrochlorothiazide only. Repeat blood pressure was 142/80.       (Please note that portions of this note may have been completed with a voice recognition program.  Efforts were made to edit the dictation but occasionally words are mis-transcribed)      FINAL IMPRESSION:  1 --recurrent cellulitis right lower leg  2 --elevated blood pressure reading                Angel Savage MD  11/14/20 1430

## 2020-11-14 NOTE — TELEPHONE ENCOUNTER
Reason for Disposition   Fever and red area (or area very tender to touch)    Answer Assessment - Initial Assessment Questions  1. ONSET: \"When did the pain start? \"       Yesterday     2. LOCATION: \"Where is the pain located? \"       Lower leg and ankle on right leg. 3. PAIN: \"How bad is the pain? \"    (Scale 1-10; or mild, moderate, severe)    -  MILD (1-3): doesn't interfere with normal activities     -  MODERATE (4-7): interferes with normal activities (e.g., work or school) or awakens from sleep, limping     -  SEVERE (8-10): excruciating pain, unable to do any normal activities, unable to walk      Achy- 8/10    4. WORK OR EXERCISE: \"Has there been any recent work or exercise that involved this part of the body?\"           5. CAUSE: \"What do you think is causing the leg pain? \"     Cellulitis     6. OTHER SYMPTOMS: \"Do you have any other symptoms? \" (e.g., chest pain, back pain, breathing difficulty, swelling, rash, fever, numbness, weakness)      Swelling in leg and ankle, Red leg and ankle     7. PREGNANCY: \"Is there any chance you are pregnant? \" \"When was your last menstrual period? \"      No    Protocols used: LEG PAIN-ADULT-[place]    Thank you for allowing me to participate in the care of your patient. The patient was connected to triage in response to information provided to the [facility]. Please do not respond through this encounter as the response is not directed to a shared pool. Caller plans to go to Urgent Care today.

## 2020-11-14 NOTE — PROGRESS NOTES
4 Eyes Admission Assessment     I agree as the admission nurse that 2 RN's have performed a thorough Head to Toe Skin Assessment on the patient. ALL assessment sites listed below have been assessed on admission. Areas assessed by both nurses:   [x]   Head, Face, and Ears   [x]   Shoulders, Back, and Chest  [x]   Arms, Elbows, and Hands   [x]   Coccyx, Sacrum, and Ischium  [x]   Legs, Feet, and Heels        Does the Patient have Skin Breakdown?   No         Nigel Prevention initiated:  No   Wound Care Orders initiated:  No      [facility] nurse consulted for Pressure Injury (Stage 3,4, Unstageable, DTI, NWPT, and Complex wounds) or Nigel score 18 or lower:  No      Nurse 1 eSignature: Electronically signed by Raquel Bernardo RN on 11/14/20 at 6:20 PM EST    **SHARE this note so that the co-signing nurse is able to place an eSignature**    Nurse 2 eSignature: Electronically signed by Baylee Golden RN on 11/14/20 at 6:59 PM EST

## 2020-11-14 NOTE — ED TRIAGE NOTES
Pt to ed c/o left leg pain and swelling, pt believes it is cellulitis as he ha had cellulitis in the past

## 2020-11-15 LAB
ANION GAP SERPL CALCULATED.3IONS-SCNC: 11 MMOL/L (ref 3–16)
BUN BLDV-MCNC: 20 MG/DL (ref 7–20)
CALCIUM SERPL-MCNC: 8.8 MG/DL (ref 8.3–10.6)
CHLORIDE BLD-SCNC: 96 MMOL/L (ref 99–110)
CO2: 28 MMOL/L (ref 21–32)
CREAT SERPL-MCNC: 1.3 MG/DL (ref 0.8–1.3)
ESTIMATED AVERAGE GLUCOSE: 131.2 MG/DL
GFR AFRICAN AMERICAN: >60
GFR NON-AFRICAN AMERICAN: 53
GLUCOSE BLD-MCNC: 135 MG/DL (ref 70–99)
GLUCOSE BLD-MCNC: 136 MG/DL (ref 70–99)
GLUCOSE BLD-MCNC: 140 MG/DL (ref 70–99)
GLUCOSE BLD-MCNC: 144 MG/DL (ref 70–99)
GLUCOSE BLD-MCNC: 158 MG/DL (ref 70–99)
HBA1C MFR BLD: 6.2 %
HCT VFR BLD CALC: 31.7 % (ref 40.5–52.5)
HEMOGLOBIN: 10.7 G/DL (ref 13.5–17.5)
MAGNESIUM: 1.5 MG/DL (ref 1.8–2.4)
MCH RBC QN AUTO: 34.3 PG (ref 26–34)
MCHC RBC AUTO-ENTMCNC: 33.8 G/DL (ref 31–36)
MCV RBC AUTO: 101.7 FL (ref 80–100)
PDW BLD-RTO: 14.5 % (ref 12.4–15.4)
PERFORMED ON: ABNORMAL
PLATELET # BLD: 163 K/UL (ref 135–450)
PMV BLD AUTO: 7 FL (ref 5–10.5)
POTASSIUM REFLEX MAGNESIUM: 3.2 MMOL/L (ref 3.5–5.1)
RBC # BLD: 3.11 M/UL (ref 4.2–5.9)
SODIUM BLD-SCNC: 135 MMOL/L (ref 136–145)
WBC # BLD: 9.1 K/UL (ref 4–11)

## 2020-11-15 PROCEDURE — 6370000000 HC RX 637 (ALT 250 FOR IP): Performed by: INTERNAL MEDICINE

## 2020-11-15 PROCEDURE — 6360000002 HC RX W HCPCS: Performed by: INTERNAL MEDICINE

## 2020-11-15 PROCEDURE — 36415 COLL VENOUS BLD VENIPUNCTURE: CPT

## 2020-11-15 PROCEDURE — 80048 BASIC METABOLIC PNL TOTAL CA: CPT

## 2020-11-15 PROCEDURE — 85027 COMPLETE CBC AUTOMATED: CPT

## 2020-11-15 PROCEDURE — 94660 CPAP INITIATION&MGMT: CPT

## 2020-11-15 PROCEDURE — 2580000003 HC RX 258: Performed by: INTERNAL MEDICINE

## 2020-11-15 PROCEDURE — 1200000000 HC SEMI PRIVATE

## 2020-11-15 PROCEDURE — 94640 AIRWAY INHALATION TREATMENT: CPT

## 2020-11-15 PROCEDURE — 83735 ASSAY OF MAGNESIUM: CPT

## 2020-11-15 RX ORDER — MAGNESIUM SULFATE IN WATER 40 MG/ML
2 INJECTION, SOLUTION INTRAVENOUS ONCE
Status: DISCONTINUED | OUTPATIENT
Start: 2020-11-15 | End: 2020-11-16 | Stop reason: HOSPADM

## 2020-11-15 RX ORDER — POTASSIUM CHLORIDE 20 MEQ/1
40 TABLET, EXTENDED RELEASE ORAL ONCE
Status: COMPLETED | OUTPATIENT
Start: 2020-11-15 | End: 2020-11-15

## 2020-11-15 RX ADMIN — INSULIN LISPRO 1 UNITS: 100 INJECTION, SOLUTION INTRAVENOUS; SUBCUTANEOUS at 19:56

## 2020-11-15 RX ADMIN — MAGNESIUM SULFATE HEPTAHYDRATE 2 G: 40 INJECTION, SOLUTION INTRAVENOUS at 08:43

## 2020-11-15 RX ADMIN — FUROSEMIDE 20 MG: 20 TABLET ORAL at 08:43

## 2020-11-15 RX ADMIN — ENOXAPARIN SODIUM 40 MG: 40 INJECTION SUBCUTANEOUS at 08:42

## 2020-11-15 RX ADMIN — INSULIN GLARGINE 5 UNITS: 100 INJECTION, SOLUTION SUBCUTANEOUS at 19:56

## 2020-11-15 RX ADMIN — VALSARTAN 160 MG: 160 TABLET ORAL at 08:42

## 2020-11-15 RX ADMIN — POTASSIUM CHLORIDE 40 MEQ: 1500 TABLET, EXTENDED RELEASE ORAL at 08:42

## 2020-11-15 RX ADMIN — HYDROCHLOROTHIAZIDE 12.5 MG: 12.5 CAPSULE ORAL at 08:42

## 2020-11-15 RX ADMIN — BUDESONIDE INHALATION 500 MCG: 0.5 SUSPENSION RESPIRATORY (INHALATION) at 21:00

## 2020-11-15 RX ADMIN — Medication 1000 UNITS: at 08:42

## 2020-11-15 RX ADMIN — ARFORMOTEROL TARTRATE 15 MCG: 15 SOLUTION RESPIRATORY (INHALATION) at 21:00

## 2020-11-15 RX ADMIN — BUDESONIDE INHALATION 500 MCG: 0.5 SUSPENSION RESPIRATORY (INHALATION) at 08:09

## 2020-11-15 RX ADMIN — INSULIN LISPRO 2 UNITS: 100 INJECTION, SOLUTION INTRAVENOUS; SUBCUTANEOUS at 12:35

## 2020-11-15 RX ADMIN — Medication 10 ML: at 08:43

## 2020-11-15 RX ADMIN — CETIRIZINE HYDROCHLORIDE 10 MG: 10 TABLET, FILM COATED ORAL at 08:43

## 2020-11-15 RX ADMIN — VANCOMYCIN HYDROCHLORIDE 1500 MG: 10 INJECTION, POWDER, LYOPHILIZED, FOR SOLUTION INTRAVENOUS at 12:35

## 2020-11-15 RX ADMIN — Medication 10 ML: at 19:57

## 2020-11-15 RX ADMIN — INSULIN LISPRO 2 UNITS: 100 INJECTION, SOLUTION INTRAVENOUS; SUBCUTANEOUS at 08:43

## 2020-11-15 RX ADMIN — ASPIRIN 81 MG: 81 TABLET, CHEWABLE ORAL at 08:42

## 2020-11-15 RX ADMIN — ARFORMOTEROL TARTRATE 15 MCG: 15 SOLUTION RESPIRATORY (INHALATION) at 08:09

## 2020-11-15 RX ADMIN — PANTOPRAZOLE SODIUM 40 MG: 40 TABLET, DELAYED RELEASE ORAL at 06:03

## 2020-11-15 RX ADMIN — ROSUVASTATIN 10 MG: 10 TABLET, FILM COATED ORAL at 08:42

## 2020-11-15 ASSESSMENT — PAIN SCALES - GENERAL: PAINLEVEL_OUTOF10: 0

## 2020-11-15 NOTE — PROGRESS NOTES
Patient is alert and oriented. VSS and afebrile with slightly elevated BP home medications and BP came down to 140's from 160's. Patient has stated no pain. Patient has been placed on CPAP for the night. Up to bathroom with stand-by assistance. Fall precautions in place. Will continue to monitor.

## 2020-11-15 NOTE — PROGRESS NOTES
Hospitalist Progress Note      PCP: Lamberto Garcia MD    Chief Complaint. Presented to hospital for right lower extremity rash, tenderness, redness    Date of Admission: 11/14/2020      Subjective:   denies chest pain, nausea, vomiting, shortness of breath, fever or chills. mention feels overall better    Medications:  Reviewed    Infusion Medications    dextrose       Scheduled Medications    magnesium sulfate  2 g Intravenous Once    sodium chloride flush  10 mL Intravenous 2 times per day    enoxaparin  40 mg Subcutaneous Daily    insulin lispro  0-12 Units Subcutaneous TID WC    insulin lispro  0-6 Units Subcutaneous Nightly    aspirin  81 mg Oral Daily    cetirizine  10 mg Oral Daily    Vitamin D  25 mcg Oral Daily    furosemide  20 mg Oral Daily    pantoprazole  40 mg Oral QAM AC    rosuvastatin  10 mg Oral Daily    influenza virus vaccine  0.5 mL Intramuscular Prior to discharge    insulin glargine  5 Units Subcutaneous Nightly    budesonide  0.5 mg Nebulization BID    And    Arformoterol Tartrate  15 mcg Nebulization BID    vancomycin  1,500 mg Intravenous Q24H    valsartan  160 mg Oral Daily    hydroCHLOROthiazide  12.5 mg Oral Daily     PRN Meds: sodium chloride flush, potassium chloride, magnesium sulfate, acetaminophen **OR** acetaminophen, magnesium hydroxide, promethazine **OR** ondansetron, glucose, dextrose, glucagon (rDNA), dextrose      Intake/Output Summary (Last 24 hours) at 11/15/2020 1402  Last data filed at 11/15/2020 1107  Gross per 24 hour   Intake 370 ml   Output 1100 ml   Net -730 ml       Physical Exam Performed:    /70   Pulse 93   Temp 97.9 °F (36.6 °C) (Oral)   Resp 16   Ht 5' 9\" (1.753 m)   Wt 267 lb 11.2 oz (121.4 kg)   SpO2 93%   BMI 39.53 kg/m²     General appearance: No apparent distress,   HEENT:  Conjunctivae/corneas clear. Neck: Supple, with full range of motion. Respiratory:  Normal respiratory effort.  Clear to auscultation, bilaterally without Rales/Wheezes/Rhonchi. Cardiovascular: Regular rate and rhythm with normal S1/S2 without murmurs or rubs  Abdomen: Soft, non-tender, non-distended, normal bowel sounds. Musculoskeletal: Right lower extremity swelling has improved, less tender on physical exam  Neurologic:  without any focal sensory/motor deficits. grossly non-focal.  Psychiatric: Alert and oriented, Normal mood  Peripheral Pulses: +2 palpable, equal bilaterally       Labs:   Recent Labs     11/14/20  1203 11/15/20  0540   WBC 12.8* 9.1   HGB 11.6* 10.7*   HCT 34.8* 31.7*    163     Recent Labs     11/14/20  1203 11/15/20  0540   * 135*   K 5.1 3.2*   CL 97* 96*   CO2 26 28   BUN 20 20   CREATININE 1.1 1.3   CALCIUM 9.4 8.8     Recent Labs     11/14/20  1203   AST 37   ALT 16   BILITOT 0.8   ALKPHOS 59     No results for input(s): INR in the last 72 hours. No results for input(s): Zoie Belts in the last 72 hours. Urinalysis:      Lab Results   Component Value Date    NITRU Negative 11/14/2020    WBCUA 3-5 11/14/2020    BACTERIA 1+ 11/14/2020    RBCUA 5-10 11/14/2020    BLOODU TRACE-INTACT 11/14/2020    SPECGRAV 1.020 11/14/2020    GLUCOSEU Negative 11/14/2020       Radiology:  XR CHEST (2 VW)   Final Result      Cardiac enlargement with no acute process. Tortuous aorta or prominent pulmonary arteries. Assessment/Plan:    Active Hospital Problems    Diagnosis    Cellulitis [L03.90]     Patient is a 57-year-old male with past medical history of diabetes mellitus, hypertension, GERD, hyperlipidemia, obstructive sleep apnea who presents to the hospital for right lower leg swelling and redness. According to the patient this is started for 2 days and it has been getting worse, right lower leg swelling is associated with redness, mild pain and swelling. Patient also mentions he had similar episodes in the past when he was treated with IV antibiotics.   Patient denies on being antibiotics before coming to the hospital this time.     Assessment  Right lower extremity cellulitis  Hyperglycemia  Diabetes mellitus 2  Hypertension  Hyperlipidemia  GERD  Obstructive sleep apnea        Plan  vancomycin, patient was given vancomycin, Zosyn at outside ED 10/14  Insulin sliding scale, Lantus 5 units nightly  Resume home statin, aspirin, Lasix  DVT prophylaxis with Lovenox  CPAP at night  Diet: DIET CARB CONTROL;  Code Status: Full Code    PT/OT Eval Status: ordered    Dispo -continue IV antibiotics, likely discharge tomorrow on p.o. antibiotics  Marlene Martines MD

## 2020-11-15 NOTE — CONSULTS
Clinical Pharmacy Consult Note    Admit date: 11/14/2020    Subjective/Objective:  Patient is a 78-year-old male with PMH of diabetes mellitus, HTN, GERD, HLD, obstructive sleep apnea who presents to the hospital for right lower leg swelling and redness. According to the patient this is started for 2 days and it has been getting worse, right lower leg swelling is associated with redness, mild pain and swelling. He reports ~5-6 occurrances of cellulitis in the past 10 years, has taken Keflex and Bactrim in the past.    Pharmacy is consulted to dose Vancomycin per Dr. Astrid Wiley    Pertinent Medications:  Zosyn 4.5g once (11/14)  Vancomycin -- day #1   2000 mg load in ED (11/14)   1500 mg q24h (11/15 - current)    Recent Labs     11/14/20  1203   *   K 5.1   CL 97*   CO2 26   BUN 20   CREATININE 1.1     Estimated Creatinine Clearance: 67 mL/min (based on SCr of 1.1 mg/dL). Height:  5' 9\" (175.3 cm)  Weight: 267 lb 11.2 oz (121.4 kg)    Micro:  Date Site Micro Susceptibility   11/14 Blood                     Assessment/Plan:  1. Cellulitis:  vancomycin day #1  Vancomycin  · Will start 1500 mg q24h. Provides ~ 16 mg/kg (based on AdjBW 91 kg) and is based on a half-life elimination of ~12 hours. · Patient is at a higher risk for accumulation d/t BMI >30 kg/m2  · Clinical pharmacist will follow-up in AM.  · Renal function will be monitored closely and dosing will be adjusted as appropriate. 2. Medication Reconciliation   · List of of current medications patient is taking is complete. Home Medication list in EPIC updated to reflect changes noted below. · Source of information: Patient, very knowledgeable about medications  · Patient's home pharmacy: [facility]   · The following changes were made to the medication list:  · Medications removed:   · Cephalexin 500 mg TID (from 3/2020)  · Bactrim DS BID      Please call with any questions.     Roney Dan, PharmD  PGY1 Pharmacy Resident  11/14/2020 7:37 PM  [id number]

## 2020-11-15 NOTE — PLAN OF CARE
Problem: Pain:  Goal: Patient's pain/discomfort is manageable  Description: Patient's pain/discomfort is manageable  Outcome: Met This Shift  Note: Pt encouraged to use call light to notify staff when pain rises beyond tolerable. Pt educated on pain scale and was using call light appropriately. Pt has denied pain so far this shift. Will continue to monitor. Problem: Body Temperature - Imbalanced:  Goal: Ability to maintain a body temperature in the normal range will improve  Description: Ability to maintain a body temperature in the normal range will improve  Outcome: Ongoing  Note: Pt has been afebrile this shift, no tylenol given or needed. Will continue to monitor. Problem: Skin Integrity - Impaired:  Goal: Will show no infection signs and symptoms  Description: Will show no infection signs and symptoms  Outcome: Ongoing  Note: Pt's skin was assessed this shift and was found to be intact aside from redness to RLE/area of cellulitis. Pt is able to turn themselves as needed and encouraged to turn frequently while awake. Pt is continent and is rarely moist. No evidence of any skin breakdown so far this shift. Will continue to monitor.

## 2020-11-15 NOTE — PROGRESS NOTES
Pt's vanc had been scheduled by pharmacy at 200, nurse messaged to verify as he had received vanc this afternoon at [facility]. Pharmacy called back and said that that was correct and that it would be rescheduled.

## 2020-11-15 NOTE — PROGRESS NOTES
Pt has been A&Ox4, VSS, lungs clear, no adventitious heart sounds, active bowel sounds, had BM today, tolerating IV antibiotics well, no side effects seen so far. Swelling has gone down a bit on his legs but cellulitis area is still red and warm. Will continue to monitor.

## 2020-11-15 NOTE — PROGRESS NOTES
Pt arrived from [facility] just a bit before 6pm, VSS, lungs clear, no adventitious heart sounds, active bowel sounds, pt had BM yesterday. Pt has BLE swelling but worse on R side where cellulitis is apparent on lower leg near ankle. During admission pt reported that he drinks on average 2 martinis and a scotch, occasionally some wine as well, last drink was yesterday. CIWAA scores completed, scoring 0, Dr Caban Gravely to ask if CIWAA protocol is needed, no AvaSys available in hospital currently but bed alarm placed. RN saw that blood cultures were ordered but thought that Feng had mentioned cultures being drawn in report, lab was called and confirmed that cultures had been drawn. Will continue to monitor.

## 2020-11-15 NOTE — PROGRESS NOTES
RESPIRATORY THERAPY ASSESSMENT    Name:  Donna Keyes Record Number:  [id number]  Age: 78 y.o. Gender: male  : 1940  Today's Date:  2020  Room:  5307/5307-01    Assessment     Is the patient being admitted for a COPD or Asthma exacerbation? No   (If yes the patient will be seen every 4 hours for the first 24 hours and then reassessed)    Patient Admission Diagnosis      Allergies  No Known Allergies    Pulmonary History:No history  Home Oxygen Therapy:  room air  Home Respiratory Therapy:Budesonide/Formoterol    Current Respiratory Therapy:  Pulmicort and Brovana   Treatment Type: EKG       Respiratory Severity Index(RSI)   Patients with orders for inhalation medications, oxygen, or any therapeutic treatment modality will be placed on Respiratory Protocol. They will be assessed with the first treatment and at least every 72 hours thereafter. The following severity scale will be used to determine frequency of treatment intervention. Smoking History: No Smoking History = 0    Social History  Social History     Tobacco Use    Smoking status: Never Smoker    Smokeless tobacco: Never Used   Substance Use Topics    Alcohol use:  Yes     Alcohol/week: 17.0 standard drinks     Types: 14 Standard drinks or equivalent, 3 Shots of liquor per week     Comment: a couple martinis and a scotch, occ wine    Drug use: No       Recent Surgical History: None = 0  Past Surgical History  Past Surgical History:   Procedure Laterality Date    COLONOSCOPY      FOOT SURGERY      HAND SURGERY      JOINT REPLACEMENT Left 2014    TOTAL KNEE REPLACEMENT    KNEE ARTHROPLASTY Right 2015    OTHER SURGICAL HISTORY  See note 14    Was unable to intubate with a peacock/Used a glidescope per Dr Vanessa Aldridge dated 14    OTHER SURGICAL HISTORY Right 2015    REPAIR OF RUPTURED PATELLAR TENDON RIGHT KNEE,USING TIBIAL       Level of Consciousness: Alert, Oriented, and Cooperative = 0    Level of Activity: Walking unassisted = 0    Respiratory Pattern: Regular Pattern; RR 8-20 = 0    Breath Sounds: Clear = 0    Sputum   ,  ,    Cough: Strong, spontaneous, non-productive = 0    Vital Signs   BP (!) 160/88   Pulse 80   Temp 99.3 °F (37.4 °C) (Oral)   Resp 16   Ht 5' 9\" (1.753 m)   Wt 267 lb 11.2 oz (121.4 kg)   SpO2 95%   BMI 39.53 kg/m²   SPO2 (COPD values may differ): Greater than or equal to 92% on room air = 0    Peak Flow (asthma only): not applicable = 0    RSI: 0-4 = See once and convert to home regimen or discontinue        Plan       Goals: Medication delivery     Patient/caregiver was educated on the proper method of use for Respiratory Care Devices:  Yes      Level of patient/caregiver understanding able to:   ? Verbalize understanding   ? Demonstrate understanding       ? Teach back        ? Needs reinforcement       ? No available caregiver               ? Other:     Response to education:  Good     Is patient being placed on Home Treatment Regimen? Yes     Does the patient have everything they need prior to discharge? NA     Plan of Care: Continue with home regimen as ordered     Electronically signed by Ninfa Crain RCP on 11/14/2020 at 10:44 PM    Respiratory Protocol Guidelines     1. Assessment and treatment by Respiratory Therapy will be initiated for medication and therapeutic interventions upon initiation of aerosolized medication. 2. Physician will be contacted for respiratory rate (RR) greater than 35 breaths per minute. Therapy will be held for heart rate (HR) greater than 140 beats per minute, pending direction from physician. 3. Bronchodilators will be administered via Metered Dose Inhaler (MDI) with spacer when the following criteria are met:  a. Alert and cooperative     b. HR < 140 bpm  c. RR < 30 bpm                d. Can demonstrate a 2-3 second inspiratory hold  4.  Bronchodilators will be administered via Hand Held Nebulizer [facility]) to patients when ANY of the following criteria are met  a. Incognizant or uncooperative          b. Patients treated with HHN at Home        c. Unable to demonstrate proper use of MDI with spacer     d. RR > 30 bpm   5. Bronchodilators will be delivered via Metered Dose Inhaler (MDI), HHN, Aerogen to intubated patients on mechanical ventilation. 6. Inhalation medication orders will be delivered and/or substituted as outlined below. Aerosolized Medications Ordering and Administration Guidelines:    1. All Medications will be ordered by a physician, and their frequency and/or modality will be adjusted as defined by the patients Respiratory Severity Index (RSI) score. 2. If the patient does not have documented COPD, consider discontinuing anticholinergics when RSI is less than 9.  3. If the bronchospasm worsens (increased RSI), then the bronchodilator frequency can be increased to a maximum of every 4 hours. If greater than every 4 hours is required, the physician will be contacted. 4. If the bronchospasm improves, the frequency of the bronchodilator can be decreased, based on the patient's RSI, but not less than home treatment regimen frequency. 5. Bronchodilator(s) will be discontinued if patient has a RSI less than 9 and has received no scheduled or as needed treatment for 72  Hrs. Patients Ordered on a Mucolytic Agent:    1. Must always be administered with a bronchodilator. 2. Discontinue if patient experiences worsened bronchospasm, or secretions have lessened to the point that the patient is able to clear them with a cough. Anti-inflammatory and Combination Medications:    1. If the patient lacks prior history of lung disease, is not using inhaled anti-inflammatory medication at home, and lacks wheezing by examination or by history for at least 24 hours, contact physician for possible discontinuation.

## 2020-11-15 NOTE — PROGRESS NOTES
Clinical Pharmacy Consult Note    Admit date: 11/14/2020    Subjective/Objective:  Patient is a 59-year-old male with PMH of diabetes mellitus, HTN, GERD, HLD, obstructive sleep apnea who presents to the hospital for right lower leg swelling and redness. According to the patient this is started for 2 days and it has been getting worse, right lower leg swelling is associated with redness, mild pain and swelling. He reports ~5-6 occurrances of cellulitis in the past 10 years, has taken Keflex and Bactrim in the past.    Pharmacy is consulted to dose Vancomycin per Dr. Ronald Hernadez    Pertinent Medications:  Zosyn 4.5g IV once (11/14)  Vancomycin -- day #2   2000mg IV x1 in ED (11/14)   1500mg IV q24h (11/15 - current)    Recent Labs     11/14/20  1203 11/15/20  0540   * 135*   K 5.1 3.2*   CL 97* 96*   CO2 26 28   BUN 20 20   CREATININE 1.1 1.3     Estimated Creatinine Clearance: 56 mL/min (based on SCr of 1.3 mg/dL). Height:  5' 9\" (175.3 cm)  Weight: 267 lb 11.2 oz (121.4 kg)    Micro:  Date Site Micro Susceptibility   11/14 Blood x2 Sent        Assessment/Plan:  1. Cellulitis:  vancomycin day #2  Vancomycin  · Renal function slightly increased today (1.1 -> 1.3). Still appropriate for q24h dosing. Will continue 1500mg IV q24h. · Trough will be drawn when appropriate, goal trough = 10-15 mcg/mL for treatment of cellulitis. · Patient is at a higher risk for accumulation d/t BMI >30 kg/m2  · Renal function will be monitored closely and dosing will be adjusted as appropriate. Please call with any questions.   Rj Morales, PharmD, [address] [place] [address] Pharmacy: [phone]  [address] [place] [address] wireless: [phone]  11/15/2020 8:29 AM

## 2020-11-16 VITALS
HEIGHT: 69 IN | SYSTOLIC BLOOD PRESSURE: 133 MMHG | OXYGEN SATURATION: 93 % | BODY MASS INDEX: 39.65 KG/M2 | DIASTOLIC BLOOD PRESSURE: 71 MMHG | RESPIRATION RATE: 16 BRPM | TEMPERATURE: 98 F | WEIGHT: 267.7 LBS | HEART RATE: 91 BPM

## 2020-11-16 LAB
ANION GAP SERPL CALCULATED.3IONS-SCNC: 11 MMOL/L (ref 3–16)
BUN BLDV-MCNC: 18 MG/DL (ref 7–20)
CALCIUM SERPL-MCNC: 9.4 MG/DL (ref 8.3–10.6)
CHLORIDE BLD-SCNC: 96 MMOL/L (ref 99–110)
CO2: 29 MMOL/L (ref 21–32)
CREAT SERPL-MCNC: 1.2 MG/DL (ref 0.8–1.3)
GFR AFRICAN AMERICAN: >60
GFR NON-AFRICAN AMERICAN: 58
GLUCOSE BLD-MCNC: 132 MG/DL (ref 70–99)
GLUCOSE BLD-MCNC: 134 MG/DL (ref 70–99)
HCT VFR BLD CALC: 33.3 % (ref 40.5–52.5)
HEMOGLOBIN: 11.4 G/DL (ref 13.5–17.5)
MCH RBC QN AUTO: 34.5 PG (ref 26–34)
MCHC RBC AUTO-ENTMCNC: 34.2 G/DL (ref 31–36)
MCV RBC AUTO: 101 FL (ref 80–100)
PDW BLD-RTO: 14.6 % (ref 12.4–15.4)
PERFORMED ON: ABNORMAL
PLATELET # BLD: 177 K/UL (ref 135–450)
PMV BLD AUTO: 7 FL (ref 5–10.5)
POTASSIUM REFLEX MAGNESIUM: 3.8 MMOL/L (ref 3.5–5.1)
RBC # BLD: 3.3 M/UL (ref 4.2–5.9)
SODIUM BLD-SCNC: 136 MMOL/L (ref 136–145)
WBC # BLD: 7.4 K/UL (ref 4–11)

## 2020-11-16 PROCEDURE — 97165 OT EVAL LOW COMPLEX 30 MIN: CPT

## 2020-11-16 PROCEDURE — 97535 SELF CARE MNGMENT TRAINING: CPT

## 2020-11-16 PROCEDURE — 90686 IIV4 VACC NO PRSV 0.5 ML IM: CPT | Performed by: INTERNAL MEDICINE

## 2020-11-16 PROCEDURE — G0008 ADMIN INFLUENZA VIRUS VAC: HCPCS | Performed by: INTERNAL MEDICINE

## 2020-11-16 PROCEDURE — 94640 AIRWAY INHALATION TREATMENT: CPT

## 2020-11-16 PROCEDURE — 6360000002 HC RX W HCPCS: Performed by: INTERNAL MEDICINE

## 2020-11-16 PROCEDURE — 6370000000 HC RX 637 (ALT 250 FOR IP): Performed by: INTERNAL MEDICINE

## 2020-11-16 PROCEDURE — 85027 COMPLETE CBC AUTOMATED: CPT

## 2020-11-16 PROCEDURE — 97161 PT EVAL LOW COMPLEX 20 MIN: CPT

## 2020-11-16 PROCEDURE — 36415 COLL VENOUS BLD VENIPUNCTURE: CPT

## 2020-11-16 PROCEDURE — 94660 CPAP INITIATION&MGMT: CPT

## 2020-11-16 PROCEDURE — 80048 BASIC METABOLIC PNL TOTAL CA: CPT

## 2020-11-16 PROCEDURE — 2580000003 HC RX 258: Performed by: INTERNAL MEDICINE

## 2020-11-16 RX ORDER — SULFAMETHOXAZOLE AND TRIMETHOPRIM 800; 160 MG/1; MG/1
1 TABLET ORAL 2 TIMES DAILY
Qty: 14 TABLET | Refills: 0 | Status: SHIPPED | OUTPATIENT
Start: 2020-11-16 | End: 2020-11-23

## 2020-11-16 RX ADMIN — PANTOPRAZOLE SODIUM 40 MG: 40 TABLET, DELAYED RELEASE ORAL at 05:37

## 2020-11-16 RX ADMIN — HYDROCHLOROTHIAZIDE 12.5 MG: 12.5 CAPSULE ORAL at 08:11

## 2020-11-16 RX ADMIN — CETIRIZINE HYDROCHLORIDE 10 MG: 10 TABLET, FILM COATED ORAL at 08:11

## 2020-11-16 RX ADMIN — ASPIRIN 81 MG: 81 TABLET, CHEWABLE ORAL at 08:11

## 2020-11-16 RX ADMIN — Medication 1000 UNITS: at 08:11

## 2020-11-16 RX ADMIN — INFLUENZA A VIRUS A/VICTORIA/2454/2019 IVR-207 (H1N1) ANTIGEN (PROPIOLACTONE INACTIVATED), INFLUENZA A VIRUS A/HONG KONG/2671/2019 IVR-208 (H3N2) ANTIGEN (PROPIOLACTONE INACTIVATED), INFLUENZA B VIRUS B/VICTORIA/705/2018 BVR-11 ANTIGEN (PROPIOLACTONE INACTIVATED), INFLUENZA B VIRUS B/PHUKET/3073/2013 BVR-1B ANTIGEN (PROPIOLACTONE INACTIVATED) 0.5 ML: 15; 15; 15; 15 INJECTION, SUSPENSION INTRAMUSCULAR at 10:30

## 2020-11-16 RX ADMIN — ROSUVASTATIN 10 MG: 10 TABLET, FILM COATED ORAL at 08:11

## 2020-11-16 RX ADMIN — FUROSEMIDE 20 MG: 20 TABLET ORAL at 08:11

## 2020-11-16 RX ADMIN — Medication 10 ML: at 08:11

## 2020-11-16 RX ADMIN — VALSARTAN 160 MG: 160 TABLET ORAL at 08:11

## 2020-11-16 RX ADMIN — ENOXAPARIN SODIUM 40 MG: 40 INJECTION SUBCUTANEOUS at 08:10

## 2020-11-16 RX ADMIN — BUDESONIDE INHALATION 500 MCG: 0.5 SUSPENSION RESPIRATORY (INHALATION) at 09:49

## 2020-11-16 RX ADMIN — ARFORMOTEROL TARTRATE 15 MCG: 15 SOLUTION RESPIRATORY (INHALATION) at 09:49

## 2020-11-16 ASSESSMENT — PAIN SCALES - GENERAL
PAINLEVEL_OUTOF10: 0

## 2020-11-16 NOTE — DISCHARGE INSTR - COC
Continuity of Care Form    Patient Name: Karen Michelle   :  1940  MRN:  [id number]    Admit date:  2020  Discharge date:  ***    Code Status Order: Full Code   Advance Directives:   Advance Care Flowsheet Documentation       Date/Time Healthcare Directive Type of Healthcare Directive Copy in [address] Agent's Name Healthcare Agent's Phone Number    20 1830  No, patient does not have an advance directive for healthcare treatment -- -- -- -- --            Admitting Physician:  Jaxon Robledo MD  PCP: Ginna Garcia MD    Discharging Nurse: [facility] Unit/Room#: 7965/5253-35  Discharging Unit Phone Number: ***    Emergency Contact:   Extended Emergency Contact Information  Primary Emergency Contact: Reshma Kaur  Address: [address] [place] [address] Phone: [phone]  Mobile Phone: [phone]  Relation: Spouse  Secondary Emergency Contact: [address] [place] [address] Phone: [phone]  Mobile Phone: [phone]  Relation: Child    Past Surgical History:  Past Surgical History:   Procedure Laterality Date    COLONOSCOPY      FOOT SURGERY      HAND SURGERY      JOINT REPLACEMENT Left 2014    TOTAL KNEE REPLACEMENT    KNEE ARTHROPLASTY Right 2015    OTHER SURGICAL HISTORY  See note 14    Was unable to intubate with a peacock/Used a glidescope per Dr Angelo Pean dated 14    OTHER SURGICAL HISTORY Right 2015    [facility]       Immunization History:   Immunization History   Administered Date(s) Administered    Influenza 2010, 2011    Influenza Virus Vaccine 2015    Influenza, High Dose (Fluzone 65 yrs and older) 2015, 10/03/2017, 2018    Pneumococcal Conjugate 13-valent (Viqjgal67) 2017    Pneumococcal Polysaccharide (Pbxkotbgp44) 2014       Active Problems:  Patient Active Problem List Diagnosis Code    Type 2 diabetes mellitus without complication, without long-term current use of insulin (HCC) E11.9    Dyslipidemia E78.5    HTN (hypertension) I10    ED (erectile dysfunction) N52.9    Vitamin D deficiency E55.9    DJD (degenerative joint disease) M19.90    Left knee DJD M17.12    GERD (gastroesophageal reflux disease) K21.9    Osteoarthritis of right knee M17.11    Sleep apnea G47.30    Chronic venous insufficiency I87.2    Swelling of joint of lower leg M25.469    H/O deep venous thrombosis Z86.718    Pulmonary HTN (HCC) I27.20    Cellulitis L03.90       Isolation/Infection:   Isolation            No Isolation          Patient Infection Status       None to display            Nurse Assessment:  Last Vital Signs: /71   Pulse 91   Temp 98 °F (36.7 °C) (Oral)   Resp 16   Ht 5' 9\" (1.753 m)   Wt 267 lb 11.2 oz (121.4 kg)   SpO2 93%   BMI 39.53 kg/m²     Last documented pain score (0-10 scale): Pain Level: 0  Last Weight:   Wt Readings from Last 1 Encounters:   11/14/20 267 lb 11.2 oz (121.4 kg)     Mental Status:  {IP PT MENTAL STATUS:20030:::0}    IV Access:  { NADIA IV ACCESS:157192354:::0}    Nursing Mobility/ADLs:  Walking   {CHP DME ADLs:254889747:::0}  Transfer  {CHP DME ADLs:845493339:::0}  Bathing  {CHP DME ADLs:597171993:::0}  Dressing  {CHP DME ADLs:678931383:::0}  Toileting  {CHP DME ADLs:816850782:::0}  Feeding  {CHP DME ADLs:031572992:::0}  Med Admin  {CHP DME ADLs:063662339:::0}  Med Delivery   { NADIA MED Delivery:430694906:::0}    Wound Care Documentation and Therapy:  Incision 08/29/14 Knee Left (Active)   Number of days: 2270       Incision 01/21/15 Knee Right (Active)   Number of days: 2125       Incision 03/06/15 Knee Right (Active)   Number of days: 2081        Elimination:  Continence:    Bowel: {YES / GC:32942}  Bladder: {YES / OE:20507}  Urinary Catheter: {Urinary Catheter:511259013:::0}   Colostomy/Ileostomy/Ileal Conduit: {YES / OM:65552}       Date of Last BM: ***    Intake/Output Summary (Last 24 hours) at 2020 1002  Last data filed at 2020 0810  Gross per 24 hour   Intake 240 ml   Output 1625 ml   Net -1385 ml     I/O last 3 completed shifts:  In: -   Out: 1350 [Urine:1350]    Safety Concerns:     [address] Safety Concerns:828495691:::0}    Impairments/Disabilities:      [address] Impairments/Disabilities:253471455:::0}    Nutrition Therapy:  Current Nutrition Therapy:   [address] NADIA Diet List:749162418:::0}    Routes of Feeding: {P DME Other Feedings:688680384:::0}  Liquids: {Slp liquid thickness:30796}  Daily Fluid Restriction: {CHP DME Yes amt example:984220599:::0}  Last Modified Barium Swallow with Video (Video Swallowing Test): {Done Not Done AYFC:673912458:::3}    Treatments at the Time of Hospital Discharge:   Respiratory Treatments: ***  Oxygen Therapy:  {Therapy; copd oxygen:76537:::0}  Ventilator:    {[facility] Vent List:496515425:::0}    Rehab Therapies: {THERAPEUTIC INTERVENTION:8222914896}  Weight Bearing Status/Restrictions: {[facility] Weight Bearin:::0}  Other Medical Equipment (for information only, NOT a DME order):  {EQUIPMENT:713706350}  Other Treatments: ***    Patient's personal belongings (please select all that are sent with patient):  {[facility] DME Belongings:056856836:::0}    RN SIGNATURE:  {Esignature:183278914:::0}    CASE MANAGEMENT/SOCIAL WORK SECTION    Inpatient Status Date: ***    Readmission Risk Assessment Score:  Readmission Risk              Risk of Unplanned Readmission:        13           Discharging to Facility/ Agency   Name:   Address:  Phone:  Fax:    Dialysis Facility (if applicable)   Name:  Address:  Dialysis Schedule:  Phone:  Fax:    / signature: {Esignature:264953708:::0}    PHYSICIAN SECTION    Prognosis: {Prognosis:4656582796:::0}    Condition at Discharge: [address] Patient Condition:326344537:::0}    Rehab Potential (if transferring to Rehab): {Prognosis:7929777748:::0}    Recommended Labs or Other Treatments After Discharge: ***    Physician Certification: I certify the above information and transfer of Danni Ojeda  is necessary for the continuing treatment of the diagnosis listed and that he requires {Admit to Appropriate Level of Care:22166:::0} for {GREATER/LESS:569612292} 30 days.      Update Admission H&P: {CHP DME Changes in HandP:918163637:::0}    PHYSICIAN SIGNATURE:  {Esignature:062476562:::0}

## 2020-11-16 NOTE — PROGRESS NOTES
Physical Therapy    Facility/Department: [address]  Initial Assessment/Discharge    NAME: Brody Reich  : 1940  MRN: [id number]    Date of Service: 2020    Discharge Recommendations:    Brody Reich scored a 24/24 on the AM-PAC short mobility form. At this time, no further PT is recommended upon discharge  Recommend patient returns to prior setting with prior services. PT Equipment Recommendations  Equipment Needed: No    Assessment   Assessment: Pt independent with all functional mobility. Safe to go home upon D/C. No further PT needed. D/C PT. Decision Making: Low Complexity  PT Education: PT Role  No Skilled PT: Independent with functional mobility   REQUIRES PT FOLLOW UP: No  Activity Tolerance  Activity Tolerance: Patient Tolerated treatment well       Patient Diagnosis(es): The primary encounter diagnosis was Cellulitis of right leg. A diagnosis of Elevated blood pressure reading was also pertinent to this visit. has a past medical history of AR (allergic rhinitis), Dermatitis, Diabetes, Diabetes ([address] DJD (degenerative joint disease), Dyslipidemia, ED (erectile dysfunction), GERD (gastroesophageal reflux disease), HTN (hypertension), Hx of blood clots, Hyperlipidemia, Sleep apnea, and Vitamin D deficiency. has a past surgical history that includes Hand surgery; Foot surgery; Colonoscopy; joint replacement (Left, 2014); Knee Arthroplasty (Right, 2015); other surgical history (See note 14); and other surgical history (Right, 2015). Restrictions  Position Activity Restriction  Other position/activity restrictions: up as tolerated  Vision/Hearing  Vision: Within Functional Limits  Hearing: Within functional limits     Subjective  General  Chart Reviewed: Yes  Patient assessed for rehabilitation services?: Yes  Additional Pertinent Hx: PMH: HTN, DJD, DM, LE cellultits. Pt admitted for right LE pain, swelling, redness. Dx: cellulitis.   Family / Caregiver Present: No  Referring Practitioner: Troy  Referral Date : 11/15/20  Follows Commands: Within Functional Limits  Subjective  Subjective: Pt seated in chair & agreeable to PT. Pain Screening  Patient Currently in Pain: Denies  Vital Signs  Patient Currently in Pain: Denies       Orientation  Orientation  Overall Orientation Status: Within Normal Limits  Social/Functional History  Social/Functional History  Lives With: Spouse  Type of Home: House  Home Layout: Two level  Home Access: Stairs to enter without rails  Entrance Stairs - Number of Steps: 3 from outside, 12-13 inside house to second floor, cannot stay on first level  Bathroom Shower/Tub: Tub/Shower unit  Bathroom Toilet: Standard  Bathroom Equipment: Shower chair, Grab bars in shower  Bathroom Accessibility: Accessible  Home Equipment: 4 wheeled walker(has walker from friend does not use)  ADL Assistance: Independent  Homemaking Assistance: Independent(share with wife)  Homemaking Responsibilities: Yes  Ambulation Assistance: Independent  Transfer Assistance: Independent  Active : Yes  Mode of Transportation: [employer]  Occupation: Part time employment  Type of occupation: fund   Additional Comments: 2 falls within the last year       Objective          AROM RLE (degrees)  RLE AROM: WNL  AROM LLE (degrees)  LLE AROM : WNL  Strength RLE  Strength RLE: WFL  Strength LLE  Strength LLE: WFL           Transfers  Sit to Stand: Independent  Stand to sit: Independent  Ambulation  Ambulation?: Yes  Ambulation 1  Surface: level tile  Device: No Device  Assistance: Independent  Gait Deviations: Increased EMILY  Distance: 200 ft  Comments: steady pace, no LOB.   Stairs/Curb  Stairs?: Yes  Stairs  # Steps : 12  Stairs Height: 6\"  Rails: Right ascending  Assistance: Independent  Comment: reciprocal pattern     Balance  Sitting - Static: Good  Sitting - Dynamic: Good  Standing - Static: Good  Standing - Dynamic: Good        Plan   Safety Devices  Type of devices: Call light within reach, Left in chair, Nurse notified                                                  AM-PAC Score  AM-PAC Inpatient Mobility Raw Score : 24 (11/16/20 1137)  AM-PAC Inpatient T-Scale Score : 61.14 (11/16/20 1137)  Mobility Inpatient CMS 0-100% Score: 0 (11/16/20 1137)  Mobility Inpatient CMS G-Code Modifier : [address] (11/16/20 1137)                Therapy Time   Individual Concurrent Group Co-treatment   Time In 1039         Time Out 1054         Minutes 15                 Iesha Dominguez, PT

## 2020-11-16 NOTE — CARE COORDINATION
Case Management Assessment            Discharge Note                    Date / Time of Note: 11/16/2020 10:20 AM                  Discharge Note Completed by: Mikhail Palomo    Patient Name: Uriel Gaytan   YOB: 1940  Diagnosis: Cellulitis [L03.90]   Date / Time: 11/14/2020 11:16 AM    Current PCP: Malu Huynh MD  Clinic patient: No    Hospitalization in the last 30 days: No    Advance Directives:  Code Status: Full Code  [place] DNR form completed and on chart: No    Financial:  Payor: Elyssa Jaimes / Plan: Johnnie / Product Type: *No Product type* /      Pharmacy:    [facility] [address], [address]  Phone: [phone] Fax: [phone]    [facility] [address], [facility] [phone] - F [phone]  [address] [place] [address]  Phone: [phone] Fax: [phone] [address] [facility] [place], [address] 59[address] [facility] [phone] - f [phone]  [facility]  [address]  [facility] [address]  Phone: [phone] Fax: [phone]    [address] [phone] [facility] [phone] [address] [address] [address]  Phone: [phone] Fax: [phone]      Assistance purchasing medications?: Potential Assistance Purchasing Medications: No  Assistance provided by Case Management: None at this time    Does patient want to participate in local refill/ meds to beds program?: Yes    Meds To Beds General Rules:  1. Can ONLY be done Monday- Friday between 8:30am-5pm  2. Prescription(s) must be in pharmacy by 3pm to be filled same day  3. Copy of patient's insurance/ prescription drug card and patient face sheet must be sent along with the prescription(s)  4.  Cost of Rx cannot be added to hospital bill. If financial assistance is needed, please contact unit  or ;  or  CANNOT provide pharmacy voucher for patients co-pays  5. Patients can then  the prescription on their way out of the hospital at discharge, or pharmacy can deliver to the bedside if staff is available. (payment due at time of pick-up or delivery - cash, check, or card accepted)     Able to afford home medications/ co-pay costs: Yes    ADLS:  Current PT AM-PAC Score:   /24  Current OT AM-PAC Score: 21 /24      DISCHARGE Disposition: Home- No Services Needed    LOC at discharge: Not Applicable  NADIA Completed: Not Indicated    Notification completed in HENS/PAS?:  Not Applicable    IMM Completed:   Not Indicated    Transportation:  Transportation PLAN for discharge: family   Mode of Transport: [address]:  [address] ordered at discharge: Not [address]: Not Applicable  Orders faxed: No    Durable Medical Equipment:  DME Provider: none  Equipment obtained during hospitalization:     Home Oxygen and Respiratory Equipment:  Oxygen needed at discharge?: Not [address]: Not Applicable  Portable tank available for discharge?: Not Indicated    Dialysis:  Dialysis patient: No    Dialysis Center:  Not Applicable      Additional CM Notes: Pt from home with wife no needs at DC, wife will drive home    The Plan for Transition of Care is related to the following treatment goals of Cellulitis [L03.90]    The Patient and/or patient representative Mague Stack and his family were provided with a choice of provider and agrees with the discharge plan Yes    Freedom of choice list was provided with basic dialogue that supports the patient's individualized plan of care/goals and shares the quality data associated with the providers.  Yes    Care Transitions patient: Yes    Brown Jara RN  [facility]  Case Management Department  Ph: [phone]  Fax: [phone]

## 2020-11-16 NOTE — PROGRESS NOTES
Patient is alert and oriented. VSS and afebrile. Patient has stated no pain this shift. Patient has been up to bathroom multiple times with stand-by assistance. Patient has redness and swelling to right leg still not worsening. Fall precautions in place. Will continue to monitor.

## 2020-11-16 NOTE — PROGRESS NOTES
Patient alert and oriented x4. VSS. Patient denies any pain or nausea. Patient voiding without difficulty. Patient has redness and swelling to his R leg. No needs at this time.

## 2020-11-16 NOTE — DISCHARGE INSTR - DIET

## 2020-11-16 NOTE — PROGRESS NOTES
Occupational Therapy   Occupational Therapy Initial Assessment, Treatment, and Discharge   Date: 2020   Patient Name: Kike Javier  MRN: [id number]     : 1940    Date of Service: 2020    Discharge Recommendations: Kike Javier scored a  [facility] the [facility]. At this time, no further OT is recommended upon discharge due to pt functioning at baseline with mobility, transfers, ADL. Recommend patient returns to prior setting with prior services. OT Equipment Recommendations  Equipment Needed: Yes  Mobility Devices: ADL Assistive Devices  ADL Assistive Devices: Sock-Aid Hard    Assessment   Performance deficits / Impairments: Decreased functional mobility ; Decreased ADL status; Decreased endurance  Assessment: pt functioning close to baseline, not OT services indicated at this time. Pt SBA/Supervison with fxl mobility/transfers, supervision to [place] with ADL tasks. D/C  Prognosis: Good  Decision Making: Low Complexity  OT Education: Plan of Care;OT Role  Patient Education: verb understanding  REQUIRES OT FOLLOW UP: No  Safety Devices  Safety Devices in place: Yes  Type of devices: All fall risk precautions in place;Call light within reach; Chair alarm in place; Left in chair;Nurse notified;Gait belt           Patient Diagnosis(es): The primary encounter diagnosis was Cellulitis of right leg. A diagnosis of Elevated blood pressure reading was also pertinent to this visit. has a past medical history of AR (allergic rhinitis), Dermatitis, Diabetes, Diabetes ([address] DJD (degenerative joint disease), Dyslipidemia, ED (erectile dysfunction), GERD (gastroesophageal reflux disease), HTN (hypertension), Hx of blood clots, Hyperlipidemia, Sleep apnea, and Vitamin D deficiency. has a past surgical history that includes Hand surgery; Foot surgery; Colonoscopy; joint replacement (Left, 2014);  Knee Arthroplasty (Right, 2015); other surgical history (See note 14); and other surgical history (Right, 03/06/2015). Subjective   General  Chart Reviewed: Yes  Patient assessed for rehabilitation services?: Yes  Additional Pertinent Hx: 49-year-old male with PMH of diabetes mellitus, HTN, GERD, HLD, obstructive sleep apnea who presents to the hospital for right lower leg swelling and redness.   Diagnosis: cellulitis  Subjective  Subjective: pt in bed eating breakfast upon arrival, agreeable to session  Patient Observation  Observations: rinsed post tx  Social/Functional History  Social/Functional History  Lives With: Spouse  Type of Home: House  Home Layout: Two level  Home Access: Stairs to enter without rails  Entrance Stairs - Number of Steps: 3 from outside, 12-13 inside house to second floor, cannot stay on first level  Bathroom Shower/Tub: Tub/Shower unit  Bathroom Toilet: Standard  Bathroom Equipment: Shower chair, Grab bars in shower  Bathroom Accessibility: Accessible  Home Equipment: 4 wheeled walker(has walker from friend does not use)  ADL Assistance: Independent  Homemaking Assistance: Independent(share with wife)  Homemaking Responsibilities: Yes  Ambulation Assistance: Independent  Transfer Assistance: Independent  Active : Yes  Mode of Transportation: [email or web address]  Occupation: Part time employment  Type of occupation: fund   Additional Comments: 2 falls within the last year       Objective   Vision: Impaired  Vision Exceptions: Wears glasses at all times  Hearing: Within functional limits    Orientation  Overall Orientation Status: Within Normal Limits     Balance  Sitting Balance: Supervision  Standing Balance: Stand by assistance  Standing Balance  Time: 10-15 minutes  Activity: standing ADL at sink, toileting, fxl mobility in room, fxl mobility in hallway 100' with SBA  Functional Mobility  Functional - Mobility Device: No device  Assist Level: Stand by assistance  Toilet Transfers  Equipment Used: Standard toilet  Toilet Transfer: Stand by assistance  ADL  Feeding: Independent  Grooming: Independent  LE Dressing: Modified independent ;Stand by assistance(pt uses stool at home to step foot up closer for donning/doffing socks and shoes, occassional A from wife on bad days, rec. sock aid)  Toileting: Supervision;Stand by assistance  Additional Comments: pt completed toileting, face washing, tooth brushing, with SBA donning/doffing socks with [place]        Bed mobility  Supine to Sit: Supervision  Scooting: Supervision  Transfers  Sit to stand: Stand by assistance  Stand to sit: Stand by assistance     Cognition  Overall Cognitive Status: WNL                 LUE AROM (degrees)  LUE AROM : WNL  Left Hand AROM (degrees)  Left Hand AROM: WNL  RUE AROM (degrees)  RUE AROM : WNL  Right Hand AROM (degrees)  Right Hand AROM: WNL  LUE Strength  Gross LUE Strength: WNL  L Hand General: 5/5  RUE Strength  Gross RUE Strength:  WNL  R Hand General: 5/5                     AM-PAC Score        AM-[facility] Inpatient Daily Activity Raw Score: 21 (11/16/20 0919)  AM-PAC Inpatient ADL T-Scale Score : 44.27 (11/16/20 0919)  ADL Inpatient CMS 0-100% Score: 32.79 (11/16/20 0919)  ADL Inpatient CMS G-Code Modifier : CJ (11/16/20 0919)      Therapy Time   Individual Concurrent Group Co-treatment   Time In 0825         Time Out 0856         Minutes 31              Timed Code Treatment Minutes:   20    Total Treatment Minutes:  [address], OTR/L

## 2020-11-16 NOTE — PROGRESS NOTES
Clinical Pharmacy Consult Note    Admit date: 11/14/2020    Subjective/Objective:  Patient is a 20-year-old male with PMH of diabetes mellitus, HTN, GERD, HLD, obstructive sleep apnea who presents to the hospital for right lower leg swelling and redness. According to the patient this is started for 2 days and it has been getting worse, right lower leg swelling is associated with redness, mild pain and swelling. He reports ~5-6 occurrances of cellulitis in the past 10 years, has taken Keflex and Bactrim in the past.    Pharmacy is consulted to dose Vancomycin per Dr. Bharat Hartman    Pertinent Medications:  Zosyn 4.5g IV once (11/14)  Vancomycin -- day #3   2000mg IV x1 in ED (11/14)   1500mg IV q24h (11/15 - current)    Recent Labs     11/15/20  0540 11/16/20  0558   * 136   K 3.2* 3.8   CL 96* 96*   CO2 28 29   BUN 20 18   CREATININE 1.3 1.2     Estimated Creatinine Clearance: 61 mL/min (based on SCr of 1.2 mg/dL). Height:  5' 9\" (175.3 cm)  Weight: 267 lb 11.2 oz (121.4 kg)    Micro:  Date Site Micro Susceptibility   11/14 Blood x2 NGTD        Assessment/Plan:  1. Cellulitis:  vancomycin day #3  Vancomycin  · Renal function stable, will continue 1500mg IV q24h. · Trough will be drawn when appropriate, goal trough = 10-15 mcg/mL for treatment of cellulitis. · Patient is at a higher risk for accumulation d/t BMI >30 kg/m2  · Renal function will be monitored closely and dosing will be adjusted as appropriate. Please call with any questions.   Robyn Mortimer, PharmD, [address] [place] [address] Pharmacy: [phone]  [address] [place] wireless: [phone]  11/16/2020 7:41 AM

## 2020-11-16 NOTE — PROGRESS NOTES
Physician Progress Note      Deshaun Wade  CSN #:                  [id number]  :                       1940  ADMIT DATE:       2020 11:16 AM  DISCH DATE:  RESPONDING  PROVIDER #:        Priti Yang MD          QUERY TEXT:    Pt admitted with Right lower extremity Cellulitis  . Pt noted to have   Increased WBC was elevated at 12.8 with left shift and increased HR. If   possible, please document in the progress notes and discharge summary if you   are evaluating and /or treating any of the following:[[The medical record   reflects the following:  Risk Factors: 79 y/o male with Rt. lower extremity Cellulitis and history of   recurrent cellulitis in his right leg  Clinical Indicators:  WBC 12.8, Temp  2257 100, -128 on admit    Per ED: \"The patient has a history of recurrent cellulitis in his right leg;   Possibility of sepsis  Treatment: Vancocin, Blood cultures, lab monitoring, Zosyn given at outside ED  Options provided:  -- Sepsis, present on admission, due to right lower extremity cellulitis  -- No Sepsis  -- Other - I will add my own diagnosis  -- Disagree - Not applicable / Not valid  -- Disagree - Clinically unable to determine / Unknown  -- Refer to Clinical Documentation Reviewer    PROVIDER RESPONSE TEXT:    This patient has sepsis, which was present on admission, due to right lower   extremity cellulitis. Query created by: Lisy Infante on 2020 7:22 AM      QUERY TEXT:    Pt admitted with Right lower extremity Cellulitis. Pt noted to have DM type 2   with Hyperglycemia. If possible, please document in progress notes and   discharge summary the relationship, if any, between the following.     The medical record reflects the following:  Risk Factors: 79 y/o male with Cellulitis and type 2 DM with Hyperglycemia  Clinical Indicators: Blood Glucose levels on admit 128, 147, 140, 144, 135,   158  Last Hgb A1C on 11/15 was 6.2  Treatment: Glucose monitoring, Vancoxcin, Bld CX'S, Insulin sliding scale,   Lantus 5 units nightly  Options provided:  -- Cellulitis due to type 2 DM  -- Cellulitis unrelated to DM  -- Other - I will add my own diagnosis  -- Disagree - Not applicable / Not valid  -- Disagree - Clinically unable to determine / Unknown  -- Refer to Clinical Documentation Reviewer    PROVIDER RESPONSE TEXT:    This patient has Cellulitis, unrelated to type 2 DM.     Query created by: Iona Brunson on 11/16/2020 7:30 AM      Electronically signed by:  Jorden Bingham MD 11/16/2020 9:58 AM

## 2020-11-18 ENCOUNTER — TELEPHONE (OUTPATIENT)
Dept: INTERNAL MEDICINE CLINIC | Age: 80
End: 2020-11-18

## 2020-11-18 LAB
BLOOD CULTURE, ROUTINE: NORMAL
CULTURE, BLOOD 2: NORMAL

## 2020-11-24 ENCOUNTER — VIRTUAL VISIT (OUTPATIENT)
Dept: INFECTIOUS DISEASES | Age: 80
End: 2020-11-24
Payer: MEDICARE

## 2020-11-24 PROCEDURE — G8427 DOCREV CUR MEDS BY ELIG CLIN: HCPCS | Performed by: INTERNAL MEDICINE

## 2020-11-24 PROCEDURE — 1111F DSCHRG MED/CURRENT MED MERGE: CPT | Performed by: INTERNAL MEDICINE

## 2020-11-24 PROCEDURE — 1123F ACP DISCUSS/DSCN MKR DOCD: CPT | Performed by: INTERNAL MEDICINE

## 2020-11-24 PROCEDURE — 4040F PNEUMOC VAC/ADMIN/RCVD: CPT | Performed by: INTERNAL MEDICINE

## 2020-11-24 PROCEDURE — 99214 OFFICE O/P EST MOD 30 MIN: CPT | Performed by: INTERNAL MEDICINE

## 2020-11-24 RX ORDER — CEPHALEXIN 500 MG/1
500 CAPSULE ORAL 4 TIMES DAILY
Qty: 84 CAPSULE | Refills: 0 | Status: SHIPPED | OUTPATIENT
Start: 2020-11-24 | End: 2020-12-15

## 2020-11-24 RX ORDER — GREEN TEA/HOODIA GORDONII 315-12.5MG
1 CAPSULE ORAL 2 TIMES DAILY
Qty: 60 TABLET | Refills: 0 | Status: SHIPPED | OUTPATIENT
Start: 2020-11-24 | End: 2020-12-24

## 2020-11-24 ASSESSMENT — ENCOUNTER SYMPTOMS
BACK PAIN: 0
RHINORRHEA: 0
SORE THROAT: 0
TROUBLE SWALLOWING: 0
COLOR CHANGE: 1
SHORTNESS OF BREATH: 0
EYE DISCHARGE: 0
WHEEZING: 0
CONSTIPATION: 0
COUGH: 0
DIARRHEA: 0
ABDOMINAL PAIN: 0
NAUSEA: 0
EYE REDNESS: 0

## 2020-11-24 NOTE — DISCHARGE SUMMARY
Hospital Medicine Discharge Summary    Patient ID: Matilde Cantu      Patient's PCP: Holly Adams MD    Admit Date: 11/14/2020     Discharge Date: 11/16/2020     Admitting Physician: Peng Salgado MD     Discharge Physician: Peng Salgado MD     Discharge Diagnoses: Active Hospital Problems    Diagnosis Date Noted    Cellulitis [L03.90] 11/14/2020       The patient was seen and examined on day of discharge and this discharge summary is in conjunction with any daily progress note from day of discharge. Condition at discharge - stable    Hospital Course: patient seen and evaluated on the day of discharge. Patient informed about following up with appointments. Patient verbalized understanding for follow-up appointments. All questions of the patient answered, patient is in agreement with the discharge plan. Medical reconciliation performed. Patient will be discharged stable condition.  Patient is a 33-year-old male with past medical history of diabetes mellitus, hypertension, GERD, hyperlipidemia, obstructive sleep apnea who presents to the hospital for right lower leg swelling and redness.  According to the patient this is started for 2 days and it has been getting worse, right lower leg swelling is associated with redness, mild pain and swelling.  Patient also mentions he had similar episodes in the past when he was treated with IV antibiotics.  Patient denies on being antibiotics before coming to the hospital this time.     Assessment  Right lower extremity cellulitis  Hyperglycemia  Diabetes mellitus 2  Hypertension  Hyperlipidemia  GERD  Obstructive sleep apnea        Plan  DC on oral Abx  Insulin sliding scale, Lantus 5 units nightly  Resume home statin, aspirin, Lasix  DVT prophylaxis with Lovenox  CPAP at night  Diet: DIET CARB CONTROL;  Code Status: Full Code    Exam:     /71   Pulse 91   Temp 98 °F (36.7 °C) (Oral)   Resp 16   Ht 5' 9\" (1.753 m)   Wt 267 lb 11.2 oz (121.4 kg)   SpO2 93%   BMI 39.53 kg/m²     General appearance: No apparent distress  HEENT:  Conjunctivae/corneas clear. Neck: Supple, No jugular venous distention. Respiratory:  Normal respiratory effort. Clear to auscultation, bilaterally without Rales/Wheezes/Rhonchi. Cardiovascular: Regular rate and rhythm with normal S1/S2 without murmurs, rubs or gallops. Abdomen: Soft, non-tender, non-distended, normal bowel sounds. Musculoskelatal: No clubbing, cyanosis or edema bilaterally. Skin: Skin color, texture, turgor normal.   Neurologic: no focal neurologic deficits. grossly non-focal.  Psychiatric: Alert and oriented, normal mood    Consults:     IP CONSULT TO PHARMACY  PHARMACY TO DOSE VANCOMYCIN      Code Status:  Prior    Activity: activity as tolerated    Labs:  For convenience and continuity at follow-up the following most recent labs are provided:      CBC:    Lab Results   Component Value Date    WBC 7.4 11/16/2020    HGB 11.4 11/16/2020    HCT 33.3 11/16/2020     11/16/2020       Renal:    Lab Results   Component Value Date     11/16/2020    K 3.8 11/16/2020    CL 96 11/16/2020    CO2 29 11/16/2020    BUN 18 11/16/2020    CREATININE 1.2 11/16/2020    CALCIUM 9.4 11/16/2020    PHOS 3.4 03/02/2020       Discharge Medications:     Discharge Medication List as of 11/16/2020 10:34 AM           Details   sulfamethoxazole-trimethoprim (BACTRIM DS;SEPTRA DS) 800-160 MG per tablet Take 1 tablet by mouth 2 times daily for 7 days, Disp-14 tablet,R-0Normal              Details   pioglitazone (ACTOS) 15 MG tablet Take 15 mg by mouth dailyHistorical Med      candesartan-hydrochlorothiazide (ATACAND HCT) 32-12.5 MG per tablet Take 1 tablet by mouth daily, Disp-90 tablet,R-2Requesting 1 year supplyNormal      budesonide-formoterol (SYMBICORT) 160-4.5 MCG/ACT AERO Inhale 2 puffs into the lungs 2 times daily, Disp-3 Inhaler,R-1Normal      omeprazole (PRILOSEC) 20 MG delayed release capsule TAKE 1 CAPSULE BY MOUTH DAILY, Disp-90 capsule,R-1Normal      rosuvastatin (CRESTOR) 10 MG tablet TAKE 1 TABLET BY MOUTH EVERY DAY, Disp-90 tablet, R-3Normal      furosemide (LASIX) 40 MG tablet Take 0.5 tablets by mouth daily, Disp-30 tablet, R-5Normal      FREESTYLE LANCETS MISC Disp-100 each, R-3, NormalTest twice daily      Blood Glucose Monitoring Suppl (FREESTYLE LITE) LISA Disp-1 Device, R-0, NormalTest twice daily      glucose blood VI test strips (FREESTYLE LITE) strip Disp-100 strip, R-5, NormalTest twice daily      cetirizine (ZYRTEC) 10 MG tablet Take 10 mg by mouth dailyHistorical Med      aspirin 81 MG tablet Take 81 mg by mouth dailyHistorical Med      Multiple Vitamins-Minerals (CENTRUM SILVER ADULT 50+ PO) Take 1 tablet by mouth dailyHistorical Med      Cholecalciferol (VITAMIN D3) 1000 UNITS CAPS Take 1 capsule by mouth daily. Time Spent on discharge is more than 30 mints in the examination, evaluation, counseling and review of medications and discharge plan. Signed:    Yarelis Faustin MD   11/23/2020      Thank you Primitivo Perez MD for the opportunity to be involved in this patient's care. If you have any questions or concerns please feel free to contact me at [phone].

## 2020-11-24 NOTE — PROGRESS NOTES
patient had a virtual visit with me today for above mentioned complaints. The patient was admitted from November 14 to November 16 of this year for right lower extremity cellulitis at [facility]. under hospitalist service. He was reportedly given IV vancomycin in the hospital and was discharged on oral Bactrim DS 1 tablet twice daily for 7 days by hospitalist.    The patient had a virtual video visit with me today to establish care for recurrent leg cellulitis. Past Medical History:   Past medical  history wasreviewed by me during this visit in detail. Past Medical History:   Diagnosis Date    AR (allergic rhinitis)     Dermatitis     Diabetes     Diabetes ([address]     oral medications    DJD (degenerative joint disease)     knees    Dyslipidemia     ED (erectile dysfunction)     GERD (gastroesophageal reflux disease)     HTN (hypertension)     Hx of blood clots     x 1 after traveling    Hyperlipidemia     Sleep apnea     cpap set at 12    Vitamin D deficiency        Past Surgical History:  Past surgical history was reviewed by me during this visit in detail. Past Surgical History:   Procedure Laterality Date    COLONOSCOPY      FOOT SURGERY      HAND SURGERY      JOINT REPLACEMENT Left 8/29/2014    TOTAL KNEE REPLACEMENT    KNEE ARTHROPLASTY Right 1/21/2015    OTHER SURGICAL HISTORY  See note 12/30/14    Was unable to intubate with a peacock/Used a glidescope per Dr Jennifer Wagner dated 12/30/14    OTHER SURGICAL HISTORY Right 03/06/2015    REPAIR OF RUPTURED PATELLAR TENDON RIGHT KNEE,USING TIBIAL       Current Medications:    All medicationswere reviewed by me during this visit  Current Outpatient Medications   Medication Sig Dispense Refill    cephALEXin (KEFLEX) 500 MG capsule Take 1 capsule by mouth 4 times daily for 21 days 84 capsule 0    Probiotic Acidophilus (FLORANEX) TABS Take 1 tablet by mouth 2 times daily 60 tablet 0    pioglitazone (ACTOS) 15 MG tablet Take 15 mg by mouth daily      candesartan-hydrochlorothiazide (ATACAND HCT) 32-12.5 MG per tablet Take 1 tablet by mouth daily 90 tablet 2    budesonide-formoterol (SYMBICORT) 160-4.5 MCG/ACT AERO Inhale 2 puffs into the lungs 2 times daily 3 Inhaler 1    omeprazole (PRILOSEC) 20 MG delayed release capsule TAKE 1 CAPSULE BY MOUTH DAILY 90 capsule 1    rosuvastatin (CRESTOR) 10 MG tablet TAKE 1 TABLET BY MOUTH EVERY DAY 90 tablet 3    furosemide (LASIX) 40 MG tablet Take 0.5 tablets by mouth daily 30 tablet 5    FREESTYLE LANCETS MISC Test twice daily 100 each 3    Blood Glucose Monitoring Suppl (FREESTYLE LITE) LISA Test twice daily 1 Device 0    glucose blood VI test strips (FREESTYLE LITE) strip Test twice daily 100 strip 5    cetirizine (ZYRTEC) 10 MG tablet Take 10 mg by mouth daily      aspirin 81 MG tablet Take 81 mg by mouth daily      Multiple Vitamins-Minerals (CENTRUM SILVER ADULT 50+ PO) Take 1 tablet by mouth daily      Cholecalciferol (VITAMIN D3) 1000 UNITS CAPS Take 1 capsule by mouth daily. No current facility-administered medications for this visit. Family history: All family history was reviewed by me today    No family history on file. No family history of immunocompromising or autoimmune conditions. No h/o TBin in family or contacts      REVIEW OF SYSTEMS:      Review of Systems   Constitutional: Negative for chills, diaphoresis and fever. HENT: Negative for ear discharge, ear pain, rhinorrhea, sore throat and trouble swallowing. Eyes: Negative for discharge and redness. Respiratory: Negative for cough, shortness of breath and wheezing. Cardiovascular: Positive for leg swelling. Negative for chest pain. Gastrointestinal: Negative for abdominal pain, constipation, diarrhea and nausea. Endocrine: Negative for polyuria. Genitourinary: Negative for dysuria, flank pain, frequency, hematuria and urgency. Musculoskeletal: Negative for back pain and myalgias.    Skin: Positive for color change. Negative for rash. Ongoing redness and swelling of the right leg, patient tells me that it has shown some improvement since hospitalization   Neurological: Negative for dizziness, seizures and headaches. Hematological: Does not bruise/bleed easily. Psychiatric/Behavioral: Negative for hallucinations and suicidal ideas. All other systems reviewed and are negative. PHYSICAL EXAM:      There were no vitals filed for this visit. Physical Exam  Vitals signs and nursing note reviewed. Constitutional:       Appearance: Normal appearance. He is well-developed. HENT:      Head: Normocephalic and atraumatic. Right Ear: External ear normal.      Left Ear: External ear normal.      Nose: Nose normal. No congestion or rhinorrhea. Mouth/Throat:      Mouth: Mucous membranes are moist.      Pharynx: No oropharyngeal exudate or posterior oropharyngeal erythema. Eyes:      General: No scleral icterus. Right eye: No discharge. Left eye: No discharge. Conjunctiva/sclera: Conjunctivae normal.      Pupils: Pupils are equal, round, and reactive to light. Neck:      Musculoskeletal: Normal range of motion and neck supple. No neck rigidity or muscular tenderness. Cardiovascular:      Rate and Rhythm: Normal rate and regular rhythm. Pulses: Normal pulses. Heart sounds: No murmur. No friction rub. Pulmonary:      Effort: Pulmonary effort is normal. No respiratory distress. Breath sounds: Normal breath sounds. No stridor. No wheezing, rhonchi or rales. Abdominal:      General: Bowel sounds are normal.      Palpations: Abdomen is soft. Tenderness: There is no abdominal tenderness. There is no right CVA tenderness, left CVA tenderness, guarding or rebound. Musculoskeletal: Normal range of motion. General: Swelling present. No tenderness. Lymphadenopathy:      Cervical: No cervical adenopathy.    Skin:     General: Skin is warm and dry. Coloration: Skin is not jaundiced. Findings: Erythema present. No rash. Comments: Moderate right leg swelling and redness noted. Indicates partially treated right leg cellulitis   Neurological:      General: No focal deficit present. Mental Status: He is alert and oriented to person, place, and time. Mental status is at baseline. Motor: No abnormal muscle tone. Psychiatric:         Mood and Affect: Mood normal.         Behavior: Behavior normal.         Thought Content: Thought content normal.              DATA:  All available lab data was reviewed by me during this visit    Last CBC:  Lab Results   Component Value Date    WBC 7.4 11/16/2020    HGB 11.4 (L) 11/16/2020    HCT 33.3 (L) 11/16/2020    .0 (H) 11/16/2020     11/16/2020    LYMPHOPCT 5.5 11/14/2020    RBC 3.30 (L) 11/16/2020    MCH 34.5 (H) 11/16/2020    MCHC 34.2 11/16/2020    RDW 14.6 11/16/2020       Last BMP:  Lab Results   Component Value Date     11/16/2020    K 3.8 11/16/2020    CL 96 11/16/2020    CO2 29 11/16/2020    BUN 18 11/16/2020    CREATININE 1.2 11/16/2020    GLUCOSE 134 11/16/2020    CALCIUM 9.4 11/16/2020        Hepatic Function Panel:   Lab Results   Component Value Date    ALKPHOS 59 11/14/2020    ALT 16 11/14/2020    AST 37 11/14/2020    PROT 7.4 11/14/2020    PROT 7.3 09/26/2012    BILITOT 0.8 11/14/2020    BILIDIR <0.2 02/13/2020    IBILI see below 02/13/2020    LABALBU 4.1 11/14/2020       Last CK: No results found for: CKTOTAL    Last ESR:  Lab Results   Component Value Date    SEDRATE 10 12/30/2014       Last CRP:  No results found for: CRP    1. Imaging: All pertinent images and reports for the current visit were reviewed by me during this visit. Outside records:    Labs, Microbiology, Radiology and pertinentresults from Care everywhere, if available, were reviewed as a part of the consultation.     Problem list:       Patient Active Problem List Diagnosis Code    Type 2 diabetes mellitus without complication, without long-term current use of insulin (HCC) E11.9    Dyslipidemia E78.5    HTN (hypertension) I10    ED (erectile dysfunction) N52.9    Vitamin D deficiency E55.9    DJD (degenerative joint disease) M19.90    Left knee DJD M17.12    GERD (gastroesophageal reflux disease) K21.9    Osteoarthritis of right knee M17.11    Sleep apnea G47.30    Chronic venous insufficiency I87.2    Swelling of joint of lower leg M25.469    H/O deep venous thrombosis Z86.718    Pulmonary HTN (HCC) I27.20    Cellulitis L03.90       Microbiology:       All available micro data was reviewed by me during this visit        Allergies and immunizations:       Known drug Allergies: All allergies data was reviewed by me during this visit  Patient has no known allergies. Immunizations: All immunizations were reviewed byme in detail. Immunization History   Administered Date(s) Administered    Influenza 11/16/2010, 11/16/2011    Influenza Virus Vaccine 01/22/2015    Influenza, High Dose (Fluzone 65 yrs and older) 12/18/2015, 10/03/2017, 12/19/2018    Influenza, Quadv, IM, PF (6 mo and older Fluzone, Flulaval, Fluarix, and 3 yrs and older Afluria) 11/16/2020    Pneumococcal Conjugate 13-valent ([id number]) 06/19/2017    Pneumococcal Polysaccharide ([id number]) 08/30/2014       Social History:  Allsocial and epidemiologic history was reviewed and updated be me in detail today.     Social History     Socioeconomic History    Marital status:      Spouse name: Not on file    Number of children: Not on file    Years of education: Not on file    Highest education level: Not on file   Occupational History    Not on file   Social Needs    Financial resource strain: Not on file    Food insecurity     Worry: Not on file     Inability: Not on file    Transportation needs     Medical: Not on file     Non-medical: Not on file   Tobacco Use    Smoking status: Never Smoker    Smokeless tobacco: Never Used   Substance and Sexual Activity    Alcohol use: Yes     Alcohol/week: 17.0 standard drinks     Types: 14 Standard drinks or equivalent, 3 Shots of liquor per week     Comment: a couple kimberly and a scotch, occ wine    Drug use: No    Sexual activity: Yes     Partners: Female   Lifestyle    Physical activity     Days per week: Not on file     Minutes per session: Not on file    Stress: Not on file   Relationships    Social connections     Talks on phone: Not on file     Gets together: Not on file     Attends Confucianism service: Not on file     Active member of club or organization: Not on file     Attends meetings of clubs or organizations: Not on file     Relationship status: Not on file    Intimate partner violence     Fear of current or ex partner: Not on file     Emotionally abused: Not on file     Physically abused: Not on file     Forced sexual activity: Not on file   Other Topics Concern    Not on file   Social History Narrative    Not on file       IMPRESSION:    The patient is a 78 y. o.old male who  has a past medical history of AR (allergic rhinitis), Dermatitis, Diabetes, Diabetes ([address] DJD (degenerative joint disease), Dyslipidemia, ED (erectile dysfunction), GERD (gastroesophageal reflux disease), HTN (hypertension), blood clots, Hyperlipidemia, Sleep apnea, and Vitamin D deficiency. was seen today for following problems:    1. Recurrent cellulitis of lower leg    2. Osteoarthritis, unspecified osteoarthritis type, unspecified site    3. Type 2 diabetes mellitus without complication, without long-term current use of insulin ([address]    4. Dyslipidemia    5. Essential hypertension    6. Gastroesophageal reflux disease without esophagitis    7. Sleep apnea, unspecified type    8. Chronic venous insufficiency    9. H/O deep venous thrombosis    10. Pulmonary HTN ([address]    11.  Class 2 obesity due to excess calories with body mass index (BMI) of 39.0 to 39.9 in adult, unspecified whether serious comorbidity present    12. Weight loss counseling, encounter for    13. Diabetes education, encounter for        Discussion:      The patient is afebrile. He is taking oral Bactrim and is tolerating it well. He has resolving cellulitis of the right leg. I examined the right leg with the help of video. He still has ongoing swelling of the right leg and has redness of the right leg, which is typically seen in a resolving cellulitis. I do not think cellulitis is completely resolved yet. The patient has needed hospitalization for the first time in the past 5 years and his last cellulitis episode before this 1 was 2 years ago when he needed antibiotics. He thinks that falls tend to trigger the cellulitis in him. He is s/p venous ablation of the right saphenous vein few years ago and had a venous extremity Doppler study of the right leg on 9/27/2019 which showed did not show any acute DVTs but small isolated calf thrombi could not be excluded. RECOMMENDATIONS:      Diagnostic Workup:    · No labs today  · Continue to follow fever curve at home. Antimicrobials:    · Will order *oral Keflex 500 mg every 6 hours for 3 weeks  · Due to the propensity of Bactrim to cause acute kidney injury in elderly patients, I will not be ordering  further oral Bactrim for the patient  · I am also ordering the oral probiotic twice daily for the patient to be taken while on Keflex. He was advised to check if he can find a probiotic cheaper over-the-counter rather than the prescription  · Discussed the importance of antibiotic compliance  · Maintain good glycemic control  · The patient tells me that falls tend to trigger his cellulitis.   I have encouraged him strongly to discuss fall prevention strategies with his PCP is as falls are a major cause of morbidity in elderly and addressing vitamin D deficiency and imbalance issues etc. to prevent falls can go a long way to diabetes who are able to lose 5 percent of their body weight and maintain this weight will reduce their risk of developing diabetes by about 50 percent and reduce their blood pressure. Losing more than 15 percent of  body weight and staying at this weight is an extremely good result, even if you never reach your \"dream\" or \"ideal\" weight. Lifestyle changes including changing eating habits, substituting excess carbohydrates with proteins, stress reduction, using self-help programs like Weight Watchers®, Overeaters Anonymous®, and Take Off Pounds Sensibly (TOPS)© , following DASH diet and increasing exercise or walking briskly daily for half hour to and hour 5-7 days a week was suggested among other measures. Information was given about various weight loss education programs and their websites like www.cdc.gov/healthyweight, www.choosemyplate.gov and www.health.gov/dietaryguidelines/      Patient education and counseling:    · The patient was educated in detail about the side-effects of variousantibiotics and things to watch for like new rashes, lip swelling, severe reaction, worsening diarrhea, break through fever etc.  · Patient was instructed to stop antibiotics and callour office if these problems were to occur, or go to nearest ER if experiencing severe symptoms. · Discussed patient's condition and what to expect. All of the patient's questions wereaddressed in a satisfactory manner and patient verbalized understanding all instructions. Follow-up:    · Follow-up with me in ID clinic or through video visit as needed    Level of complexity of visit: High     Please note that this chart was generated using Dragon dictation software. Although every effort was made to ensure the accuracy of this automated transcription, some errors in transcription may have occurred inadvertently.  If you may need any clarification, please do not hesitate to contact me through EPIC or at the phone number provided below with my electronic signature. Any pictures or media included in this note were obtained after taking informed verbal consent from the patient and with their approval to include those in the patient's medical record. Thankyou for involving me in the care of your patient. If you have any additional questions,please do not hesitate to contact me.     Hailey Sifuentes MD, MPH  11/24/20 , 10:37 AM [facility] Infectious Disease   [address] [place] [address], [address] [place] [address]  Office: [phone]  Fax: [phone]  Clinic days:  Tuesday & Thursday

## 2020-11-25 ENCOUNTER — TELEPHONE (OUTPATIENT)
Dept: INFECTIOUS DISEASES | Age: 80
End: 2020-11-25

## 2020-12-02 ENCOUNTER — OFFICE VISIT (OUTPATIENT)
Dept: INTERNAL MEDICINE CLINIC | Age: 80
End: 2020-12-02
Payer: COMMERCIAL

## 2020-12-02 VITALS
DIASTOLIC BLOOD PRESSURE: 82 MMHG | SYSTOLIC BLOOD PRESSURE: 136 MMHG | OXYGEN SATURATION: 96 % | TEMPERATURE: 97 F | HEART RATE: 87 BPM | BODY MASS INDEX: 39.01 KG/M2 | HEIGHT: 69 IN | WEIGHT: 263.4 LBS

## 2020-12-02 DIAGNOSIS — L03.115 CELLULITIS OF LEG, RIGHT: ICD-10-CM

## 2020-12-02 DIAGNOSIS — R80.8 OTHER PROTEINURIA: ICD-10-CM

## 2020-12-02 DIAGNOSIS — E78.2 MIXED HYPERLIPIDEMIA: ICD-10-CM

## 2020-12-02 DIAGNOSIS — G47.30 SLEEP APNEA, UNSPECIFIED TYPE: ICD-10-CM

## 2020-12-02 DIAGNOSIS — I10 ESSENTIAL HYPERTENSION: ICD-10-CM

## 2020-12-02 DIAGNOSIS — E11.9 TYPE 2 DIABETES MELLITUS TREATED WITHOUT INSULIN (HCC): Primary | ICD-10-CM

## 2020-12-02 LAB
CHOLESTEROL, TOTAL: 182 MG/DL (ref 0–199)
CHP ED QC CHECK: NORMAL
GLUCOSE BLD-MCNC: 136 MG/DL
HBA1C MFR BLD: 6 %
HDLC SERPL-MCNC: 84 MG/DL (ref 40–60)
LDL CHOLESTEROL CALCULATED: 85 MG/DL
TRIGL SERPL-MCNC: 66 MG/DL (ref 0–150)
VLDLC SERPL CALC-MCNC: 13 MG/DL

## 2020-12-02 PROCEDURE — 1036F TOBACCO NON-USER: CPT | Performed by: INTERNAL MEDICINE

## 2020-12-02 PROCEDURE — 82962 GLUCOSE BLOOD TEST: CPT | Performed by: INTERNAL MEDICINE

## 2020-12-02 PROCEDURE — G8417 CALC BMI ABV UP PARAM F/U: HCPCS | Performed by: INTERNAL MEDICINE

## 2020-12-02 PROCEDURE — 1111F DSCHRG MED/CURRENT MED MERGE: CPT | Performed by: INTERNAL MEDICINE

## 2020-12-02 PROCEDURE — G8427 DOCREV CUR MEDS BY ELIG CLIN: HCPCS | Performed by: INTERNAL MEDICINE

## 2020-12-02 PROCEDURE — 4040F PNEUMOC VAC/ADMIN/RCVD: CPT | Performed by: INTERNAL MEDICINE

## 2020-12-02 PROCEDURE — 83036 HEMOGLOBIN GLYCOSYLATED A1C: CPT | Performed by: INTERNAL MEDICINE

## 2020-12-02 PROCEDURE — 99214 OFFICE O/P EST MOD 30 MIN: CPT | Performed by: INTERNAL MEDICINE

## 2020-12-02 PROCEDURE — 1123F ACP DISCUSS/DSCN MKR DOCD: CPT | Performed by: INTERNAL MEDICINE

## 2020-12-02 PROCEDURE — G8482 FLU IMMUNIZE ORDER/ADMIN: HCPCS | Performed by: INTERNAL MEDICINE

## 2020-12-02 RX ORDER — BLOOD-GLUCOSE METER
KIT MISCELLANEOUS
Qty: 100 STRIP | Refills: 5 | Status: SHIPPED | OUTPATIENT
Start: 2020-12-02

## 2020-12-02 RX ORDER — LANCETS 28 GAUGE
EACH MISCELLANEOUS
Qty: 100 EACH | Refills: 5 | Status: SHIPPED | OUTPATIENT
Start: 2020-12-02

## 2020-12-02 NOTE — PROGRESS NOTES
Subjective:      Patient ID: Lewis Franco is a 78 y.o. male. HPI He is here for a check up. He has diabetes, T2,  hypertension, and hyperlipidemia. He is taking medication as prescribed, continues to work on a more balanced meal and more physical activity. He has sleep apnea and uses CPAP. Feels better. H/O recurrent right lower extremity cellulitis. Treated with long term antibiotics. Review of Systems   Constitutional:        Elevated BMI at 38.9. Continues to work on a more balanced meal plan and more physical activity. HENT: Negative. Eyes: Negative. Respiratory:        Sleep apnea, see HPI. Cardiovascular: Negative. Gastrointestinal: Negative. Endocrine:        Hyperlipidemia, treated with statin with LDL of 85. Diabetes, treated with oral agents. A1c 6.0. Genitourinary: proteinuria noted. Musculoskeletal: Negative. Skin: Negative. Neurological: Negative. Psychiatric/Behavioral: Negative. Objective:   Physical Exam   Constitutional: He is oriented to person, place, and time. He appears well-developed and well-nourished. No distress. HENT:   Head: Normocephalic and atraumatic. Right Ear: External ear normal.   Left Ear: External ear normal.   Mouth/Throat: Oropharynx is clear and moist.     Eyes: Pupils are equal, round, and reactive to light. Conjunctivae and EOM are normal. No scleral icterus. Neck: Normal range of motion. Neck supple. No thyromegaly present. Cardiovascular: Normal rate, regular rhythm, normal heart sounds and intact distal pulses. Pulmonary/Chest: Effort normal and breath sounds normal.   Abdominal: Soft. Bowel sounds are normal. He exhibits no mass. Musculoskeletal:   1 to 1-1/2 + leg edema, R>L. Chronic stasis changes. Lymphadenopathy:     He has no cervical adenopathy. Neurological: He is alert and oriented to person, place, and time. He has normal reflexes. Skin: Skin is warm and dry. Psychiatric: He has a normal mood and affect. His behavior is normal. Judgment and thought content normal.       Assessment:        Diagnosis Orders   1. Type 2 diabetes mellitus without complication, without long-term current use of insulin ([facility])  Diet, physical activity   POCT Glucose    POCT glycosylated hemoglobin (Hb A1C)    MICROALBUMIN / CREATININE URINE RATIO   2. Essential hypertension  Basic Metabolic Panel  Stable. Continue same medication regimen. DASH diet discussed. 3. Mixed hyperlipidemia  Lipid Panel  Heart healthy diet and statin compliance discussed. 4. Leg cellulitis. Recurrent  Antibiotic therapy. Diuretic, compression. 6.      Proteinuria: risk factor control stressed. Discussed meaning. Avoidance of nephrotoxins stressed. Plan:    See plans above.

## 2020-12-24 ENCOUNTER — TELEPHONE (OUTPATIENT)
Dept: INFECTIOUS DISEASES | Age: 80
End: 2020-12-24

## 2020-12-24 RX ORDER — CEPHALEXIN 500 MG/1
500 CAPSULE ORAL 3 TIMES DAILY
Qty: 30 CAPSULE | Refills: 0 | Status: SHIPPED | OUTPATIENT
Start: 2020-12-24 | End: 2021-01-03

## 2020-12-24 NOTE — TELEPHONE ENCOUNTER
Patient called asking for more Keflex and Floranex. Patient is being treated by Dr. Ginna Curtis for Cellulitis and patient says that it's not completely healed. It's still red, tender and swollen. It is somewhat better than it was just not fully healed. Patients pharmacy is [facility] as noted in chart.

## 2021-02-18 ENCOUNTER — TELEPHONE (OUTPATIENT)
Dept: INTERNAL MEDICINE CLINIC | Age: 81
End: 2021-02-18

## 2021-02-18 RX ORDER — OMEPRAZOLE 20 MG/1
CAPSULE, DELAYED RELEASE ORAL
Qty: 90 CAPSULE | Refills: 3 | Status: SHIPPED | OUTPATIENT
Start: 2021-02-18 | End: 2022-02-07

## 2021-03-17 RX ORDER — FUROSEMIDE 40 MG/1
20 TABLET ORAL DAILY
Qty: 30 TABLET | Refills: 0 | Status: SHIPPED | OUTPATIENT
Start: 2021-03-17 | End: 2021-06-22 | Stop reason: SDUPTHER

## 2021-05-11 RX ORDER — ROSUVASTATIN CALCIUM 10 MG/1
TABLET, COATED ORAL
Qty: 90 TABLET | Refills: 3 | Status: SHIPPED | OUTPATIENT
Start: 2021-05-11

## 2021-06-03 ENCOUNTER — OFFICE VISIT (OUTPATIENT)
Dept: INTERNAL MEDICINE CLINIC | Age: 81
End: 2021-06-03
Payer: MEDICARE

## 2021-06-03 VITALS
SYSTOLIC BLOOD PRESSURE: 130 MMHG | DIASTOLIC BLOOD PRESSURE: 80 MMHG | HEIGHT: 66 IN | BODY MASS INDEX: 41.66 KG/M2 | WEIGHT: 259.2 LBS | HEART RATE: 78 BPM | OXYGEN SATURATION: 100 %

## 2021-06-03 DIAGNOSIS — I87.2 VENOUS INSUFFICIENCY OF BOTH LOWER EXTREMITIES: ICD-10-CM

## 2021-06-03 DIAGNOSIS — E78.2 MIXED HYPERLIPIDEMIA: ICD-10-CM

## 2021-06-03 DIAGNOSIS — I10 ESSENTIAL HYPERTENSION: ICD-10-CM

## 2021-06-03 DIAGNOSIS — E11.9 TYPE 2 DIABETES MELLITUS TREATED WITHOUT INSULIN (HCC): Primary | ICD-10-CM

## 2021-06-03 LAB
CHP ED QC CHECK: NORMAL
GLUCOSE BLD-MCNC: 96 MG/DL
HBA1C MFR BLD: 5.7 %

## 2021-06-03 PROCEDURE — 4040F PNEUMOC VAC/ADMIN/RCVD: CPT | Performed by: INTERNAL MEDICINE

## 2021-06-03 PROCEDURE — 83036 HEMOGLOBIN GLYCOSYLATED A1C: CPT | Performed by: INTERNAL MEDICINE

## 2021-06-03 PROCEDURE — 82962 GLUCOSE BLOOD TEST: CPT | Performed by: INTERNAL MEDICINE

## 2021-06-03 PROCEDURE — G8417 CALC BMI ABV UP PARAM F/U: HCPCS | Performed by: INTERNAL MEDICINE

## 2021-06-03 PROCEDURE — 1036F TOBACCO NON-USER: CPT | Performed by: INTERNAL MEDICINE

## 2021-06-03 PROCEDURE — 99214 OFFICE O/P EST MOD 30 MIN: CPT | Performed by: INTERNAL MEDICINE

## 2021-06-03 PROCEDURE — G8427 DOCREV CUR MEDS BY ELIG CLIN: HCPCS | Performed by: INTERNAL MEDICINE

## 2021-06-03 PROCEDURE — 1123F ACP DISCUSS/DSCN MKR DOCD: CPT | Performed by: INTERNAL MEDICINE

## 2021-06-03 SDOH — EDUCATIONAL SECURITY: EDUCATION ATTAINMENT: WHAT IS THE HIGHEST LEVEL OF SCHOOL YOU HAVE COMPLETED OR THE HIGHEST DEGREE YOU HAVE RECEIVED?: PATIENT REFUSED

## 2021-06-03 SDOH — ECONOMIC STABILITY: FOOD INSECURITY: WITHIN THE PAST 12 MONTHS, THE FOOD YOU BOUGHT JUST DIDN'T LAST AND YOU DIDN'T HAVE MONEY TO GET MORE.: NEVER TRUE

## 2021-06-03 SDOH — ECONOMIC STABILITY: INCOME INSECURITY: HOW HARD IS IT FOR YOU TO PAY FOR THE VERY BASICS LIKE FOOD, HOUSING, MEDICAL CARE, AND HEATING?: NOT VERY HARD

## 2021-06-03 SDOH — ECONOMIC STABILITY: FOOD INSECURITY: WITHIN THE PAST 12 MONTHS, YOU WORRIED THAT YOUR FOOD WOULD RUN OUT BEFORE YOU GOT MONEY TO BUY MORE.: NEVER TRUE

## 2021-06-03 ASSESSMENT — PATIENT HEALTH QUESTIONNAIRE - PHQ9
1. LITTLE INTEREST OR PLEASURE IN DOING THINGS: 0
SUM OF ALL RESPONSES TO PHQ QUESTIONS 1-9: 0
2. FEELING DOWN, DEPRESSED OR HOPELESS: 0
SUM OF ALL RESPONSES TO PHQ QUESTIONS 1-9: 0
SUM OF ALL RESPONSES TO PHQ9 QUESTIONS 1 & 2: 0
SUM OF ALL RESPONSES TO PHQ QUESTIONS 1-9: 0

## 2021-06-03 ASSESSMENT — SOCIAL DETERMINANTS OF HEALTH (SDOH): HOW HARD IS IT FOR YOU TO PAY FOR THE VERY BASICS LIKE FOOD, HOUSING, MEDICAL CARE, AND HEATING?: NOT HARD AT ALL

## 2021-06-03 NOTE — PROGRESS NOTES
Patient: Lila Bell is a 78 y.o. male who presents today with the following Chief Complaint(s):    Chief Complaint   Patient presents with    Diabetes    Follow-up         HPIHe is here for a check up and f/u on hypertension, hyperlipidemia, and diabetes, type 2. He is taking medication as prescribed, continues to focus on more balanced meal plan and regular physical activity. H/O venous insufficieny lower extremities. No ulcers, using diuretic and wears compression hose. Current Outpatient Medications   Medication Sig Dispense Refill    rosuvastatin (CRESTOR) 10 MG tablet TAKE 1 TABLET BY MOUTH EVERY DAY 90 tablet 3    furosemide (LASIX) 40 MG tablet Take 0.5 tablets by mouth daily 30 tablet 0    omeprazole (PRILOSEC) 20 MG delayed release capsule TAKE 1 CAPSULE BY MOUTH DAILY 90 capsule 3    blood glucose test strips (FREESTYLE LITE) strip Test twice daily 100 strip 5    FreeStyle Lancets MISC Test twice daily 100 each 5    pioglitazone (ACTOS) 15 MG tablet Take 15 mg by mouth daily      budesonide-formoterol (SYMBICORT) 160-4.5 MCG/ACT AERO Inhale 2 puffs into the lungs 2 times daily 3 Inhaler 1    Blood Glucose Monitoring Suppl (FREESTYLE LITE) LISA Test twice daily 1 Device 0    cetirizine (ZYRTEC) 10 MG tablet Take 10 mg by mouth daily      aspirin 81 MG tablet Take 81 mg by mouth daily      Multiple Vitamins-Minerals (CENTRUM SILVER ADULT 50+ PO) Take 1 tablet by mouth daily      Cholecalciferol (VITAMIN D3) 1000 UNITS CAPS Take 1 capsule by mouth daily.  candesartan-hydrochlorothiazide (ATACAND HCT) 32-12.5 MG per tablet Take 1 tablet by mouth daily 90 tablet 2     No current facility-administered medications for this visit. Patient's past medical history, surgical history, family history, medications,and allergies  were all reviewed and updated as appropriate today. Review of Systems   Constitutional: Negative. HENT: Negative. Eyes: Negative. Respiratory: Negative. Cardiovascular:        Hypertension, treated with ARB, diuretic combination. Gastrointestinal: Negative. Endocrine:        Hyperlipidemia, treated with statin. LDL 85. Diabetes, type 2 treated with pioglitazone. A1c 5.7   Genitourinary: Negative. Musculoskeletal: Negative. Skin: Negative. Neurological: Negative. Psychiatric/Behavioral: Negative. Physical Exam  Constitutional:       General: He is not in acute distress. Appearance: He is well-developed. He is obese. HENT:      Head: Normocephalic and atraumatic. Right Ear: External ear normal.      Left Ear: External ear normal.      Nose: Nose normal.   Eyes:      General: No scleral icterus. Conjunctiva/sclera: Conjunctivae normal.      Pupils: Pupils are equal, round, and reactive to light. Neck:      Thyroid: No thyromegaly. Cardiovascular:      Rate and Rhythm: Normal rate and regular rhythm. Heart sounds: Normal heart sounds. Pulmonary:      Effort: Pulmonary effort is normal.      Breath sounds: Normal breath sounds. Abdominal:      General: Bowel sounds are normal.      Palpations: Abdomen is soft. There is no mass. Musculoskeletal:      Cervical back: Normal range of motion and neck supple. Comments: Leg edema, no change. Lymphadenopathy:      Cervical: No cervical adenopathy. Skin:     General: Skin is warm and dry. Neurological:      Mental Status: He is alert and oriented to person, place, and time. Deep Tendon Reflexes: Reflexes are normal and symmetric. Psychiatric:         Behavior: Behavior normal.         Thought Content: Thought content normal.         Judgment: Judgment normal.         Vitals:    06/03/21 0838   BP: 130/80   Pulse: 78   SpO2: 100%       Assessment:   Diagnosis Orders   1. Type 2 diabetes mellitus treated without insulin (HCC)  Diet, physical activity, med compliance and weight reduction discussed.   POCT Glucose    POCT

## 2021-06-22 RX ORDER — FUROSEMIDE 40 MG/1
20 TABLET ORAL DAILY
Qty: 30 TABLET | Refills: 0 | Status: SHIPPED | OUTPATIENT
Start: 2021-06-22

## 2021-06-27 ASSESSMENT — ENCOUNTER SYMPTOMS
RESPIRATORY NEGATIVE: 1
EYES NEGATIVE: 1
GASTROINTESTINAL NEGATIVE: 1

## 2021-08-31 ENCOUNTER — TELEPHONE (OUTPATIENT)
Dept: INTERNAL MEDICINE CLINIC | Age: 81
End: 2021-08-31

## 2021-08-31 DIAGNOSIS — I10 ESSENTIAL HYPERTENSION: ICD-10-CM

## 2021-08-31 RX ORDER — CANDESARTAN CILEXETIL AND HYDROCHLOROTHIAZIDE 32; 12.5 MG/1; MG/1
1 TABLET ORAL DAILY
Qty: 90 TABLET | Refills: 2 | Status: CANCELLED | OUTPATIENT
Start: 2021-08-31 | End: 2022-05-28

## 2021-08-31 RX ORDER — CANDESARTAN CILEXETIL AND HYDROCHLOROTHIAZIDE 32; 12.5 MG/1; MG/1
1 TABLET ORAL DAILY
Qty: 90 TABLET | Refills: 3 | Status: SHIPPED | OUTPATIENT
Start: 2021-08-31 | End: 2022-10-11

## 2021-12-07 ENCOUNTER — OFFICE VISIT (OUTPATIENT)
Dept: INTERNAL MEDICINE CLINIC | Age: 81
End: 2021-12-07
Payer: MEDICARE

## 2021-12-07 VITALS
WEIGHT: 267 LBS | TEMPERATURE: 98.5 F | DIASTOLIC BLOOD PRESSURE: 88 MMHG | OXYGEN SATURATION: 94 % | SYSTOLIC BLOOD PRESSURE: 138 MMHG | HEIGHT: 66 IN | BODY MASS INDEX: 42.91 KG/M2 | HEART RATE: 102 BPM

## 2021-12-07 DIAGNOSIS — Z23 FLU VACCINE NEED: ICD-10-CM

## 2021-12-07 DIAGNOSIS — E78.2 MIXED HYPERLIPIDEMIA: ICD-10-CM

## 2021-12-07 DIAGNOSIS — I10 PRIMARY HYPERTENSION: ICD-10-CM

## 2021-12-07 DIAGNOSIS — Z00.00 ROUTINE GENERAL MEDICAL EXAMINATION AT A HEALTH CARE FACILITY: ICD-10-CM

## 2021-12-07 DIAGNOSIS — E11.9 TYPE 2 DIABETES MELLITUS TREATED WITHOUT INSULIN (HCC): Primary | ICD-10-CM

## 2021-12-07 DIAGNOSIS — E66.01 OBESITY, CLASS III, BMI 40-49.9 (MORBID OBESITY) (HCC): ICD-10-CM

## 2021-12-07 LAB
BASOPHILS ABSOLUTE: 0 K/UL (ref 0–0.2)
BASOPHILS RELATIVE PERCENT: 0.9 %
CHP ED QC CHECK: NORMAL
EOSINOPHILS ABSOLUTE: 0.1 K/UL (ref 0–0.6)
EOSINOPHILS RELATIVE PERCENT: 2.8 %
GLUCOSE BLD-MCNC: 104 MG/DL
HBA1C MFR BLD: 5.6 %
HCT VFR BLD CALC: 40.5 % (ref 40.5–52.5)
HEMOGLOBIN: 13.1 G/DL (ref 13.5–17.5)
LYMPHOCYTES ABSOLUTE: 1.3 K/UL (ref 1–5.1)
LYMPHOCYTES RELATIVE PERCENT: 26.9 %
MCH RBC QN AUTO: 32.9 PG (ref 26–34)
MCHC RBC AUTO-ENTMCNC: 32.3 G/DL (ref 31–36)
MCV RBC AUTO: 101.9 FL (ref 80–100)
MONOCYTES ABSOLUTE: 0.6 K/UL (ref 0–1.3)
MONOCYTES RELATIVE PERCENT: 12.7 %
NEUTROPHILS ABSOLUTE: 2.8 K/UL (ref 1.7–7.7)
NEUTROPHILS RELATIVE PERCENT: 56.7 %
PDW BLD-RTO: 14.9 % (ref 12.4–15.4)
PLATELET # BLD: 192 K/UL (ref 135–450)
PMV BLD AUTO: 7.2 FL (ref 5–10.5)
RBC # BLD: 3.98 M/UL (ref 4.2–5.9)
WBC # BLD: 5 K/UL (ref 4–11)

## 2021-12-07 PROCEDURE — G0008 ADMIN INFLUENZA VIRUS VAC: HCPCS | Performed by: INTERNAL MEDICINE

## 2021-12-07 PROCEDURE — G8484 FLU IMMUNIZE NO ADMIN: HCPCS | Performed by: INTERNAL MEDICINE

## 2021-12-07 PROCEDURE — 83036 HEMOGLOBIN GLYCOSYLATED A1C: CPT | Performed by: INTERNAL MEDICINE

## 2021-12-07 PROCEDURE — 4040F PNEUMOC VAC/ADMIN/RCVD: CPT | Performed by: INTERNAL MEDICINE

## 2021-12-07 PROCEDURE — 1123F ACP DISCUSS/DSCN MKR DOCD: CPT | Performed by: INTERNAL MEDICINE

## 2021-12-07 PROCEDURE — 82962 GLUCOSE BLOOD TEST: CPT | Performed by: INTERNAL MEDICINE

## 2021-12-07 PROCEDURE — 90694 VACC AIIV4 NO PRSRV 0.5ML IM: CPT | Performed by: INTERNAL MEDICINE

## 2021-12-07 PROCEDURE — G0439 PPPS, SUBSEQ VISIT: HCPCS | Performed by: INTERNAL MEDICINE

## 2021-12-07 RX ORDER — ROSUVASTATIN CALCIUM 20 MG/1
20 TABLET, COATED ORAL NIGHTLY
Qty: 90 TABLET | Refills: 3 | Status: SHIPPED | OUTPATIENT
Start: 2021-12-07 | End: 2022-03-07

## 2021-12-07 SDOH — ECONOMIC STABILITY: INCOME INSECURITY: HOW HARD IS IT FOR YOU TO PAY FOR THE VERY BASICS LIKE FOOD, HOUSING, MEDICAL CARE, AND HEATING?: NOT VERY HARD

## 2021-12-07 SDOH — EDUCATIONAL SECURITY: EDUCATION ATTAINMENT: WHAT IS THE HIGHEST LEVEL OF SCHOOL YOU HAVE COMPLETED OR THE HIGHEST DEGREE YOU HAVE RECEIVED?: PATIENT REFUSED

## 2021-12-07 SDOH — ECONOMIC STABILITY: FOOD INSECURITY: WITHIN THE PAST 12 MONTHS, THE FOOD YOU BOUGHT JUST DIDN'T LAST AND YOU DIDN'T HAVE MONEY TO GET MORE.: NEVER TRUE

## 2021-12-07 ASSESSMENT — PATIENT HEALTH QUESTIONNAIRE - PHQ9
2. FEELING DOWN, DEPRESSED OR HOPELESS: 0
SUM OF ALL RESPONSES TO PHQ QUESTIONS 1-9: 0
SUM OF ALL RESPONSES TO PHQ9 QUESTIONS 1 & 2: 0
SUM OF ALL RESPONSES TO PHQ QUESTIONS 1-9: 0
1. LITTLE INTEREST OR PLEASURE IN DOING THINGS: 0
SUM OF ALL RESPONSES TO PHQ QUESTIONS 1-9: 0

## 2021-12-07 ASSESSMENT — LIFESTYLE VARIABLES: HOW OFTEN DO YOU HAVE A DRINK CONTAINING ALCOHOL: 0

## 2021-12-07 NOTE — PATIENT INSTRUCTIONS
Rosuvastatin, take 2 of the 10 mg. Start new prescription once completed which is 20 mg. Advance Directives: Care Instructions  Overview  An advance directive is a legal way to state your wishes at the end of your life. It tells your family and your doctor what to do if you can't say what you want. There are two main types of advance directives. You can change them any time your wishes change. Living will. This form tells your family and your doctor your wishes about life support and other treatment. The form is also called a declaration. Medical power of . This form lets you name a person to make treatment decisions for you when you can't speak for yourself. This person is called a health care agent (health care proxy, health care surrogate). The form is also called a durable power of  for health care. If you do not have an advance directive, decisions about your medical care may be made by a family member, or by a doctor or a  who doesn't know you. It may help to think of an advance directive as a gift to the people who care for you. If you have one, they won't have to make tough decisions by themselves. Follow-up care is a key part of your treatment and safety. Be sure to make and go to all appointments, and call your doctor if you are having problems. It's also a good idea to know your test results and keep a list of the medicines you take. What should you include in an advance directive? Many states have a unique advance directive form. (It may ask you to address specific issues.) Or you might use a universal form that's approved by many states. If your form doesn't tell you what to address, it may be hard to know what to include in your advance directive. Use the questions below to help you get started. · Who do you want to make decisions about your medical care if you are not able to?   · What life-support measures do you want if you have a serious illness that gets worse over time or can't be cured? · What are you most afraid of that might happen? (Maybe you're afraid of having pain, losing your independence, or being kept alive by machines.)  · Where would you prefer to die? (Your home? A hospital? A nursing home?)  · Do you want to donate your organs when you die? · Do you want certain Buddhism practices performed before you die? When should you call for help? Be sure to contact your doctor if you have any questions. Where can you learn more? Go to [email or web address]://[email or web address] [email or web address] org and sign in to your Neuronetrix account. Enter R264 in the [employer] box to learn more about \"Advance Directives: Care Instructions. \"     If you do not have an account, please click on the \"Sign Up Now\" link. Current as of: March 17, 2021               Content Version: 13.0  © 6815-4752 [email or web address]. Care instructions adapted under license by [facility] ([facility]). If you have questions about a medical condition or this instruction, always ask your healthcare professional. [place] [address] any warranty or liability for your use of this information. Learning About Medical Power of   What is a medical power of ? A medical power of , also called a durable power of  for health care, is one type of the legal forms called advance directives. It lets you name the person you want to make treatment decisions for you if you can't speak or decide for yourself. The person you choose is called your health care agent. This person is also called a health care proxy or health care surrogate. A medical power of  may be called something else in your state. How do you choose a health care agent? Choose your health care agent carefully. This person may or may not be a family member. Talk to the person before you make your final decision. Make sure he or she is comfortable with this responsibility.   It's a good idea to choose someone who:  · Is at least 25years old. · Knows you well and understands what makes life meaningful for you. · Understands your Pentecostalism and moral values. · Will do what you want, not what he or she wants. · Will be able to make difficult choices at a stressful time. · Will be able to refuse or stop treatment, if that is what you would want, even if you could die. · Will be firm and confident with health professionals if needed. · Will ask questions to get needed information. · Lives near you or agrees to travel to you if needed. Your family may help you make medical decisions while you can still be part of that process. But it's important to choose one person to be your health care agent in case you aren't able to make decisions for yourself. If you don't fill out the legal form and name a health care agent, the decisions your family can make may be limited. A health care agent may be called something else in your state. Who will make decisions for you if you don't have a health care agent? If you don't have a health care agent or a living will, you may not get the care you want. Decisions may be made by family members who disagree about your medical care. Or decisions may be made by a medical professional who doesn't know you well. In some cases, a  makes the decisions. When you name a health care agent, it is very clear who has the power to make health decisions for you. How do you name a health care agent? You name your health care agent on a legal form. This form is usually called a medical power of . Ask your hospital, state bar association, or office on aging where to find these forms. You must sign the form to make it legal. Some states require you to get the form notarized. This means that a person called a  watches you sign the form and then he or she signs the form. Some states also require that two or more witnesses sign the form.   Be sure to tell your family members and doctors who your health care agent is. Where can you learn more? Go to https://chpepiceweb.healthTechpacker. org and sign in to your HiWiFi account. Enter [id number] in the [facility] box to learn more about \"Learning About [address]. \"     If you do not have an account, please click on the \"Sign Up Now\" link. Current as of: March 17, 2021               Content Version: 13.0  © 3167-2126 Micronotes. Care instructions adapted under license by [facility] [place] ([facility]). If you have questions about a medical condition or this instruction, always ask your healthcare professional. [address] any warranty or liability for your use of this information. Learning About Living [place] Clock  What is a living will? A living will, also called a declaration, is a legal form. It tells your family and your doctor your wishes when you can't speak for yourself. It's used by the health professionals who will treat you as you near the end of your life or if you get seriously hurt or ill. If you put your wishes in writing, your loved ones and others will know what kind of care you want. They won't need to guess. This can ease your mind and be helpful to others. And you can change or cancel your living will at any time. A living will is not the same as an estate or property will. An estate will explains what you want to happen with your money and property after you die. How do you use it? A living will is used to describe the kinds of treatment or life support you want as you near the end of your life or if you get seriously hurt or ill. Keep these facts in mind about living martines. · Your living will is used only if you can't speak or make decisions for yourself. Most often, one or more doctors must certify that you can't speak or decide for yourself before your living will takes effect.   · If you get better and can speak for yourself again, you can accept or refuse any treatment. It doesn't matter what you said in your living will. · Some states may limit your right to refuse treatment in certain cases. For example, you may need to clearly state in your living will that you don't want artificial hydration and nutrition, such as being fed through a tube. Is a living will a legal document? A living will is a legal document. Each state has its own laws about living martines. And a living will may be called something else in your state. Here are some things to know about living martines. · You don't need an  to complete a living will. But legal advice can be helpful if your state's laws are unclear. It can also help if your health history is complicated or your family can't agree on what should be in your living will. · You can change your living will at any time. Some people find that their wishes about end-of-life care change as their health changes. If you make big changes to your living will, complete a new form. · If you move to another state, make sure that your living will is legal in the state where you now live. In most cases, doctors will respect your wishes even if you have a form from a different state. · You might use a universal form that has been approved by many states. This kind of form can sometimes be filled out and stored online. Your digital copy will then be available wherever you have a connection to the internet. The doctors and nurses who need to treat you can find it right away. · Your state may offer an online registry. This is another place where you can store your living will online. · It's a good idea to get your living will notarized. This means using a person called a  to watch two people sign, or witness, your living will. What should you know when you create a living will?   Here are some questions to ask yourself as you make your living will:  · Do you know enough about life support methods that might be used? If not, talk to your doctor so you know what might be done if you can't breathe on your own, your heart stops, or you can't swallow. · What things would you still want to be able to do after you receive life-support methods? Would you want to be able to walk? To speak? To eat on your own? To live without the help of machines? · Do you want certain Mandaeism practices performed if you become very ill? · If you have a choice, where do you want to be cared for? In your home? At a hospital or nursing home? · If you have a choice at the end of your life, where would you prefer to die? At home? In a hospital or nursing home? Somewhere else? · Would you prefer to be buried or cremated? · Do you want your organs to be donated after you die? What should you do with your living will? · Make sure that your family members and your health care agent have copies of your living will (also called a declaration). · Give your doctor a copy of your living will. Ask him or her to keep it as part of your medical record. If you have more than one doctor, make sure that each one has a copy. · Put a copy of your living will where it can be easily found. For example, some people may put a copy on their refrigerator door. If you are using a digital copy, be sure your doctor, family members, and health care agent know how to find and access it. Where can you learn more? Go to https://Blue Apronpemeaganewaugusto.Arisoko. org and sign in to your [employer]ed [employer] Network account. Enter U776 in the [facility] box to learn more about \"Learning About Living [place]. \"     If you do not have an account, please click on the \"Sign Up Now\" link. Current as of: March 17, 2021               Content Version: 13.0  © 8524-4099 Healthwise, Incorporated. Care instructions adapted under license by [facility] ([facility]).  If you have questions about a medical condition or this instruction, always ask your healthcare professional. Candice Rico healthy weight? A healthy weight is the weight at which you feel good about yourself and have energy for work and play. It's also one that lowers your risk for health problems. What can you do to stay at a healthy weight? It can be hard to stay at a healthy weight, especially when fast food, vending-machine snacks, and processed foods are so easy to find. And with your busy lifestyle, activity may be low on your list of things to do. But staying at a healthy weight may be easier than you think. Here are some dos and don'ts for staying at a healthy weight. Do eat healthy foods  The kinds of foods you eat have a big impact on both your weight and your health. Reaching and staying at a healthy weight is not about going on a diet. It's about making healthier food choices every day and changing your diet for good. Healthy eating means eating a variety of foods so that you get all the nutrients you need. Your body needs protein, carbohydrate, and fats for energy. They keep your heart beating, your brain active, and your muscles working. On most days, try to eat from each food group. This means eating a variety of:  · Whole grains, such as whole wheat breads and pastas. · Fruits and vegetables. · Dairy products, such as low-fat milk, yogurt, and cheese. · Lean proteins, such as all types of fish, chicken without the skin, and beans. Don't have too much or too little of one thing. All foods, if eaten in moderation, can be part of healthy eating. Even sweets can be okay. If your favorite foods are high in fat, salt, sugar, or calories, limit how often you eat them. Eat smaller servings, or look for healthy substitutes. Do watch what you eat  Many people eat more than their bodies need. Part of staying at a healthy weight means learning how much food you really need from day to day and not eating more than that. Even with healthy foods, eating too much can make you gain weight.   Having a well-balanced diet means that you eat enough, but not too much, and that your food gives you the nutrients you need to stay healthy. So listen to your body. Eat when you're hungry. Stop when you feel satisfied. It's a good idea to have healthy snacks ready for when you get hungry. Keep healthy snacks with you at work, in your car, and at home. If you have a healthy snack easily available, you'll be less likely to pick a candy bar or bag of chips from a vending machine instead. Some healthy snacks you might want to keep on hand are fruit, low-fat yogurt, string cheese, low-fat microwave popcorn, raisins and other dried fruit, nuts, whole wheat crackers, pretzels, carrots, celery sticks, and broccoli. Do some physical activity  A big part of reaching and staying at a healthy weight is being active. When you're active, you burn calories. This makes it easier to reach and stay at a healthy weight. When you're active on a regular basis, your body burns more calories, even when you're at rest. Being active helps you lose fat and build lean muscle. Try to be active for at least 1 hour every day. This may sound like a lot, but it's okay to be active in smaller blocks of time that add up to 1 hour a day. Any activity that makes your heart beat faster and keeps it there for a while counts. A brisk walk, run, or swim will get your heart beating faster. So will climbing stairs, shooting baskets, or cycling. Even some household chores like vacuuming and mowing the lawn will get your heart rate up. Pick activities that you enjoy--ones that make your heart beat faster, your muscles stronger, and your muscles and joints more flexible. If you find more than one thing you like doing, do them all. You don't have to do the same thing every day. Don't diet  Diets don't work. Diets are temporary. Because you give up so much when you diet, you may be hungry and think about food all the time.  And after you stop dieting, you also may overeat to make up for what you missed. Most people who diet end up gaining back the pounds they lost--and more. Remember that healthy bodies come in lots of shapes and sizes. Everyone can get healthier by eating better and being more active. Where can you learn more? Go to https://daniel.Green Shoots Distribution. org and sign in to your Cahootify account. Enter 234 [id number] in the [employer] box to learn more about \"Learning About Healthy Weight. \"     If you do not have an account, please click on the \"Sign Up Now\" link. Current as of: March 17, 2021               Content Version: 13.0  © 8695-0658 AdYapper. Care instructions adapted under license by [facility] ([facility]). If you have questions about a medical condition or this instruction, always ask your healthcare professional. [address] any warranty or liability for your use of this information. ·        Eating Healthy Foods: Care Instructions  Your Care Instructions     Eating healthy foods can help lower your risk for disease. Healthy food gives you energy and keeps your heart strong, your brain active, your muscles working, and your bones strong. A healthy diet includes a variety of foods from the basic food groups: grains, vegetables, fruits, milk and milk products, and meat and beans. Some people may eat more of their favorite foods from only one food group and, as a result, miss getting the nutrients they need. So, it is important to pay attention not only to what you eat but also to what you are missing from your diet. You can eat a healthy, balanced diet by making a few small changes. Follow-up care is a key part of your treatment and safety. Be sure to make and go to all appointments, and call your doctor if you are having problems. It's also a good idea to know your test results and keep a list of the medicines you take. How can you care for yourself at home?   Look at what you eat  · Keep a food diary for a week or two and record everything you eat or drink. Track the number of servings you eat from each food group. · For a balanced diet every day, eat a variety of:  ? 6 or more ounce-equivalents of grains, such as cereals, breads, crackers, rice, or pasta, every day. An ounce-equivalent is 1 slice of bread, 1 cup of ready-to-eat cereal, or ½ cup of cooked rice, cooked pasta, or cooked cereal.  ? 2½ cups of vegetables, especially:  § Dark-green vegetables such as broccoli and spinach. § Orange vegetables such as carrots and sweet potatoes. § Dry beans (such as stone and kidney beans) and peas (such as lentils). ? 2 cups of fresh, frozen, or canned fruit. A small apple or 1 banana or orange equals 1 cup. ? 3 cups of nonfat or low-fat milk, yogurt, or other milk products. ? 5½ ounces of meat and beans, such as chicken, fish, lean meat, beans, nuts, and seeds. One egg, 1 tablespoon of peanut butter, ½ ounce nuts or seeds, or ¼ cup of cooked beans equals 1 ounce of meat. · Learn how to read food labels for serving sizes and ingredients. Fast-food and convenience-food meals often contain few or no fruits or vegetables. Make sure you eat some fruits and vegetables to make the meal more nutritious. · Look at your food diary. For each food group, add up what you have eaten and then divide the total by the number of days. This will give you an idea of how much you are eating from each food group. See if you can find some ways to change your diet to make it more healthy. Start small  · Do not try to make dramatic changes to your diet all at once. You might feel that you are missing out on your favorite foods and then be more likely to fail. · Start slowly, and gradually change your habits. Try some of the following:  ? Use whole wheat bread instead of white bread. ? Use nonfat or low-fat milk instead of whole milk. ? Eat brown rice instead of white rice, and eat whole wheat pasta instead of white-flour pasta.   ? Try low-fat cheeses and low-fat yogurt. ? Add more fruits and vegetables to meals and have them for snacks. ? Add lettuce, tomato, cucumber, and onion to sandwiches. ? Add fruit to yogurt and cereal.  Enjoy food  · You can still eat your favorite foods. You just may need to eat less of them. If your favorite foods are high in fat, salt, and sugar, limit how often you eat them, but do not cut them out entirely. · Eat a wide variety of foods. Make healthy choices when eating out  · The type of restaurant you choose can help you make healthy choices. Even fast-food chains are now offering more low-fat or healthier choices on the menu. · Choose smaller portions, or take half of your meal home. · When eating out, try:  ? A veggie pizza with a whole wheat crust or grilled chicken (instead of sausage or pepperoni). ? Pasta with roasted vegetables, grilled chicken, or marinara sauce instead of cream sauce. ? A vegetable wrap or grilled chicken wrap. ? Broiled or poached food instead of fried or breaded items. Make healthy choices easy  · Buy packaged, prewashed, ready-to-eat fresh vegetables and fruits, such as baby carrots, salad mixes, and chopped or shredded broccoli and cauliflower. · Buy packaged, presliced fruits, such as melon or pineapple. · Choose 100% fruit or vegetable juice instead of soda. Limit juice intake to 4 to 6 oz (½ to ¾ cup) a day. · Blend low-fat yogurt, fruit juice, and canned or frozen fruit to make a smoothie for breakfast or a snack. Where can you learn more? Go to https://Route4Mepepiceweb.health"[employer]". org and sign in to your [employer] account. Enter N431 in the GetNinjas box to learn more about \"Eating Healthy Foods: Care Instructions. \"     If you do not have an account, please click on the \"Sign Up Now\" link. Current as of: December 17, 2020               Content Version: 13.0  © 1858-3038 Healthwise, Incorporated. Care instructions adapted under license by [facility] ([facility]).  If you have car. Always wear a helmet when riding a bicycle or motorcycle.

## 2021-12-07 NOTE — PROGRESS NOTES
Take 1 capsule by mouth daily. Yes Historical Provider, MD       Past Medical History:   Diagnosis Date    AR (allergic rhinitis)     Dermatitis     Diabetes     Diabetes ([facility]     oral medications    DJD (degenerative joint disease)     knees    Dyslipidemia     ED (erectile dysfunction)     GERD (gastroesophageal reflux disease)     HTN (hypertension)     Hx of blood clots     x 1 after traveling    Hyperlipidemia     Sleep apnea     cpap set at 12    Vitamin D deficiency        Past Surgical History:   Procedure Laterality Date    COLONOSCOPY      FOOT SURGERY      HAND SURGERY      JOINT REPLACEMENT Left 8/29/2014    TOTAL KNEE REPLACEMENT    KNEE ARTHROPLASTY Right 1/21/2015    OTHER SURGICAL HISTORY  See note 12/30/14    Was unable to intubate with a peacock/Used a glidescope per Dr Markos Holbrook dated 12/30/14    OTHER SURGICAL HISTORY Right 03/06/2015    REPAIR OF RUPTURED PATELLAR TENDON RIGHT KNEE,USING TIBIAL       No family history on file. CareTeam (Including outside providers/suppliers regularly involved in providing care):   Patient Care Team:  Nehemias Giles MD as PCP - General (Internal Medicine)  Nehemias Giles MD as PCP - REHABILITATION [facility] Empaneled Provider  Gaurang Soria MD as Consulting Physician (Vascular Surgery)  Goldie Baker MD as Consulting Physician (Surgical Oncology)    Wt Readings from Last 3 Encounters:   12/07/21 267 lb (121.1 kg)   06/03/21 259 lb 3.2 oz (117.6 kg)   12/02/20 263 lb 6.4 oz (119.5 kg)     Vitals:    12/07/21 1001   BP: (!) 145/85   Site: Right Upper Arm   Position: Sitting   Cuff Size: Medium Adult   Pulse: 102   Temp: 98.5 °F (36.9 °C)   SpO2: 94%   Weight: 267 lb (121.1 kg)   Height: 5' 6\" (1.676 m)     Body mass index is 43.09 kg/m². Based upon direct observation of the patient, evaluation of cognition reveals intact memory and cognition.          Patient's complete Health Risk Assessment and screening values have been reviewed and are found in Flowsheets. The following problems were reviewed today and where indicated follow up appointments were made and/or referrals ordered. Positive Risk Factor Screenings with Interventions:          General Health and ACP:  General  In general, how would you say your health is?: Good  In the past 7 days, have you experienced any of the following? New or Increased Pain, New or Increased Fatigue, Loneliness, Social Isolation, Stress or Anger?: None of These  Do you get the social and emotional support that you need?: Yes  Advance Directives     Power of [address] [place] [address] Will ACP-Advance Directive ACP-Power of     Not on File Not on File Not on File Not on File      General Health Risk Interventions:  · Discussion. · Literature provided. Health Habits/Nutrition:  Health Habits/Nutrition  Do you exercise for at least 20 minutes 2-3 times per week?: Yes  Have you lost any weight without trying in the past 3 months?: No  Do you eat only one meal per day?: No  Have you seen the dentist within the past year?: Yes  Body mass index: (!) 43.09  Health Habits/Nutrition Interventions:  · Health and wellness advice given. · Questions answered. · Literature provided.         Personalized Preventive Plan   Current Health Maintenance Status  Immunization History   Administered Date(s) Administered    COVID-19, Kowalski Peter, PF, 30mcg/0.3mL 01/22/2021, 02/12/2021    Influenza 11/16/2010, 11/16/2011    Influenza Virus Vaccine 01/22/2015    Influenza, High Dose (Fluzone 65 yrs and older) 12/18/2015, 10/03/2017, 12/19/2018    Influenza, Quadv, IM, PF (6 mo and older Fluzone, Flulaval, Fluarix, and 3 yrs and older Afluria) 11/16/2020    Pneumococcal Conjugate 13-valent ([id number]) 06/19/2017    Pneumococcal Polysaccharide ([id number]) 08/30/2014        Health Maintenance   Topic Date Due    COVID-19 Vaccine (3 - Booster for Pfizer series) 08/12/2021    Flu vaccine (1) 09/01/2021    Potassium monitoring  11/16/2021    Creatinine monitoring  11/16/2021    Lipid screen  12/02/2021    DTaP/Tdap/Td vaccine (2 - Td or Tdap) 07/17/2028    Shingles Vaccine  Completed    Pneumococcal 65+ years Vaccine  Completed    Hepatitis A vaccine  Aged Out    Hib vaccine  Aged Out    Meningococcal (ACWY) vaccine  Aged Out     Recommendations for Gyft Due: see orders and patient instructions/AVS.  . Recommended screening schedule for the next 5-10 years is provided to the patient in written form: see Patient Instructions/AVS.          Advance Care Planning   Advanced Care Planning: Discussed the patients choices for care and treatment in case of a health event that adversely affects decision-making abilities. Also discussed the patients long-term treatment options. Reviewed with the patient the [address] [place] [address] Declaration forms  Reviewed the process of designating a competent adult as an Agent (or -in-fact) that could take make health care decisions for the patient if incompetent. Patient was asked to complete the declaration forms, either acknowledge the forms by a public notary or an eligible witness and provide a signed copy to the practice office. Time spent (minutes): 3 minutes. Obesity Counseling: Assessed behavioral health risks and factors affecting choice of behavior. Suggested weight control approaches, including dietary changes behavioral modification and follow up plan. Provided educational and support documentation. Time spent (minutes): 5 minutes. Cardiovascular Disease Risk Counseling: Assessed the patient's risk to develop cardiovascular disease and reviewed main risk factors.    Reviewed steps to reduce disease risk including:   · Quitting tobacco use, reducing amount smoked, or not starting the habit  · Making healthy food choices  · Being physically active and gradualy increasing activity levels   · Reduce weight and determine a healthy BMI goal  · Monitor blood pressure and treat if higher than 140/90 mmHg  · Maintain blood total cholesterol levels under 5 mmol/l or 190 mg/dl  · Maintain LDL cholesterol levels under 3.0 mmol/l or 115 mg/dl   · Control blood glucose levels  · Consider taking aspirin (75 mg daily), once blood pressure is controlled   Provided a follow up plan. Time spent (minutes): 5 minutes. Diagnosis Orders   1. Type 2 diabetes mellitus treated without insulin (HCC)  Diet, physical activity and weight reduction discussed. POCT Glucose    POCT glycosylated hemoglobin (Hb A1C)   2. Flu vaccine need  INFLUENZA, QUADV, ADJUVANTED, 72 YRS =, IM, PF, PREFILL SYR, 0.5ML (FLUAD)   3. Obesity, Class III, BMI 40-49.9 (morbid obesity) ([address]  Meal planning and physical activity discussed. 4. Primary hypertension  DASH and low sodium diet discussed. COMPREHENSIVE METABOLIC PANEL    CBC WITH AUTO DIFFERENTIAL    TSH with Reflex    MICROALBUMIN / CREATININE URINE RATIO   5. Mixed hyperlipidemia  Lipid Panel  Heart healthy diet and statin compliance discussed. 6. Routine general medical examination at a health care facility  See above.

## 2021-12-08 LAB
A/G RATIO: 2.1 (ref 1.1–2.2)
ALBUMIN SERPL-MCNC: 4.8 G/DL (ref 3.4–5)
ALP BLD-CCNC: 77 U/L (ref 40–129)
ALT SERPL-CCNC: 14 U/L (ref 10–40)
ANION GAP SERPL CALCULATED.3IONS-SCNC: 18 MMOL/L (ref 3–16)
AST SERPL-CCNC: 20 U/L (ref 15–37)
BILIRUB SERPL-MCNC: 0.5 MG/DL (ref 0–1)
BUN BLDV-MCNC: 14 MG/DL (ref 7–20)
CALCIUM SERPL-MCNC: 10 MG/DL (ref 8.3–10.6)
CHLORIDE BLD-SCNC: 98 MMOL/L (ref 99–110)
CHOLESTEROL, TOTAL: 227 MG/DL (ref 0–199)
CO2: 26 MMOL/L (ref 21–32)
CREAT SERPL-MCNC: 0.9 MG/DL (ref 0.8–1.3)
CREATININE URINE: 219.4 MG/DL (ref 39–259)
GFR AFRICAN AMERICAN: >60
GFR NON-AFRICAN AMERICAN: >60
GLUCOSE BLD-MCNC: 99 MG/DL (ref 70–99)
HDLC SERPL-MCNC: 125 MG/DL (ref 40–60)
LDL CHOLESTEROL CALCULATED: 85 MG/DL
MICROALBUMIN UR-MCNC: 54.2 MG/DL
MICROALBUMIN/CREAT UR-RTO: 247 MG/G (ref 0–30)
POTASSIUM SERPL-SCNC: 4.1 MMOL/L (ref 3.5–5.1)
SODIUM BLD-SCNC: 142 MMOL/L (ref 136–145)
TOTAL PROTEIN: 7.1 G/DL (ref 6.4–8.2)
TRIGL SERPL-MCNC: 83 MG/DL (ref 0–150)
TSH REFLEX: 1.99 UIU/ML (ref 0.27–4.2)
VLDLC SERPL CALC-MCNC: 17 MG/DL

## 2022-02-07 RX ORDER — OMEPRAZOLE 20 MG/1
CAPSULE, DELAYED RELEASE ORAL
Qty: 90 CAPSULE | Refills: 3 | Status: SHIPPED | OUTPATIENT
Start: 2022-02-07

## 2022-03-03 ENCOUNTER — TELEPHONE (OUTPATIENT)
Dept: INTERNAL MEDICINE CLINIC | Age: 82
End: 2022-03-03

## 2022-03-03 DIAGNOSIS — M19.90 ARTHRITIS: Primary | ICD-10-CM

## 2022-03-18 NOTE — TELEPHONE ENCOUNTER
Pt states he needs two rx for handicap placards. Pt requests additional one mailed. Please contact pt at [phone].

## 2022-06-14 ENCOUNTER — OFFICE VISIT (OUTPATIENT)
Dept: INTERNAL MEDICINE CLINIC | Age: 82
End: 2022-06-14
Payer: COMMERCIAL

## 2022-06-14 VITALS
BODY MASS INDEX: 42.46 KG/M2 | OXYGEN SATURATION: 98 % | TEMPERATURE: 97.3 F | WEIGHT: 264.2 LBS | SYSTOLIC BLOOD PRESSURE: 138 MMHG | HEART RATE: 100 BPM | DIASTOLIC BLOOD PRESSURE: 86 MMHG | HEIGHT: 66 IN

## 2022-06-14 DIAGNOSIS — E78.2 MIXED HYPERLIPIDEMIA: ICD-10-CM

## 2022-06-14 DIAGNOSIS — I27.20 PULMONARY HTN (HCC): ICD-10-CM

## 2022-06-14 DIAGNOSIS — J45.20 MILD INTERMITTENT REACTIVE AIRWAY DISEASE WITHOUT COMPLICATION: ICD-10-CM

## 2022-06-14 DIAGNOSIS — E66.01 OBESITY, CLASS III, BMI 40-49.9 (MORBID OBESITY) (HCC): ICD-10-CM

## 2022-06-14 DIAGNOSIS — N18.1 CONTROLLED TYPE 2 DIABETES MELLITUS WITH STAGE 1 CHRONIC KIDNEY DISEASE, WITHOUT LONG-TERM CURRENT USE OF INSULIN (HCC): ICD-10-CM

## 2022-06-14 DIAGNOSIS — E11.22 CONTROLLED TYPE 2 DIABETES MELLITUS WITH STAGE 1 CHRONIC KIDNEY DISEASE, WITHOUT LONG-TERM CURRENT USE OF INSULIN (HCC): ICD-10-CM

## 2022-06-14 DIAGNOSIS — I10 PRIMARY HYPERTENSION: ICD-10-CM

## 2022-06-14 DIAGNOSIS — E11.9 TYPE 2 DIABETES MELLITUS TREATED WITHOUT INSULIN (HCC): Primary | ICD-10-CM

## 2022-06-14 LAB
CHP ED QC CHECK: NORMAL
GLUCOSE BLD-MCNC: 118 MG/DL
HBA1C MFR BLD: 5.7 %

## 2022-06-14 PROCEDURE — 1036F TOBACCO NON-USER: CPT | Performed by: INTERNAL MEDICINE

## 2022-06-14 PROCEDURE — 99214 OFFICE O/P EST MOD 30 MIN: CPT | Performed by: INTERNAL MEDICINE

## 2022-06-14 PROCEDURE — G8427 DOCREV CUR MEDS BY ELIG CLIN: HCPCS | Performed by: INTERNAL MEDICINE

## 2022-06-14 PROCEDURE — 3044F HG A1C LEVEL LT 7.0%: CPT | Performed by: INTERNAL MEDICINE

## 2022-06-14 PROCEDURE — 82962 GLUCOSE BLOOD TEST: CPT | Performed by: INTERNAL MEDICINE

## 2022-06-14 PROCEDURE — 1123F ACP DISCUSS/DSCN MKR DOCD: CPT | Performed by: INTERNAL MEDICINE

## 2022-06-14 PROCEDURE — G8417 CALC BMI ABV UP PARAM F/U: HCPCS | Performed by: INTERNAL MEDICINE

## 2022-06-14 PROCEDURE — 83036 HEMOGLOBIN GLYCOSYLATED A1C: CPT | Performed by: INTERNAL MEDICINE

## 2022-06-14 RX ORDER — PIOGLITAZONEHYDROCHLORIDE 15 MG/1
TABLET ORAL
Qty: 90 TABLET | Refills: 1 | Status: SHIPPED | OUTPATIENT
Start: 2022-06-14

## 2022-06-14 SDOH — ECONOMIC STABILITY: FOOD INSECURITY: WITHIN THE PAST 12 MONTHS, YOU WORRIED THAT YOUR FOOD WOULD RUN OUT BEFORE YOU GOT MONEY TO BUY MORE.: NEVER TRUE

## 2022-06-14 SDOH — ECONOMIC STABILITY: FOOD INSECURITY: WITHIN THE PAST 12 MONTHS, THE FOOD YOU BOUGHT JUST DIDN'T LAST AND YOU DIDN'T HAVE MONEY TO GET MORE.: NEVER TRUE

## 2022-06-14 ASSESSMENT — PATIENT HEALTH QUESTIONNAIRE - PHQ9
SUM OF ALL RESPONSES TO PHQ QUESTIONS 1-9: 0
1. LITTLE INTEREST OR PLEASURE IN DOING THINGS: 0
SUM OF ALL RESPONSES TO PHQ9 QUESTIONS 1 & 2: 0
2. FEELING DOWN, DEPRESSED OR HOPELESS: 0
SUM OF ALL RESPONSES TO PHQ QUESTIONS 1-9: 0

## 2022-06-14 ASSESSMENT — ANXIETY QUESTIONNAIRES
3. WORRYING TOO MUCH ABOUT DIFFERENT THINGS: 0
IF YOU CHECKED OFF ANY PROBLEMS ON THIS QUESTIONNAIRE, HOW DIFFICULT HAVE THESE PROBLEMS MADE IT FOR YOU TO DO YOUR WORK, TAKE CARE OF THINGS AT HOME, OR GET ALONG WITH OTHER PEOPLE: NOT DIFFICULT AT ALL
4. TROUBLE RELAXING: 0
GAD7 TOTAL SCORE: 0
2. NOT BEING ABLE TO STOP OR CONTROL WORRYING: 0
6. BECOMING EASILY ANNOYED OR IRRITABLE: 0
5. BEING SO RESTLESS THAT IT IS HARD TO SIT STILL: 0
1. FEELING NERVOUS, ANXIOUS, OR ON EDGE: 0
7. FEELING AFRAID AS IF SOMETHING AWFUL MIGHT HAPPEN: 0

## 2022-06-14 ASSESSMENT — SOCIAL DETERMINANTS OF HEALTH (SDOH): HOW HARD IS IT FOR YOU TO PAY FOR THE VERY BASICS LIKE FOOD, HOUSING, MEDICAL CARE, AND HEATING?: NOT HARD AT ALL

## 2022-06-14 NOTE — PATIENT INSTRUCTIONS
[facility] Laboratory Locations - No appointment necessary. @ indicates the location is open Saturdays in addition to Monday through Friday. Call your preferred location for test preparation, business hours and other information you need. [employer] accepts [employer]. [facility]    @ [place] [facility] [address] [facility] [address] [place] [address]. [place], [address]   Ph: [phone] [facility] [address]   Ph: [phone]  @ [facility] Lab Svcs. [address] [facility]   Ph: [phone]    [facility] Svcs. [address]   Ph: [phone] @ [place] [facility] S[facility] [address]  Ph: [phone] @ [place] [facility] Svcs. [address] [facility]. [address]   Ph: [phone]     [facility] S[facility]. [place] [address]  Ph: [phone]    [place]   @ [facility] Svcs[facility] [address] [place], [place] [address]   Ph: [phone] [facility] [address]  Ph: [phone] [phone] [phone]   [facility]:  [address] Lab Svcs[address] [facility]   Ph: [phone] [facility]. Lab Svcs.   [address] [facility], [facility]   Ph: [phone]

## 2022-06-14 NOTE — PROGRESS NOTES
Patient: Usha Parker is a 78 y.o. male who presents today with the following Chief Complaint(s):    Chief Complaint   Patient presents with    Diabetes         HPIcough with some wheezing. Phlegm. Indy Pucker not green. 1/2 + ;leg no celluli  MDI/   Current Outpatient Medications   Medication Sig Dispense Refill    omeprazole (PRILOSEC) 20 MG delayed release capsule TAKE 1 CAPSULE BY MOUTH DAILY 90 capsule 3    pioglitazone (ACTOS) 15 MG tablet TAKE 1 TABLET BY MOUTH DAILY 90 tablet 0    furosemide (LASIX) 40 MG tablet Take 0.5 tablets by mouth daily 30 tablet 0    rosuvastatin (CRESTOR) 10 MG tablet TAKE 1 TABLET BY MOUTH EVERY DAY 90 tablet 3    blood glucose test strips (FREESTYLE LITE) strip Test twice daily 100 strip 5    FreeStyle Lancets MISC Test twice daily 100 each 5    budesonide-formoterol (SYMBICORT) 160-4.5 MCG/ACT AERO Inhale 2 puffs into the lungs 2 times daily 3 Inhaler 1    Blood Glucose Monitoring Suppl (FREESTYLE LITE) LISA Test twice daily 1 Device 0    cetirizine (ZYRTEC) 10 MG tablet Take 10 mg by mouth daily      aspirin 81 MG tablet Take 81 mg by mouth daily      Multiple Vitamins-Minerals (CENTRUM SILVER ADULT 50+ PO) Take 1 tablet by mouth daily      Cholecalciferol (VITAMIN D3) 1000 UNITS CAPS Take 1 capsule by mouth daily.  Handicap Placard MISC by Does not apply route Exp: 03/03/2025 1 each 0    rosuvastatin (CRESTOR) 20 MG tablet Take 1 tablet by mouth nightly for high cholesterol. 90 tablet 3    candesartan-hydrochlorothiazide (ATACAND HCT) 32-12.5 MG per tablet Take 1 tablet by mouth daily 90 tablet 3     No current facility-administered medications for this visit. Patient's past medical history, surgical history, family history, medications,and allergies  were all reviewed and updated as appropriate today.       Review of Systems      Physical Exam    Vitals:    06/14/22 1113   BP: (!) 142/80   Pulse:    Temp:    SpO2:        Assessment:  Encounter Diagnosis Name Primary?  Type 2 diabetes mellitus [facility] without insulin [facility] Yes       Plan:  1.  Type 2 diabetes mellitus treated without insulin ([facility])  ***  - POCT Glucose  - POCT glycosylated hemoglobin (Hb A1C)

## 2022-06-16 PROBLEM — J45.909 MILD REACTIVE AIRWAYS DISEASE: Status: ACTIVE | Noted: 2022-06-16

## 2022-06-16 ASSESSMENT — ENCOUNTER SYMPTOMS
GASTROINTESTINAL NEGATIVE: 1
WHEEZING: 1
EYES NEGATIVE: 1

## 2022-06-16 NOTE — PROGRESS NOTES
tablet by mouth daily 90 tablet 3     No current facility-administered medications for this visit. Patient's past medical history, surgical history, family history, medications,and allergies  were all reviewed and updated as appropriate today. Review of Systems   Constitutional:        Elevated BMI at 42. 6. HENT: Negative. Eyes: Negative. Respiratory: Positive for wheezing. See HPI. Cardiovascular:        Hypertension, treated with ARB, diuretic combination. Gastrointestinal: Negative. Endocrine:        Hyperlipidemia, treated with statin. LDL 85. Diabetes, type 2 treated with pioglitazone. A1c 5.7. Genitourinary: Negative. Musculoskeletal: Negative. Skin: Negative. Neurological: Negative. Psychiatric/Behavioral: Negative. Physical Exam  Constitutional:       General: He is not in acute distress. Appearance: He is well-developed. He is obese. HENT:      Head: Normocephalic and atraumatic. Right Ear: External ear normal.      Left Ear: External ear normal.      Nose: Nose normal.   Eyes:      General: No scleral icterus. Conjunctiva/sclera: Conjunctivae normal.      Pupils: Pupils are equal, round, and reactive to light. Neck:      Thyroid: No thyromegaly. Cardiovascular:      Rate and Rhythm: Normal rate and regular rhythm. Heart sounds: Normal heart sounds. Pulmonary:      Effort: Pulmonary effort is normal.      Breath sounds: Normal breath sounds. Abdominal:      General: Bowel sounds are normal.      Palpations: Abdomen is soft. There is no mass. Musculoskeletal:      Cervical back: Normal range of motion and neck supple. Comments: Leg edema, 1/2 to 1 +. No change. Lymphadenopathy:      Cervical: No cervical adenopathy. Skin:     General: Skin is warm and dry. Neurological:      Mental Status: He is alert and oriented to person, place, and time. Deep Tendon Reflexes: Reflexes are normal and symmetric. Psychiatric:         Behavior: Behavior normal.         Thought Content: Thought content normal.         Judgment: Judgment normal.         Vitals:    06/14/22 1154   BP: 138/86   Pulse:    Temp:    SpO2:        Assessment:   Diagnosis Orders   1. Type 2 diabetes mellitus treated without insulin (HCC)  Diet, physical activity, med compliance and weight reduction discussed. POCT Glucose    POCT glycosylated hemoglobin (Hb A1C)   2. Mixed hyperlipidemia  Heart healthy diet and statin compliance discussed. 3. Essential hypertension  Continue ARB, diuretic. DASH and low sodium diet discussed. 4. Venous insufficiency of both lower extremities  Weight reduction, low sodium, compression hose, weight reduction stressed. 5.      Reactive airways disease. Currently wheezing. Samples of trelegy given. Call with progress report. Plan:  See plans above.

## 2022-08-29 ENCOUNTER — TELEPHONE (OUTPATIENT)
Dept: INTERNAL MEDICINE CLINIC | Age: 82
End: 2022-08-29

## 2022-08-29 NOTE — TELEPHONE ENCOUNTER
Patient and his wife were possibly exposed to meningitis, they don't have any symptoms, but are wondering if they should get tested.   Please advise

## 2022-10-10 DIAGNOSIS — I10 ESSENTIAL HYPERTENSION: ICD-10-CM

## 2022-10-10 NOTE — TELEPHONE ENCOUNTER
Medication:   Requested Prescriptions     Pending Prescriptions Disp Refills    candesartan-hydroCHLOROthiazide (ATACAND HCT) 32-12.5 MG per tablet [Pharmacy Med Name: CANDESARTAN HCT 32-12.5MG TABLETS] 90 tablet 3     Sig: TAKE 1 TABLET BY MOUTH DAILY        Last Filled: 08/31/21    Patient Phone Number: [phone] (home)     Last appt: 6/14/2022   Next appt: 12/13/2022

## 2022-10-11 RX ORDER — CANDESARTAN CILEXETIL AND HYDROCHLOROTHIAZIDE 32; 12.5 MG/1; MG/1
1 TABLET ORAL DAILY
Qty: 90 TABLET | Refills: 3 | Status: SHIPPED | OUTPATIENT
Start: 2022-10-11 | End: 2023-07-08

## 2022-11-02 ENCOUNTER — TELEPHONE (OUTPATIENT)
Dept: INTERNAL MEDICINE CLINIC | Age: 82
End: 2022-11-02

## 2022-11-02 NOTE — TELEPHONE ENCOUNTER
Patient is calling to find out the status of form faxed previously for patient to receive orthopedic shoes please advise if received they also are sending a form today.

## 2022-12-13 ENCOUNTER — OFFICE VISIT (OUTPATIENT)
Dept: INTERNAL MEDICINE CLINIC | Age: 82
End: 2022-12-13

## 2022-12-13 VITALS
HEART RATE: 77 BPM | DIASTOLIC BLOOD PRESSURE: 86 MMHG | BODY MASS INDEX: 37.62 KG/M2 | WEIGHT: 262.8 LBS | OXYGEN SATURATION: 98 % | SYSTOLIC BLOOD PRESSURE: 137 MMHG | HEIGHT: 70 IN

## 2022-12-13 DIAGNOSIS — Z12.5 PROSTATE CANCER SCREENING: ICD-10-CM

## 2022-12-13 DIAGNOSIS — E11.9 TYPE 2 DIABETES MELLITUS TREATED WITHOUT INSULIN (HCC): Primary | ICD-10-CM

## 2022-12-13 DIAGNOSIS — E78.2 MIXED HYPERLIPIDEMIA: ICD-10-CM

## 2022-12-13 DIAGNOSIS — I87.2 VENOUS INSUFFICIENCY OF BOTH LOWER EXTREMITIES: ICD-10-CM

## 2022-12-13 DIAGNOSIS — I10 PRIMARY HYPERTENSION: ICD-10-CM

## 2022-12-13 LAB
CHP ED QC CHECK: NORMAL
GLUCOSE BLD-MCNC: 111 MG/DL
HBA1C MFR BLD: 5.4 %

## 2022-12-13 RX ORDER — PIOGLITAZONEHYDROCHLORIDE 15 MG/1
15 TABLET ORAL DAILY
Qty: 90 TABLET | Refills: 3 | Status: SHIPPED | OUTPATIENT
Start: 2022-12-13

## 2022-12-13 RX ORDER — ROSUVASTATIN CALCIUM 20 MG/1
20 TABLET, COATED ORAL NIGHTLY
Qty: 90 TABLET | Refills: 3 | Status: SHIPPED | OUTPATIENT
Start: 2022-12-13 | End: 2023-03-13

## 2022-12-13 NOTE — PROGRESS NOTES
Patient: Jake Richardson is a 78 y.o. male who presents today with the following Chief Complaint(s):    Chief Complaint   Patient presents with    Follow-up    Diabetes         HPItrelegy no effect. Symbicort no no effect. Breathing ok, exertional sob. No increase. CPAP at night. Hard wood tonic. ED med have not been effective. Did not want pen. Advised visit urology again. [employer] member but not used yet. 1 + leg edema, L > R. Down in am. Right TM impacted. Advised. Does have soln has not used yet. Current Outpatient Medications   Medication Sig Dispense Refill    candesartan-hydroCHLOROthiazide (ATACAND HCT) 32-12.5 MG per tablet TAKE 1 TABLET BY MOUTH DAILY 90 tablet 3    pioglitazone (ACTOS) 15 MG tablet TAKE 1 TABLET BY MOUTH DAILY 90 tablet 1    Handicap Placard MISC by Does not apply route Exp: 03/03/2025 1 each 0    omeprazole (PRILOSEC) 20 MG delayed release capsule TAKE 1 CAPSULE BY MOUTH DAILY 90 capsule 3    furosemide (LASIX) 40 MG tablet Take 0.5 tablets by mouth daily 30 tablet 0    rosuvastatin (CRESTOR) 10 MG tablet TAKE 1 TABLET BY MOUTH EVERY DAY 90 tablet 3    blood glucose test strips (FREESTYLE LITE) strip Test twice daily 100 strip 5    FreeStyle Lancets MISC Test twice daily 100 each 5    budesonide-formoterol (SYMBICORT) 160-4.5 MCG/ACT AERO Inhale 2 puffs into the lungs 2 times daily 3 Inhaler 1    Blood Glucose Monitoring Suppl (FREESTYLE LITE) LISA Test twice daily 1 Device 0    cetirizine (ZYRTEC) 10 MG tablet Take 10 mg by mouth daily      aspirin 81 MG tablet Take 81 mg by mouth daily      Multiple Vitamins-Minerals (CENTRUM SILVER ADULT 50+ PO) Take 1 tablet by mouth daily      Cholecalciferol (VITAMIN D3) 1000 UNITS CAPS Take 1 capsule by mouth daily. rosuvastatin (CRESTOR) 20 MG tablet Take 1 tablet by mouth nightly for high cholesterol. 90 tablet 3     No current facility-administered medications for this visit.        Patient's past medical history, surgical history, family history, medications,and allergies  were all reviewed and updated as appropriate today. Review of Systems      Physical Exam    Vitals:    12/13/22 1112   BP: (!) 142/100   Pulse:    SpO2:        Assessment:  Encounter Diagnosis   Name Primary? Type 2 diabetes mellitus [facility] without insulin [facility] Yes       Plan:  1.  Type 2 diabetes mellitus treated without insulin ([facility])  ***  - POCT Glucose  - POCT glycosylated hemoglobin (Hb A1C)

## 2022-12-14 LAB
A/G RATIO: 1.5 (ref 1.1–2.2)
ALBUMIN SERPL-MCNC: 4.1 G/DL (ref 3.4–5)
ALP BLD-CCNC: 78 U/L (ref 40–129)
ALT SERPL-CCNC: 13 U/L (ref 10–40)
ANION GAP SERPL CALCULATED.3IONS-SCNC: 18 MMOL/L (ref 3–16)
AST SERPL-CCNC: 18 U/L (ref 15–37)
BASOPHILS ABSOLUTE: 0 K/UL (ref 0–0.2)
BASOPHILS RELATIVE PERCENT: 0.5 %
BILIRUB SERPL-MCNC: 0.5 MG/DL (ref 0–1)
BUN BLDV-MCNC: 21 MG/DL (ref 7–20)
CALCIUM SERPL-MCNC: 9.9 MG/DL (ref 8.3–10.6)
CHLORIDE BLD-SCNC: 95 MMOL/L (ref 99–110)
CHOLESTEROL, TOTAL: 195 MG/DL (ref 0–199)
CO2: 24 MMOL/L (ref 21–32)
CREAT SERPL-MCNC: 1.2 MG/DL (ref 0.8–1.3)
EOSINOPHILS ABSOLUTE: 0.2 K/UL (ref 0–0.6)
EOSINOPHILS RELATIVE PERCENT: 2.6 %
GFR SERPL CREATININE-BSD FRML MDRD: 59 ML/MIN/{1.73_M2}
GLUCOSE BLD-MCNC: 106 MG/DL (ref 70–99)
HCT VFR BLD CALC: 40.8 % (ref 40.5–52.5)
HDLC SERPL-MCNC: 112 MG/DL (ref 40–60)
HEMOGLOBIN: 13.5 G/DL (ref 13.5–17.5)
LDL CHOLESTEROL CALCULATED: 69 MG/DL
LYMPHOCYTES ABSOLUTE: 1.4 K/UL (ref 1–5.1)
LYMPHOCYTES RELATIVE PERCENT: 22.6 %
MCH RBC QN AUTO: 33.5 PG (ref 26–34)
MCHC RBC AUTO-ENTMCNC: 33.1 G/DL (ref 31–36)
MCV RBC AUTO: 101 FL (ref 80–100)
MONOCYTES ABSOLUTE: 0.7 K/UL (ref 0–1.3)
MONOCYTES RELATIVE PERCENT: 11.6 %
NEUTROPHILS ABSOLUTE: 3.8 K/UL (ref 1.7–7.7)
NEUTROPHILS RELATIVE PERCENT: 62.7 %
PDW BLD-RTO: 14.1 % (ref 12.4–15.4)
PLATELET # BLD: 202 K/UL (ref 135–450)
PMV BLD AUTO: 7.8 FL (ref 5–10.5)
POTASSIUM SERPL-SCNC: 3.7 MMOL/L (ref 3.5–5.1)
PROSTATE SPECIFIC ANTIGEN: 7.44 NG/ML (ref 0–4)
RBC # BLD: 4.03 M/UL (ref 4.2–5.9)
SODIUM BLD-SCNC: 137 MMOL/L (ref 136–145)
TOTAL PROTEIN: 6.8 G/DL (ref 6.4–8.2)
TRIGL SERPL-MCNC: 69 MG/DL (ref 0–150)
TSH REFLEX: 2.21 UIU/ML (ref 0.27–4.2)
VLDLC SERPL CALC-MCNC: 14 MG/DL
WBC # BLD: 6.1 K/UL (ref 4–11)

## 2022-12-20 DIAGNOSIS — I10 PRIMARY HYPERTENSION: ICD-10-CM

## 2022-12-21 LAB
CREATININE URINE: 153.5 MG/DL (ref 39–259)
MICROALBUMIN UR-MCNC: 29.1 MG/DL
MICROALBUMIN/CREAT UR-RTO: 189.6 MG/G (ref 0–30)

## 2022-12-24 ASSESSMENT — ENCOUNTER SYMPTOMS
GASTROINTESTINAL NEGATIVE: 1
EYES NEGATIVE: 1

## 2022-12-24 NOTE — PROGRESS NOTES
Patient: Rupert Branham is a 78 y.o. male who presents today with the following Chief Complaint(s):    Chief Complaint   Patient presents with    Follow-up    Diabetes         Diabetes  He is here for a check up and f/u on hypertension, hyperlipidemia, and diabetes, type 2. He is taking medication as prescribed, continues to focus on more balanced meal plan and regular physical activity. H/O venous insufficieny lower extremities. No ulcers, minimal leg swelling. Using diuretic and wears compression hose. H/O reactive airways disease and has not been wheezing recently. Maintenance inhalers, trelegy and symbicort  have not helped. Current Outpatient Medications   Medication Sig Dispense Refill    rosuvastatin (CRESTOR) 20 MG tablet Take 1 tablet by mouth nightly for high cholesterol. 90 tablet 3    pioglitazone (ACTOS) 15 MG tablet Take 1 tablet by mouth daily 90 tablet 3    candesartan-hydroCHLOROthiazide (ATACAND HCT) 32-12.5 MG per tablet TAKE 1 TABLET BY MOUTH DAILY 90 tablet 3    Handicap Placard MISC by Does not apply route Exp: 03/03/2025 1 each 0    omeprazole (PRILOSEC) 20 MG delayed release capsule TAKE 1 CAPSULE BY MOUTH DAILY 90 capsule 3    furosemide (LASIX) 40 MG tablet Take 0.5 tablets by mouth daily 30 tablet 0    blood glucose test strips (FREESTYLE LITE) strip Test twice daily 100 strip 5    FreeStyle Lancets MISC Test twice daily 100 each 5    budesonide-formoterol (SYMBICORT) 160-4.5 MCG/ACT AERO Inhale 2 puffs into the lungs 2 times daily 3 Inhaler 1    Blood Glucose Monitoring Suppl (FREESTYLE LITE) LISA Test twice daily 1 Device 0    cetirizine (ZYRTEC) 10 MG tablet Take 10 mg by mouth daily      aspirin 81 MG tablet Take 81 mg by mouth daily      Multiple Vitamins-Minerals (CENTRUM SILVER ADULT 50+ PO) Take 1 tablet by mouth daily      Cholecalciferol (VITAMIN D3) 1000 UNITS CAPS Take 1 capsule by mouth daily. No current facility-administered medications for this visit. Patient's past medical history, surgical history, family history, medications,and allergies  were all reviewed and updated as appropriate today. Review of Systems   Constitutional:         Elevated BMI at 37.7. HENT: Negative. Eyes: Negative. Respiratory:          See HPI. Cardiovascular:         Hypertension, treated with ARB, diuretic combination. Gastrointestinal: Negative. Endocrine:        Hyperlipidemia, treated with statin. LDL 69. Diabetes, type 2 treated with pioglitazone. A1c 5.4. Genitourinary: Negative. Musculoskeletal: Negative. Skin: Negative. Neurological: Negative. Psychiatric/Behavioral: Negative. Physical Exam  Constitutional:       General: He is not in acute distress. Appearance: He is well-developed. He is obese. HENT:      Head: Normocephalic and atraumatic. Right Ear: External ear normal.      Left Ear: External ear normal.      Nose: Nose normal.   Eyes:      General: No scleral icterus. Conjunctiva/sclera: Conjunctivae normal.      Pupils: Pupils are equal, round, and reactive to light. Neck:      Thyroid: No thyromegaly. Cardiovascular:      Rate and Rhythm: Normal rate and regular rhythm. Heart sounds: Normal heart sounds. Pulmonary:      Effort: Pulmonary effort is normal.      Breath sounds: Normal breath sounds. Abdominal:      General: Bowel sounds are normal.      Palpations: Abdomen is soft. There is no mass. Musculoskeletal:      Cervical back: Normal range of motion and neck supple. Comments: Leg edema, 1/2 to 1 +. No change. Lymphadenopathy:      Cervical: No cervical adenopathy. Skin:     General: Skin is warm and dry. Neurological:      Mental Status: He is alert and oriented to person, place, and time. Deep Tendon Reflexes: Reflexes are normal and symmetric. Psychiatric:         Behavior: Behavior normal.         Thought Content:  Thought content normal.         Judgment: Judgment normal.       Vitals:    12/13/22 1147   BP: 137/86   Pulse:    SpO2:        Assessment:   Diagnosis Orders   1. Type 2 diabetes mellitus treated without insulin (HCC)  Diet, physical activity, med compliance and weight reduction discussed. POCT Glucose    POCT glycosylated hemoglobin (Hb A1C)   2. Mixed hyperlipidemia  Heart healthy diet and statin compliance discussed. 3. Essential hypertension  Continue ARB, diuretic. DASH and low sodium diet discussed. 4. Venous insufficiency of both lower extremities  Weight reduction, low sodium, compression hose, weight reduction stressed. Plan:  See plans above.

## 2023-02-13 NOTE — TELEPHONE ENCOUNTER
Medication:   Requested Prescriptions     Pending Prescriptions Disp Refills    omeprazole (PRILOSEC) 20 MG delayed release capsule [Pharmacy Med Name: OMEPRAZOLE 20MG CAPSULES] 90 capsule 3     Sig: TAKE 1 CAPSULE BY MOUTH DAILY        Last Filled:  11/14/2022    Patient Phone Number: [phone] (home)     Last appt: 12/13/2022   Next appt: 6/15/2023    Last OARRS: No flowsheet data found.

## 2023-02-14 RX ORDER — OMEPRAZOLE 20 MG/1
CAPSULE, DELAYED RELEASE ORAL
Qty: 90 CAPSULE | Refills: 3 | Status: SHIPPED | OUTPATIENT
Start: 2023-02-14

## 2023-06-17 PROBLEM — E66.01 SEVERE OBESITY (BMI 35.0-39.9) WITH COMORBIDITY (HCC): Status: ACTIVE | Noted: 2023-06-17

## 2023-06-29 ENCOUNTER — TELEPHONE (OUTPATIENT)
Dept: INTERNAL MEDICINE CLINIC | Age: 83
End: 2023-06-29

## 2023-06-29 NOTE — TELEPHONE ENCOUNTER
Patient was told to keep track of his BP so this is what he has:  6/24 58988 10:30 am  Before coffee  6/25 139/86 10:30 amd  6/26 151/103  10:30 am  After carmona  6/27 132/84 10:45 am, then 1 pm  154/106  6/28  140/86 11:15 am,   6/29 147/87 10:35 am  Please advise

## 2023-07-05 NOTE — TELEPHONE ENCOUNTER
Notes increase in b/p always after coffee. Will reduce to 1 cup daily and then 2 cups daily measuring blood pressure after each and call with results. Hold on b/p med change until getting this data.

## 2023-07-12 ENCOUNTER — TELEPHONE (OUTPATIENT)
Dept: INTERNAL MEDICINE CLINIC | Age: 83
End: 2023-07-12

## 2023-07-12 NOTE — TELEPHONE ENCOUNTER
Patient returning a call to physician concerning his blood pressure physician requested these before determining whether patient would be put on medication  please advise

## 2023-07-14 NOTE — TELEPHONE ENCOUNTER
Patient apologizes for missing the call from the provider, he says that he won't be available until this evening but its okay to leave a VM.     Please advise

## 2023-08-01 ENCOUNTER — TELEPHONE (OUTPATIENT)
Dept: INTERNAL MEDICINE CLINIC | Age: 83
End: 2023-08-01

## 2023-08-01 NOTE — TELEPHONE ENCOUNTER
Patient wants the provider to call him concerning his BP  7/28 135/88  91  7/29 147/95 95  7/30 134/86 87  7/31 145/86 96  8/1 141/93  86  Please advise

## 2023-08-07 RX ORDER — NEBIVOLOL 5 MG/1
5 TABLET ORAL DAILY
Qty: 30 TABLET | Refills: 1 | Status: SHIPPED | OUTPATIENT
Start: 2023-08-07

## 2023-08-07 RX ORDER — NEBIVOLOL 5 MG/1
5 TABLET ORAL DAILY
Qty: 90 TABLET | OUTPATIENT
Start: 2023-08-07

## 2023-08-17 ENCOUNTER — TELEPHONE (OUTPATIENT)
Dept: INTERNAL MEDICINE CLINIC | Age: 83
End: 2023-08-17

## 2023-08-19 RX ORDER — PIOGLITAZONEHYDROCHLORIDE 15 MG/1
15 TABLET ORAL DAILY
Qty: 30 TABLET | Refills: 0 | Status: SHIPPED | OUTPATIENT
Start: 2023-08-19

## 2023-08-21 RX ORDER — PIOGLITAZONEHYDROCHLORIDE 15 MG/1
15 TABLET ORAL DAILY
Qty: 90 TABLET | OUTPATIENT
Start: 2023-08-21

## 2023-10-13 ENCOUNTER — TELEPHONE (OUTPATIENT)
Dept: INTERNAL MEDICINE CLINIC | Age: 83
End: 2023-10-13

## 2023-10-13 DIAGNOSIS — I10 PRIMARY HYPERTENSION: Primary | ICD-10-CM

## 2023-10-13 DIAGNOSIS — I10 PRIMARY HYPERTENSION: ICD-10-CM

## 2023-10-13 RX ORDER — NEBIVOLOL 5 MG/1
5 TABLET ORAL DAILY
Qty: 30 TABLET | Refills: 5 | Status: SHIPPED | OUTPATIENT
Start: 2023-10-13

## 2023-10-13 RX ORDER — NIFEDIPINE 30 MG/1
30 TABLET, EXTENDED RELEASE ORAL DAILY
Qty: 30 TABLET | Refills: 5 | Status: SHIPPED | OUTPATIENT
Start: 2023-10-13

## 2023-10-13 NOTE — TELEPHONE ENCOUNTER
Added Nifedipine, 30 mg to Nebivolol, ARB-diuretic combination. Monitor b/p daily and call with results in 3 weeks.

## 2023-10-13 NOTE — TELEPHONE ENCOUNTER
Patient would like the provider to call him regarding his BP he thinks his meds may need to be changed.   This week it has been:  136/83  146/89  143/84    Last Week it was:  163/105  164/98  Please advise

## 2023-10-17 RX ORDER — NIFEDIPINE 30 MG/1
30 TABLET, EXTENDED RELEASE ORAL DAILY
Qty: 90 TABLET | OUTPATIENT
Start: 2023-10-17

## 2023-11-28 DIAGNOSIS — I10 ESSENTIAL HYPERTENSION: ICD-10-CM

## 2023-11-29 RX ORDER — CANDESARTAN CILEXETIL AND HYDROCHLOROTHIAZIDE 32; 12.5 MG/1; MG/1
1 TABLET ORAL DAILY
Qty: 90 TABLET | Refills: 3 | Status: SHIPPED | OUTPATIENT
Start: 2023-11-29 | End: 2024-08-25

## 2023-12-15 ENCOUNTER — OFFICE VISIT (OUTPATIENT)
Dept: INTERNAL MEDICINE CLINIC | Age: 83
End: 2023-12-15
Payer: COMMERCIAL

## 2023-12-15 VITALS
OXYGEN SATURATION: 97 % | BODY MASS INDEX: 36.04 KG/M2 | DIASTOLIC BLOOD PRESSURE: 78 MMHG | WEIGHT: 251.2 LBS | SYSTOLIC BLOOD PRESSURE: 122 MMHG | HEART RATE: 81 BPM

## 2023-12-15 DIAGNOSIS — R97.20 ELEVATED PSA: ICD-10-CM

## 2023-12-15 DIAGNOSIS — I10 PRIMARY HYPERTENSION: ICD-10-CM

## 2023-12-15 DIAGNOSIS — E78.2 MIXED HYPERLIPIDEMIA: ICD-10-CM

## 2023-12-15 DIAGNOSIS — E11.22 CONTROLLED TYPE 2 DIABETES MELLITUS WITH STAGE 1 CHRONIC KIDNEY DISEASE, WITHOUT LONG-TERM CURRENT USE OF INSULIN (HCC): Primary | ICD-10-CM

## 2023-12-15 DIAGNOSIS — R60.0 BILATERAL LEG EDEMA: ICD-10-CM

## 2023-12-15 DIAGNOSIS — Z23 NEEDS FLU SHOT: ICD-10-CM

## 2023-12-15 DIAGNOSIS — N18.1 CONTROLLED TYPE 2 DIABETES MELLITUS WITH STAGE 1 CHRONIC KIDNEY DISEASE, WITHOUT LONG-TERM CURRENT USE OF INSULIN (HCC): Primary | ICD-10-CM

## 2023-12-15 LAB
CHP ED QC CHECK: NORMAL
GLUCOSE BLD-MCNC: 114 MG/DL
HBA1C MFR BLD: 5.4 %

## 2023-12-15 PROCEDURE — 82962 GLUCOSE BLOOD TEST: CPT | Performed by: INTERNAL MEDICINE

## 2023-12-15 PROCEDURE — G8417 CALC BMI ABV UP PARAM F/U: HCPCS | Performed by: INTERNAL MEDICINE

## 2023-12-15 PROCEDURE — G8484 FLU IMMUNIZE NO ADMIN: HCPCS | Performed by: INTERNAL MEDICINE

## 2023-12-15 PROCEDURE — 1036F TOBACCO NON-USER: CPT | Performed by: INTERNAL MEDICINE

## 2023-12-15 PROCEDURE — 99214 OFFICE O/P EST MOD 30 MIN: CPT | Performed by: INTERNAL MEDICINE

## 2023-12-15 PROCEDURE — G8427 DOCREV CUR MEDS BY ELIG CLIN: HCPCS | Performed by: INTERNAL MEDICINE

## 2023-12-15 PROCEDURE — 3074F SYST BP LT 130 MM HG: CPT | Performed by: INTERNAL MEDICINE

## 2023-12-15 PROCEDURE — 1123F ACP DISCUSS/DSCN MKR DOCD: CPT | Performed by: INTERNAL MEDICINE

## 2023-12-15 PROCEDURE — 3044F HG A1C LEVEL LT 7.0%: CPT | Performed by: INTERNAL MEDICINE

## 2023-12-15 PROCEDURE — 83036 HEMOGLOBIN GLYCOSYLATED A1C: CPT | Performed by: INTERNAL MEDICINE

## 2023-12-15 PROCEDURE — 3078F DIAST BP <80 MM HG: CPT | Performed by: INTERNAL MEDICINE

## 2023-12-15 RX ORDER — FUROSEMIDE 20 MG/1
20 TABLET ORAL DAILY PRN
Qty: 90 TABLET | Refills: 3 | Status: SHIPPED | OUTPATIENT
Start: 2023-12-15

## 2023-12-15 NOTE — PATIENT INSTRUCTIONS
[address] [facility] Laboratory Locations - No appointment necessary. ? indicates the location is open Saturdays in addition to Monday through Friday. Call your preferred location for test preparation, business hours and other information you need. [employer] accepts [employer]. [facility]    ? [place] [id number]. [address] [facility], [address]    Ph: [address] [facility], [address] [facility] [address]    Ph: [phone]   ? [address]    [place], [address] [facility]    Ph: [address], [address]    Ph: [phone] ? [place]   [address]   Ph: [phone]  ? [address] [facility], [address] [facility]    Ph: [place] [address] [facility], [address] [facility]   Ph: [phone]    NORTH    ? [place], [place] [address]    Ph: [phone]  [facility]   [address] [place] [address]   Ph: Johnnie Alcaraz. [place], [address] [facility]: [address]    Ph: [address] [facility].  [facility] [place] [address]    Ph: [phone]

## 2023-12-15 NOTE — PROGRESS NOTES
Patient: Kylee Brewer is a 78 y.o. male who presents today with the following Chief Complaint(s):    Chief Complaint   Patient presents with    Follow-up    Diabetes         HPIelevated PSA, biopsy planned. Breathing sob. 2 pillows. Uses CPAP. Rigo is qod. Will d/c with leg edema, and A!C 5.4. Start lasix, 20 mg daily for 2 weeks, then prn. Does not feel depressed but not doing anything. Advised getting up and planning day. Diabetic eye exem, ok, 3 tp 4 months ago. Foot dr every 10 weeks. CPAP 6 to 8. Current Outpatient Medications   Medication Sig Dispense Refill    candesartan-hydroCHLOROthiazide (ATACAND HCT) 32-12.5 MG per tablet TAKE 1 TABLET BY MOUTH DAILY 90 tablet 3    pioglitazone (ACTOS) 15 MG tablet TAKE 1 TABLET BY MOUTH DAILY 30 tablet 5    NIFEdipine (PROCARDIA XL) 30 MG extended release tablet Take 1 tablet by mouth daily 30 tablet 5    nebivolol (BYSTOLIC) 5 MG tablet Take 1 tablet by mouth daily 30 tablet 5    omeprazole (PRILOSEC) 20 MG delayed release capsule TAKE 1 CAPSULE BY MOUTH DAILY 90 capsule 3    Handicap Placard MISC by Does not apply route Exp: 03/03/2025 1 each 0    furosemide (LASIX) 40 MG tablet Take 0.5 tablets by mouth daily 30 tablet 0    blood glucose test strips (FREESTYLE LITE) strip Test twice daily 100 strip 5    FreeStyle Lancets MISC Test twice daily 100 each 5    budesonide-formoterol (SYMBICORT) 160-4.5 MCG/ACT AERO Inhale 2 puffs into the lungs 2 times daily 3 Inhaler 1    Blood Glucose Monitoring Suppl (FREESTYLE LITE) LISA Test twice daily 1 Device 0    cetirizine (ZYRTEC) 10 MG tablet Take 1 tablet by mouth daily      aspirin 81 MG tablet Take 1 tablet by mouth daily      Multiple Vitamins-Minerals (CENTRUM SILVER ADULT 50+ PO) Take 1 tablet by mouth daily      Cholecalciferol (VITAMIN D3) 1000 UNITS CAPS Take 1 capsule by mouth daily. rosuvastatin (CRESTOR) 20 MG tablet Take 1 tablet by mouth nightly for high cholesterol.  90 tablet 3     No

## 2023-12-16 LAB
ALBUMIN SERPL-MCNC: 4.3 G/DL (ref 3.4–5)
ALBUMIN/GLOB SERPL: 1.7 {RATIO} (ref 1.1–2.2)
ALP SERPL-CCNC: 73 U/L (ref 40–129)
ALT SERPL-CCNC: 15 U/L (ref 10–40)
ANION GAP SERPL CALCULATED.3IONS-SCNC: 14 MMOL/L (ref 3–16)
AST SERPL-CCNC: 22 U/L (ref 15–37)
BASOPHILS # BLD: 0.1 K/UL (ref 0–0.2)
BASOPHILS NFR BLD: 1.4 %
BILIRUB SERPL-MCNC: 0.3 MG/DL (ref 0–1)
BUN SERPL-MCNC: 17 MG/DL (ref 7–20)
CALCIUM SERPL-MCNC: 9.8 MG/DL (ref 8.3–10.6)
CHLORIDE SERPL-SCNC: 95 MMOL/L (ref 99–110)
CHOLEST SERPL-MCNC: 194 MG/DL (ref 0–199)
CO2 SERPL-SCNC: 27 MMOL/L (ref 21–32)
CREAT SERPL-MCNC: 1 MG/DL (ref 0.8–1.3)
DEPRECATED RDW RBC AUTO: 14.4 % (ref 12.4–15.4)
EOSINOPHIL # BLD: 0.2 K/UL (ref 0–0.6)
EOSINOPHIL NFR BLD: 3.3 %
GFR SERPLBLD CREATININE-BSD FMLA CKD-EPI: >60 ML/MIN/{1.73_M2}
GLUCOSE SERPL-MCNC: 93 MG/DL (ref 70–99)
HCT VFR BLD AUTO: 39.1 % (ref 40.5–52.5)
HDLC SERPL-MCNC: 99 MG/DL (ref 40–60)
HGB BLD-MCNC: 13 G/DL (ref 13.5–17.5)
LDLC SERPL CALC-MCNC: 79 MG/DL
LYMPHOCYTES # BLD: 2.1 K/UL (ref 1–5.1)
LYMPHOCYTES NFR BLD: 30.6 %
MCH RBC QN AUTO: 33.5 PG (ref 26–34)
MCHC RBC AUTO-ENTMCNC: 33.3 G/DL (ref 31–36)
MCV RBC AUTO: 100.5 FL (ref 80–100)
MONOCYTES # BLD: 0.7 K/UL (ref 0–1.3)
MONOCYTES NFR BLD: 9.7 %
NEUTROPHILS # BLD: 3.8 K/UL (ref 1.7–7.7)
NEUTROPHILS NFR BLD: 55 %
PLATELET # BLD AUTO: 251 K/UL (ref 135–450)
PMV BLD AUTO: 7.6 FL (ref 5–10.5)
POTASSIUM SERPL-SCNC: 4.4 MMOL/L (ref 3.5–5.1)
PROT SERPL-MCNC: 6.8 G/DL (ref 6.4–8.2)
RBC # BLD AUTO: 3.89 M/UL (ref 4.2–5.9)
SODIUM SERPL-SCNC: 136 MMOL/L (ref 136–145)
TRIGL SERPL-MCNC: 78 MG/DL (ref 0–150)
TSH SERPL DL<=0.005 MIU/L-ACNC: 2.01 UIU/ML (ref 0.27–4.2)
VLDLC SERPL CALC-MCNC: 16 MG/DL
WBC # BLD AUTO: 6.9 K/UL (ref 4–11)

## 2023-12-26 PROCEDURE — 90694 VACC AIIV4 NO PRSRV 0.5ML IM: CPT | Performed by: INTERNAL MEDICINE

## 2023-12-26 PROCEDURE — 90471 IMMUNIZATION ADMIN: CPT | Performed by: INTERNAL MEDICINE

## 2023-12-26 NOTE — PROGRESS NOTES
Patient: Arcenio Bernal is a 78 y.o. male who presents today with the following Chief Complaint(s):    Chief Complaint   Patient presents with    Follow-up    Diabetes         Diabetes    He is here for a check up and f/u on hypertension, hyperlipidemia, and diabetes, type 2. He is taking medication as prescribed, continues to focus on more balanced meal plan and regular physical activity. H/O venous insufficieny lower extremities. No ulcers, minimal leg swelling. Using diuretic and wears compression hose. Takes pioglitazone qod. H/O reactive airways disease and has not been wheezing recently. Maintenance inhalers, trelegy and symbicort  have not helped. Diabetic eye exam no retinopathy. Sees podiatrist every 10 weeks. H/O sleep apnea. Wears CPAP 6 to 8 hours nightly.        Current Outpatient Medications   Medication Sig Dispense Refill    furosemide (LASIX) 20 MG tablet Take 1 tablet by mouth daily as needed (leg edema.) 90 tablet 3    candesartan-hydroCHLOROthiazide (ATACAND HCT) 32-12.5 MG per tablet TAKE 1 TABLET BY MOUTH DAILY 90 tablet 3    pioglitazone (ACTOS) 15 MG tablet TAKE 1 TABLET BY MOUTH DAILY 30 tablet 5    NIFEdipine (PROCARDIA XL) 30 MG extended release tablet Take 1 tablet by mouth daily 30 tablet 5    nebivolol (BYSTOLIC) 5 MG tablet Take 1 tablet by mouth daily 30 tablet 5    omeprazole (PRILOSEC) 20 MG delayed release capsule TAKE 1 CAPSULE BY MOUTH DAILY 90 capsule 3    Handicap Placard MISC by Does not apply route Exp: 03/03/2025 1 each 0    furosemide (LASIX) 40 MG tablet Take 0.5 tablets by mouth daily 30 tablet 0    blood glucose test strips (FREESTYLE LITE) strip Test twice daily 100 strip 5    FreeStyle Lancets MISC Test twice daily 100 each 5    budesonide-formoterol (SYMBICORT) 160-4.5 MCG/ACT AERO Inhale 2 puffs into the lungs 2 times daily 3 Inhaler 1    Blood Glucose Monitoring Suppl (FREESTYLE LITE) LISA Test twice daily 1 Device 0    cetirizine (ZYRTEC) 10 MG tablet Take

## 2023-12-27 RX ORDER — ROSUVASTATIN CALCIUM 20 MG/1
TABLET, COATED ORAL
Qty: 90 TABLET | Refills: 3 | Status: SHIPPED | OUTPATIENT
Start: 2023-12-27

## 2024-02-14 RX ORDER — OMEPRAZOLE 20 MG/1
CAPSULE, DELAYED RELEASE ORAL
Qty: 90 CAPSULE | Refills: 3 | Status: SHIPPED | OUTPATIENT
Start: 2024-02-14

## 2024-02-14 NOTE — TELEPHONE ENCOUNTER
Medication:   Requested Prescriptions     Pending Prescriptions Disp Refills    omeprazole (PRILOSEC) 20 MG delayed release capsule [Pharmacy Med Name: OMEPRAZOLE 20MG CAPSULES] 90 capsule 3     Sig: TAKE 1 CAPSULE BY MOUTH DAILY        Last Filled:  2/14/23    Last appt: 12/15/2023   Next appt: 6/18/2024    Last OARRS:        No data to display

## 2024-04-29 DIAGNOSIS — I10 PRIMARY HYPERTENSION: ICD-10-CM

## 2024-04-29 NOTE — TELEPHONE ENCOUNTER
Medication:   Requested Prescriptions     Pending Prescriptions Disp Refills    nebivolol (BYSTOLIC) 5 MG tablet [Pharmacy Med Name: NEBIVOLOL 5MG TABLETS] 30 tablet 5     Sig: TAKE 1 TABLET BY MOUTH DAILY    NIFEdipine (PROCARDIA XL) 30 MG extended release tablet [Pharmacy Med Name: NIFEDIPINE 30MG ER (XL/OSM) TABLETS] 30 tablet 5     Sig: TAKE 1 TABLET BY MOUTH DAILY     Last Filled:  #30 of each with 5 refills on 10/13/23    Last appt: 12/15/2023   Next appt: 6/18/2024    Last OARRS:        No data to display

## 2024-04-30 RX ORDER — NIFEDIPINE 30 MG/1
30 TABLET, EXTENDED RELEASE ORAL DAILY
Qty: 30 TABLET | Refills: 5 | Status: SHIPPED | OUTPATIENT
Start: 2024-04-30

## 2024-04-30 RX ORDER — NEBIVOLOL 5 MG/1
5 TABLET ORAL DAILY
Qty: 30 TABLET | Refills: 5 | Status: SHIPPED | OUTPATIENT
Start: 2024-04-30

## 2024-07-14 ENCOUNTER — APPOINTMENT (OUTPATIENT)
Dept: CT IMAGING | Age: 84
End: 2024-07-14
Attending: EMERGENCY MEDICINE
Payer: MEDICARE

## 2024-07-14 ENCOUNTER — APPOINTMENT (OUTPATIENT)
Dept: GENERAL RADIOLOGY | Age: 84
End: 2024-07-14
Payer: MEDICARE

## 2024-07-14 ENCOUNTER — HOSPITAL ENCOUNTER (INPATIENT)
Age: 84
LOS: 12 days | Discharge: NURSING, REHAB OR LONG-TERM CARE | End: 2024-07-26
Attending: EMERGENCY MEDICINE | Admitting: FAMILY MEDICINE
Payer: MEDICARE

## 2024-07-14 DIAGNOSIS — R07.9 ACUTE CHEST PAIN: ICD-10-CM

## 2024-07-14 DIAGNOSIS — Z96.652 HISTORY OF TOTAL KNEE ARTHROPLASTY, LEFT: ICD-10-CM

## 2024-07-14 DIAGNOSIS — E83.42 HYPOMAGNESEMIA: ICD-10-CM

## 2024-07-14 DIAGNOSIS — I26.99 PULMONARY EMBOLISM (HCC): ICD-10-CM

## 2024-07-14 DIAGNOSIS — I26.99 ACUTE MASSIVE PULMONARY EMBOLISM (HCC): ICD-10-CM

## 2024-07-14 DIAGNOSIS — R06.02 SHORTNESS OF BREATH: Primary | ICD-10-CM

## 2024-07-14 DIAGNOSIS — J96.01 ACUTE RESPIRATORY FAILURE WITH HYPOXIA (HCC): ICD-10-CM

## 2024-07-14 DIAGNOSIS — R55 SYNCOPE AND COLLAPSE: ICD-10-CM

## 2024-07-14 DIAGNOSIS — I10 ESSENTIAL HYPERTENSION: ICD-10-CM

## 2024-07-14 DIAGNOSIS — I26.02 ACUTE SADDLE PULMONARY EMBOLISM WITH ACUTE COR PULMONALE (HCC): ICD-10-CM

## 2024-07-14 DIAGNOSIS — I26.09 ACUTE PULMONARY EMBOLISM WITH ACUTE COR PULMONALE, UNSPECIFIED PULMONARY EMBOLISM TYPE (HCC): ICD-10-CM

## 2024-07-14 DIAGNOSIS — I10 PRIMARY HYPERTENSION: ICD-10-CM

## 2024-07-14 LAB
ALBUMIN SERPL-MCNC: 3.5 G/DL (ref 3.4–5)
ALBUMIN/GLOB SERPL: 1.2 {RATIO} (ref 1.1–2.2)
ALP SERPL-CCNC: 90 U/L (ref 40–129)
ALT SERPL-CCNC: 30 U/L (ref 10–40)
ANION GAP SERPL CALCULATED.3IONS-SCNC: 16 MMOL/L (ref 3–16)
ANION GAP SERPL CALCULATED.3IONS-SCNC: 22 MMOL/L (ref 3–16)
ANTI-XA UNFRAC HEPARIN: >1.1 IU/ML (ref 0.3–0.7)
APTT BLD: 23.6 SEC (ref 22.1–36.4)
AST SERPL-CCNC: 56 U/L (ref 15–37)
BACTERIA URNS QL MICRO: NORMAL /HPF
BASE EXCESS BLDV CALC-SCNC: -8.2 MMOL/L (ref -3–3)
BASOPHILS # BLD: 0 K/UL (ref 0–0.2)
BASOPHILS NFR BLD: 0.3 %
BILIRUB SERPL-MCNC: 0.6 MG/DL (ref 0–1)
BILIRUB UR QL STRIP.AUTO: NEGATIVE
BUN SERPL-MCNC: 23 MG/DL (ref 7–20)
BUN SERPL-MCNC: 23 MG/DL (ref 7–20)
CALCIUM SERPL-MCNC: 8.8 MG/DL (ref 8.3–10.6)
CALCIUM SERPL-MCNC: 9 MG/DL (ref 8.3–10.6)
CHLORIDE SERPL-SCNC: 97 MMOL/L (ref 99–110)
CHLORIDE SERPL-SCNC: 99 MMOL/L (ref 99–110)
CLARITY UR: CLEAR
CO2 BLDV-SCNC: 42 MMOL/L
CO2 SERPL-SCNC: 16 MMOL/L (ref 21–32)
CO2 SERPL-SCNC: 18 MMOL/L (ref 21–32)
COHGB MFR BLDV: 2.5 % (ref 0–1.5)
COLOR UR: YELLOW
CREAT SERPL-MCNC: 1.3 MG/DL (ref 0.8–1.3)
CREAT SERPL-MCNC: 1.4 MG/DL (ref 0.8–1.3)
DEPRECATED RDW RBC AUTO: 13.7 % (ref 12.4–15.4)
DO-HGB MFR BLDV: 6 %
EOSINOPHIL # BLD: 0.1 K/UL (ref 0–0.6)
EOSINOPHIL NFR BLD: 1.3 %
EPI CELLS #/AREA URNS AUTO: 3 /HPF (ref 0–5)
GFR SERPLBLD CREATININE-BSD FMLA CKD-EPI: 49 ML/MIN/{1.73_M2}
GFR SERPLBLD CREATININE-BSD FMLA CKD-EPI: 53 ML/MIN/{1.73_M2}
GLUCOSE SERPL-MCNC: 168 MG/DL (ref 70–99)
GLUCOSE SERPL-MCNC: 243 MG/DL (ref 70–99)
GLUCOSE UR STRIP.AUTO-MCNC: NEGATIVE MG/DL
HCO3 BLDV-SCNC: 17.6 MMOL/L (ref 23–29)
HCT VFR BLD AUTO: 37.2 % (ref 40.5–52.5)
HGB BLD-MCNC: 12.7 G/DL (ref 13.5–17.5)
HGB UR QL STRIP.AUTO: NEGATIVE
HYALINE CASTS #/AREA URNS AUTO: 5 /LPF (ref 0–8)
INR PPP: 1.04 (ref 0.85–1.15)
KETONES UR STRIP.AUTO-MCNC: ABNORMAL MG/DL
LACTATE BLDV-SCNC: 2.1 MMOL/L (ref 0.4–1.9)
LACTATE BLDV-SCNC: 6.6 MMOL/L (ref 0.4–1.9)
LEUKOCYTE ESTERASE UR QL STRIP.AUTO: NEGATIVE
LYMPHOCYTES # BLD: 3.6 K/UL (ref 1–5.1)
LYMPHOCYTES NFR BLD: 34.1 %
MAGNESIUM SERPL-MCNC: 1.4 MG/DL (ref 1.8–2.4)
MAGNESIUM SERPL-MCNC: 1.9 MG/DL (ref 1.8–2.4)
MCH RBC QN AUTO: 34.7 PG (ref 26–34)
MCHC RBC AUTO-ENTMCNC: 34.2 G/DL (ref 31–36)
MCV RBC AUTO: 101.6 FL (ref 80–100)
METHGB MFR BLDV: 0 %
MONOCYTES # BLD: 1.3 K/UL (ref 0–1.3)
MONOCYTES NFR BLD: 11.8 %
NEUTROPHILS # BLD: 5.6 K/UL (ref 1.7–7.7)
NEUTROPHILS NFR BLD: 52.5 %
NITRITE UR QL STRIP.AUTO: NEGATIVE
NT-PROBNP SERPL-MCNC: 1182 PG/ML (ref 0–449)
O2 CT VFR BLDV CALC: 16 VOL %
O2 THERAPY: ABNORMAL
PCO2 BLDV: 36.6 MMHG (ref 40–50)
PH BLDV: 7.29 [PH] (ref 7.35–7.45)
PH UR STRIP.AUTO: 5 [PH] (ref 5–8)
PLATELET # BLD AUTO: 218 K/UL (ref 135–450)
PMV BLD AUTO: 7 FL (ref 5–10.5)
PO2 BLDV: 81.4 MMHG (ref 25–40)
POTASSIUM SERPL-SCNC: 3.5 MMOL/L (ref 3.5–5.1)
POTASSIUM SERPL-SCNC: 3.6 MMOL/L (ref 3.5–5.1)
PROT SERPL-MCNC: 6.4 G/DL (ref 6.4–8.2)
PROT UR STRIP.AUTO-MCNC: 100 MG/DL
PROTHROMBIN TIME: 13.8 SEC (ref 11.9–14.9)
RBC # BLD AUTO: 3.66 M/UL (ref 4.2–5.9)
RBC CLUMPS #/AREA URNS AUTO: 2 /HPF (ref 0–4)
REASON FOR REJECTION: NORMAL
REASON FOR REJECTION: NORMAL
REJECTED TEST: NORMAL
REJECTED TEST: NORMAL
SAO2 % BLDV: 94 %
SODIUM SERPL-SCNC: 133 MMOL/L (ref 136–145)
SODIUM SERPL-SCNC: 135 MMOL/L (ref 136–145)
SP GR UR STRIP.AUTO: >=1.03 (ref 1–1.03)
TROPONIN, HIGH SENSITIVITY: 31 NG/L (ref 0–22)
TROPONIN, HIGH SENSITIVITY: 61 NG/L (ref 0–22)
UA COMPLETE W REFLEX CULTURE PNL UR: ABNORMAL
UA DIPSTICK W REFLEX MICRO PNL UR: YES
URN SPEC COLLECT METH UR: ABNORMAL
UROBILINOGEN UR STRIP-ACNC: 1 E.U./DL
WBC # BLD AUTO: 10.7 K/UL (ref 4–11)
WBC #/AREA URNS AUTO: 1 /HPF (ref 0–5)

## 2024-07-14 PROCEDURE — 85730 THROMBOPLASTIN TIME PARTIAL: CPT

## 2024-07-14 PROCEDURE — 6360000002 HC RX W HCPCS: Performed by: EMERGENCY MEDICINE

## 2024-07-14 PROCEDURE — 99291 CRITICAL CARE FIRST HOUR: CPT | Performed by: INTERNAL MEDICINE

## 2024-07-14 PROCEDURE — 85025 COMPLETE CBC W/AUTO DIFF WBC: CPT

## 2024-07-14 PROCEDURE — 85520 HEPARIN ASSAY: CPT

## 2024-07-14 PROCEDURE — 5A0935A ASSISTANCE WITH RESPIRATORY VENTILATION, LESS THAN 24 CONSECUTIVE HOURS, HIGH NASAL FLOW/VELOCITY: ICD-10-PCS | Performed by: FAMILY MEDICINE

## 2024-07-14 PROCEDURE — 83735 ASSAY OF MAGNESIUM: CPT

## 2024-07-14 PROCEDURE — 96374 THER/PROPH/DIAG INJ IV PUSH: CPT

## 2024-07-14 PROCEDURE — 82803 BLOOD GASES ANY COMBINATION: CPT

## 2024-07-14 PROCEDURE — 2580000003 HC RX 258: Performed by: EMERGENCY MEDICINE

## 2024-07-14 PROCEDURE — 84484 ASSAY OF TROPONIN QUANT: CPT

## 2024-07-14 PROCEDURE — 2580000003 HC RX 258: Performed by: INTERNAL MEDICINE

## 2024-07-14 PROCEDURE — 71260 CT THORAX DX C+: CPT

## 2024-07-14 PROCEDURE — 71045 X-RAY EXAM CHEST 1 VIEW: CPT

## 2024-07-14 PROCEDURE — 81001 URINALYSIS AUTO W/SCOPE: CPT

## 2024-07-14 PROCEDURE — 36415 COLL VENOUS BLD VENIPUNCTURE: CPT

## 2024-07-14 PROCEDURE — 85610 PROTHROMBIN TIME: CPT

## 2024-07-14 PROCEDURE — 80053 COMPREHEN METABOLIC PANEL: CPT

## 2024-07-14 PROCEDURE — 99285 EMERGENCY DEPT VISIT HI MDM: CPT

## 2024-07-14 PROCEDURE — 6370000000 HC RX 637 (ALT 250 FOR IP): Performed by: FAMILY MEDICINE

## 2024-07-14 PROCEDURE — 83880 ASSAY OF NATRIURETIC PEPTIDE: CPT

## 2024-07-14 PROCEDURE — 94760 N-INVAS EAR/PLS OXIMETRY 1: CPT

## 2024-07-14 PROCEDURE — 83605 ASSAY OF LACTIC ACID: CPT

## 2024-07-14 PROCEDURE — 2580000003 HC RX 258: Performed by: FAMILY MEDICINE

## 2024-07-14 PROCEDURE — 2100000000 HC CCU R&B

## 2024-07-14 PROCEDURE — 2700000000 HC OXYGEN THERAPY PER DAY

## 2024-07-14 PROCEDURE — 6360000004 HC RX CONTRAST MEDICATION: Performed by: EMERGENCY MEDICINE

## 2024-07-14 PROCEDURE — 93005 ELECTROCARDIOGRAM TRACING: CPT | Performed by: EMERGENCY MEDICINE

## 2024-07-14 RX ORDER — SODIUM CHLORIDE 450 MG/100ML
INJECTION, SOLUTION INTRAVENOUS CONTINUOUS
Status: DISCONTINUED | OUTPATIENT
Start: 2024-07-14 | End: 2024-07-15

## 2024-07-14 RX ORDER — TRAMADOL HYDROCHLORIDE 50 MG/1
50 TABLET ORAL EVERY 6 HOURS PRN
Status: DISCONTINUED | OUTPATIENT
Start: 2024-07-14 | End: 2024-07-24 | Stop reason: SDUPTHER

## 2024-07-14 RX ORDER — ASPIRIN 81 MG/1
81 TABLET, CHEWABLE ORAL DAILY
Status: DISCONTINUED | OUTPATIENT
Start: 2024-07-14 | End: 2024-07-26 | Stop reason: HOSPADM

## 2024-07-14 RX ORDER — SODIUM CHLORIDE 0.9 % (FLUSH) 0.9 %
5-40 SYRINGE (ML) INJECTION PRN
Status: DISCONTINUED | OUTPATIENT
Start: 2024-07-14 | End: 2024-07-15 | Stop reason: HOSPADM

## 2024-07-14 RX ORDER — ONDANSETRON 4 MG/1
4 TABLET, ORALLY DISINTEGRATING ORAL EVERY 8 HOURS PRN
Status: DISCONTINUED | OUTPATIENT
Start: 2024-07-14 | End: 2024-07-26 | Stop reason: HOSPADM

## 2024-07-14 RX ORDER — POLYETHYLENE GLYCOL 3350 17 G/17G
17 POWDER, FOR SOLUTION ORAL DAILY PRN
Status: DISCONTINUED | OUTPATIENT
Start: 2024-07-14 | End: 2024-07-26 | Stop reason: HOSPADM

## 2024-07-14 RX ORDER — 0.9 % SODIUM CHLORIDE 0.9 %
500 INTRAVENOUS SOLUTION INTRAVENOUS ONCE
Status: COMPLETED | OUTPATIENT
Start: 2024-07-14 | End: 2024-07-14

## 2024-07-14 RX ORDER — PANTOPRAZOLE SODIUM 40 MG/1
40 TABLET, DELAYED RELEASE ORAL
Status: DISCONTINUED | OUTPATIENT
Start: 2024-07-15 | End: 2024-07-26 | Stop reason: HOSPADM

## 2024-07-14 RX ORDER — SODIUM CHLORIDE 9 MG/ML
INJECTION, SOLUTION INTRAVENOUS CONTINUOUS
Status: DISCONTINUED | OUTPATIENT
Start: 2024-07-14 | End: 2024-07-14

## 2024-07-14 RX ORDER — ACETAMINOPHEN 650 MG/1
650 SUPPOSITORY RECTAL EVERY 6 HOURS PRN
Status: DISCONTINUED | OUTPATIENT
Start: 2024-07-14 | End: 2024-07-26 | Stop reason: HOSPADM

## 2024-07-14 RX ORDER — HEPARIN SODIUM 1000 [USP'U]/ML
40 INJECTION, SOLUTION INTRAVENOUS; SUBCUTANEOUS PRN
Status: DISCONTINUED | OUTPATIENT
Start: 2024-07-14 | End: 2024-07-15

## 2024-07-14 RX ORDER — ONDANSETRON 2 MG/ML
4 INJECTION INTRAMUSCULAR; INTRAVENOUS EVERY 6 HOURS PRN
Status: DISCONTINUED | OUTPATIENT
Start: 2024-07-14 | End: 2024-07-26 | Stop reason: HOSPADM

## 2024-07-14 RX ORDER — SODIUM CHLORIDE 0.9 % (FLUSH) 0.9 %
5-40 SYRINGE (ML) INJECTION EVERY 12 HOURS SCHEDULED
Status: DISCONTINUED | OUTPATIENT
Start: 2024-07-14 | End: 2024-07-15 | Stop reason: HOSPADM

## 2024-07-14 RX ORDER — SODIUM CHLORIDE 9 MG/ML
INJECTION, SOLUTION INTRAVENOUS PRN
Status: DISCONTINUED | OUTPATIENT
Start: 2024-07-14 | End: 2024-07-15 | Stop reason: HOSPADM

## 2024-07-14 RX ORDER — SODIUM CHLORIDE 0.9 % (FLUSH) 0.9 %
5-40 SYRINGE (ML) INJECTION PRN
Status: DISCONTINUED | OUTPATIENT
Start: 2024-07-14 | End: 2024-07-26 | Stop reason: HOSPADM

## 2024-07-14 RX ORDER — SODIUM CHLORIDE 9 MG/ML
INJECTION, SOLUTION INTRAVENOUS CONTINUOUS
Status: DISCONTINUED | OUTPATIENT
Start: 2024-07-14 | End: 2024-07-14 | Stop reason: HOSPADM

## 2024-07-14 RX ORDER — HEPARIN SODIUM 1000 [USP'U]/ML
80 INJECTION, SOLUTION INTRAVENOUS; SUBCUTANEOUS ONCE
Status: COMPLETED | OUTPATIENT
Start: 2024-07-14 | End: 2024-07-14

## 2024-07-14 RX ORDER — ACETAMINOPHEN 325 MG/1
650 TABLET ORAL EVERY 6 HOURS PRN
Status: DISCONTINUED | OUTPATIENT
Start: 2024-07-14 | End: 2024-07-15 | Stop reason: DRUGHIGH

## 2024-07-14 RX ORDER — HEPARIN SODIUM 1000 [USP'U]/ML
80 INJECTION, SOLUTION INTRAVENOUS; SUBCUTANEOUS PRN
Status: DISCONTINUED | OUTPATIENT
Start: 2024-07-14 | End: 2024-07-15

## 2024-07-14 RX ORDER — ROSUVASTATIN CALCIUM 20 MG/1
20 TABLET, COATED ORAL NIGHTLY
Status: DISCONTINUED | OUTPATIENT
Start: 2024-07-14 | End: 2024-07-26 | Stop reason: HOSPADM

## 2024-07-14 RX ORDER — MAGNESIUM SULFATE IN WATER 40 MG/ML
2000 INJECTION, SOLUTION INTRAVENOUS ONCE
Status: COMPLETED | OUTPATIENT
Start: 2024-07-14 | End: 2024-07-14

## 2024-07-14 RX ORDER — POTASSIUM CHLORIDE 7.45 MG/ML
10 INJECTION INTRAVENOUS PRN
Status: DISCONTINUED | OUTPATIENT
Start: 2024-07-14 | End: 2024-07-26 | Stop reason: HOSPADM

## 2024-07-14 RX ORDER — SODIUM CHLORIDE 0.9 % (FLUSH) 0.9 %
5-40 SYRINGE (ML) INJECTION EVERY 12 HOURS SCHEDULED
Status: DISCONTINUED | OUTPATIENT
Start: 2024-07-14 | End: 2024-07-26 | Stop reason: HOSPADM

## 2024-07-14 RX ORDER — SODIUM CHLORIDE 9 MG/ML
INJECTION, SOLUTION INTRAVENOUS PRN
Status: DISCONTINUED | OUTPATIENT
Start: 2024-07-14 | End: 2024-07-26 | Stop reason: HOSPADM

## 2024-07-14 RX ORDER — POTASSIUM CHLORIDE 20 MEQ/1
40 TABLET, EXTENDED RELEASE ORAL PRN
Status: DISCONTINUED | OUTPATIENT
Start: 2024-07-14 | End: 2024-07-26 | Stop reason: HOSPADM

## 2024-07-14 RX ORDER — IPRATROPIUM BROMIDE AND ALBUTEROL SULFATE 2.5; .5 MG/3ML; MG/3ML
1 SOLUTION RESPIRATORY (INHALATION) ONCE
Status: DISCONTINUED | OUTPATIENT
Start: 2024-07-14 | End: 2024-07-14

## 2024-07-14 RX ORDER — HEPARIN SODIUM 10000 [USP'U]/100ML
5-30 INJECTION, SOLUTION INTRAVENOUS CONTINUOUS
Status: DISCONTINUED | OUTPATIENT
Start: 2024-07-14 | End: 2024-07-15

## 2024-07-14 RX ORDER — MAGNESIUM SULFATE IN WATER 40 MG/ML
2000 INJECTION, SOLUTION INTRAVENOUS PRN
Status: DISCONTINUED | OUTPATIENT
Start: 2024-07-14 | End: 2024-07-26 | Stop reason: HOSPADM

## 2024-07-14 RX ADMIN — IOPAMIDOL 75 ML: 755 INJECTION, SOLUTION INTRAVENOUS at 13:12

## 2024-07-14 RX ADMIN — SODIUM CHLORIDE, PRESERVATIVE FREE 10 ML: 5 INJECTION INTRAVENOUS at 22:11

## 2024-07-14 RX ADMIN — SODIUM CHLORIDE 500 ML: 9 INJECTION, SOLUTION INTRAVENOUS at 13:56

## 2024-07-14 RX ADMIN — SODIUM CHLORIDE 500 ML: 9 INJECTION, SOLUTION INTRAVENOUS at 15:27

## 2024-07-14 RX ADMIN — HEPARIN SODIUM 18 UNITS/KG/HR: 10000 INJECTION, SOLUTION INTRAVENOUS at 14:04

## 2024-07-14 RX ADMIN — MAGNESIUM SULFATE HEPTAHYDRATE 2000 MG: 40 INJECTION, SOLUTION INTRAVENOUS at 15:32

## 2024-07-14 RX ADMIN — ROSUVASTATIN 20 MG: 20 TABLET, FILM COATED ORAL at 22:10

## 2024-07-14 RX ADMIN — Medication 10 ML: at 22:11

## 2024-07-14 RX ADMIN — HEPARIN SODIUM 9100 UNITS: 1000 INJECTION INTRAVENOUS; SUBCUTANEOUS at 13:58

## 2024-07-14 RX ADMIN — SODIUM CHLORIDE: 4.5 INJECTION, SOLUTION INTRAVENOUS at 17:48

## 2024-07-14 ASSESSMENT — PAIN - FUNCTIONAL ASSESSMENT: PAIN_FUNCTIONAL_ASSESSMENT: NONE - DENIES PAIN

## 2024-07-14 ASSESSMENT — PAIN SCALES - GENERAL
PAINLEVEL_OUTOF10: 0

## 2024-07-14 NOTE — ED NOTES
How does patient ambulate?   []Low Fall Risk (ambulates by themselves without support)  []Stand by assist   []Contact Guard   []Front wheel walker  []Wheelchair   []Steady  []Bed bound  []History of Lower Extremity Amputation  [x]Unknown, did not assess in the emergency department   How does patient take pills?  [x]Whole with Water  []Crushed in applesauce  []Crushed in pudding  []Other  []Unknown no oral medications were given in the ED  Is patient alert?   [x]Alert  []Drowsy but responds to voice  []Doesn't respond to voice but responds to painful stimuli  []Unresponsive  Is patient oriented?   [x]To person  [x]To place  [x]To time  [x]To situation  []Confused  []Agitated  [x]Follows commands  If patient is disoriented or from a Skill Nursing Facility has family been notified of admission?   []Yes   [x]No  Patient belongings?   []Cell phone  []Wallet   []Dentures  [x]Clothing  Any specific patient or family belongings/needs/dynamics?   Wife at bedside. Has been appropriate.   Miscellaneous comments/pending orders?   has been called. Magnesium infusing.      If there are any additional questions please reach out to the Emergency Department.

## 2024-07-14 NOTE — PROGRESS NOTES
PERT CONSULTATION  Evaluation requested for PE   Large bilateral PE   Heparin gtt   Please keep NPO   Aspiration thrombectomy planned for either this evening or tomorrow AM     Full consult to follow    Ziggy Hendrix MD FACC   General, Interventional Cardiology, and Peripheral Vascular Disease   [facility]   (O): [phone]  (F): [phone]

## 2024-07-14 NOTE — PROGRESS NOTES
RN referral for patient and family requesting to see .   Visited with patient, wife and daughter.  Patient states he would like to go to confession and receive Sacrament of Sick.  Patient and family attend [place].  Fr. Bradshaw notified and will be here within the hour to see patient.    Pt & family aware of same.

## 2024-07-14 NOTE — CONSULTS
Interventional Cardiology/ PERT  Consultation     Marvin NEELY Vincent  1940    PCP: Heath Hua MD  Referring Physician: DR. Tinajero  Reason for Referral: PE   Chief Complaint:   Chief Complaint   Patient presents with    Chest Pain     Pt had onset of chest pain/pressure that onset about 30 minutes ago. 911 called. No cardiac hx except hypertension. Had syncopal episode while walking to the ambulance. CPR was given for approx 1 minute.        Subjective:     History of Present Illness: The patient is 84 y.o. male with a past medical history significant for diabetes, hypertension, prior DVT, who presents with above complaint.  With the sudden onset of substernal chest discomfort and shortness of breath.  According to family has had several days of allergy type symptoms.  Today however was exacerbated thus presenting to [facility] ER from evaluation.  Of note upon EMS arrival he had a brief period of asystole with 1 round of ACLS and return of spontaneous circulation.  He currently has no complaints he is on 6 L of oxygen.  He is fully awake alert and oriented.  He also admits to significant bilateral lower extremity edema.  A CTA performed in the ER was consistent with bilateral pulmonary embolism. interventional cardiology has thus been consulted          Past Medical History:   Diagnosis Date    AR (allergic rhinitis)     Dermatitis     Diabetes     Diabetes (HCC)     oral medications    DJD (degenerative joint disease)     knees    Dyslipidemia     ED (erectile dysfunction)     GERD (gastroesophageal reflux disease)     HTN (hypertension)     Hx of blood clots     x 1 after traveling    Hyperlipidemia     Sleep apnea     cpap set at 12    Vitamin D deficiency      Past Surgical History:   Procedure Laterality Date    COLONOSCOPY      FOOT SURGERY      HAND SURGERY      JOINT REPLACEMENT Left 8/29/2014    TOTAL KNEE REPLACEMENT    KNEE ARTHROPLASTY Right 1/21/2015    OTHER SURGICAL

## 2024-07-14 NOTE — ED NOTES
RN called RT for recommendation on what oxygen device would best suit patient. RT recommended high flow starting at 15 L/min and titrate down as appropriate.

## 2024-07-14 NOTE — ED PROVIDER NOTES
EMERGENCY DEPARTMENT PROVIDER NOTE    Patient Identification  Pt Name: Marvin Kaur  MRN: [id number]  Birthdate 1940  Date of evaluation: 7/14/2024  Provider: Yordan Tinajero DO  PCP: Heath Hua MD    Chief Complaint  Chest Pain (Pt had onset of chest pain/pressure that onset about 30 minutes ago. 911 called. No cardiac hx except hypertension. Had syncopal episode while walking to the ambulance. CPR was given for approx 1 minute. )      HPI  (History provided by patient and EMS)  This is a 84 y.o. male with pertinent past medical history of hypertension who was brought in by EMS transportation for shortness of breath and chest pain.  Patient reports symptoms began about 30 minutes prior to arrival while he was having a routine visit by his nurse practitioner at home.  Pain is diffuse across his chest, nothing clearly seem to make the symptoms any better or worse.  On EMS arrival, patient oxygen saturation in the low 80s on room air.  While walking to the ambulance, patient lost consciousness and was lowered to the ground, EMS was unable to palpate a pulse in this patient received 1 minute of CPR for regaining consciousness.  Patient denies any history of similar symptoms previously.  Denies any pulmonary disease, has never been a smoker.  States he had a DVT 25 years ago.  Denies any fevers, chills or abdominal pain.        I have reviewed the following nursing documentation:  Allergies: Patient has no known allergies.    Past medical history:   Past Medical History:   Diagnosis Date    AR (allergic rhinitis)     Dermatitis     Diabetes     Diabetes (HCC)     oral medications    DJD (degenerative joint disease)     knees    Dyslipidemia     ED (erectile dysfunction)     GERD (gastroesophageal reflux disease)     HTN (hypertension)     Hx of blood clots     x 1 after traveling    Hyperlipidemia     Sleep apnea     cpap set at 12    Vitamin D deficiency      Past surgical history:   Past Surgical  placed or performed during the hospital encounter of 07/14/24   CBC with Auto Differential   Result Value Ref Range    WBC 10.7 4.0 - 11.0 K/uL    RBC 3.66 (L) 4.20 - 5.90 M/uL    Hemoglobin 12.7 (L) 13.5 - 17.5 g/dL    Hematocrit 37.2 (L) 40.5 - 52.5 %    .6 (H) 80.0 - 100.0 fL    MCH 34.7 (H) 26.0 - 34.0 pg    MCHC 34.2 31.0 - 36.0 g/dL    RDW 13.7 12.4 - 15.4 %    Platelets 218 135 - 450 K/uL    MPV 7.0 5.0 - 10.5 fL    Neutrophils % 52.5 %    Lymphocytes % 34.1 %    Monocytes % 11.8 %    Eosinophils % 1.3 %    Basophils % 0.3 %    Neutrophils Absolute 5.6 1.7 - 7.7 K/uL    Lymphocytes Absolute 3.6 1.0 - 5.1 K/uL    Monocytes Absolute 1.3 0.0 - 1.3 K/uL    Eosinophils Absolute 0.1 0.0 - 0.6 K/uL    Basophils Absolute 0.0 0.0 - 0.2 K/uL   CMP w/ Reflex to MG   Result Value Ref Range    Sodium 135 (L) 136 - 145 mmol/L    Potassium reflex Magnesium 3.5 3.5 - 5.1 mmol/L    Chloride 97 (L) 99 - 110 mmol/L    CO2 16 (L) 21 - 32 mmol/L    Anion Gap 22 (H) 3 - 16    Glucose 243 (H) 70 - 99 mg/dL    BUN 23 (H) 7 - 20 mg/dL    Creatinine 1.4 (H) 0.8 - 1.3 mg/dL    Est, Glom Filt Rate 49 (A) >60    Calcium 9.0 8.3 - 10.6 mg/dL    Total Protein 6.4 6.4 - 8.2 g/dL    Albumin 3.5 3.4 - 5.0 g/dL    Albumin/Globulin Ratio 1.2 1.1 - 2.2    Total Bilirubin 0.6 0.0 - 1.0 mg/dL    Alkaline Phosphatase 90 40 - 129 U/L    ALT 30 10 - 40 U/L    AST 56 (H) 15 - 37 U/L   Troponin   Result Value Ref Range    Troponin, High Sensitivity 31 (H) 0 - 22 ng/L   Blood Gas, Venous   Result Value Ref Range    pH, Ino 7.291 (L) 7.350 - 7.450    pCO2, Ino 36.6 (L) 40.0 - 50.0 mmHg    PO2, Ino 81.4 (H) 25.0 - 40.0 mmHg    HCO3, Venous 17.6 (L) 23.0 - 29.0 mmol/L    Base Excess, Ino -8.2 (L) -3.0 - 3.0 mmol/L    O2 Sat, Ino 94 Not Established %    Carboxyhemoglobin 2.5 (H) 0.0 - 1.5 %    MetHgb, Ino 0.0 <1.5 %    TC02 (Calc), Ino 42 Not Established mmol/L    O2 Content, Ino 16 Not Established VOL %    O2 Therapy Unknown     Hemoglobin, Ino,

## 2024-07-14 NOTE — PROGRESS NOTES
Patient received [facility] [place] [place] and communion from FrNaman Bradshaw  per patient request.

## 2024-07-15 ENCOUNTER — APPOINTMENT (OUTPATIENT)
Age: 84
End: 2024-07-15
Attending: FAMILY MEDICINE
Payer: MEDICARE

## 2024-07-15 ENCOUNTER — APPOINTMENT (OUTPATIENT)
Dept: CT IMAGING | Age: 84
End: 2024-07-15
Payer: MEDICARE

## 2024-07-15 ENCOUNTER — APPOINTMENT (OUTPATIENT)
Dept: GENERAL RADIOLOGY | Age: 84
End: 2024-07-15
Payer: MEDICARE

## 2024-07-15 ENCOUNTER — APPOINTMENT (OUTPATIENT)
Dept: VASCULAR LAB | Age: 84
End: 2024-07-15
Attending: FAMILY MEDICINE
Payer: MEDICARE

## 2024-07-15 PROBLEM — I26.99 ACUTE MASSIVE PULMONARY EMBOLISM (HCC): Status: ACTIVE | Noted: 2024-07-15

## 2024-07-15 PROBLEM — I46.9 CARDIAC ARREST (HCC): Status: ACTIVE | Noted: 2024-07-15

## 2024-07-15 PROBLEM — J96.01 ACUTE RESPIRATORY FAILURE WITH HYPOXIA (HCC): Status: ACTIVE | Noted: 2024-07-15

## 2024-07-15 PROBLEM — R06.02 SHORTNESS OF BREATH: Status: ACTIVE | Noted: 2024-07-15

## 2024-07-15 LAB
ABO + RH BLD: NORMAL
ABO + RH BLD: NORMAL
ANION GAP SERPL CALCULATED.3IONS-SCNC: 16 MMOL/L (ref 3–16)
ANTI-XA UNFRAC HEPARIN: >1.1 IU/ML (ref 0.3–0.7)
APTT BLD: >180 SEC (ref 22.1–36.4)
BLD GP AB SCN SERPL QL: NORMAL
BUN SERPL-MCNC: 22 MG/DL (ref 7–20)
CALCIUM SERPL-MCNC: 9.1 MG/DL (ref 8.3–10.6)
CHLORIDE SERPL-SCNC: 99 MMOL/L (ref 99–110)
CO2 SERPL-SCNC: 21 MMOL/L (ref 21–32)
CREAT SERPL-MCNC: 1.2 MG/DL (ref 0.8–1.3)
D-DIMER QUANTITATIVE: 9.72 UG/ML FEU (ref 0–0.6)
DEPRECATED RDW RBC AUTO: 13.8 % (ref 12.4–15.4)
ECHO AO ASC DIAM: 3.6 CM
ECHO AO ASCENDING AORTA INDEX: 1.59 CM/M2
ECHO AO ROOT DIAM: 3.3 CM
ECHO AO ROOT INDEX: 1.45 CM/M2
ECHO AV MEAN GRADIENT: 3 MMHG
ECHO AV MEAN VELOCITY: 0.8 M/S
ECHO AV PEAK GRADIENT: 5 MMHG
ECHO AV PEAK VELOCITY: 1.1 M/S
ECHO AV VELOCITY RATIO: 0.91
ECHO AV VTI: 22.3 CM
ECHO BSA: 2.32 M2
ECHO BSA: 2.32 M2
ECHO EST RA PRESSURE: 8 MMHG
ECHO LA AREA 2C: 15.3 CM2
ECHO LA AREA 4C: 14.2 CM2
ECHO LA DIAMETER INDEX: 2.03 CM/M2
ECHO LA DIAMETER: 4.6 CM
ECHO LA MAJOR AXIS: 4.8 CM
ECHO LA MINOR AXIS: 5.1 CM
ECHO LA TO AORTIC ROOT RATIO: 1.39
ECHO LA VOL BP: 37 ML (ref 18–58)
ECHO LA VOL MOD A2C: 38 ML (ref 18–58)
ECHO LA VOL MOD A4C: 34 ML (ref 18–58)
ECHO LA VOL/BSA BIPLANE: 16 ML/M2 (ref 16–34)
ECHO LA VOLUME INDEX MOD A2C: 17 ML/M2 (ref 16–34)
ECHO LA VOLUME INDEX MOD A4C: 15 ML/M2 (ref 16–34)
ECHO LV E' LATERAL VELOCITY: 4 CM/S
ECHO LV E' SEPTAL VELOCITY: 8 CM/S
ECHO LV EDV A2C: 35 ML
ECHO LV EDV A4C: 37 ML
ECHO LV EDV INDEX A4C: 16 ML/M2
ECHO LV EDV NDEX A2C: 15 ML/M2
ECHO LV EJECTION FRACTION A2C: 49 %
ECHO LV EJECTION FRACTION A4C: 53 %
ECHO LV EJECTION FRACTION BIPLANE: 47 % (ref 55–100)
ECHO LV ESV A2C: 18 ML
ECHO LV ESV A4C: 17 ML
ECHO LV ESV INDEX A2C: 8 ML/M2
ECHO LV ESV INDEX A4C: 7 ML/M2
ECHO LV FRACTIONAL SHORTENING: 24 % (ref 28–44)
ECHO LV INTERNAL DIMENSION DIASTOLE INDEX: 1.45 CM/M2
ECHO LV INTERNAL DIMENSION DIASTOLIC: 3.3 CM (ref 4.2–5.9)
ECHO LV INTERNAL DIMENSION SYSTOLIC INDEX: 1.1 CM/M2
ECHO LV INTERNAL DIMENSION SYSTOLIC: 2.5 CM
ECHO LV IVSD: 1.3 CM (ref 0.6–1)
ECHO LV MASS 2D: 133 G (ref 88–224)
ECHO LV MASS INDEX 2D: 58.6 G/M2 (ref 49–115)
ECHO LV POSTERIOR WALL DIASTOLIC: 1.2 CM (ref 0.6–1)
ECHO LV RELATIVE WALL THICKNESS RATIO: 0.73
ECHO LVOT AV VTI INDEX: 1
ECHO LVOT MEAN GRADIENT: 2 MMHG
ECHO LVOT PEAK GRADIENT: 4 MMHG
ECHO LVOT PEAK VELOCITY: 1 M/S
ECHO LVOT VTI: 22.3 CM
ECHO MV A VELOCITY: 0.73 M/S
ECHO MV E DECELERATION TIME (DT): 248 MS
ECHO MV E VELOCITY: 0.48 M/S
ECHO MV E/A RATIO: 0.66
ECHO MV E/E' LATERAL: 12
ECHO MV E/E' RATIO (AVERAGED): 9
ECHO MV E/E' SEPTAL: 6
ECHO MV LVOT VTI INDEX: 0.7
ECHO MV MAX VELOCITY: 0.9 M/S
ECHO MV MEAN GRADIENT: 1 MMHG
ECHO MV MEAN VELOCITY: 0.4 M/S
ECHO MV PEAK GRADIENT: 3 MMHG
ECHO MV VTI: 15.6 CM
ECHO PULMONARY ARTERY END DIASTOLIC PRESSURE: 4 MMHG
ECHO PV MAX VELOCITY: 1.3 M/S
ECHO PV PEAK GRADIENT: 7 MMHG
ECHO PV REGURGITANT MAX VELOCITY: 1 M/S
ECHO RA AREA 4C: 18.8 CM2
ECHO RA END SYSTOLIC VOLUME APICAL 4 CHAMBER INDEX BSA: 19 ML/M2
ECHO RA VOLUME BIPLANE METHOD OF DISKS: 19 ML
ECHO RA VOLUME INDEX BP: 8 ML/M2
ECHO RA VOLUME: 43 ML
ECHO RIGHT VENTRICULAR SYSTOLIC PRESSURE (RVSP): 42 MMHG
ECHO RV BASAL DIMENSION: 4.2 CM
ECHO RV FREE WALL PEAK S': 11 CM/S
ECHO RV LONGITUDINAL DIMENSION: 7.3 CM
ECHO RV MID DIMENSION: 3.1 CM
ECHO RV TAPSE: 1.4 CM (ref 1.7–?)
ECHO TV REGURGITANT MAX VELOCITY: 2.9 M/S
ECHO TV REGURGITANT PEAK GRADIENT: 46 MMHG
EKG ATRIAL RATE: 102 BPM
EKG DIAGNOSIS: NORMAL
EKG P AXIS: 99 DEGREES
EKG P-R INTERVAL: 200 MS
EKG Q-T INTERVAL: 362 MS
EKG QRS DURATION: 78 MS
EKG QTC CALCULATION (BAZETT): 471 MS
EKG R AXIS: 58 DEGREES
EKG T AXIS: -24 DEGREES
EKG VENTRICULAR RATE: 102 BPM
FIBRINOGEN PPP-MCNC: 295 MG/DL (ref 227–534)
GFR SERPLBLD CREATININE-BSD FMLA CKD-EPI: 59 ML/MIN/{1.73_M2}
GLUCOSE BLD-MCNC: 148 MG/DL (ref 70–99)
GLUCOSE BLD-MCNC: 88 MG/DL (ref 70–99)
GLUCOSE SERPL-MCNC: 111 MG/DL (ref 70–99)
HCT VFR BLD AUTO: 27.2 % (ref 40.5–52.5)
HCT VFR BLD AUTO: 28.3 % (ref 40.5–52.5)
HCT VFR BLD AUTO: 34.8 % (ref 40.5–52.5)
HGB BLD-MCNC: 12 G/DL (ref 13.5–17.5)
HGB BLD-MCNC: 9.3 G/DL (ref 13.5–17.5)
HGB BLD-MCNC: 9.6 G/DL (ref 13.5–17.5)
INR PPP: 1.34 (ref 0.85–1.15)
LACTATE BLDV-SCNC: 1.2 MMOL/L (ref 0.4–2)
MCH RBC QN AUTO: 34.9 PG (ref 26–34)
MCHC RBC AUTO-ENTMCNC: 34.4 G/DL (ref 31–36)
MCV RBC AUTO: 101.5 FL (ref 80–100)
PERFORMED ON: ABNORMAL
PERFORMED ON: NORMAL
PLATELET # BLD AUTO: 199 K/UL (ref 135–450)
PMV BLD AUTO: 6.8 FL (ref 5–10.5)
POTASSIUM SERPL-SCNC: 3.8 MMOL/L (ref 3.5–5.1)
PROTHROMBIN TIME: 16.7 SEC (ref 11.9–14.9)
RBC # BLD AUTO: 3.43 M/UL (ref 4.2–5.9)
SODIUM SERPL-SCNC: 136 MMOL/L (ref 136–145)
WBC # BLD AUTO: 10.3 K/UL (ref 4–11)

## 2024-07-15 PROCEDURE — 6360000004 HC RX CONTRAST MEDICATION: Performed by: INTERNAL MEDICINE

## 2024-07-15 PROCEDURE — 99291 CRITICAL CARE FIRST HOUR: CPT | Performed by: INTERNAL MEDICINE

## 2024-07-15 PROCEDURE — 2580000003 HC RX 258: Performed by: INTERNAL MEDICINE

## 2024-07-15 PROCEDURE — 37184 PRIM ART M-THRMBC 1ST VSL: CPT | Performed by: INTERNAL MEDICINE

## 2024-07-15 PROCEDURE — 82746 ASSAY OF FOLIC ACID SERUM: CPT

## 2024-07-15 PROCEDURE — 30233N1 TRANSFUSION OF NONAUTOLOGOUS RED BLOOD CELLS INTO PERIPHERAL VEIN, PERCUTANEOUS APPROACH: ICD-10-PCS | Performed by: FAMILY MEDICINE

## 2024-07-15 PROCEDURE — C1753 CATH, INTRAVAS ULTRASOUND: HCPCS | Performed by: INTERNAL MEDICINE

## 2024-07-15 PROCEDURE — 83540 ASSAY OF IRON: CPT

## 2024-07-15 PROCEDURE — 80048 BASIC METABOLIC PNL TOTAL CA: CPT

## 2024-07-15 PROCEDURE — 85610 PROTHROMBIN TIME: CPT

## 2024-07-15 PROCEDURE — 6360000002 HC RX W HCPCS

## 2024-07-15 PROCEDURE — 51798 US URINE CAPACITY MEASURE: CPT

## 2024-07-15 PROCEDURE — 36014 PLACE CATHETER IN ARTERY: CPT | Performed by: INTERNAL MEDICINE

## 2024-07-15 PROCEDURE — 99153 MOD SED SAME PHYS/QHP EA: CPT | Performed by: INTERNAL MEDICINE

## 2024-07-15 PROCEDURE — 93306 TTE W/DOPPLER COMPLETE: CPT

## 2024-07-15 PROCEDURE — 85379 FIBRIN DEGRADATION QUANT: CPT

## 2024-07-15 PROCEDURE — 86923 COMPATIBILITY TEST ELECTRIC: CPT

## 2024-07-15 PROCEDURE — 6360000002 HC RX W HCPCS: Performed by: FAMILY MEDICINE

## 2024-07-15 PROCEDURE — 85301 ANTITHROMBIN III ANTIGEN: CPT

## 2024-07-15 PROCEDURE — 75743 ARTERY X-RAYS LUNGS: CPT | Performed by: INTERNAL MEDICINE

## 2024-07-15 PROCEDURE — 85730 THROMBOPLASTIN TIME PARTIAL: CPT

## 2024-07-15 PROCEDURE — 2720000010 HC SURG SUPPLY STERILE: Performed by: INTERNAL MEDICINE

## 2024-07-15 PROCEDURE — 85384 FIBRINOGEN ACTIVITY: CPT

## 2024-07-15 PROCEDURE — 2500000003 HC RX 250 WO HCPCS

## 2024-07-15 PROCEDURE — 2580000003 HC RX 258: Performed by: FAMILY MEDICINE

## 2024-07-15 PROCEDURE — 85014 HEMATOCRIT: CPT

## 2024-07-15 PROCEDURE — 86900 BLOOD TYPING SEROLOGIC ABO: CPT

## 2024-07-15 PROCEDURE — 74176 CT ABD & PELVIS W/O CONTRAST: CPT

## 2024-07-15 PROCEDURE — C1760 CLOSURE DEV, VASC: HCPCS | Performed by: INTERNAL MEDICINE

## 2024-07-15 PROCEDURE — 6370000000 HC RX 637 (ALT 250 FOR IP): Performed by: INTERNAL MEDICINE

## 2024-07-15 PROCEDURE — 6360000002 HC RX W HCPCS: Performed by: INTERNAL MEDICINE

## 2024-07-15 PROCEDURE — 99152 MOD SED SAME PHYS/QHP 5/>YRS: CPT | Performed by: INTERNAL MEDICINE

## 2024-07-15 PROCEDURE — 85347 COAGULATION TIME ACTIVATED: CPT

## 2024-07-15 PROCEDURE — 2709999900 HC NON-CHARGEABLE SUPPLY: Performed by: INTERNAL MEDICINE

## 2024-07-15 PROCEDURE — 86147 CARDIOLIPIN ANTIBODY EA IG: CPT

## 2024-07-15 PROCEDURE — 82728 ASSAY OF FERRITIN: CPT

## 2024-07-15 PROCEDURE — 83605 ASSAY OF LACTIC ACID: CPT

## 2024-07-15 PROCEDURE — 93306 TTE W/DOPPLER COMPLETE: CPT | Performed by: INTERNAL MEDICINE

## 2024-07-15 PROCEDURE — C1757 CATH, THROMBECTOMY/EMBOLECT: HCPCS | Performed by: INTERNAL MEDICINE

## 2024-07-15 PROCEDURE — 02CQ3ZZ EXTIRPATION OF MATTER FROM RIGHT PULMONARY ARTERY, PERCUTANEOUS APPROACH: ICD-10-PCS | Performed by: INTERNAL MEDICINE

## 2024-07-15 PROCEDURE — 93970 EXTREMITY STUDY: CPT

## 2024-07-15 PROCEDURE — 86850 RBC ANTIBODY SCREEN: CPT

## 2024-07-15 PROCEDURE — 2500000003 HC RX 250 WO HCPCS: Performed by: INTERNAL MEDICINE

## 2024-07-15 PROCEDURE — 2000000000 HC ICU R&B

## 2024-07-15 PROCEDURE — 85300 ANTITHROMBIN III ACTIVITY: CPT

## 2024-07-15 PROCEDURE — 86901 BLOOD TYPING SEROLOGIC RH(D): CPT

## 2024-07-15 PROCEDURE — 85520 HEPARIN ASSAY: CPT

## 2024-07-15 PROCEDURE — C1769 GUIDE WIRE: HCPCS | Performed by: INTERNAL MEDICINE

## 2024-07-15 PROCEDURE — 36556 INSERT NON-TUNNEL CV CATH: CPT | Performed by: INTERNAL MEDICINE

## 2024-07-15 PROCEDURE — 85018 HEMOGLOBIN: CPT

## 2024-07-15 PROCEDURE — 93010 ELECTROCARDIOGRAM REPORT: CPT | Performed by: INTERNAL MEDICINE

## 2024-07-15 PROCEDURE — 2580000003 HC RX 258

## 2024-07-15 PROCEDURE — P9016 RBC LEUKOCYTES REDUCED: HCPCS

## 2024-07-15 PROCEDURE — B31U1ZZ FLUOROSCOPY OF PULMONARY TRUNK USING LOW OSMOLAR CONTRAST: ICD-10-PCS | Performed by: INTERNAL MEDICINE

## 2024-07-15 PROCEDURE — C1894 INTRO/SHEATH, NON-LASER: HCPCS | Performed by: INTERNAL MEDICINE

## 2024-07-15 PROCEDURE — 36556 INSERT NON-TUNNEL CV CATH: CPT

## 2024-07-15 PROCEDURE — 02CR3ZZ EXTIRPATION OF MATTER FROM LEFT PULMONARY ARTERY, PERCUTANEOUS APPROACH: ICD-10-PCS | Performed by: INTERNAL MEDICINE

## 2024-07-15 PROCEDURE — 71045 X-RAY EXAM CHEST 1 VIEW: CPT

## 2024-07-15 PROCEDURE — 36430 TRANSFUSION BLD/BLD COMPNT: CPT

## 2024-07-15 PROCEDURE — 6370000000 HC RX 637 (ALT 250 FOR IP): Performed by: FAMILY MEDICINE

## 2024-07-15 PROCEDURE — 83550 IRON BINDING TEST: CPT

## 2024-07-15 PROCEDURE — 85027 COMPLETE CBC AUTOMATED: CPT

## 2024-07-15 PROCEDURE — 82607 VITAMIN B-12: CPT

## 2024-07-15 PROCEDURE — 93970 EXTREMITY STUDY: CPT | Performed by: INTERNAL MEDICINE

## 2024-07-15 PROCEDURE — 36415 COLL VENOUS BLD VENIPUNCTURE: CPT

## 2024-07-15 RX ORDER — NOREPINEPHRINE BITARTRATE 0.06 MG/ML
1-100 INJECTION, SOLUTION INTRAVENOUS CONTINUOUS
Status: DISCONTINUED | OUTPATIENT
Start: 2024-07-15 | End: 2024-07-15

## 2024-07-15 RX ORDER — SODIUM CHLORIDE 9 MG/ML
INJECTION, SOLUTION INTRAVENOUS PRN
Status: DISCONTINUED | OUTPATIENT
Start: 2024-07-15 | End: 2024-07-17

## 2024-07-15 RX ORDER — MORPHINE SULFATE 2 MG/ML
2 INJECTION, SOLUTION INTRAMUSCULAR; INTRAVENOUS
Status: DISCONTINUED | OUTPATIENT
Start: 2024-07-15 | End: 2024-07-26 | Stop reason: HOSPADM

## 2024-07-15 RX ORDER — 0.9 % SODIUM CHLORIDE 0.9 %
500 INTRAVENOUS SOLUTION INTRAVENOUS ONCE
Status: COMPLETED | OUTPATIENT
Start: 2024-07-15 | End: 2024-07-15

## 2024-07-15 RX ORDER — NOREPINEPHRINE BITARTRATE 0.06 MG/ML
INJECTION, SOLUTION INTRAVENOUS
Status: COMPLETED
Start: 2024-07-15 | End: 2024-07-15

## 2024-07-15 RX ORDER — INSULIN LISPRO 100 [IU]/ML
0-8 INJECTION, SOLUTION INTRAVENOUS; SUBCUTANEOUS
Status: DISCONTINUED | OUTPATIENT
Start: 2024-07-15 | End: 2024-07-26 | Stop reason: HOSPADM

## 2024-07-15 RX ORDER — MIDODRINE HYDROCHLORIDE 5 MG/1
10 TABLET ORAL
Status: DISCONTINUED | OUTPATIENT
Start: 2024-07-15 | End: 2024-07-16

## 2024-07-15 RX ORDER — FENTANYL CITRATE 50 UG/ML
INJECTION, SOLUTION INTRAMUSCULAR; INTRAVENOUS PRN
Status: DISCONTINUED | OUTPATIENT
Start: 2024-07-15 | End: 2024-07-15 | Stop reason: HOSPADM

## 2024-07-15 RX ORDER — ACETAMINOPHEN 325 MG/1
650 TABLET ORAL EVERY 4 HOURS PRN
Status: DISCONTINUED | OUTPATIENT
Start: 2024-07-15 | End: 2024-07-26 | Stop reason: HOSPADM

## 2024-07-15 RX ORDER — NOREPINEPHRINE BITARTRATE 0.06 MG/ML
1-100 INJECTION, SOLUTION INTRAVENOUS CONTINUOUS
Status: DISCONTINUED | OUTPATIENT
Start: 2024-07-15 | End: 2024-07-16

## 2024-07-15 RX ORDER — SODIUM CHLORIDE 0.9 % (FLUSH) 0.9 %
5-40 SYRINGE (ML) INJECTION PRN
Status: DISCONTINUED | OUTPATIENT
Start: 2024-07-15 | End: 2024-07-26 | Stop reason: HOSPADM

## 2024-07-15 RX ORDER — OXYCODONE HYDROCHLORIDE AND ACETAMINOPHEN 5; 325 MG/1; MG/1
2 TABLET ORAL EVERY 4 HOURS PRN
Status: DISCONTINUED | OUTPATIENT
Start: 2024-07-15 | End: 2024-07-16

## 2024-07-15 RX ORDER — SODIUM CHLORIDE 9 MG/ML
INJECTION, SOLUTION INTRAVENOUS PRN
Status: DISCONTINUED | OUTPATIENT
Start: 2024-07-15 | End: 2024-07-26 | Stop reason: HOSPADM

## 2024-07-15 RX ORDER — SODIUM CHLORIDE 9 MG/ML
INJECTION, SOLUTION INTRAVENOUS CONTINUOUS
Status: DISCONTINUED | OUTPATIENT
Start: 2024-07-15 | End: 2024-07-16

## 2024-07-15 RX ORDER — INSULIN LISPRO 100 [IU]/ML
0-4 INJECTION, SOLUTION INTRAVENOUS; SUBCUTANEOUS NIGHTLY
Status: DISCONTINUED | OUTPATIENT
Start: 2024-07-15 | End: 2024-07-26 | Stop reason: HOSPADM

## 2024-07-15 RX ORDER — MIDAZOLAM HYDROCHLORIDE 1 MG/ML
INJECTION INTRAMUSCULAR; INTRAVENOUS PRN
Status: DISCONTINUED | OUTPATIENT
Start: 2024-07-15 | End: 2024-07-15 | Stop reason: HOSPADM

## 2024-07-15 RX ORDER — SODIUM CHLORIDE 0.9 % (FLUSH) 0.9 %
5-40 SYRINGE (ML) INJECTION EVERY 12 HOURS SCHEDULED
Status: DISCONTINUED | OUTPATIENT
Start: 2024-07-15 | End: 2024-07-26 | Stop reason: HOSPADM

## 2024-07-15 RX ORDER — PHENYLEPHRINE HCL IN 0.9% NACL 1 MG/10 ML
SYRINGE (ML) INTRAVENOUS PRN
Status: DISCONTINUED | OUTPATIENT
Start: 2024-07-15 | End: 2024-07-15 | Stop reason: HOSPADM

## 2024-07-15 RX ORDER — GLUCAGON 1 MG/ML
1 KIT INJECTION PRN
Status: DISCONTINUED | OUTPATIENT
Start: 2024-07-15 | End: 2024-07-26 | Stop reason: HOSPADM

## 2024-07-15 RX ORDER — DEXTROSE MONOHYDRATE 100 MG/ML
INJECTION, SOLUTION INTRAVENOUS CONTINUOUS PRN
Status: DISCONTINUED | OUTPATIENT
Start: 2024-07-15 | End: 2024-07-26 | Stop reason: HOSPADM

## 2024-07-15 RX ORDER — OXYCODONE HYDROCHLORIDE AND ACETAMINOPHEN 5; 325 MG/1; MG/1
1 TABLET ORAL EVERY 4 HOURS PRN
Status: DISCONTINUED | OUTPATIENT
Start: 2024-07-15 | End: 2024-07-16

## 2024-07-15 RX ORDER — NOREPINEPHRINE BITARTRATE 0.06 MG/ML
1-100 INJECTION, SOLUTION INTRAVENOUS CONTINUOUS
Status: DISCONTINUED | OUTPATIENT
Start: 2024-07-15 | End: 2024-07-15 | Stop reason: SDUPTHER

## 2024-07-15 RX ORDER — HEPARIN SODIUM 1000 [USP'U]/ML
INJECTION, SOLUTION INTRAVENOUS; SUBCUTANEOUS PRN
Status: DISCONTINUED | OUTPATIENT
Start: 2024-07-15 | End: 2024-07-15 | Stop reason: HOSPADM

## 2024-07-15 RX ORDER — DOPAMINE HYDROCHLORIDE 160 MG/100ML
1-20 INJECTION, SOLUTION INTRAVENOUS CONTINUOUS
Status: DISCONTINUED | OUTPATIENT
Start: 2024-07-15 | End: 2024-07-16

## 2024-07-15 RX ORDER — WARFARIN SODIUM 5 MG/1
5 TABLET ORAL DAILY
Status: DISCONTINUED | OUTPATIENT
Start: 2024-07-15 | End: 2024-07-15

## 2024-07-15 RX ORDER — HEPARIN SODIUM 10000 [USP'U]/100ML
5-30 INJECTION, SOLUTION INTRAVENOUS CONTINUOUS
Status: DISCONTINUED | OUTPATIENT
Start: 2024-07-15 | End: 2024-07-15

## 2024-07-15 RX ADMIN — PANTOPRAZOLE SODIUM 40 MG: 40 TABLET, DELAYED RELEASE ORAL at 05:19

## 2024-07-15 RX ADMIN — NOREPINEPHRINE BITARTRATE 3 MCG/MIN: 0.06 INJECTION, SOLUTION INTRAVENOUS at 20:06

## 2024-07-15 RX ADMIN — HEPARIN SODIUM 15 UNITS/KG/HR: 10000 INJECTION, SOLUTION INTRAVENOUS at 04:34

## 2024-07-15 RX ADMIN — PROTAMINE SULFATE 50 MG: 10 INJECTION, SOLUTION INTRAVENOUS at 16:17

## 2024-07-15 RX ADMIN — SODIUM CHLORIDE: 9 INJECTION, SOLUTION INTRAVENOUS at 23:06

## 2024-07-15 RX ADMIN — SODIUM CHLORIDE: 9 INJECTION, SOLUTION INTRAVENOUS at 20:10

## 2024-07-15 RX ADMIN — SODIUM CHLORIDE 500 ML: 9 INJECTION, SOLUTION INTRAVENOUS at 15:30

## 2024-07-15 RX ADMIN — Medication 10 ML: at 21:51

## 2024-07-15 RX ADMIN — Medication 3 MCG/MIN: at 20:06

## 2024-07-15 RX ADMIN — SODIUM CHLORIDE: 9 INJECTION, SOLUTION INTRAVENOUS at 12:08

## 2024-07-15 RX ADMIN — OXYCODONE HYDROCHLORIDE AND ACETAMINOPHEN 2 TABLET: 5; 325 TABLET ORAL at 21:16

## 2024-07-15 RX ADMIN — ASPIRIN 81 MG 81 MG: 81 TABLET ORAL at 08:19

## 2024-07-15 RX ADMIN — ROSUVASTATIN 20 MG: 20 TABLET, FILM COATED ORAL at 21:17

## 2024-07-15 RX ADMIN — Medication 10 ML: at 08:22

## 2024-07-15 ASSESSMENT — PAIN SCALES - GENERAL
PAINLEVEL_OUTOF10: 0
PAINLEVEL_OUTOF10: 3
PAINLEVEL_OUTOF10: 0
PAINLEVEL_OUTOF10: 8
PAINLEVEL_OUTOF10: 0

## 2024-07-15 ASSESSMENT — PAIN DESCRIPTION - LOCATION: LOCATION: LEG;NECK

## 2024-07-15 NOTE — FLOWSHEET NOTE
07/15/24 1930   Urine Assessment   Urinary Status Has not voided   Bladder Scan Volume (mL) 7 mL   $ Bladder scan $ Yes     Bladder scan performed, minimal urine noted.

## 2024-07-15 NOTE — DISCHARGE INSTRUCTIONS
Please call and make an appointment with your PCP within 1 week  Please call and make an appointment with Cardiology, pulmonology, orthopedic surgery  Please do not restart taking your Bystolic, Procardia XL, candesartan-hydrochlorothiazide until after you have seen your cardiologist  Please take all your medications as prescribed including any new ones on discharge

## 2024-07-15 NOTE — PROGRESS NOTES
[facility] Daily Progress Note      Admit Date:  7/14/2024    Chief Complaint   Patient presents with    Chest Pain     Pt had onset of chest pain/pressure that onset about 30 minutes ago. 911 called. No cardiac hx except hypertension. Had syncopal episode while walking to the ambulance. CPR was given for approx 1 minute.         Subjective:  Mr. Kaur denies exertional chest pain, SOB/QUILES, PND, palpitations, light-headedness, or edema. Wearing o2. Pt states he has L leg pain from falling.    Objective:   /82   Pulse 87   Temp 98 °F (36.7 °C) (Temporal)   Resp 18   Ht 1.829 m (6')   Wt 106 kg (233 lb 11 oz)   SpO2 96%   BMI 31.69 kg/m²     Intake/Output Summary (Last 24 hours) at 7/15/2024 0808  Last data filed at 7/15/2024 0622  Gross per 24 hour   Intake 893.24 ml   Output 300 ml   Net 593.24 ml       TELEMETRY: Sinus     Physical Exam:  General:  Awake, alert, oriented x 3, NAD  Skin:  Warm and dry  Neck:  JVD flat  Chest:  normal air entry  Cardiovascular:  RRR S1S2, no S3, no mrmr  Abdomen:  Soft, ND, NT, No HSM  Extremities:  2+ edema    Medications:    sodium chloride flush  5-40 mL IntraVENous 2 times per day    aspirin  81 mg Oral Daily    pantoprazole  40 mg Oral QAM AC    rosuvastatin  20 mg Oral Nightly    sodium chloride flush  5-40 mL IntraVENous 2 times per day      heparin (PORCINE) Infusion 12 Units/kg/hr (07/15/24 0622)    sodium chloride      sodium chloride      sodium chloride 50 mL/hr at 07/15/24 0622     heparin (porcine), heparin (porcine), perflutren lipid microspheres, sodium chloride flush, sodium chloride, sodium chloride flush, sodium chloride, potassium chloride **OR** potassium alternative oral replacement **OR** potassium chloride, magnesium sulfate, ondansetron **OR** ondansetron, polyethylene glycol, acetaminophen **OR** acetaminophen, traMADol    Lab Data:  CBC:   Recent Labs     07/14/24  1321 07/15/24  0430   WBC 10.7 10.3   HGB 12.7* 12.0*   HCT

## 2024-07-15 NOTE — SEDATION DOCUMENTATION
Brief Pre-Op Note/Sedation Assessment      Marvin Kaur  1940  [id number]  7:52 AM    Planned Procedure: Cardiac Catheterization Procedure  Post Procedure Plan: Return to same level of care  Consent: I have discussed with the patient and/or the patient representative the indication, alternatives, and the possible risks and/or complications of the planned procedure and the anesthesia methods. The patient and/or patient representative appear to understand and agree to proceed.        Chief Complaint:   Dyspnea      Indications for Cath Procedure:  Presentation:  Syncope  2.  Anginal Classification within 2 weeks:  No symptoms  3.  Angina Symptoms Assessment:  Asymptomatic  4.  Heart Failure Class within last 2 weeks:  Yes:  Heart Failure Type: Unknown Severity:  Class IV - Symptoms of HF at rest  5.  Cardiovascular Instability:  Yes:  Hemodynamic instability (not cardiogenic shock)    Prior Ischemic Workup/Eval:  Pre-Procedural Medications: Yes: Aspirin  2.   Stress Test Completed?  No    Does Patient need surgery?  Cath Valve Surgery:  No    Pre-Procedure Medical History:  Vital Signs:  /82   Pulse 87   Temp 98 °F (36.7 °C) (Temporal)   Resp 18   Ht 1.829 m (6')   Wt 106 kg (233 lb 11 oz)   SpO2 96%   BMI 31.69 kg/m²     Allergies:  No Known Allergies  Medications:    Current Facility-Administered Medications   Medication Dose Route Frequency Provider Last Rate Last Admin    heparin (porcine) injection 9,100 Units  80 Units/kg IntraVENous PRN Yordan Tinajero, DO        heparin (porcine) injection 4,500 Units  40 Units/kg IntraVENous PRN Yordan Tinajero, DO        heparin 25,000 units in dextrose 5% 250 mL (premix) infusion  5-30 Units/kg/hr IntraVENous Continuous Patti Juarez MD 13.6 mL/hr at 07/15/24 0622 12 Units/kg/hr at 07/15/24 0622    perflutren lipid microspheres (DEFINITY) injection 1.5 mL  1.5 mL IntraVENous ONCE PRN Ziggy Hendrix MD        sodium chloride flush 0.9 % injection  5-40 mL  5-40 mL IntraVENous 2 times per day Ziggy Hendrix MD   10 mL at 07/14/24 2211    sodium chloride flush 0.9 % injection 5-40 mL  5-40 mL IntraVENous PRN Ziggy Hendrix MD        0.9 % sodium chloride infusion   IntraVENous PRN Ziggy Hendrix MD        aspirin chewable tablet 81 mg  81 mg Oral Daily Patti Juarez MD        pantoprazole (PROTONIX) tablet 40 mg  40 mg Oral QAM AC Patti Juarez MD   40 mg at 07/15/24 0519    rosuvastatin (CRESTOR) tablet 20 mg  20 mg Oral Nightly Patti Juarez MD   20 mg at 07/14/24 2210    sodium chloride flush 0.9 % injection 5-40 mL  5-40 mL IntraVENous 2 times per day Patti Juarez MD   10 mL at 07/14/24 2211    sodium chloride flush 0.9 % injection 5-40 mL  5-40 mL IntraVENous PRN Patti Juarez MD        0.9 % sodium chloride infusion   IntraVENous PRN Patti Juarez MD        potassium chloride (KLOR-CON M) extended release tablet 40 mEq  40 mEq Oral PRN Patti Juarez MD        Or    potassium bicarb-citric acid (EFFER-K) effervescent tablet 40 mEq  40 mEq Oral PRN Patti Juarez MD        Or    potassium chloride 10 mEq/100 mL IVPB (Peripheral Line)  10 mEq IntraVENous PRN Patti Juarez MD        magnesium sulfate 2000 mg in 50 mL IVPB premix  2,000 mg IntraVENous PRN Patti Juarez MD        ondansetron (ZOFRAN-ODT) disintegrating tablet 4 mg  4 mg Oral Q8H PRN Patti Juarez MD        Or    ondansetron (ZOFRAN) injection 4 mg  4 mg IntraVENous Q6H PRN Patti Juarez MD        polyethylene glycol (GLYCOLAX) packet 17 g  17 g Oral Daily PRN Patti Juarez MD        acetaminophen (TYLENOL) tablet 650 mg  650 mg Oral Q6H PRN Patti Juarez MD        Or    acetaminophen (TYLENOL) suppository 650 mg  650 mg Rectal Q6H PRN Patti Juarez MD        traMADol (ULTRAM) tablet 50 mg  50 mg Oral Q6H PRN Patti Juarez MD        0.45 % sodium chloride infusion   IntraVENous Continuous Ziggy Hendrix MD 50 mL/hr at 07/15/24 0622 Rate Verify at 07/15/24 0622       Past

## 2024-07-15 NOTE — PROGRESS NOTES
Back from cath lab s/p Aspiration thrombectomy  Some oozing noted R groi  Manual pressure applied x 10 in  Pressure dressing applied  Update given to family.

## 2024-07-15 NOTE — PROGRESS NOTES
EVENTS:7.14 chest pain called 911. Pt lost consciousness while walking to ED. Unable to palpate a pulse. CPR x 1min. ROSC obtained.  Large thrombus R and  left PA and thrombus estending RUL And LANE pulm arteries.  7.15 aspiration thrombectomy w Dr Mills      Todays wt:105.7      NEURO:Awake alert    CARDIAC:NSR  Asa 81mgs given  K 3.8 trop 61  Probnp 1182    RESP:02 4l  Pcxr unremarkable    GI:npo     SKIN:intact  Bilateral 2+ pitting edema      MUSCUSKELETAL:wnl        LINES:bilatera PIV      GTTS:hep gtt      See flowsheets for details, VS, MAR for meds and titration.

## 2024-07-15 NOTE — PROGRESS NOTES
Called by nursing for continuous oozing from R groin access site requiring manual pressure all day. Also patient has hypotension. Fem stop placed. Hemostasis achieved. Ordered protamine. Hgb dropped from 12 to 9. Levo started. 2 units of blood transfused. CT abd/pelvis showed no RP bleed. Pulmonary placed central line. OK to cont pressors but patient needs volume replacement to due persistent RV dysfunction post thrombectomy.

## 2024-07-15 NOTE — H&P
V2.0  History and Physical      Name:  Marvin Kaur /Age/Sex: 1940  (84 y.o. male)   MRN & CSN:  [id number] & [id number] Encounter Date/Time: 2024 9:35 PM EDT   Location:  [id number] PCP: Heath Hua MD       Hospital Day: 1    Assessment and Plan:   Marvin Kaur is a 84 y.o. male with a pmh of  who presents with Acute saddle pulmonary embolism with acute cor pulmonale (HCC)    Hospital Problems             Last Modified POA    * (Principal) pulmonary emboli with cor 2024 Yes       Plan:  B/l PE with cor pulmonale  On heparin drip  Admitted to CVU  ECHO ordered   LE dopplers pending  Cardiology has been consulted     Acute Respiratory failure on 10 L high flow    Hypotension   BP responding to fluid resuscitation  Antihypertensives on hold     Hypomag   Repleted  Hyponatremia   Cont to monitor         Diet Diet NPO Exceptions are: Sips of Water with Meds   DVT Prophylaxis [] Lovenox, []  Heparin, [] SCDs, [] Ambulation,  [] Eliquis, [] Xarelto, [] Coumadin   Code Status Full Code   Surrogate Decision Maker/ POA      Personally reviewed Lab Studies and Imaging     EKG interpreted personally and results     History of Present Illness:     Chief Complaint:   Marvin Kaur is a 84 y.o. male with pmh of  DVT, HTN , Pulmonary HTN c/o chest pain and shortness of breath. Also reports lower extremity edema. The pt had an asystolic episode . The pt received resuscitation with ROSC.   ED work up showed   cT chest showed  B/l PE with cor pulmonale  He is hypotensive. BP responding to fluid resuscitation     Review of Systems:        Pertinent positives and negatives discussed in HPI     Objective:   No intake or output data in the 24 hours ending 24   Vitals:   Vitals:    24 1652 24 1700 24 1800 24 1900   BP:  101/72 111/77 99/70   Pulse: 95 95 93 86   Resp:       Temp:       TempSrc:       SpO2:  97% 99% 98%   Weight:       Height:      performed after the administration of intravenous contrast.  Multiplanar reformatted images are provided for review.  MIP images are provided for review. Automated exposure control, iterative reconstruction, and/or weight based adjustment of the mA/kV was utilized to reduce the radiation dose to as low as reasonably achievable. COMPARISON: None. HISTORY: ORDERING SYSTEM PROVIDED HISTORY: Respiratory failure, chest pain, loss of consciousness with 1 minute of CPR performed, history of DVT 25 years ago, eval for pulmonary embolism, iatrogenic injury from CPR TECHNOLOGIST PROVIDED HISTORY: Reason for exam:->Respiratory failure, chest pain, loss of consciousness with 1 minute of CPR performed, history of DVT 25 years ago, eval for pulmonary embolism, iatrogenic injury from CPR Additional Contrast?->1 Reason for Exam: Respiratory failure, chest pain, loss of consciousness with 1 minute of CPR performed, history of DVT 25 years ago, eval for pulmonary embolism, iatrogenic injury from CPR FINDINGS: Pulmonary Arteries: There is a large thrombus burden extending into the right and left main pulmonary artery (no saddle thrombus) as well as thrombus extending into both the right and left upper and lower lobe pulmonary intersegmental arteries. Mediastinum: No evidence of mediastinal lymphadenopathy.  There is slight prominence to the right ventricle and atrium compared to the left.  No pericardial effusion.  Minimal adenopathy within the mediastinum. Lungs/pleura: Tiny left pleural effusion.  No lobar infiltrate effusion or pneumothorax. Upper Abdomen: Limited images of the upper abdomen are unremarkable. Soft Tissues/Bones: Moderate degenerative changes of the spine.     Large thrombus burden as detailed above. Slight right ventricular strain with prominence to the ventricle and atrium. No obvious lobar infiltrate effusion.  Tiny left pleural effusion. Results communicated to the communication center.     XR CHEST

## 2024-07-15 NOTE — PROGRESS NOTES
V2.0    [facility] [place]      Name:  Marvin Kaur /Age/Sex: 1940  (84 y.o. male)   MRN & CSN:  [id number] & [id number] Encounter Date/Time: 7/15/2024 5:50 PM EDT   Location:  [facility] PCP: Heath Hua MD     Attending:Lyly Vyas MD       Hospital Day: 2    Assessment and Recommendations   Marvin Kaur is a 84 y.o. male with pmh of left leg DVT ~20 years ago, HTN, Pulmonary HTN, LALITA on CPAP presented with c/o chest pain, shortness of breath and worsening lower extremity edema for the past several months and a syncopal episode  the morning of presentation. En route to the hospital by EMS, he went into asystole for a few minutes requiring CPR with ROSC achieved.    In the ED he was found to be hypoxic requiring NRBM.   Also noted to be hypotensive 72/50 .  CTA showed massive bilateral PE with cor pulmonale.  Patient was started on heparin drip and cardiology consulted.      Plan:     Massive bilateral pulmonary emboli with cor pulmonale:  Acute hypoxic respiratory failure:  Syncope and asystole cardiac arrest:  Venous Doppler negative for DVT.  TTE 7/15/2024: LVEF 60 to 65%.  Dilated RV with akinetic wall. (Todd's sign +)  Cardiology consult appreciated.  s/p mechanical thrombectomy 7/15/2024.  Currently on heparin drip.  Coumadin held.  Recovery period complicated by persistent bleeding from the right groin with associated hypotension and ABLA.  Wean down oxygen as tolerated.  Follow-up hypercoagulable workup.      Acute blood loss anemia:   Transfused 1 unit PRBC; monitor H&H every 6 hours  Right groin FemoStop in place.  Follow-up CT abdomen and pelvis to rule out retroperitoneal bleed.      Shock: Obstructive vs. Hypovolemic  Requiring pressors; Dano-Synephrine and Levophed  Monitor blood pressures  Intensivist consult appreciated.          Diet ADULT DIET; Regular; Low Fat/Low Chol/High Fiber/2 gm Na   DVT Prophylaxis [] Lovenox, [x]  Heparin, [] SCDs, [] Ambulation,  []

## 2024-07-15 NOTE — CONSULTS
ventricle and atrium compared to the left.  No  pericardial effusion.  Minimal adenopathy within the mediastinum.     Lungs/pleura: Tiny left pleural effusion.  No lobar infiltrate effusion or  pneumothorax.     Upper Abdomen: Limited images of the upper abdomen are unremarkable.     Soft Tissues/Bones: Moderate degenerative changes of the spine.     IMPRESSION:  Large thrombus burden as detailed above.     Slight right ventricular strain with prominence to the ventricle and atrium.     No obvious lobar infiltrate effusion.  Tiny left pleural effusion.     Results communicated to the communication center.    Problem List:   Massive PE  Syncope and asystole cardiac arrest  Acute hypoxic respiratory failure  Elevated PSA  Assessment/Plan:     Patient presents with syncope and asystole cardiac arrest related to massive PE.  Personally reviewed CTA chest which shows bilateral PE with saddle embolus and evidence of RV strain.  Patient is now status post emergent mechanical thrombectomy by cardiology today.  Currently hemodynamically stable.    Acute hypoxic respiratory failure, required nonrebreather on arrival and this is related to obstructive shock related hypoxia.  Wean down O2 as tolerated-currently requiring 2 to 4 L O2.    2D echocardiogram and lower extremity Dopplers are awaited.    Sent for hypercoagulable workup.  Patient states that he has previously had colonoscopy without issues reported.  Prior colonoscopy from 2018 showed cecal/ascending colon/rectal polyps.  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  Will repeat PSA.    Hemodynamically stable, not requiring vasopressors.    PE most likely related to poor mobility, no other risk factors.  Given the severity of PE leading to syncope/cardiac arrest, would recommend lifelong anticoagulation.  Currently on IV heparin drip.    Rocky Simpson MD    Critical care time 70 Min excluding  procedures    Addendum: Acute drop in hemoglobin noted-12.7>9.3.  Patient is now pale and hypotensive.  Stat CT abdomen/pelvis does not show any clear evidence of RP bleed-awaiting confirmation with radiology report. ?  Perioperative blood loss.  Only minor ooze noted at the puncture site, FemoStop in place.  Patient received protamine 50 mg IV x 1.  Plans for 2 units of PRBC.  Patient requiring norepinephrine drip.  2D echo revealed severe RV dilation and pulmonary hypertension, add dopamine 2.5 mcg.  Hold anticoagulation for now.  Discussed with cardiology.

## 2024-07-15 NOTE — CONSULTS
Clinical Pharmacy Note: Pharmacy to Dose Warfarin    Pharmacy consulted by Dr. Mills to dose warfarin.    Marvin Kaur is a 84 y.o. male  is receiving warfarin for indication: submassive PE. Patient unable to afford $160/month copay for Eliquis at this time.    INR Goal Range: 2-3  Prior to admission warfarin dosing regimen: new start  INR today:   Lab Results   Component Value Date/Time    INR 1.04 07/14/2024 01:21 PM       Assessment/Plan:  Possible concomitant drug-drug interactions include: heparin   Based on today's assessment, dose warfarin 5 mg tonight.  Will continue heparin drip to bridge until INR >2.  Daily INR is ordered. Pharmacy will continue to monitor and make adjustments to regimen as necessary.     Thank you for the consult,     Maria A Wu, PharmD, BCPS  Clinical Pharmacist  [id number]

## 2024-07-15 NOTE — OP NOTE
Central Venous Catheter Insertion Procedure Note    Procedure: Insertion of Central Venous Catheter    Indications:  Acute blood loss anemia    Procedure Details   Informed consent was obtained for the procedure, including sedation.  Risks of lung perforation, hemorrhage, arrhythmia, and adverse drug reaction were discussed.     Under sterile conditions, the skin above the on the right internal jugular vein was prepped using chlorhexine and covered with a sterile drape. 5cc 1%Local anesthesia was applied to the skin and subcutaneous tissues over the site. A 18-gauge needle was inserted into the desired vein under ultrasound guidance. A guide wire was then passed easily through the needle and subsequently the needle was withdrawn. There were no arrhythmias. A 7.0 Portuguese triple-lumen central venous catheter was then inserted into the desired vein over the guide wire. The guidewire was removed and the catheter was sutured into place using 2-0 mersilk.    Findings:  There were no changes to vital signs. Catheter was flushed with 10 cc NS. Patient did tolerate procedure well.    EBL: 3cc    Recommendations:  CXR ordered to verify placement.

## 2024-07-15 NOTE — CONSULTS
Pharmacy to check patient copay for  Eliquis/Xarelto    Copay for patient will be: $168.86/month for Eliquis and $161.82 for Xarelto     Pharmacy will continue to follow the decision on therapy and  the patient if appropriate.       Maria A Wu, PharmD, BCPS  Clinical Pharmacist  [id number]

## 2024-07-15 NOTE — PLAN OF CARE
Problem: Safety - Adult  Goal: Free from fall injury  7/14/2024 2313 by Melinda Freeman RN  Outcome: Progressing  7/14/2024 2313 by Melinda Freeman RN  Outcome: Progressing     Problem: Pain  Goal: Verbalizes/displays adequate comfort level or baseline comfort level  7/14/2024 2313 by Melinda Freeman RN  Outcome: Progressing  7/14/2024 2313 by Melinda Freeman RN  Outcome: Progressing     Problem: Discharge Planning  Goal: Discharge to home or other facility with appropriate resources  Outcome: Progressing     Problem: Skin/Tissue Integrity  Goal: Absence of new skin breakdown  Description: 1.  Monitor for areas of redness and/or skin breakdown  2.  Assess vascular access sites hourly  3.  Every 4-6 hours minimum:  Change oxygen saturation probe site  4.  Every 4-6 hours:  If on nasal continuous positive airway pressure, respiratory therapy assess nares and determine need for appliance change or resting period.  Outcome: Progressing     Problem: Chronic Conditions and Co-morbidities  Goal: Patient's chronic conditions and co-morbidity symptoms are monitored and maintained or improved  Outcome: Progressing     Problem: Respiratory - Adult  Goal: Achieves optimal ventilation and oxygenation  Outcome: Progressing     Problem: Cardiovascular - Adult  Goal: Maintains optimal cardiac output and hemodynamic stability  Outcome: Progressing     Problem: Metabolic/Fluid and Electrolytes - Adult  Goal: Electrolytes maintained within normal limits  Outcome: Progressing  Goal: Hemodynamic stability and optimal renal function maintained  Outcome: Progressing     Problem: Hematologic - Adult  Goal: Maintains hematologic stability  Outcome: Progressing

## 2024-07-16 ENCOUNTER — APPOINTMENT (OUTPATIENT)
Dept: GENERAL RADIOLOGY | Age: 84
End: 2024-07-16
Payer: MEDICARE

## 2024-07-16 PROBLEM — D62 ACUTE BLOOD LOSS ANEMIA: Status: ACTIVE | Noted: 2024-07-16

## 2024-07-16 LAB
ANION GAP SERPL CALCULATED.3IONS-SCNC: 9 MMOL/L (ref 3–16)
ANTI-XA UNFRAC HEPARIN: <0.1 IU/ML (ref 0.3–0.7)
ANTI-XA UNFRAC HEPARIN: >1.1 IU/ML (ref 0.3–0.7)
ANTI-XA UNFRAC HEPARIN: >1.1 IU/ML (ref 0.3–0.7)
APTT BLD: 30.3 SEC (ref 22.1–36.4)
BUN SERPL-MCNC: 21 MG/DL (ref 7–20)
CALCIUM SERPL-MCNC: 8.1 MG/DL (ref 8.3–10.6)
CHLORIDE SERPL-SCNC: 103 MMOL/L (ref 99–110)
CO2 SERPL-SCNC: 23 MMOL/L (ref 21–32)
CREAT SERPL-MCNC: 1.2 MG/DL (ref 0.8–1.3)
DEPRECATED RDW RBC AUTO: 15.8 % (ref 12.4–15.4)
DEPRECATED RDW RBC AUTO: 15.9 % (ref 12.4–15.4)
EST. AVERAGE GLUCOSE BLD GHB EST-MCNC: 114 MG/DL
FERRITIN SERPL IA-MCNC: 160.6 NG/ML (ref 30–400)
FOLATE SERPL-MCNC: >20 NG/ML (ref 4.78–24.2)
GFR SERPLBLD CREATININE-BSD FMLA CKD-EPI: 59 ML/MIN/{1.73_M2}
GLUCOSE BLD-MCNC: 112 MG/DL (ref 70–99)
GLUCOSE BLD-MCNC: 134 MG/DL (ref 70–99)
GLUCOSE BLD-MCNC: 163 MG/DL (ref 70–99)
GLUCOSE BLD-MCNC: 167 MG/DL (ref 70–99)
GLUCOSE SERPL-MCNC: 116 MG/DL (ref 70–99)
HBA1C MFR BLD: 5.6 %
HCT VFR BLD AUTO: 23.1 % (ref 40.5–52.5)
HCT VFR BLD AUTO: 26.1 % (ref 40.5–52.5)
HGB BLD-MCNC: 7.8 G/DL (ref 13.5–17.5)
HGB BLD-MCNC: 8.8 G/DL (ref 13.5–17.5)
INR PPP: 1.1 (ref 0.85–1.15)
IRON SATN MFR SERPL: 23 % (ref 20–50)
IRON SERPL-MCNC: 44 UG/DL (ref 59–158)
LACTATE BLDV-SCNC: 0.9 MMOL/L (ref 0.4–2)
MCH RBC QN AUTO: 33.1 PG (ref 26–34)
MCH RBC QN AUTO: 33.3 PG (ref 26–34)
MCHC RBC AUTO-ENTMCNC: 33.7 G/DL (ref 31–36)
MCHC RBC AUTO-ENTMCNC: 34 G/DL (ref 31–36)
MCV RBC AUTO: 98 FL (ref 80–100)
MCV RBC AUTO: 98.3 FL (ref 80–100)
PERFORMED ON: ABNORMAL
PLATELET # BLD AUTO: 171 K/UL (ref 135–450)
PLATELET # BLD AUTO: 186 K/UL (ref 135–450)
PMV BLD AUTO: 6.7 FL (ref 5–10.5)
PMV BLD AUTO: 7.1 FL (ref 5–10.5)
POC ACT LR: 292 SEC
POC ACT LR: 295 SEC
POTASSIUM SERPL-SCNC: 3.7 MMOL/L (ref 3.5–5.1)
PROTHROMBIN TIME: 14.4 SEC (ref 11.9–14.9)
PSA SERPL DL<=0.01 NG/ML-MCNC: 6.54 NG/ML (ref 0–4)
RBC # BLD AUTO: 2.35 M/UL (ref 4.2–5.9)
RBC # BLD AUTO: 2.66 M/UL (ref 4.2–5.9)
SODIUM SERPL-SCNC: 135 MMOL/L (ref 136–145)
TIBC SERPL-MCNC: 188 UG/DL (ref 260–445)
VIT B12 SERPL-MCNC: 370 PG/ML (ref 211–911)
WBC # BLD AUTO: 8.4 K/UL (ref 4–11)
WBC # BLD AUTO: 9.6 K/UL (ref 4–11)

## 2024-07-16 PROCEDURE — 6370000000 HC RX 637 (ALT 250 FOR IP): Performed by: FAMILY MEDICINE

## 2024-07-16 PROCEDURE — 83036 HEMOGLOBIN GLYCOSYLATED A1C: CPT

## 2024-07-16 PROCEDURE — 2000000000 HC ICU R&B

## 2024-07-16 PROCEDURE — 83605 ASSAY OF LACTIC ACID: CPT

## 2024-07-16 PROCEDURE — 85730 THROMBOPLASTIN TIME PARTIAL: CPT

## 2024-07-16 PROCEDURE — 84153 ASSAY OF PSA TOTAL: CPT

## 2024-07-16 PROCEDURE — 85027 COMPLETE CBC AUTOMATED: CPT

## 2024-07-16 PROCEDURE — 99233 SBSQ HOSP IP/OBS HIGH 50: CPT | Performed by: INTERNAL MEDICINE

## 2024-07-16 PROCEDURE — 85520 HEPARIN ASSAY: CPT

## 2024-07-16 PROCEDURE — 73503 X-RAY EXAM HIP UNI 4/> VIEWS: CPT

## 2024-07-16 PROCEDURE — 85610 PROTHROMBIN TIME: CPT

## 2024-07-16 PROCEDURE — 36415 COLL VENOUS BLD VENIPUNCTURE: CPT

## 2024-07-16 PROCEDURE — 6360000002 HC RX W HCPCS: Performed by: INTERNAL MEDICINE

## 2024-07-16 PROCEDURE — 6370000000 HC RX 637 (ALT 250 FOR IP): Performed by: INTERNAL MEDICINE

## 2024-07-16 PROCEDURE — 80048 BASIC METABOLIC PNL TOTAL CA: CPT

## 2024-07-16 PROCEDURE — 51798 US URINE CAPACITY MEASURE: CPT

## 2024-07-16 PROCEDURE — 2580000003 HC RX 258: Performed by: INTERNAL MEDICINE

## 2024-07-16 PROCEDURE — 73590 X-RAY EXAM OF LOWER LEG: CPT

## 2024-07-16 PROCEDURE — 2580000003 HC RX 258: Performed by: FAMILY MEDICINE

## 2024-07-16 PROCEDURE — 73560 X-RAY EXAM OF KNEE 1 OR 2: CPT

## 2024-07-16 PROCEDURE — 99291 CRITICAL CARE FIRST HOUR: CPT | Performed by: INTERNAL MEDICINE

## 2024-07-16 PROCEDURE — 6370000000 HC RX 637 (ALT 250 FOR IP): Performed by: NURSE PRACTITIONER

## 2024-07-16 RX ORDER — FUROSEMIDE 10 MG/ML
20 INJECTION INTRAMUSCULAR; INTRAVENOUS 2 TIMES DAILY
Status: DISCONTINUED | OUTPATIENT
Start: 2024-07-16 | End: 2024-07-17

## 2024-07-16 RX ORDER — WARFARIN SODIUM 5 MG/1
5 TABLET ORAL DAILY
Status: DISCONTINUED | OUTPATIENT
Start: 2024-07-16 | End: 2024-07-18 | Stop reason: DRUGHIGH

## 2024-07-16 RX ORDER — HEPARIN SODIUM 1000 [USP'U]/ML
80 INJECTION, SOLUTION INTRAVENOUS; SUBCUTANEOUS PRN
Status: DISCONTINUED | OUTPATIENT
Start: 2024-07-16 | End: 2024-07-18

## 2024-07-16 RX ORDER — OXYCODONE HYDROCHLORIDE 5 MG/1
10 TABLET ORAL EVERY 4 HOURS PRN
Status: DISCONTINUED | OUTPATIENT
Start: 2024-07-16 | End: 2024-07-26 | Stop reason: HOSPADM

## 2024-07-16 RX ORDER — OXYCODONE HYDROCHLORIDE 5 MG/1
5 TABLET ORAL EVERY 4 HOURS PRN
Status: DISCONTINUED | OUTPATIENT
Start: 2024-07-16 | End: 2024-07-26 | Stop reason: HOSPADM

## 2024-07-16 RX ORDER — ACETAMINOPHEN 325 MG/1
650 TABLET ORAL 3 TIMES DAILY
Status: DISCONTINUED | OUTPATIENT
Start: 2024-07-16 | End: 2024-07-26 | Stop reason: HOSPADM

## 2024-07-16 RX ORDER — MIDODRINE HYDROCHLORIDE 5 MG/1
10 TABLET ORAL 3 TIMES DAILY PRN
Status: DISCONTINUED | OUTPATIENT
Start: 2024-07-16 | End: 2024-07-26 | Stop reason: HOSPADM

## 2024-07-16 RX ORDER — TIZANIDINE 4 MG/1
4 TABLET ORAL EVERY 6 HOURS PRN
Status: DISCONTINUED | OUTPATIENT
Start: 2024-07-16 | End: 2024-07-26 | Stop reason: HOSPADM

## 2024-07-16 RX ORDER — HEPARIN SODIUM 10000 [USP'U]/100ML
5-30 INJECTION, SOLUTION INTRAVENOUS CONTINUOUS
Status: DISCONTINUED | OUTPATIENT
Start: 2024-07-16 | End: 2024-07-18

## 2024-07-16 RX ORDER — HEPARIN SODIUM 1000 [USP'U]/ML
40 INJECTION, SOLUTION INTRAVENOUS; SUBCUTANEOUS PRN
Status: DISCONTINUED | OUTPATIENT
Start: 2024-07-16 | End: 2024-07-18

## 2024-07-16 RX ADMIN — ASPIRIN 81 MG 81 MG: 81 TABLET ORAL at 08:23

## 2024-07-16 RX ADMIN — Medication 10 ML: at 19:59

## 2024-07-16 RX ADMIN — OXYCODONE HYDROCHLORIDE 5 MG: 5 TABLET ORAL at 13:54

## 2024-07-16 RX ADMIN — FUROSEMIDE 20 MG: 10 INJECTION, SOLUTION INTRAMUSCULAR; INTRAVENOUS at 11:32

## 2024-07-16 RX ADMIN — ACETAMINOPHEN 650 MG: 325 TABLET ORAL at 19:57

## 2024-07-16 RX ADMIN — MIDODRINE HYDROCHLORIDE 10 MG: 5 TABLET ORAL at 11:45

## 2024-07-16 RX ADMIN — Medication 10 ML: at 19:57

## 2024-07-16 RX ADMIN — ROSUVASTATIN 20 MG: 20 TABLET, FILM COATED ORAL at 19:57

## 2024-07-16 RX ADMIN — ACETAMINOPHEN 650 MG: 325 TABLET ORAL at 13:54

## 2024-07-16 RX ADMIN — WARFARIN SODIUM 5 MG: 5 TABLET ORAL at 18:36

## 2024-07-16 RX ADMIN — FUROSEMIDE 20 MG: 10 INJECTION, SOLUTION INTRAMUSCULAR; INTRAVENOUS at 18:36

## 2024-07-16 RX ADMIN — PANTOPRAZOLE SODIUM 40 MG: 40 TABLET, DELAYED RELEASE ORAL at 06:16

## 2024-07-16 RX ADMIN — Medication 10 ML: at 08:24

## 2024-07-16 RX ADMIN — HEPARIN SODIUM 18 UNITS/KG/HR: 10000 INJECTION, SOLUTION INTRAVENOUS at 11:34

## 2024-07-16 RX ADMIN — MIDODRINE HYDROCHLORIDE 10 MG: 5 TABLET ORAL at 08:23

## 2024-07-16 ASSESSMENT — PAIN DESCRIPTION - LOCATION
LOCATION: LEG
LOCATION: LEG
LOCATION: LEG;KNEE

## 2024-07-16 ASSESSMENT — PAIN SCALES - GENERAL
PAINLEVEL_OUTOF10: 0
PAINLEVEL_OUTOF10: 2
PAINLEVEL_OUTOF10: 4
PAINLEVEL_OUTOF10: 6
PAINLEVEL_OUTOF10: 0
PAINLEVEL_OUTOF10: 0
PAINLEVEL_OUTOF10: 4
PAINLEVEL_OUTOF10: 4
PAINLEVEL_OUTOF10: 2
PAINLEVEL_OUTOF10: 2
PAINLEVEL_OUTOF10: 4

## 2024-07-16 ASSESSMENT — PAIN DESCRIPTION - DESCRIPTORS
DESCRIPTORS: ACHING
DESCRIPTORS: TIGHTNESS
DESCRIPTORS: ACHING;TIGHTNESS

## 2024-07-16 ASSESSMENT — PAIN DESCRIPTION - PAIN TYPE: TYPE: ACUTE PAIN

## 2024-07-16 ASSESSMENT — PAIN DESCRIPTION - ORIENTATION
ORIENTATION: LEFT
ORIENTATION: UPPER
ORIENTATION: LEFT

## 2024-07-16 ASSESSMENT — PAIN DESCRIPTION - FREQUENCY: FREQUENCY: INTERMITTENT

## 2024-07-16 NOTE — PROGRESS NOTES
[facility] Pulmonary/CCM Progress note      Admit Date: 7/14/2024    Chief Complaint: Syncope and cardiac arrest    Subjective:     Interval History: Patient was profoundly hypotensive post mechanical thrombectomy/aspiration, thought to be related to perioperative blood loss with no signs of RP bleed.  Patient received 1 unit of PRBC with improvement in hypotension.  Now off vasopressors.    Scheduled Meds:   furosemide  20 mg IntraVENous BID    warfarin  5 mg Oral Daily    acetaminophen  650 mg Oral TID    sodium chloride flush  5-40 mL IntraVENous 2 times per day    insulin lispro  0-8 Units SubCUTAneous TID WC    insulin lispro  0-4 Units SubCUTAneous Nightly    aspirin  81 mg Oral Daily    pantoprazole  40 mg Oral QAM AC    rosuvastatin  20 mg Oral Nightly    sodium chloride flush  5-40 mL IntraVENous 2 times per day     Continuous Infusions:   heparin (PORCINE) Infusion 18 Units/kg/hr (07/16/24 1134)    sodium chloride      dextrose      sodium chloride      sodium chloride       PRN Meds:heparin (porcine), heparin (porcine), midodrine, oxyCODONE **OR** oxyCODONE, tiZANidine, sodium chloride flush, sodium chloride, acetaminophen, morphine, dextrose bolus **OR** dextrose bolus, glucagon (rDNA), dextrose, sodium chloride, perflutren lipid microspheres, sodium chloride flush, sodium chloride, potassium chloride **OR** potassium alternative oral replacement **OR** potassium chloride, magnesium sulfate, ondansetron **OR** ondansetron, polyethylene glycol, [DISCONTINUED] acetaminophen **OR** acetaminophen, traMADol    Review of Systems  Constitutional: Fatigue and malaise  Ears, nose, mouth, throat: negative for ear drainage, epistaxis, hoarseness, nasal congestion, sore throat and voice change  Respiratory: negative except for dyspnea on exertion and shortness of breath  Cardiovascular: negative for chest pain, chest pressure/discomfort, irregular heart beat, lower extremity edema and palpitations  Gastrointestinal:

## 2024-07-16 NOTE — CONSULTS
[facility] Orthopedic Surgery  Consult Note    Patient: Marvin Kaur  Admit Date: 7/14/2024  Requesting Physician: Patti Juarez MD  Room: [place] [id number]-    Chief complaint: LEFT knee pain    HPI: Marvin Kaur is a 84 y.o. male who presented to [facility] ER after syncopal episode.      Describes pain in the left knee of moderate intensity and of throbbing nature since the fall/syncope which is relieved by rest partially. Denies new numbness/tingling.       Independent imaging review of the left knee, hip and tib fib demonstrated: no fracture.  Stable TKA with effusion.    Patient lives with spouse in [facility] and uses a cane to ambulate.      Dr. Jamey Navas did TKA for him back in 2014    Relevant notes, labs and other tests reviewed.  Medical History:  Past Medical History:   Diagnosis Date    AR (allergic rhinitis)     Dermatitis     Diabetes     Diabetes (HCC)     oral medications    DJD (degenerative joint disease)     knees    Dyslipidemia     ED (erectile dysfunction)     GERD (gastroesophageal reflux disease)     HTN (hypertension)     Hx of blood clots     x 1 after traveling    Hyperlipidemia     Sleep apnea     cpap set at 12    Vitamin D deficiency      Past Surgical History:   Procedure Laterality Date    COLONOSCOPY      FOOT SURGERY      HAND SURGERY      JOINT REPLACEMENT Left 8/29/2014    TOTAL KNEE REPLACEMENT    KNEE ARTHROPLASTY Right 1/21/2015    OTHER SURGICAL HISTORY  See note 12/30/14    Was unable to intubate with a peacock/Used a glidescope per Dr Weston dated 12/30/14    OTHER SURGICAL HISTORY Right 03/06/2015    REPAIR OF RUPTURED PATELLAR TENDON RIGHT KNEE,USING TIBIAL       Social History:    reports that he has never smoked. He has never used smokeless tobacco.    Family History:  History reviewed. No pertinent family history.    Medications:  ALL MEDICATIONS HAVE BEEN REVIEWED:  Scheduled:   furosemide  20 mg IntraVENous BID    sodium chloride flush  5-40 mL  rhythm  GI: Abdomen soft, nontender  NEURO: Awake and alert.  No speech defect  PSYCHIATRIC: Appropriate affect; not agitated  MUSCULOSKELETAL:  LEFT knee  Inspection: On exam there are no ulcerations, rashes or lesions about the left knee.   There is pain to palpation of the left knee with moderate to large effusion.  Extensor mechanism intact. He does have bilateral pitting edema.  Motor: Intact DF/PF on the left.  Sensation: Grossly intact to light touch throughout the left lower extremity in all nerve distributions.  Vascular:  2+ left DP pulse.    Labs reviewed:  Recent Labs     07/14/24  1321 07/15/24  0430 07/15/24  1516 07/15/24  2206 07/16/24  0411   WBC 10.7 10.3  --   --  8.4   HGB 12.7* 12.0* 9.3* 9.6* 8.8*   HCT 37.2* 34.8* 27.2* 28.3* 26.1*    199  --   --  171     Recent Labs     07/14/24  1321 07/14/24  1502 07/14/24  2030 07/15/24  0430 07/16/24  0411   * 133*  --  136 135*   K 3.5 3.6  --  3.8 3.7   CL 97* 99  --  99 103   CO2 16* 18*  --  21 23   BUN 23* 23*  --  22* 21*   CREATININE 1.4* 1.3  --  1.2 1.2   GLUCOSE 243* 168*  --  111* 116*   CALCIUM 9.0 8.8  --  9.1 8.1*   MG 1.40*  --  1.90  --   --      Recent Labs     07/14/24  1321 07/15/24  1620 07/16/24  0411   INR 1.04 1.34* 1.10   PROTIME 13.8 16.7* 14.4       Lab Results   Component Value Date    COLORU Yellow 07/14/2024    CLARITYU Clear 07/14/2024    PHUR 5.0 07/14/2024    GLUCOSEU Negative 07/14/2024    BLOODU Negative 07/14/2024    LEUKOCYTESUR Negative 07/14/2024    BILIRUBINUR Negative 07/14/2024    UROBILINOGEN 1.0 07/14/2024    RBCUA 2 07/14/2024    WBCUA 1 07/14/2024    BACTERIA None Seen 07/14/2024       Imaging:  XR CHEST PORTABLE   Final Result   Right-sided central venous catheter is noted with its tip projecting over the   area of the distal SVC.         CT ABDOMEN PELVIS WO CONTRAST Additional Contrast? None   Final Result   1. No retroperitoneal hematoma.   2. Bilateral perinephric fat stranding which is a

## 2024-07-16 NOTE — PROGRESS NOTES
Latest Reference Range & Units 07/15/24 22:06   Hemoglobin Quant 13.5 - 17.5 g/dL 9.6 (L)   Hematocrit 40.5 - 52.5 % 28.3 (L)   (L): Data is abnormally low    Post infusion H&H. Per Dr. Simpson, no need for 2nd UPRBC if hemoglobin >8    Electronically signed by Jeannie Licea RN on 7/15/2024 at 10:15 PM

## 2024-07-16 NOTE — PROGRESS NOTES
V2.0    [facility] Progress Note      Name:  Marvin Kaur /Age/Sex: 1940  (84 y.o. male)   MRN & CSN:  [id number] & [id number] Encounter Date/Time: 2024 5:50 PM EDT   Location:  [facility] PCP: Heath Hua MD     Attending:Lyly Vyas MD       Hospital Day: 3    Assessment and Recommendations   Marvin Kaur is a 84 y.o. male with pmh of left leg DVT ~20 years ago, HTN, Pulmonary HTN, LALITA on CPAP presented with c/o chest pain, shortness of breath and worsening lower extremity edema for the past several months and a syncopal episode  the morning of presentation. En route to the hospital by EMS, he went into asystole for a few minutes requiring CPR with ROSC achieved.    In the ED he was found to be hypoxic requiring NRBM.   Also noted to be hypotensive 72/50 .  CTA showed massive bilateral PE with cor pulmonale.  Patient was started on heparin drip and cardiology consulted.  Underwent mechanical thrombectomy on 7/15/24 with recovery period complicated by persistent bleeding from the right groin with associated hypotension and ABLA requiring PRBC transfusion and pressors.      Plan:     Massive bilateral pulmonary emboli with cor pulmonale:  Acute hypoxic respiratory failure:  Syncope and asystole cardiac arrest:  Venous Doppler negative for DVT.  TTE 7/15/2024: LVEF 60 to 65%. Dilated RV with akinetic wall. (Todd's sign +)  Cardiology consult appreciated.  s/p mechanical thrombectomy 7/15/2024.  Currently on heparin drip.  Coumadin held.  Wean down oxygen as tolerated.  Follow-up hypercoagulable workup.  Intensivist consult appreciated.    Acute blood loss anemia:   Hgb dropped from 12.7-8.8.  Transfused 1 unit PRBC 7/15/2024.  CT abdomen and pelvis negative for retroperitoneal bleed.  Monitor H&H and transfuse as needed.    Shock: Resolved.  Off pressors with stable BP.  Monitor blood pressures on Midodrine.  Procardia, by systolic and Atacand on hold.    Bilateral lower extremity  DVT 25 years ago, eval for pulmonary embolism, iatrogenic injury from CPR TECHNOLOGIST PROVIDED HISTORY: Reason for exam:->Respiratory failure, chest pain, loss of consciousness with 1 minute of CPR performed, history of DVT 25 years ago, eval for pulmonary embolism, iatrogenic injury from CPR Additional Contrast?->1 Reason for Exam: Respiratory failure, chest pain, loss of consciousness with 1 minute of CPR performed, history of DVT 25 years ago, eval for pulmonary embolism, iatrogenic injury from CPR FINDINGS: Pulmonary Arteries: There is a large thrombus burden extending into the right and left main pulmonary artery (no saddle thrombus) as well as thrombus extending into both the right and left upper and lower lobe pulmonary intersegmental arteries. Mediastinum: No evidence of mediastinal lymphadenopathy.  There is slight prominence to the right ventricle and atrium compared to the left.  No pericardial effusion.  Minimal adenopathy within the mediastinum. Lungs/pleura: Tiny left pleural effusion.  No lobar infiltrate effusion or pneumothorax. Upper Abdomen: Limited images of the upper abdomen are unremarkable. Soft Tissues/Bones: Moderate degenerative changes of the spine.     Large thrombus burden as detailed above. Slight right ventricular strain with prominence to the ventricle and atrium. No obvious lobar infiltrate effusion.  Tiny left pleural effusion. Results communicated to the communication center.     XR CHEST PORTABLE    Result Date: 7/14/2024  EXAMINATION: ONE XRAY VIEW OF THE CHEST 7/14/2024 1:08 pm COMPARISON: 11/14/2020 HISTORY: Acute shortness of breath. FINDINGS: Patient is rotated and there is artifact along the right chest.  Stable borderline cardiomegaly.  Limited depth of inspiration.  No consolidation, pleural effusion, or pneumothorax.     Low lung volumes.  No acute abnormality seen allowing for patient rotation.       CBC:   Recent Labs     07/14/24  1321 07/15/24  0430 07/15/24  1516

## 2024-07-16 NOTE — FLOWSHEET NOTE
07/15/24 2000   Vitals   /64   Temp 96.8 °F (36 °C)   Temp Source Temporal   Pulse 77   Respirations 16   SpO2 98 %     Blood infusion increased to 300ml/hr per MD verbal order    Electronically signed by Jeannie Licea RN on 7/15/2024 at 8:05 PM

## 2024-07-16 NOTE — PROGRESS NOTES
Discussed home medications with patient.   Pt prescribed lasix 20mg tablet as needed for leg edema and lasix 40mg tablet daily.   Pt states he has stopped taking lasix due to side effect of frequent urination.   Education given to pt about importance of lasix and edema and pt states understanding at this time.   Vickie Merritt RN 11:02 AM

## 2024-07-16 NOTE — PROGRESS NOTES
Latest Reference Range & Units 07/16/24 18:41   Anti-XA Unfrac Heparin 0.30 - 0.70 IU/mL >1.10 ()   (HH): Data is critically high    Heparin gtt paused per orders     Electronically signed by Jeannie Licea RN on 7/16/2024 at 7:24 PM

## 2024-07-16 NOTE — PROGRESS NOTES
[facility] Daily Progress Note      Admit Date:  7/14/2024    Chief Complaint   Patient presents with    Chest Pain     Pt had onset of chest pain/pressure that onset about 30 minutes ago. 911 called. No cardiac hx except hypertension. Had syncopal episode while walking to the ambulance. CPR was given for approx 1 minute.         Subjective:  Mr. Kaur doing well this morning. denies exertional chest pain, SOB/QUILES, PND, palpitations, light-headedness, or edema. No further bleeding overnight. R groin site stable cdi no hematoma. Denies SOB. Off levophed. HGB stable.     Objective:   BP (!) 91/52   Pulse 79   Temp 97 °F (36.1 °C) (Temporal)   Resp 16   Ht 1.829 m (6')   Wt 110.9 kg (244 lb 7.8 oz)   SpO2 100%   BMI 33.16 kg/m²     Intake/Output Summary (Last 24 hours) at 7/16/2024 1222  Last data filed at 7/16/2024 1000  Gross per 24 hour   Intake 2577.84 ml   Output 750 ml   Net 1827.84 ml       TELEMETRY: Sinus     Physical Exam:  General:  Awake, alert, oriented x 3, NAD  Skin:  Warm and dry  Neck:  JVD +  Chest:  normal air entry  Cardiovascular:  RRR S1S2, no S3, no mrmr  Abdomen:  Soft, ND, NT, No HSM  Extremities:  3+ edema    Medications:    furosemide  20 mg IntraVENous BID    warfarin  5 mg Oral Daily    sodium chloride flush  5-40 mL IntraVENous 2 times per day    insulin lispro  0-8 Units SubCUTAneous TID WC    insulin lispro  0-4 Units SubCUTAneous Nightly    aspirin  81 mg Oral Daily    pantoprazole  40 mg Oral QAM AC    rosuvastatin  20 mg Oral Nightly    sodium chloride flush  5-40 mL IntraVENous 2 times per day      heparin (PORCINE) Infusion 18 Units/kg/hr (07/16/24 1134)    sodium chloride      dextrose      sodium chloride      sodium chloride       heparin (porcine), heparin (porcine), midodrine, sodium chloride flush, sodium chloride, acetaminophen, oxyCODONE-acetaminophen **OR** oxyCODONE-acetaminophen, morphine, dextrose bolus **OR** dextrose bolus, glucagon (rDNA),  it is both accurate and complete.

## 2024-07-16 NOTE — PROGRESS NOTES
Latest Reference Range & Units 07/15/24 19:39   Lactic Acid 0.4 - 2.0 mmol/L 1.2     Dr. Simpson ordered Lactic to be drawn, results WNL

## 2024-07-16 NOTE — FLOWSHEET NOTE
07/16/24 0800   Vitals   Pulse 85   /71   MAP (Calculated) 87   MAP (mmHg) 87   Cardiac Rhythm Sinus rhythm   Oxygen Therapy   SpO2 92 %   Peripheral Vascular   Peripheral Vascular (WDL) X   Edema Right lower extremity;Left lower extremity   RLE Edema +3;Pitting   LLE Edema +3;Pitting   RUE Neurovascular Assessment   Capillary Refill Less than/Equal to 3 seconds   Color Appropriate for Ethnicity   Temperature Warm   RUE Sensation  Full sensation   R Radial Pulse +1 (Weak)   LUE Neurovascular Assessment   Capillary Refill Less than/Equal to 3 seconds   Color Appropriate for Ethnicity   Temperature Warm   LUE Sensation  Full sensation   L Radial Pulse +1 (Weak)   RLE Neurovascular Assessment   Capillary Refill Less than/Equal to 3 seconds   Color Appropriate for Ethnicity   Temperature Warm   RLE Sensation  Full sensation   R Post Tibial Pulse Doppler   R Pedal Pulse Doppler   LLE Neurovascular Assessment   Capillary Refill Less than/Equal to 3 seconds   Color Appropriate for Ethnicity   Temperature Warm   LLE Sensation  Full sensation   L Post Tibial Pulse Doppler   L Pedal Pulse Doppler   Puncture Site Assessment 1   Location Femoral - left   Site Assessment No redness, drainage, swelling or hematoma   Hemostasis Intervention Manual pressure   Dressing Applied Transparent occlusive dressing   Multiple puncture sites No       Shift assessment complete. VSS. Pt. Is alert and oriented resting comfortably in bed. Pt. Has complaints of left knee pain/tightness in upper thigh area. Pt states he started to feel pain yesterday and admits to falling on left side prior to admission. Bilateral knee replacement noted but no bruising noted at this time. POC discussed with patient and patient states understanding and is in agreement with plan. Bed alarm engaged. Call light and bedside table within reach. No further needs expressed at this time. Vickie Merritt RN

## 2024-07-16 NOTE — PROGRESS NOTES
Pharmacy to Dose Warfarin    Pharmacy consulted to dose warfarin for PE.    INR Goal: 2-3    INR today: 1.1    Assessment/Plan:  - INR subtherapeutic - warfarin held last night due to bleeding   - Will start warfarin 5 mg tonight  - Heparin initiated and plan to continue until INR > 2    Pharmacy will continue to follow.    Maria A Wu, PharmD, BCPS  Clinical Pharmacist  [id number]

## 2024-07-17 LAB
ANION GAP SERPL CALCULATED.3IONS-SCNC: 9 MMOL/L (ref 3–16)
ANTI-XA UNFRAC HEPARIN: 0.42 IU/ML (ref 0.3–0.7)
ANTI-XA UNFRAC HEPARIN: 0.92 IU/ML (ref 0.3–0.7)
ANTI-XA UNFRAC HEPARIN: >1.1 IU/ML (ref 0.3–0.7)
APTT BLD: >180 SEC (ref 22.1–36.4)
AT III ACT/NOR PPP CHRO: 76 % (ref 76–128)
AT III AG ACT/NOR PPP IA: 65 % (ref 82–136)
BLOOD BANK DISPENSE STATUS: NORMAL
BLOOD BANK DISPENSE STATUS: NORMAL
BLOOD BANK PRODUCT CODE: NORMAL
BLOOD BANK PRODUCT CODE: NORMAL
BPU ID: NORMAL
BPU ID: NORMAL
BUN SERPL-MCNC: 22 MG/DL (ref 7–20)
CALCIUM SERPL-MCNC: 8.4 MG/DL (ref 8.3–10.6)
CARDIOLIPIN IGG SER IA-ACNC: <10 GPL
CARDIOLIPIN IGM SER IA-ACNC: <10 MPL
CHLORIDE SERPL-SCNC: 100 MMOL/L (ref 99–110)
CO2 SERPL-SCNC: 24 MMOL/L (ref 21–32)
CREAT SERPL-MCNC: 1.5 MG/DL (ref 0.8–1.3)
DEPRECATED RDW RBC AUTO: 15.3 % (ref 12.4–15.4)
DEPRECATED RDW RBC AUTO: 16 % (ref 12.4–15.4)
DESCRIPTION BLOOD BANK: NORMAL
DESCRIPTION BLOOD BANK: NORMAL
GFR SERPLBLD CREATININE-BSD FMLA CKD-EPI: 45 ML/MIN/{1.73_M2}
GLUCOSE BLD-MCNC: 107 MG/DL (ref 70–99)
GLUCOSE BLD-MCNC: 110 MG/DL (ref 70–99)
GLUCOSE BLD-MCNC: 115 MG/DL (ref 70–99)
GLUCOSE BLD-MCNC: 141 MG/DL (ref 70–99)
GLUCOSE SERPL-MCNC: 124 MG/DL (ref 70–99)
HCT VFR BLD AUTO: 21.3 % (ref 40.5–52.5)
HCT VFR BLD AUTO: 22.7 % (ref 40.5–52.5)
HGB BLD-MCNC: 7.5 G/DL (ref 13.5–17.5)
HGB BLD-MCNC: 7.7 G/DL (ref 13.5–17.5)
INR PPP: 1.19 (ref 0.85–1.15)
MCH RBC QN AUTO: 33.2 PG (ref 26–34)
MCH RBC QN AUTO: 34.4 PG (ref 26–34)
MCHC RBC AUTO-ENTMCNC: 34.2 G/DL (ref 31–36)
MCHC RBC AUTO-ENTMCNC: 35.3 G/DL (ref 31–36)
MCV RBC AUTO: 97.1 FL (ref 80–100)
MCV RBC AUTO: 97.6 FL (ref 80–100)
PERFORMED ON: ABNORMAL
PLATELET # BLD AUTO: 187 K/UL (ref 135–450)
PLATELET # BLD AUTO: 192 K/UL (ref 135–450)
PMV BLD AUTO: 6.7 FL (ref 5–10.5)
PMV BLD AUTO: 6.9 FL (ref 5–10.5)
POTASSIUM SERPL-SCNC: 3.5 MMOL/L (ref 3.5–5.1)
PROTHROMBIN TIME: 15.3 SEC (ref 11.9–14.9)
RBC # BLD AUTO: 2.18 M/UL (ref 4.2–5.9)
RBC # BLD AUTO: 2.34 M/UL (ref 4.2–5.9)
SODIUM SERPL-SCNC: 133 MMOL/L (ref 136–145)
WBC # BLD AUTO: 8.5 K/UL (ref 4–11)
WBC # BLD AUTO: 9.8 K/UL (ref 4–11)

## 2024-07-17 PROCEDURE — 80048 BASIC METABOLIC PNL TOTAL CA: CPT

## 2024-07-17 PROCEDURE — 85027 COMPLETE CBC AUTOMATED: CPT

## 2024-07-17 PROCEDURE — 2000000000 HC ICU R&B

## 2024-07-17 PROCEDURE — 85520 HEPARIN ASSAY: CPT

## 2024-07-17 PROCEDURE — 99233 SBSQ HOSP IP/OBS HIGH 50: CPT | Performed by: INTERNAL MEDICINE

## 2024-07-17 PROCEDURE — 6370000000 HC RX 637 (ALT 250 FOR IP): Performed by: FAMILY MEDICINE

## 2024-07-17 PROCEDURE — 36430 TRANSFUSION BLD/BLD COMPNT: CPT

## 2024-07-17 PROCEDURE — 85610 PROTHROMBIN TIME: CPT

## 2024-07-17 PROCEDURE — 6360000002 HC RX W HCPCS: Performed by: INTERNAL MEDICINE

## 2024-07-17 PROCEDURE — 99232 SBSQ HOSP IP/OBS MODERATE 35: CPT | Performed by: STUDENT IN AN ORGANIZED HEALTH CARE EDUCATION/TRAINING PROGRAM

## 2024-07-17 PROCEDURE — 85730 THROMBOPLASTIN TIME PARTIAL: CPT

## 2024-07-17 PROCEDURE — 6370000000 HC RX 637 (ALT 250 FOR IP): Performed by: INTERNAL MEDICINE

## 2024-07-17 PROCEDURE — 36415 COLL VENOUS BLD VENIPUNCTURE: CPT

## 2024-07-17 PROCEDURE — 6370000000 HC RX 637 (ALT 250 FOR IP): Performed by: NURSE PRACTITIONER

## 2024-07-17 PROCEDURE — 84153 ASSAY OF PSA TOTAL: CPT

## 2024-07-17 PROCEDURE — 94760 N-INVAS EAR/PLS OXIMETRY 1: CPT

## 2024-07-17 PROCEDURE — 2580000003 HC RX 258: Performed by: FAMILY MEDICINE

## 2024-07-17 PROCEDURE — 2580000003 HC RX 258: Performed by: INTERNAL MEDICINE

## 2024-07-17 RX ORDER — FUROSEMIDE 10 MG/ML
20 INJECTION INTRAMUSCULAR; INTRAVENOUS DAILY
Status: DISCONTINUED | OUTPATIENT
Start: 2024-07-18 | End: 2024-07-19

## 2024-07-17 RX ADMIN — ACETAMINOPHEN 650 MG: 325 TABLET ORAL at 15:14

## 2024-07-17 RX ADMIN — Medication 10 ML: at 20:35

## 2024-07-17 RX ADMIN — ACETAMINOPHEN 650 MG: 325 TABLET ORAL at 09:43

## 2024-07-17 RX ADMIN — WARFARIN SODIUM 5 MG: 5 TABLET ORAL at 18:22

## 2024-07-17 RX ADMIN — Medication 10 ML: at 09:44

## 2024-07-17 RX ADMIN — ASPIRIN 81 MG 81 MG: 81 TABLET ORAL at 09:43

## 2024-07-17 RX ADMIN — PANTOPRAZOLE SODIUM 40 MG: 40 TABLET, DELAYED RELEASE ORAL at 09:43

## 2024-07-17 RX ADMIN — ACETAMINOPHEN 650 MG: 325 TABLET ORAL at 20:32

## 2024-07-17 RX ADMIN — ROSUVASTATIN 20 MG: 20 TABLET, FILM COATED ORAL at 20:32

## 2024-07-17 RX ADMIN — FUROSEMIDE 20 MG: 10 INJECTION, SOLUTION INTRAMUSCULAR; INTRAVENOUS at 09:48

## 2024-07-17 RX ADMIN — TRAMADOL HYDROCHLORIDE 50 MG: 50 TABLET ORAL at 11:35

## 2024-07-17 ASSESSMENT — PAIN DESCRIPTION - ORIENTATION
ORIENTATION: LEFT

## 2024-07-17 ASSESSMENT — PAIN DESCRIPTION - DESCRIPTORS
DESCRIPTORS: ACHING
DESCRIPTORS: ACHING

## 2024-07-17 ASSESSMENT — PAIN SCALES - GENERAL
PAINLEVEL_OUTOF10: 4
PAINLEVEL_OUTOF10: 3
PAINLEVEL_OUTOF10: 5
PAINLEVEL_OUTOF10: 2
PAINLEVEL_OUTOF10: 0
PAINLEVEL_OUTOF10: 2
PAINLEVEL_OUTOF10: 0
PAINLEVEL_OUTOF10: 6
PAINLEVEL_OUTOF10: 3

## 2024-07-17 ASSESSMENT — PAIN DESCRIPTION - LOCATION
LOCATION: LEG

## 2024-07-17 ASSESSMENT — PAIN DESCRIPTION - PAIN TYPE: TYPE: ACUTE PAIN

## 2024-07-17 NOTE — PROGRESS NOTES
[facility] Daily Progress Note      Admit Date:  7/14/2024    Chief Complaint:  Chest pain    Subjective:  Mr. Kaur is feeling well today. He was receiving a unit of blood on assessment today. Feels okay. No new complaints, just leg pain    Objective:   /82   Pulse 86   Temp 97 °F (36.1 °C) (Temporal)   Resp 16   Ht 1.829 m (6')   Wt 111.5 kg (245 lb 13 oz)   SpO2 93%   BMI 33.34 kg/m²     Intake/Output Summary (Last 24 hours) at 7/17/2024 0743  Last data filed at 7/17/2024 0708  Gross per 24 hour   Intake 858.79 ml   Output 1100 ml   Net -241.21 ml       Physical Exam:  General:  Awake, alert, NAD  Skin:  Warm and dry  Neck:  JVD not visibile sitting upright  Chest:  CTAB, Comfortable   Cardiovascular:  RRR S1S2, no S3, No murmur  Abdomen:  Soft, ND, NT, No HSM  Extremities:  No edema    Medications:    furosemide  20 mg IntraVENous BID    warfarin  5 mg Oral Daily    acetaminophen  650 mg Oral TID    sodium chloride flush  5-40 mL IntraVENous 2 times per day    insulin lispro  0-8 Units SubCUTAneous TID WC    insulin lispro  0-4 Units SubCUTAneous Nightly    aspirin  81 mg Oral Daily    pantoprazole  40 mg Oral QAM AC    rosuvastatin  20 mg Oral Nightly    sodium chloride flush  5-40 mL IntraVENous 2 times per day      heparin (PORCINE) Infusion 9 Units/kg/hr (07/17/24 0708)    sodium chloride      dextrose      sodium chloride      sodium chloride       heparin (porcine), heparin (porcine), midodrine, oxyCODONE **OR** oxyCODONE, tiZANidine, sodium chloride flush, sodium chloride, acetaminophen, morphine, dextrose bolus **OR** dextrose bolus, glucagon (rDNA), dextrose, sodium chloride, perflutren lipid microspheres, sodium chloride flush, sodium chloride, potassium chloride **OR** potassium alternative oral replacement **OR** potassium chloride, magnesium sulfate, ondansetron **OR** ondansetron, polyethylene glycol, [DISCONTINUED] acetaminophen **OR** acetaminophen,  room air  Echo 7/15 with RV strain post thrombectomy  Lower extremity dopplers without DVT   No longer on pressor support  On heparin gtt, bridging to coumadin. Unable to afford eliquis  Hypercoagulability work up pending; agree with Heme consult  INR 1.19 today  Lower extremity edema  Reported not taking home lasix d/t frequent urination.   Remains on 20mg IVP lasix BID  L hip, L knee pain  Mechanical fall prior to admission  Ortho consulted  ALFA, Cr. 1.2 today        Juan Pablo Moreno MD, MD 7/17/2024 4:38 PM    Scribe's Attestation: This note was scribed in the presence of Dr. Juan Pablo Moreno MD by Patricia Gomes RN

## 2024-07-17 NOTE — PROGRESS NOTES
[facility] Pulmonary/CCM Progress note      Admit Date: 7/14/2024    Chief Complaint: Syncope and cardiac arrest    Subjective:     Interval History: Patient appears stable, complains mostly of left hip and knee pain related to arthritis.  Hemoglobin 7.5> trending downwards, hemodynamically stable.  Patient denies any shortness of breath or chest pain.    Scheduled Meds:   [START ON 7/18/2024] furosemide  20 mg IntraVENous Daily    warfarin  5 mg Oral Daily    acetaminophen  650 mg Oral TID    sodium chloride flush  5-40 mL IntraVENous 2 times per day    insulin lispro  0-8 Units SubCUTAneous TID WC    insulin lispro  0-4 Units SubCUTAneous Nightly    aspirin  81 mg Oral Daily    pantoprazole  40 mg Oral QAM AC    rosuvastatin  20 mg Oral Nightly    sodium chloride flush  5-40 mL IntraVENous 2 times per day     Continuous Infusions:   heparin (PORCINE) Infusion Stopped (07/17/24 0740)    sodium chloride      dextrose      sodium chloride       PRN Meds:heparin (porcine), heparin (porcine), midodrine, oxyCODONE **OR** oxyCODONE, tiZANidine, sodium chloride flush, sodium chloride, acetaminophen, morphine, dextrose bolus **OR** dextrose bolus, glucagon (rDNA), dextrose, perflutren lipid microspheres, sodium chloride flush, sodium chloride, potassium chloride **OR** potassium alternative oral replacement **OR** potassium chloride, magnesium sulfate, ondansetron **OR** ondansetron, polyethylene glycol, [DISCONTINUED] acetaminophen **OR** acetaminophen, traMADol    Review of Systems  Constitutional: Fatigue and malaise  Ears, nose, mouth, throat: negative for ear drainage, epistaxis, hoarseness, nasal congestion, sore throat and voice change  Respiratory: negative except for dyspnea on exertion and shortness of breath  Cardiovascular: negative for chest pain, chest pressure/discomfort, irregular heart beat, lower extremity edema and palpitations  Gastrointestinal: negative for abdominal pain, constipation, diarrhea, jaundice,

## 2024-07-17 NOTE — PROGRESS NOTES
Latest Reference Range & Units 07/17/24 11:28   Anti-XA Unfrac Heparin 0.30 - 0.70 IU/mL 0.92 (H)   (H): Data is abnormally high    Heparin gtt stopped at 0740.   Recheck Anti-XA above.   Okay to continue to hold heparin gtt at this time per MD.   Vickie Merritt, RN

## 2024-07-17 NOTE — FLOWSHEET NOTE
07/17/24 0742   Vitals   Temp 97.7 °F (36.5 °C)   Temp Source Temporal   Pulse 92   Heart Rate Source Monitor   Respirations 16   /75   MAP (Calculated) 91   MAP (mmHg) 87   BP Location Left upper arm   BP Upper/Lower Upper   BP Method Automatic   Patient Position Semi fowlers   Cardiac Rhythm Sinus rhythm   Pain Assessment   Pain Assessment None - Denies Pain   Oxygen Therapy   SpO2 94 %   Pulse Oximetry Type Intermittent   Pulse Oximeter Device Mode Intermittent   Pulse Oximeter Device Location Finger   O2 Device None (Room air)       Shift assessment complete. VSS. Pt. Is alert and oriented resting comfortably in bed. Pt. Denies chest pain or SOB. Pt denies LLE pain at this time. Pt states unable to sleep due to increased urination from lasix. Medication education given. POC discussed with patient and patient states understanding and is in agreement with plan. Call light and bedside table within reach. No further needs expressed at this time. Vickie Merritt RN

## 2024-07-17 NOTE — PLAN OF CARE
Problem: Safety - Adult  Goal: Free from fall injury  Outcome: Progressing  Flowsheets (Taken 7/16/2024 2334)  Free From Fall Injury:   Instruct family/caregiver on patient safety   Based on caregiver fall risk screen, instruct family/caregiver to ask for assistance with transferring infant if caregiver noted to have fall risk factors     Problem: Pain  Goal: Verbalizes/displays adequate comfort level or baseline comfort level  Outcome: Progressing  Flowsheets (Taken 7/16/2024 6198)  Verbalizes/displays adequate comfort level or baseline comfort level:   Assess pain using appropriate pain scale   Encourage patient to monitor pain and request assistance     Problem: Skin/Tissue Integrity  Goal: Absence of new skin breakdown  Description: 1.  Monitor for areas of redness and/or skin breakdown  2.  Assess vascular access sites hourly  3.  Every 4-6 hours minimum:  Change oxygen saturation probe site  4.  Every 4-6 hours:  If on nasal continuous positive airway pressure, respiratory therapy assess nares and determine need for appliance change or resting period.  Outcome: Progressing

## 2024-07-17 NOTE — PROGRESS NOTES
Pharmacy to Dose Warfarin    Pharmacy consulted to dose warfarin for PE.    INR Goal: 2-3    INR today: 1.19    Assessment/Plan:  - INR subtherapeutic after starting warfarin last night  - Heparin drip ordered, however Xa level remains >1.1 so has been held. Plan was to bridge until INR > 2  - Continue warfarin dose of 5 mg tonight     Pharmacy will continue to follow.    Maria A Wu, PharmD, BCPS  Clinical Pharmacist  [id number]

## 2024-07-17 NOTE — PROGRESS NOTES
V2.0    [facility] Progress Note      Name:  Marvin Kaur /Age/Sex: 1940  (84 y.o. male)   MRN & CSN:  [id number] & [id number] Encounter Date/Time: 2024 5:50 PM EDT   Location:  [id number] PCP: Heath Hua MD     Attending:Lyly Vyas MD       Hospital Day: 4    Assessment and Recommendations   Marvin Kaur is a 84 y.o. male with pmh of left leg DVT ~20 years ago, HTN, Pulmonary HTN, LALITA on CPAP presented with c/o chest pain, shortness of breath and worsening lower extremity edema for the past several months and a syncopal episode  the morning of presentation. En route to the hospital by EMS, he went into asystole for a few minutes requiring CPR with ROSC achieved.    In the ED he was found to be hypoxic requiring NRBM.   Also noted to be hypotensive 72/50 .  CTA showed massive bilateral PE with cor pulmonale.  Patient was started on heparin drip and cardiology consulted.  Underwent mechanical thrombectomy on 7/15/24 with recovery period complicated by persistent bleeding from the right groin with associated hypotension and ABLA requiring PRBC transfusion and pressors.      Plan:     Massive bilateral pulmonary emboli with cor pulmonale:  Acute hypoxic respiratory failure:  Syncope and asystole cardiac arrest:  Venous Doppler negative for DVT.  TTE 7/15/2024: LVEF 60 to 65%. Dilated RV with akinetic wall. (Todd's sign +)  Cardiology consult appreciated.  s/p mechanical thrombectomy 7/15/2024.  Currently on heparin drip/Coumadin.  INR subtherapeutic.  Weaned off supplemental oxygen.  Follow-up hypercoagulable workup.  Heme-onc consulted.  Intensivist consult appreciated.    Acute blood loss anemia:   Hgb dropped from 12.7-8.8.  Transfused 1 unit PRBC 7/15/2024.  CT abdomen and pelvis negative for retroperitoneal bleed.  Monitor H&H and transfuse as needed.    Shock: Resolved.  Off pressors with stable BP.  Monitor blood pressures on Midodrine.  Procardia, Bystolic and Atacand on

## 2024-07-17 NOTE — PROGRESS NOTES
Latest Reference Range & Units 07/16/24 23:15   Anti-XA Unfrac Heparin 0.30 - 0.70 IU/mL >1.10 ()   (HH): Data is critically high    Heparin gtt held at this time for AntiXa result

## 2024-07-18 LAB
ALBUMIN SERPL-MCNC: 2.9 G/DL (ref 3.4–5)
ANION GAP SERPL CALCULATED.3IONS-SCNC: 10 MMOL/L (ref 3–16)
ANTI-XA UNFRAC HEPARIN: <0.1 IU/ML (ref 0.3–0.7)
BASOPHILS # BLD: 0.1 K/UL (ref 0–0.2)
BASOPHILS NFR BLD: 0.6 %
BUN SERPL-MCNC: 22 MG/DL (ref 7–20)
CALCIUM SERPL-MCNC: 8.5 MG/DL (ref 8.3–10.6)
CHLORIDE SERPL-SCNC: 100 MMOL/L (ref 99–110)
CO2 SERPL-SCNC: 24 MMOL/L (ref 21–32)
CREAT SERPL-MCNC: 1.4 MG/DL (ref 0.8–1.3)
DEPRECATED RDW RBC AUTO: 15.5 % (ref 12.4–15.4)
DEPRECATED RDW RBC AUTO: 15.8 % (ref 12.4–15.4)
EOSINOPHIL # BLD: 0.2 K/UL (ref 0–0.6)
EOSINOPHIL NFR BLD: 2.4 %
GFR SERPLBLD CREATININE-BSD FMLA CKD-EPI: 49 ML/MIN/{1.73_M2}
GLUCOSE BLD-MCNC: 111 MG/DL (ref 70–99)
GLUCOSE BLD-MCNC: 127 MG/DL (ref 70–99)
GLUCOSE BLD-MCNC: 140 MG/DL (ref 70–99)
GLUCOSE SERPL-MCNC: 106 MG/DL (ref 70–99)
HCT VFR BLD AUTO: 22 % (ref 40.5–52.5)
HCT VFR BLD AUTO: 23.8 % (ref 40.5–52.5)
HGB BLD-MCNC: 7.6 G/DL (ref 13.5–17.5)
HGB BLD-MCNC: 8.2 G/DL (ref 13.5–17.5)
INR PPP: 1.05 (ref 0.85–1.15)
LYMPHOCYTES # BLD: 2.6 K/UL (ref 1–5.1)
LYMPHOCYTES NFR BLD: 29.2 %
MCH RBC QN AUTO: 33.5 PG (ref 26–34)
MCH RBC QN AUTO: 33.9 PG (ref 26–34)
MCHC RBC AUTO-ENTMCNC: 34.4 G/DL (ref 31–36)
MCHC RBC AUTO-ENTMCNC: 34.7 G/DL (ref 31–36)
MCV RBC AUTO: 97.3 FL (ref 80–100)
MCV RBC AUTO: 97.8 FL (ref 80–100)
MONOCYTES # BLD: 0.9 K/UL (ref 0–1.3)
MONOCYTES NFR BLD: 10.1 %
NEUTROPHILS # BLD: 5.1 K/UL (ref 1.7–7.7)
NEUTROPHILS NFR BLD: 57.7 %
PERFORMED ON: ABNORMAL
PHOSPHATE SERPL-MCNC: 3.4 MG/DL (ref 2.5–4.9)
PLATELET # BLD AUTO: 193 K/UL (ref 135–450)
PLATELET # BLD AUTO: 214 K/UL (ref 135–450)
PMV BLD AUTO: 6.8 FL (ref 5–10.5)
PMV BLD AUTO: 6.8 FL (ref 5–10.5)
POTASSIUM SERPL-SCNC: 3.6 MMOL/L (ref 3.5–5.1)
PROTHROMBIN TIME: 14 SEC (ref 11.9–14.9)
PSA SERPL DL<=0.01 NG/ML-MCNC: 17.51 NG/ML (ref 0–4)
RBC # BLD AUTO: 2.26 M/UL (ref 4.2–5.9)
RBC # BLD AUTO: 2.43 M/UL (ref 4.2–5.9)
SODIUM SERPL-SCNC: 134 MMOL/L (ref 136–145)
WBC # BLD AUTO: 7.9 K/UL (ref 4–11)
WBC # BLD AUTO: 8.9 K/UL (ref 4–11)

## 2024-07-18 PROCEDURE — 6370000000 HC RX 637 (ALT 250 FOR IP): Performed by: FAMILY MEDICINE

## 2024-07-18 PROCEDURE — 6360000002 HC RX W HCPCS: Performed by: INTERNAL MEDICINE

## 2024-07-18 PROCEDURE — 99232 SBSQ HOSP IP/OBS MODERATE 35: CPT | Performed by: STUDENT IN AN ORGANIZED HEALTH CARE EDUCATION/TRAINING PROGRAM

## 2024-07-18 PROCEDURE — 85027 COMPLETE CBC AUTOMATED: CPT

## 2024-07-18 PROCEDURE — 6370000000 HC RX 637 (ALT 250 FOR IP): Performed by: INTERNAL MEDICINE

## 2024-07-18 PROCEDURE — 97116 GAIT TRAINING THERAPY: CPT

## 2024-07-18 PROCEDURE — 2000000000 HC ICU R&B

## 2024-07-18 PROCEDURE — 2580000003 HC RX 258: Performed by: FAMILY MEDICINE

## 2024-07-18 PROCEDURE — 99233 SBSQ HOSP IP/OBS HIGH 50: CPT | Performed by: INTERNAL MEDICINE

## 2024-07-18 PROCEDURE — 97162 PT EVAL MOD COMPLEX 30 MIN: CPT

## 2024-07-18 PROCEDURE — 2580000003 HC RX 258: Performed by: INTERNAL MEDICINE

## 2024-07-18 PROCEDURE — 97535 SELF CARE MNGMENT TRAINING: CPT

## 2024-07-18 PROCEDURE — 97530 THERAPEUTIC ACTIVITIES: CPT

## 2024-07-18 PROCEDURE — 6370000000 HC RX 637 (ALT 250 FOR IP): Performed by: NURSE PRACTITIONER

## 2024-07-18 PROCEDURE — 85520 HEPARIN ASSAY: CPT

## 2024-07-18 PROCEDURE — 80069 RENAL FUNCTION PANEL: CPT

## 2024-07-18 PROCEDURE — 85025 COMPLETE CBC W/AUTO DIFF WBC: CPT

## 2024-07-18 PROCEDURE — 85610 PROTHROMBIN TIME: CPT

## 2024-07-18 PROCEDURE — 6370000000 HC RX 637 (ALT 250 FOR IP): Performed by: STUDENT IN AN ORGANIZED HEALTH CARE EDUCATION/TRAINING PROGRAM

## 2024-07-18 PROCEDURE — 97166 OT EVAL MOD COMPLEX 45 MIN: CPT

## 2024-07-18 RX ORDER — WARFARIN SODIUM 5 MG/1
10 TABLET ORAL DAILY
Status: DISCONTINUED | OUTPATIENT
Start: 2024-07-18 | End: 2024-07-19

## 2024-07-18 RX ORDER — ENOXAPARIN SODIUM 150 MG/ML
1 INJECTION SUBCUTANEOUS 2 TIMES DAILY
Status: DISCONTINUED | OUTPATIENT
Start: 2024-07-18 | End: 2024-07-25

## 2024-07-18 RX ADMIN — ACETAMINOPHEN 650 MG: 325 TABLET ORAL at 08:03

## 2024-07-18 RX ADMIN — Medication 10 ML: at 07:57

## 2024-07-18 RX ADMIN — Medication 10 ML: at 20:34

## 2024-07-18 RX ADMIN — ROSUVASTATIN 20 MG: 20 TABLET, FILM COATED ORAL at 20:33

## 2024-07-18 RX ADMIN — FUROSEMIDE 20 MG: 10 INJECTION, SOLUTION INTRAMUSCULAR; INTRAVENOUS at 08:02

## 2024-07-18 RX ADMIN — Medication 10 ML: at 20:36

## 2024-07-18 RX ADMIN — ENOXAPARIN SODIUM 120 MG: 150 INJECTION SUBCUTANEOUS at 20:33

## 2024-07-18 RX ADMIN — ACETAMINOPHEN 650 MG: 325 TABLET ORAL at 17:11

## 2024-07-18 RX ADMIN — ENOXAPARIN SODIUM 120 MG: 150 INJECTION SUBCUTANEOUS at 11:30

## 2024-07-18 RX ADMIN — ASPIRIN 81 MG 81 MG: 81 TABLET ORAL at 08:02

## 2024-07-18 RX ADMIN — ACETAMINOPHEN 650 MG: 325 TABLET ORAL at 20:33

## 2024-07-18 RX ADMIN — WARFARIN SODIUM 10 MG: 5 TABLET ORAL at 17:12

## 2024-07-18 RX ADMIN — PANTOPRAZOLE SODIUM 40 MG: 40 TABLET, DELAYED RELEASE ORAL at 08:02

## 2024-07-18 ASSESSMENT — PAIN SCALES - GENERAL
PAINLEVEL_OUTOF10: 1
PAINLEVEL_OUTOF10: 0
PAINLEVEL_OUTOF10: 1
PAINLEVEL_OUTOF10: 0

## 2024-07-18 NOTE — CONSULTS
[facility]    Consult Note      Requesting Physician:      CHIEF COMPLAINT:  SOB      HISTORY OF PRESENT ILLNESS:    Mr. Kaur  is a 84 y.o. male we are seeing in consultation for Pulmonary embolism    ICD-10-CM    1. Shortness of breath  R06.02 CANCELED: Vascular duplex lower extremity venous bilateral     CANCELED: Echo (TTE) complete (PRN contrast/bubble/strain/3D)     CANCELED: Vascular duplex lower extremity venous bilateral     CANCELED: Echo (TTE) complete (PRN contrast/bubble/strain/3D)      2. Acute chest pain  R07.9       3. Acute respiratory failure with hypoxia (HCC)  J96.01       4. Acute pulmonary embolism with acute cor pulmonale, unspecified pulmonary embolism type (HCC)  I26.09       5. Hypomagnesemia  E83.42       6. Syncope and collapse  R55       7. Acute saddle pulmonary embolism with acute cor pulmonale (HCC)  I26.02 Echo (TTE) complete (PRN contrast/bubble/strain/3D)     Vascular duplex lower extremity venous bilateral     Echo (TTE) complete (PRN contrast/bubble/strain/3D)     Vascular duplex lower extremity venous bilateral      8. Pulmonary embolism (HCC)  I26.99 Invasive vascular procedure     Invasive vascular procedure         84-year-old gentleman with past medical history significant for hypertension, hyperlipidemia, GERD.  He presented to the hospital on 7/14/2024 with chest pain and shortness of breath.  He had an asystolic episode and received resuscitation.  CTPA done on admission showed large thrombus burden extending into the right and left main pulmonary artery as well as thrombus extending into both the right and left upper and lower lobe pulmonary into segmental arteries.  He had aspiration thrombectomy on 7/15/2024.  He is on IV heparin.  He will be doing warfarin at home due to cost issues with [employer].    Patient had blood loss during the  this interval not displayed.        LDH:No results for input(s): \"LDH\" in the last 720 hours.      Radiology Review:  XR KNEE LEFT (1-2 VIEWS)  Narrative: EXAMINATION:  2 XRAY VIEWS OF THE LEFT KNEE; 4 XRAY VIEWS OF THE LEFT TIBIA AND FIBULA    7/16/2024 10:21 am    COMPARISON:  None.    HISTORY:  ORDERING SYSTEM PROVIDED HISTORY: pain s/p fall  TECHNOLOGIST PROVIDED HISTORY:  Reason for exam:->pain s/p fall  Reason for Exam: Pain s/p fall    FINDINGS:  The patient is status post left knee arthroplasty.  The hardware appears  properly placed without complication.  There is mild cortical irregularity at  the a tip of the medial malleolus.  There is a moderate suprapatellar joint  effusion.  Impression: 1.  Mild cortical irregularity at the tip of the medial malleolus.  Correlate  with location of patient's pain.  This could represent an acute avulsion  fracture or sequela of remote trauma.    2.  Moderate suprapatellar joint effusion.  XR TIBIA FIBULA LEFT (2 VIEWS)  Narrative: EXAMINATION:  2 XRAY VIEWS OF THE LEFT KNEE; 4 XRAY VIEWS OF THE LEFT TIBIA AND FIBULA    7/16/2024 10:21 am    COMPARISON:  None.    HISTORY:  ORDERING SYSTEM PROVIDED HISTORY: pain s/p fall  TECHNOLOGIST PROVIDED HISTORY:  Reason for exam:->pain s/p fall  Reason for Exam: Pain s/p fall    FINDINGS:  The patient is status post left knee arthroplasty.  The hardware appears  properly placed without complication.  There is mild cortical irregularity at  the a tip of the medial malleolus.  There is a moderate suprapatellar joint  effusion.  Impression: 1.  Mild cortical irregularity at the tip of the medial malleolus.  Correlate  with location of patient's pain.  This could represent an acute avulsion  fracture or sequela of remote trauma.    2.  Moderate suprapatellar joint effusion.  XR HIP LEFT MIN 4VW W PELVIS  Narrative: EXAMINATION:  ONE XRAY VIEW OF THE PELVIS AND TWO XRAY VIEWS LEFT HIP    7/16/2024 10:21

## 2024-07-18 NOTE — PROGRESS NOTES
[facility] - Inpatient Rehabilitation Department   Phone: [phone]    Physical Therapy    [x] Initial Evaluation            [] Daily Treatment Note         [] Discharge Summary      Patient: Marvin Kaur   : 1940   MRN: [id number]   Date of Service:  2024  Admitting Diagnosis: Acute saddle pulmonary embolism with acute cor pulmonale (HCC)  Current Admission Summary: Pt admitted with syncope and cardiac arrest, fall at home, SOB, acute chest pain, acute respiratory failure, acute saddle PE; thrombectomy on 7/15/24, hypotension and anemia post op, received 2u PRBC; reports of L hip/knee pain/edema; xrays negative, ortho consult recommends conservative treatment, WBAT and gentle ROM  Past Medical History:  has a past medical history of AR (allergic rhinitis), Dermatitis, Diabetes, Diabetes (HCC), DJD (degenerative joint disease), Dyslipidemia, ED (erectile dysfunction), GERD (gastroesophageal reflux disease), HTN (hypertension), Hx of blood clots, Hyperlipidemia, Sleep apnea, and Vitamin D deficiency.  Past Surgical History:  has a past surgical history that includes Hand surgery; Foot surgery; Colonoscopy; joint replacement (Left, 2014); Knee Arthroplasty (Right, 2015); other surgical history (See note 14); and other surgical history (Right, 2015).    Discharge Recommendations: Marvin Kaur scored a 13/24 on the AM-PAC short mobility form. Current research shows that an AM-PAC score of 17 or less is typically not associated with a discharge to the patient's home setting. Based on the patient's AM-PAC score and their current functional mobility deficits, it is recommended that the patient have 5-7 sessions per week of Physical Therapy at d/c to increase the patient's independence.  At this time, this patient demonstrates complex nursing, medical, and rehabilitative needs, and would benefit from intensive rehabilitation services upon discharge from the Inpatient

## 2024-07-18 NOTE — PROGRESS NOTES
NEURO: A&O x4     CARDIAC: NSR     RESP: 95% on room air     GI: Active BS, BM 7/16/24 PM     SKIN: Scattered bruising, L groin puncture cyril, L knee abrasion     : Adequate UO, wt increasing     LINES: RIJ TLC, R hand PIV     GTTS: none at this time.    Pt having trouble walking d/t swelling and recent fall at home, may benefit from PT/OT. HemOnc consult d/t lab results. Coumadin 5mg given this evening.         See flowsheets for details, VS, MAR for meds and titration.    Electronically signed by Jeannie Licea RN on 7/17/2024 at 11:14 PM

## 2024-07-18 NOTE — PROGRESS NOTES
[facility] Daily Progress Note      Admit Date:  7/14/2024    Chief Complaint:  Chest pain    Subjective:  Mr. Kaur is feeling well this morning. His blood counts are stable. Waiting for his INR to be therapeutic between 2-3    Objective:   BP (!) 102/57   Pulse 88   Temp 98.5 °F (36.9 °C) (Temporal)   Resp 18   Ht 1.829 m (6')   Wt 112.5 kg (248 lb 0.3 oz)   SpO2 95%   BMI 33.64 kg/m²     Intake/Output Summary (Last 24 hours) at 7/18/2024 1240  Last data filed at 7/18/2024 0738  Gross per 24 hour   Intake 740 ml   Output 1200 ml   Net -460 ml       Physical Exam:  General:  Awake, alert, NAD  Skin:  Warm and dry  Neck:  JVD not visualized sitting upright  Chest:  CTAB, Comfortable   Cardiovascular:  RRR S1S2, no S3, No murmur  Abdomen:  Soft, ND, NT, No HSM  Extremities:  No edema    Medications:    warfarin  10 mg Oral Daily    enoxaparin  1 mg/kg SubCUTAneous BID    furosemide  20 mg IntraVENous Daily    acetaminophen  650 mg Oral TID    sodium chloride flush  5-40 mL IntraVENous 2 times per day    insulin lispro  0-8 Units SubCUTAneous TID WC    insulin lispro  0-4 Units SubCUTAneous Nightly    aspirin  81 mg Oral Daily    pantoprazole  40 mg Oral QAM AC    rosuvastatin  20 mg Oral Nightly    sodium chloride flush  5-40 mL IntraVENous 2 times per day      sodium chloride      dextrose      sodium chloride       midodrine, oxyCODONE **OR** oxyCODONE, tiZANidine, sodium chloride flush, sodium chloride, acetaminophen, morphine, dextrose bolus **OR** dextrose bolus, glucagon (rDNA), dextrose, perflutren lipid microspheres, sodium chloride flush, sodium chloride, potassium chloride **OR** potassium alternative oral replacement **OR** potassium chloride, magnesium sulfate, ondansetron **OR** ondansetron, polyethylene glycol, [DISCONTINUED] acetaminophen **OR** acetaminophen, traMADol    TELEMETRY (Personally reviewed by me): Sinus     Lab Data:  CBC:   Recent Labs     07/17/24  1523

## 2024-07-18 NOTE — PLAN OF CARE
Problem: Safety - Adult  Goal: Free from fall injury  7/18/2024 0806 by Corin Marshall RN  Outcome: Progressing  7/17/2024 2308 by Jeannie Licea RN  Outcome: Progressing  Flowsheets (Taken 7/17/2024 2308)  Free From Fall Injury:   Instruct family/caregiver on patient safety   Based on caregiver fall risk screen, instruct family/caregiver to ask for assistance with transferring infant if caregiver noted to have fall risk factors     Problem: Pain  Goal: Verbalizes/displays adequate comfort level or baseline comfort level  7/18/2024 0806 by Corin Marshall RN  Outcome: Progressing  7/17/2024 2308 by Jeannie Licea RN  Outcome: Progressing  Flowsheets (Taken 7/17/2024 2308)  Verbalizes/displays adequate comfort level or baseline comfort level:   Encourage patient to monitor pain and request assistance   Assess pain using appropriate pain scale     Problem: Discharge Planning  Goal: Discharge to home or other facility with appropriate resources  Outcome: Progressing     Problem: Skin/Tissue Integrity  Goal: Absence of new skin breakdown  Description: 1.  Monitor for areas of redness and/or skin breakdown  2.  Assess vascular access sites hourly  3.  Every 4-6 hours minimum:  Change oxygen saturation probe site  4.  Every 4-6 hours:  If on nasal continuous positive airway pressure, respiratory therapy assess nares and determine need for appliance change or resting period.  7/18/2024 0806 by Corin Marshall RN  Outcome: Progressing  7/17/2024 2308 by Jeannie Licea RN  Outcome: Progressing     Problem: Chronic Conditions and Co-morbidities  Goal: Patient's chronic conditions and co-morbidity symptoms are monitored and maintained or improved  Outcome: Progressing     Problem: Respiratory - Adult  Goal: Achieves optimal ventilation and oxygenation  Outcome: Progressing     Problem: Cardiovascular - Adult  Goal: Maintains optimal cardiac output and hemodynamic stability  Outcome: Progressing  Goal: Absence of  cardiac dysrhythmias or at baseline  Outcome: Progressing     Problem: Metabolic/Fluid and Electrolytes - Adult  Goal: Electrolytes maintained within normal limits  Outcome: Progressing  Goal: Hemodynamic stability and optimal renal function maintained  Outcome: Progressing     Problem: Hematologic - Adult  Goal: Maintains hematologic stability  Outcome: Progressing

## 2024-07-18 NOTE — PROGRESS NOTES
[facility] - Inpatient Rehabilitation Department   Phone: [phone]    Occupational Therapy    [x] Initial Evaluation            [] Daily Treatment Note         [] Discharge Summary      Patient: Marvin Kaur   : 1940   MRN: [id number]   Date of Service:  2024    Admitting Diagnosis:  Acute saddle pulmonary embolism with acute cor pulmonale (HCC)  Current Admission Summary: - Pt had onset of chest pain/pressure ,911 called. No cardiac hx except hypertension. Had syncopal episode while walking to the ambulance. CPR was given for approx 1 minute., CT showed large thrombus in the right an dleft main pulmonary artery, upper and lower lobe of the lungs, Heparin gtts started, NPO, maybe for thrombectomy today.   7/15 Aspiration thrombectomy ([place]). Bleeding R groin. Manual pressure, femstop x2.   Hypotensive. Levo started. RIJ TLC insertion. CT abd done (no RP bleed), 2 PRBC ordered, 1 given. 7/15 PM: Pt bladder scanned, 7ml, Labs sent, PRBC given. Levo able to be titrated off by AM, Pt voided x2. H&H 8.8/26.1. No heparin infusing at this time. BLE dopplers - neg DVT   : Up to chair. Heparin gtt started. AntiXa not therapeutic x3 by AM. Pt on room air, BP stable. Able to ambulate to bathroom with walker. Pt states he does not take his lasix at home \"because it makes him pee too much\". Edema still noted in BLE. Weight is up again today. Lasix 20mg BID IVP ordered.   : H&H trending down, 7.5/21.3. AntiXa and aPTT elevated still: Heparin stopped, ok with Pulm. I UPRBC given, H&H now 7.7.7. HemOnc consult, 5mg Coumadin given, goal INR 2-3.   : H&H 7.     Past Medical History:  has a past medical history of AR (allergic rhinitis), Dermatitis, Diabetes, Diabetes (HCC), DJD (degenerative joint disease), Dyslipidemia, ED (erectile dysfunction), GERD (gastroesophageal reflux disease), HTN (hypertension), Hx of blood clots, Hyperlipidemia, Sleep apnea, and Vitamin D

## 2024-07-18 NOTE — PROGRESS NOTES
[facility] Pulmonary/CCM Progress note      Admit Date: 7/14/2024    Chief Complaint: Syncope and cardiac arrest    Subjective:     Interval History: Significant dyspnea with exertion associated with sinus tachycardia.  Appears very deconditioned.  Still complains of significant left hip and knee pain.  Hemoglobin 8.2.    Scheduled Meds:   warfarin  10 mg Oral Daily    enoxaparin  1 mg/kg SubCUTAneous BID    furosemide  20 mg IntraVENous Daily    acetaminophen  650 mg Oral TID    sodium chloride flush  5-40 mL IntraVENous 2 times per day    insulin lispro  0-8 Units SubCUTAneous TID WC    insulin lispro  0-4 Units SubCUTAneous Nightly    aspirin  81 mg Oral Daily    pantoprazole  40 mg Oral QAM AC    rosuvastatin  20 mg Oral Nightly    sodium chloride flush  5-40 mL IntraVENous 2 times per day     Continuous Infusions:   sodium chloride      dextrose      sodium chloride       PRN Meds:midodrine, oxyCODONE **OR** oxyCODONE, tiZANidine, sodium chloride flush, sodium chloride, acetaminophen, morphine, dextrose bolus **OR** dextrose bolus, glucagon (rDNA), dextrose, perflutren lipid microspheres, sodium chloride flush, sodium chloride, potassium chloride **OR** potassium alternative oral replacement **OR** potassium chloride, magnesium sulfate, ondansetron **OR** ondansetron, polyethylene glycol, [DISCONTINUED] acetaminophen **OR** acetaminophen, traMADol    Review of Systems  Constitutional: Fatigue and malaise  Ears, nose, mouth, throat: negative for ear drainage, epistaxis, hoarseness, nasal congestion, sore throat and voice change  Respiratory: negative except for dyspnea on exertion and shortness of breath  Cardiovascular: negative for chest pain, chest pressure/discomfort, irregular heart beat, lower extremity edema and palpitations  Gastrointestinal: negative for abdominal pain, constipation, diarrhea, jaundice, melena, odynophagia, reflux symptoms and vomiting  Hematologic/lymphatic: negative for bleeding, easy

## 2024-07-18 NOTE — PROGRESS NOTES
V2.0    [facility] Progress Note      Name:  Marvin Kaur /Age/Sex: 1940  (84 y.o. male)   MRN & CSN:  [id number] & [id number] Encounter Date/Time: 2024 5:50 PM EDT   Location:  [facility] PCP: Heath Hua MD     Attending:Lyly Vyas MD       Hospital Day: 5    Assessment and Recommendations   Marvin Kaur is a 84 y.o. male with pmh of left leg DVT ~20 years ago, HTN, Pulmonary HTN, LALITA on CPAP presented with c/o chest pain, shortness of breath and worsening lower extremity edema for the past several months and a syncopal episode  the morning of presentation. En route to the hospital by EMS, he went into asystole for a few minutes requiring CPR with ROSC achieved.    In the ED he was found to be hypoxic requiring NRBM.   Also noted to be hypotensive 72/50 .  CTA showed massive bilateral PE with cor pulmonale.  Patient was started on heparin drip and cardiology consulted.  Underwent mechanical thrombectomy on 7/15/24 with recovery period complicated by persistent bleeding from the right groin with associated hypotension and ABLA requiring PRBC transfusion and pressors.      Plan:     Massive bilateral pulmonary emboli with cor pulmonale:  Acute hypoxic respiratory failure:  Syncope and asystole cardiac arrest:  Venous Doppler negative for DVT.  TTE 7/15/2024: LVEF 60 to 65%. Dilated RV with akinetic wall. (Todd's sign +)  Cardiology consult appreciated.  s/p mechanical thrombectomy 7/15/2024.  Currently on heparin drip/Coumadin.  INR subtherapeutic.  Weaned off supplemental oxygen.  Heme-onc consult appreciated: No indication for hypercoagulable workup.   CT chest abdomen and pelvis with no evidence of metastatic disease.  Outpatient follow-up in 2 to 3 weeks after discharge.  PSA pending.  Intensivist consult appreciated.    Acute blood loss anemia:   Hgb dropped from 12.7-8.8.  Transfused 1 unit PRBC 7/15/2024.  CT abdomen and pelvis negative for retroperitoneal bleed.  Monitor  for review.  MIP images are provided for review. Automated exposure control, iterative reconstruction, and/or weight based adjustment of the mA/kV was utilized to reduce the radiation dose to as low as reasonably achievable. COMPARISON: None. HISTORY: ORDERING SYSTEM PROVIDED HISTORY: Respiratory failure, chest pain, loss of consciousness with 1 minute of CPR performed, history of DVT 25 years ago, eval for pulmonary embolism, iatrogenic injury from CPR TECHNOLOGIST PROVIDED HISTORY: Reason for exam:->Respiratory failure, chest pain, loss of consciousness with 1 minute of CPR performed, history of DVT 25 years ago, eval for pulmonary embolism, iatrogenic injury from CPR Additional Contrast?->1 Reason for Exam: Respiratory failure, chest pain, loss of consciousness with 1 minute of CPR performed, history of DVT 25 years ago, eval for pulmonary embolism, iatrogenic injury from CPR FINDINGS: Pulmonary Arteries: There is a large thrombus burden extending into the right and left main pulmonary artery (no saddle thrombus) as well as thrombus extending into both the right and left upper and lower lobe pulmonary intersegmental arteries. Mediastinum: No evidence of mediastinal lymphadenopathy.  There is slight prominence to the right ventricle and atrium compared to the left.  No pericardial effusion.  Minimal adenopathy within the mediastinum. Lungs/pleura: Tiny left pleural effusion.  No lobar infiltrate effusion or pneumothorax. Upper Abdomen: Limited images of the upper abdomen are unremarkable. Soft Tissues/Bones: Moderate degenerative changes of the spine.     Large thrombus burden as detailed above. Slight right ventricular strain with prominence to the ventricle and atrium. No obvious lobar infiltrate effusion.  Tiny left pleural effusion. Results communicated to the communication center.     XR CHEST PORTABLE    Result Date: 7/14/2024  EXAMINATION: ONE XRAY VIEW OF THE CHEST 7/14/2024 1:08 pm COMPARISON: 11/14/2020

## 2024-07-18 NOTE — PROGRESS NOTES
Pharmacy to Dose Warfarin    Pharmacy consulted to dose warfarin for PE.    INR Goal: 2-3    INR today: 1.05    Assessment/Plan:  - INR subtheraeputic after two doses of warfarin 5 mg   - Will give warfarin 10 mg tonight  - Continue heparin drip until INR > 2    Pharmacy will continue to follow.    Maria A Wu, PharmD, BCPS  Clinical Pharmacist  [id number]

## 2024-07-18 NOTE — PLAN OF CARE
Problem: Safety - Adult  Goal: Free from fall injury  Outcome: Progressing  Flowsheets (Taken 7/17/2024 2308)  Free From Fall Injury:   Instruct family/caregiver on patient safety   Based on caregiver fall risk screen, instruct family/caregiver to ask for assistance with transferring infant if caregiver noted to have fall risk factors     Problem: Pain  Goal: Verbalizes/displays adequate comfort level or baseline comfort level  Outcome: Progressing  Flowsheets (Taken 7/17/2024 2307)  Verbalizes/displays adequate comfort level or baseline comfort level:   Encourage patient to monitor pain and request assistance   Assess pain using appropriate pain scale     Problem: Skin/Tissue Integrity  Goal: Absence of new skin breakdown  Description: 1.  Monitor for areas of redness and/or skin breakdown  2.  Assess vascular access sites hourly  3.  Every 4-6 hours minimum:  Change oxygen saturation probe site  4.  Every 4-6 hours:  If on nasal continuous positive airway pressure, respiratory therapy assess nares and determine need for appliance change or resting period.  Outcome: Progressing

## 2024-07-19 ENCOUNTER — APPOINTMENT (OUTPATIENT)
Dept: CT IMAGING | Age: 84
End: 2024-07-19
Payer: MEDICARE

## 2024-07-19 ENCOUNTER — TELEPHONE (OUTPATIENT)
Dept: PHARMACY | Age: 84
End: 2024-07-19

## 2024-07-19 LAB
ABO + RH BLD: NORMAL
ALBUMIN SERPL-MCNC: 2.6 G/DL (ref 3.4–5)
ANION GAP SERPL CALCULATED.3IONS-SCNC: 9 MMOL/L (ref 3–16)
BLD GP AB SCN SERPL QL: NORMAL
BLOOD BANK DISPENSE STATUS: NORMAL
BLOOD BANK PRODUCT CODE: NORMAL
BPU ID: NORMAL
BUN SERPL-MCNC: 21 MG/DL (ref 7–20)
CALCIUM SERPL-MCNC: 8.3 MG/DL (ref 8.3–10.6)
CHLORIDE SERPL-SCNC: 100 MMOL/L (ref 99–110)
CK SERPL-CCNC: 58 U/L (ref 39–308)
CO2 SERPL-SCNC: 24 MMOL/L (ref 21–32)
CREAT SERPL-MCNC: 1.3 MG/DL (ref 0.8–1.3)
DEPRECATED RDW RBC AUTO: 15.7 % (ref 12.4–15.4)
DESCRIPTION BLOOD BANK: NORMAL
GFR SERPLBLD CREATININE-BSD FMLA CKD-EPI: 53 ML/MIN/{1.73_M2}
GLUCOSE BLD-MCNC: 112 MG/DL (ref 70–99)
GLUCOSE BLD-MCNC: 118 MG/DL (ref 70–99)
GLUCOSE BLD-MCNC: 128 MG/DL (ref 70–99)
GLUCOSE BLD-MCNC: 152 MG/DL (ref 70–99)
GLUCOSE SERPL-MCNC: 114 MG/DL (ref 70–99)
HCT VFR BLD AUTO: 20.3 % (ref 40.5–52.5)
HCT VFR BLD AUTO: 20.7 % (ref 40.5–52.5)
HCT VFR BLD AUTO: 22.8 % (ref 40.5–52.5)
HGB BLD-MCNC: 7.1 G/DL (ref 13.5–17.5)
HGB BLD-MCNC: 7.2 G/DL (ref 13.5–17.5)
HGB BLD-MCNC: 8 G/DL (ref 13.5–17.5)
INR PPP: 1.34 (ref 0.85–1.15)
MCH RBC QN AUTO: 34 PG (ref 26–34)
MCHC RBC AUTO-ENTMCNC: 35.1 G/DL (ref 31–36)
MCV RBC AUTO: 97 FL (ref 80–100)
PERFORMED ON: ABNORMAL
PHOSPHATE SERPL-MCNC: 3.3 MG/DL (ref 2.5–4.9)
PLATELET # BLD AUTO: 203 K/UL (ref 135–450)
PMV BLD AUTO: 6.5 FL (ref 5–10.5)
POTASSIUM SERPL-SCNC: 3.6 MMOL/L (ref 3.5–5.1)
PROTHROMBIN TIME: 16.7 SEC (ref 11.9–14.9)
RBC # BLD AUTO: 2.09 M/UL (ref 4.2–5.9)
REASON FOR REJECTION: NORMAL
REJECTED TEST: NORMAL
SODIUM SERPL-SCNC: 133 MMOL/L (ref 136–145)
SPECIMEN SOURCE: NORMAL
WBC # BLD AUTO: 7.8 K/UL (ref 4–11)

## 2024-07-19 PROCEDURE — 36592 COLLECT BLOOD FROM PICC: CPT

## 2024-07-19 PROCEDURE — 74176 CT ABD & PELVIS W/O CONTRAST: CPT

## 2024-07-19 PROCEDURE — 99233 SBSQ HOSP IP/OBS HIGH 50: CPT | Performed by: INTERNAL MEDICINE

## 2024-07-19 PROCEDURE — 6360000002 HC RX W HCPCS: Performed by: INTERNAL MEDICINE

## 2024-07-19 PROCEDURE — 86901 BLOOD TYPING SEROLOGIC RH(D): CPT

## 2024-07-19 PROCEDURE — P9016 RBC LEUKOCYTES REDUCED: HCPCS

## 2024-07-19 PROCEDURE — 2000000000 HC ICU R&B

## 2024-07-19 PROCEDURE — 86900 BLOOD TYPING SEROLOGIC ABO: CPT

## 2024-07-19 PROCEDURE — 73700 CT LOWER EXTREMITY W/O DYE: CPT

## 2024-07-19 PROCEDURE — 6370000000 HC RX 637 (ALT 250 FOR IP): Performed by: NURSE PRACTITIONER

## 2024-07-19 PROCEDURE — 6370000000 HC RX 637 (ALT 250 FOR IP): Performed by: FAMILY MEDICINE

## 2024-07-19 PROCEDURE — 36430 TRANSFUSION BLD/BLD COMPNT: CPT

## 2024-07-19 PROCEDURE — 82550 ASSAY OF CK (CPK): CPT

## 2024-07-19 PROCEDURE — 86850 RBC ANTIBODY SCREEN: CPT

## 2024-07-19 PROCEDURE — 86923 COMPATIBILITY TEST ELECTRIC: CPT

## 2024-07-19 PROCEDURE — 85014 HEMATOCRIT: CPT

## 2024-07-19 PROCEDURE — 85027 COMPLETE CBC AUTOMATED: CPT

## 2024-07-19 PROCEDURE — 36415 COLL VENOUS BLD VENIPUNCTURE: CPT

## 2024-07-19 PROCEDURE — 85018 HEMOGLOBIN: CPT

## 2024-07-19 PROCEDURE — 85610 PROTHROMBIN TIME: CPT

## 2024-07-19 PROCEDURE — 2580000003 HC RX 258: Performed by: FAMILY MEDICINE

## 2024-07-19 PROCEDURE — 80069 RENAL FUNCTION PANEL: CPT

## 2024-07-19 PROCEDURE — 2580000003 HC RX 258: Performed by: INTERNAL MEDICINE

## 2024-07-19 RX ORDER — FUROSEMIDE 10 MG/ML
20 INJECTION INTRAMUSCULAR; INTRAVENOUS DAILY
Status: DISCONTINUED | OUTPATIENT
Start: 2024-07-20 | End: 2024-07-21

## 2024-07-19 RX ORDER — SODIUM CHLORIDE 9 MG/ML
INJECTION, SOLUTION INTRAVENOUS PRN
Status: DISCONTINUED | OUTPATIENT
Start: 2024-07-19 | End: 2024-07-21

## 2024-07-19 RX ORDER — ENOXAPARIN SODIUM 100 MG/ML
40 INJECTION SUBCUTANEOUS DAILY
Status: DISCONTINUED | OUTPATIENT
Start: 2024-07-20 | End: 2024-07-21 | Stop reason: ALTCHOICE

## 2024-07-19 RX ORDER — FUROSEMIDE 10 MG/ML
20 INJECTION INTRAMUSCULAR; INTRAVENOUS 2 TIMES DAILY
Status: DISCONTINUED | OUTPATIENT
Start: 2024-07-19 | End: 2024-07-19

## 2024-07-19 RX ORDER — WARFARIN SODIUM 7.5 MG/1
7.5 TABLET ORAL DAILY
Status: DISCONTINUED | OUTPATIENT
Start: 2024-07-19 | End: 2024-07-19

## 2024-07-19 RX ADMIN — ENOXAPARIN SODIUM 120 MG: 150 INJECTION SUBCUTANEOUS at 09:23

## 2024-07-19 RX ADMIN — PANTOPRAZOLE SODIUM 40 MG: 40 TABLET, DELAYED RELEASE ORAL at 05:31

## 2024-07-19 RX ADMIN — Medication 10 ML: at 09:25

## 2024-07-19 RX ADMIN — ACETAMINOPHEN 650 MG: 325 TABLET ORAL at 09:24

## 2024-07-19 RX ADMIN — Medication 10 ML: at 20:24

## 2024-07-19 RX ADMIN — ACETAMINOPHEN 650 MG: 325 TABLET ORAL at 20:22

## 2024-07-19 RX ADMIN — ASPIRIN 81 MG 81 MG: 81 TABLET ORAL at 09:24

## 2024-07-19 RX ADMIN — Medication 10 ML: at 20:23

## 2024-07-19 RX ADMIN — ROSUVASTATIN 20 MG: 20 TABLET, FILM COATED ORAL at 20:22

## 2024-07-19 RX ADMIN — FUROSEMIDE 20 MG: 10 INJECTION, SOLUTION INTRAMUSCULAR; INTRAVENOUS at 09:25

## 2024-07-19 ASSESSMENT — PAIN DESCRIPTION - ORIENTATION
ORIENTATION: LEFT
ORIENTATION: LEFT

## 2024-07-19 ASSESSMENT — PAIN SCALES - GENERAL
PAINLEVEL_OUTOF10: 0
PAINLEVEL_OUTOF10: 3
PAINLEVEL_OUTOF10: 3
PAINLEVEL_OUTOF10: 0

## 2024-07-19 ASSESSMENT — PAIN DESCRIPTION - LOCATION
LOCATION: OTHER (COMMENT)
LOCATION: OTHER (COMMENT)

## 2024-07-19 ASSESSMENT — PAIN DESCRIPTION - DESCRIPTORS
DESCRIPTORS: ACHING
DESCRIPTORS: ACHING

## 2024-07-19 NOTE — PROGRESS NOTES
Order noted for redraw on h/h not from tlc.   Blood was taken with peripheral stick this am per the nurse/charge nurse.

## 2024-07-19 NOTE — PROGRESS NOTES
Assist dr hollingsworth with rectal exam, no sample sent as pt had smear on outside of rectum and md states no signs of bleed.

## 2024-07-19 NOTE — CONSENT
Informed Consent for Blood Component Transfusion Note    I have discussed with the patient the rationale for blood component transfusion; its benefits in treating or preventing fatigue, organ damage, or death; and its risk which includes mild transfusion reactions, rare risk of blood borne infection, or more serious but rare reactions. I have discussed the alternatives to transfusion, including the risk and consequences of not receiving transfusion. The patient had an opportunity to ask questions and had agreed to proceed with transfusion of blood components.    Electronically signed by Rocky Simpson MD on 7/19/24 at 1:57 PM EDT

## 2024-07-19 NOTE — PROGRESS NOTES
Latest Reference Range & Units 07/19/24 11:20   Hemoglobin Quant 13.5 - 17.5 g/dL 7.2 (L)   Hematocrit 40.5 - 52.5 % 20.7 (LL)       Panic results taken. Not different than prior count.

## 2024-07-19 NOTE — PROGRESS NOTES
Dr limon in to see pt while he is in ct.  Md states he is okay with coumadin hold if hosp/cc would prefer this

## 2024-07-19 NOTE — PROGRESS NOTES
NEURO: A&O x4     CARDIAC: NSR     RESP: 95% on room air     GI: Active BS, BM 7/18/24 PM     SKIN: Scattered bruising, L groin puncture cyril, L knee abrasion     : Adequate UO, wt increasing     LINES: RIJ TLC, R hand PIV     GTTS: none at this time.     Pt having trouble walking d/t swelling and recent fall at home. Coumadin 10mg given this evening d/t subtherapuetic INR, along with lovenox for bridge. HHC, SNF and [facility] referrals made today for discharge planning.          See flowsheets for details, VS, MAR for meds and titration.     Electronically signed by Jeannie Licea RN on 7/17/2024 at 11:14 PM

## 2024-07-19 NOTE — TELEPHONE ENCOUNTER
Clinical pharmacist reached out to refer patient to anticoagulation clinic. He is currently admitted, pending d/c Sun or Mon.     Tentatively scheduled initial appt on Thurs 7/25.     Referral was faxed to Dr. Mills for signature.

## 2024-07-19 NOTE — PROGRESS NOTES
Pt wanted to not take lasix. D/w pt importance/complications of compliance/noncompliance with lasix. Meds given

## 2024-07-19 NOTE — PLAN OF CARE
Problem: Safety - Adult  Goal: Free from fall injury  7/18/2024 2127 by Jeannie Licea, RN  Outcome: Progressing  Flowsheets (Taken 7/17/2024 2308)  Free From Fall Injury:   Instruct family/caregiver on patient safety   Based on caregiver fall risk screen, instruct family/caregiver to ask for assistance with transferring infant if caregiver noted to have fall risk factors     Problem: Pain  Goal: Verbalizes/displays adequate comfort level or baseline comfort level  7/18/2024 2127 by Jeannie Licea, RN  Outcome: Progressing  Flowsheets (Taken 7/18/2024 2127)  Verbalizes/displays adequate comfort level or baseline comfort level:   Encourage patient to monitor pain and request assistance   Assess pain using appropriate pain scale   Administer analgesics based on type and severity of pain and evaluate response     Problem: Discharge Planning  Goal: Discharge to home or other facility with appropriate resources  7/18/2024 2127 by Jeannie Licea RN  Outcome: Progressing  Flowsheets (Taken 7/18/2024 2127)  Discharge to home or other facility with appropriate resources:   Identify barriers to discharge with patient and caregiver   Arrange for needed discharge resources and transportation as appropriate   Identify discharge learning needs (meds, wound care, etc)     Problem: Respiratory - Adult  Goal: Achieves optimal ventilation and oxygenation  7/18/2024 2127 by Jeannie Licea RN  Outcome: Progressing  Flowsheets (Taken 7/18/2024 2127)  Achieves optimal ventilation and oxygenation:   Assess for changes in respiratory status   Assess for changes in mentation and behavior   Position to facilitate oxygenation and minimize respiratory effort     Problem: Cardiovascular - Adult  Goal: Maintains optimal cardiac output and hemodynamic stability  7/18/2024 2127 by Jeannie Licea, RN  Outcome: Progressing  Flowsheets (Taken 7/18/2024 2127)  Maintains optimal cardiac output and hemodynamic stability:   Monitor blood pressure and

## 2024-07-19 NOTE — PROGRESS NOTES
V2.0    [facility] Progress Note      Name:  Marvin Kaur /Age/Sex: 1940  (84 y.o. male)   MRN & CSN:  [id number] & [id number] Encounter Date/Time: 2024 5:50 PM EDT   Location:  [facility] PCP: Heath Hua MD     Attending:Lyly Vyas MD       Hospital Day: 6    Assessment and Recommendations   Marvin Kaur is a 84 y.o. male with pmh of left leg DVT ~20 years ago, HTN, Pulmonary HTN, LALITA on CPAP presented with c/o chest pain, shortness of breath and worsening lower extremity edema for the past several months and a syncopal episode  the morning of presentation. En route to the hospital by EMS, he went into asystole for a few minutes requiring CPR with ROSC achieved.    In the ED he was found to be hypoxic requiring NRBM.   Also noted to be hypotensive 72/50 .  CTA showed massive bilateral PE with cor pulmonale.  Patient was started on heparin drip and cardiology consulted.  Underwent mechanical thrombectomy on 7/15/24 with recovery period complicated by persistent bleeding from the right groin with associated hypotension and ABLA requiring PRBC transfusion and pressors.      Plan:     Massive bilateral pulmonary emboli with cor pulmonale:  Acute hypoxic respiratory failure:  Syncope and asystole cardiac arrest:  Venous Doppler negative for DVT.  TTE 7/15/2024: LVEF 60 to 65%. Dilated RV with akinetic wall. (Todd's sign +)  Cardiology consult appreciated.  s/p mechanical thrombectomy 7/15/2024.  Currently on heparin drip/Coumadin.  INR subtherapeutic.  Weaned off supplemental oxygen.  Heme-onc consult appreciated: No indication for hypercoagulable workup.   CT chest abdomen and pelvis with no evidence of metastatic disease.  Outpatient follow-up in 2 to 3 weeks after discharge.  PSA pending.  Intensivist consult appreciated.    Acute blood loss anemia:   Hgb dropped from 12.7-8.8.  Transfused 1 unit PRBC 7/15/2024.  CT abdomen and pelvis negative for retroperitoneal bleed.  Follow  [facility].     XR CHEST PORTABLE    Result Date: 7/14/2024  EXAMINATION: ONE XRAY VIEW OF THE CHEST 7/14/2024 1:08 pm COMPARISON: 11/14/2020 HISTORY: Acute shortness of breath. FINDINGS: Patient is rotated and there is artifact along the right chest.  Stable borderline cardiomegaly.  Limited depth of inspiration.  No consolidation, pleural effusion, or pneumothorax.     Low lung volumes.  No acute abnormality seen allowing for patient rotation.       CBC:   Recent Labs     07/18/24  0540 07/18/24  1031 07/19/24  0418 07/19/24  1120   WBC 7.9 8.9 7.8  --    HGB 7.6* 8.2* 7.1* 7.2*    214 203  --      BMP:    Recent Labs     07/17/24  0515 07/18/24  0540 07/19/24  0320   * 134* 133*   K 3.5 3.6 3.6    100 100   CO2 24 24 24   BUN 22* 22* 21*   CREATININE 1.5* 1.4* 1.3   GLUCOSE 124* 106* 114*     Hepatic:   No results for input(s): \"AST\", \"ALT\", \"BILITOT\", \"ALKPHOS\" in the last 72 hours.    Invalid input(s): \"ALB\"    Lipids:   Lab Results   Component Value Date/Time    CHOL 194 12/15/2023 01:17 PM    HDL 99 12/15/2023 01:17 PM    HDL 83 11/16/2011 10:12 AM    TRIG 78 12/15/2023 01:17 PM     Hemoglobin A1C:   Lab Results   Component Value Date/Time    LABA1C 5.6 07/16/2024 04:11 AM     TSH:   Lab Results   Component Value Date/Time    TSH 2.01 12/15/2023 01:17 PM     Troponin: No results found for: \"TROPONINT\"  Lactic Acid:   No results for input(s): \"LACTA\" in the last 72 hours.    BNP:   No results for input(s): \"PROBNP\" in the last 72 hours.    UA:  Lab Results   Component Value Date/Time    NITRU Negative 07/14/2024 05:45 PM    COLORU Yellow 07/14/2024 05:45 PM    PHUR 5.0 07/14/2024 05:45 PM    PHUR 7.0 11/14/2020 12:03 PM    WBCUA 1 07/14/2024 05:45 PM    RBCUA 2 07/14/2024 05:45 PM    MUCUS 4+ 12/30/2014 11:25 AM    BACTERIA None Seen 07/14/2024 05:45 PM    CLARITYU Clear 07/14/2024 05:45 PM    LEUKOCYTESUR Negative 07/14/2024 05:45 PM    UROBILINOGEN 1.0 07/14/2024 05:45 PM

## 2024-07-19 NOTE — PROGRESS NOTES
[facility] Daily Progress Note      Admit Date:  7/14/2024    Chief Complaint   Patient presents with    Chest Pain     Pt had onset of chest pain/pressure that onset about 30 minutes ago. 911 called. No cardiac hx except hypertension. Had syncopal episode while walking to the ambulance. CPR was given for approx 1 minute.         Subjective:  Mr. Kaur denies exertional chest pain, SOB/QUILES, PND, palpitations, light-headedness, or edema. Has drop in hgb but no evidence of bleeding.    Objective:   BP (!) 114/95   Pulse 81   Temp 97.1 °F (36.2 °C)   Resp 18   Ht 1.829 m (6')   Wt 112 kg (246 lb 14.6 oz)   SpO2 94%   BMI 33.49 kg/m²     Intake/Output Summary (Last 24 hours) at 7/19/2024 1656  Last data filed at 7/19/2024 1423  Gross per 24 hour   Intake 720 ml   Output 2050 ml   Net -1330 ml       TELEMETRY: Sinus     Physical Exam:  General:  Awake, alert, oriented x 3, NAD  Skin:  Warm and dry  Neck:  JVD +  Chest:  normal air entry  Cardiovascular:  RRR S1S2, no S3, no mrmr  Abdomen:  Soft, ND, NT, No HSM  Extremities:  2+ edema    Medications:    [START ON 7/20/2024] furosemide  20 mg IntraVENous Daily    [Held by provider] enoxaparin  1 mg/kg SubCUTAneous BID    acetaminophen  650 mg Oral TID    sodium chloride flush  5-40 mL IntraVENous 2 times per day    insulin lispro  0-8 Units SubCUTAneous TID WC    insulin lispro  0-4 Units SubCUTAneous Nightly    [Held by provider] aspirin  81 mg Oral Daily    pantoprazole  40 mg Oral QAM AC    rosuvastatin  20 mg Oral Nightly    sodium chloride flush  5-40 mL IntraVENous 2 times per day      sodium chloride      sodium chloride      dextrose      sodium chloride       sodium chloride, midodrine, oxyCODONE **OR** oxyCODONE, tiZANidine, sodium chloride flush, sodium chloride, acetaminophen, morphine, dextrose bolus **OR** dextrose bolus, glucagon (rDNA), dextrose, perflutren lipid microspheres, sodium chloride flush, sodium chloride, potassium chloride  EDT    I, Talat Mills MD, personally performed the services described in this documentation as scribed, in my presence, and it is both accurate and complete.

## 2024-07-19 NOTE — PROGRESS NOTES
[facility] Pulmonary/CCM Progress note      Admit Date: 7/14/2024    Chief Complaint: Syncope and cardiac arrest    Subjective:     Interval History: QUILES persists, but patient is lying in bed comfortably today.  Still continues to have an acute drop in hemoglobin-7.1 today.  Left leg pain-CT femur ordered by hospitalist revealed a large left rectus femoris hematoma.    Scheduled Meds:   [START ON 7/20/2024] furosemide  20 mg IntraVENous Daily    [Held by provider] enoxaparin  1 mg/kg SubCUTAneous BID    acetaminophen  650 mg Oral TID    sodium chloride flush  5-40 mL IntraVENous 2 times per day    insulin lispro  0-8 Units SubCUTAneous TID WC    insulin lispro  0-4 Units SubCUTAneous Nightly    aspirin  81 mg Oral Daily    pantoprazole  40 mg Oral QAM AC    rosuvastatin  20 mg Oral Nightly    sodium chloride flush  5-40 mL IntraVENous 2 times per day     Continuous Infusions:   sodium chloride      sodium chloride      dextrose      sodium chloride       PRN Meds:sodium chloride, midodrine, oxyCODONE **OR** oxyCODONE, tiZANidine, sodium chloride flush, sodium chloride, acetaminophen, morphine, dextrose bolus **OR** dextrose bolus, glucagon (rDNA), dextrose, perflutren lipid microspheres, sodium chloride flush, sodium chloride, potassium chloride **OR** potassium alternative oral replacement **OR** potassium chloride, magnesium sulfate, ondansetron **OR** ondansetron, polyethylene glycol, [DISCONTINUED] acetaminophen **OR** acetaminophen, traMADol    Review of Systems  Constitutional: Fatigue and malaise  Ears, nose, mouth, throat: negative for ear drainage, epistaxis, hoarseness, nasal congestion, sore throat and voice change  Respiratory: negative except for dyspnea on exertion and shortness of breath  Cardiovascular: negative for chest pain, chest pressure/discomfort, irregular heart beat, lower extremity edema and palpitations  Gastrointestinal: negative for abdominal pain, constipation, diarrhea, jaundice, melena,

## 2024-07-19 NOTE — PROGRESS NOTES
Pharmacy to Dose Warfarin    Pharmacy consulted to dose warfarin for PE.    INR Goal: 2-3    INR today: 1.34    Assessment/Plan:  - INR 1.34 today  - Patient received 10 mg warfarin last night after two doses of 5 mg   - Will give 7.5 mg tonight  - Continue enoxaparin 1 mg/kg BID to bridge until INR > 2  - In anticipation of patient possibly discharging over the weekend, patient has been scheduled for a coumadin clinic appointment with the [facility] coumadin clinic on Thursday, 7/25 at 2:45pm. Patient should arrive 30 minutes early to register prior to first appointment.    Pharmacy will continue to follow.    Maria A Wu, PharmD, BCPS  Clinical Pharmacist  [id number]

## 2024-07-19 NOTE — PROGRESS NOTES
Left thigh measurements as noted. Area marked clearly for future measurements.     Lower 23 inches    Upper 25.5 inches.

## 2024-07-20 LAB
ALBUMIN SERPL-MCNC: 3 G/DL (ref 3.4–5)
ANION GAP SERPL CALCULATED.3IONS-SCNC: 10 MMOL/L (ref 3–16)
BUN SERPL-MCNC: 19 MG/DL (ref 7–20)
CALCIUM SERPL-MCNC: 8.6 MG/DL (ref 8.3–10.6)
CHLORIDE SERPL-SCNC: 100 MMOL/L (ref 99–110)
CO2 SERPL-SCNC: 24 MMOL/L (ref 21–32)
CREAT SERPL-MCNC: 1.3 MG/DL (ref 0.8–1.3)
DEPRECATED RDW RBC AUTO: 16 % (ref 12.4–15.4)
GFR SERPLBLD CREATININE-BSD FMLA CKD-EPI: 53 ML/MIN/{1.73_M2}
GLUCOSE BLD-MCNC: 109 MG/DL (ref 70–99)
GLUCOSE BLD-MCNC: 138 MG/DL (ref 70–99)
GLUCOSE BLD-MCNC: 146 MG/DL (ref 70–99)
GLUCOSE BLD-MCNC: 173 MG/DL (ref 70–99)
GLUCOSE SERPL-MCNC: 116 MG/DL (ref 70–99)
HCT VFR BLD AUTO: 23.2 % (ref 40.5–52.5)
HCT VFR BLD AUTO: 23.3 % (ref 40.5–52.5)
HCT VFR BLD AUTO: 23.3 % (ref 40.5–52.5)
HCT VFR BLD AUTO: 26.4 % (ref 40.5–52.5)
HGB BLD-MCNC: 7.9 G/DL (ref 13.5–17.5)
HGB BLD-MCNC: 8 G/DL (ref 13.5–17.5)
HGB BLD-MCNC: 8.1 G/DL (ref 13.5–17.5)
HGB BLD-MCNC: 8.8 G/DL (ref 13.5–17.5)
INR PPP: 1.6 (ref 0.85–1.15)
MAGNESIUM SERPL-MCNC: 1.4 MG/DL (ref 1.8–2.4)
MCH RBC QN AUTO: 32.8 PG (ref 26–34)
MCHC RBC AUTO-ENTMCNC: 34 G/DL (ref 31–36)
MCV RBC AUTO: 96.5 FL (ref 80–100)
PERFORMED ON: ABNORMAL
PHOSPHATE SERPL-MCNC: 3.2 MG/DL (ref 2.5–4.9)
PLATELET # BLD AUTO: 226 K/UL (ref 135–450)
PMV BLD AUTO: 6.5 FL (ref 5–10.5)
POTASSIUM SERPL-SCNC: 4 MMOL/L (ref 3.5–5.1)
PROTHROMBIN TIME: 19.2 SEC (ref 11.9–14.9)
RBC # BLD AUTO: 2.41 M/UL (ref 4.2–5.9)
SODIUM SERPL-SCNC: 134 MMOL/L (ref 136–145)
WBC # BLD AUTO: 8.6 K/UL (ref 4–11)

## 2024-07-20 PROCEDURE — 2000000000 HC ICU R&B

## 2024-07-20 PROCEDURE — 6360000002 HC RX W HCPCS: Performed by: FAMILY MEDICINE

## 2024-07-20 PROCEDURE — 6360000002 HC RX W HCPCS: Performed by: INTERNAL MEDICINE

## 2024-07-20 PROCEDURE — 2580000003 HC RX 258: Performed by: INTERNAL MEDICINE

## 2024-07-20 PROCEDURE — 85610 PROTHROMBIN TIME: CPT

## 2024-07-20 PROCEDURE — 85014 HEMATOCRIT: CPT

## 2024-07-20 PROCEDURE — 99233 SBSQ HOSP IP/OBS HIGH 50: CPT | Performed by: INTERNAL MEDICINE

## 2024-07-20 PROCEDURE — 2580000003 HC RX 258: Performed by: FAMILY MEDICINE

## 2024-07-20 PROCEDURE — 6370000000 HC RX 637 (ALT 250 FOR IP): Performed by: NURSE PRACTITIONER

## 2024-07-20 PROCEDURE — 85018 HEMOGLOBIN: CPT

## 2024-07-20 PROCEDURE — 83735 ASSAY OF MAGNESIUM: CPT

## 2024-07-20 PROCEDURE — 85027 COMPLETE CBC AUTOMATED: CPT

## 2024-07-20 PROCEDURE — 6370000000 HC RX 637 (ALT 250 FOR IP): Performed by: FAMILY MEDICINE

## 2024-07-20 PROCEDURE — 80069 RENAL FUNCTION PANEL: CPT

## 2024-07-20 RX ADMIN — ENOXAPARIN SODIUM 40 MG: 100 INJECTION SUBCUTANEOUS at 09:42

## 2024-07-20 RX ADMIN — Medication 10 ML: at 21:41

## 2024-07-20 RX ADMIN — Medication 10 ML: at 09:36

## 2024-07-20 RX ADMIN — ROSUVASTATIN 20 MG: 20 TABLET, FILM COATED ORAL at 21:07

## 2024-07-20 RX ADMIN — FUROSEMIDE 20 MG: 10 INJECTION, SOLUTION INTRAMUSCULAR; INTRAVENOUS at 09:35

## 2024-07-20 RX ADMIN — ACETAMINOPHEN 650 MG: 325 TABLET ORAL at 16:08

## 2024-07-20 RX ADMIN — PANTOPRAZOLE SODIUM 40 MG: 40 TABLET, DELAYED RELEASE ORAL at 06:37

## 2024-07-20 RX ADMIN — MAGNESIUM SULFATE HEPTAHYDRATE 2000 MG: 40 INJECTION, SOLUTION INTRAVENOUS at 06:35

## 2024-07-20 RX ADMIN — Medication 10 ML: at 09:35

## 2024-07-20 RX ADMIN — OXYCODONE HYDROCHLORIDE 5 MG: 5 TABLET ORAL at 16:09

## 2024-07-20 RX ADMIN — ACETAMINOPHEN 650 MG: 325 TABLET ORAL at 09:34

## 2024-07-20 RX ADMIN — Medication 10 ML: at 21:40

## 2024-07-20 RX ADMIN — ACETAMINOPHEN 650 MG: 325 TABLET ORAL at 21:08

## 2024-07-20 RX ADMIN — OXYCODONE HYDROCHLORIDE 5 MG: 5 TABLET ORAL at 21:07

## 2024-07-20 ASSESSMENT — PAIN DESCRIPTION - DESCRIPTORS
DESCRIPTORS: ACHING

## 2024-07-20 ASSESSMENT — PAIN DESCRIPTION - ORIENTATION
ORIENTATION: LEFT

## 2024-07-20 ASSESSMENT — PAIN SCALES - GENERAL
PAINLEVEL_OUTOF10: 6
PAINLEVEL_OUTOF10: 2
PAINLEVEL_OUTOF10: 8
PAINLEVEL_OUTOF10: 3
PAINLEVEL_OUTOF10: 2
PAINLEVEL_OUTOF10: 3

## 2024-07-20 ASSESSMENT — PAIN DESCRIPTION - LOCATION
LOCATION: LEG
LOCATION: LEG
LOCATION: OTHER (COMMENT)
LOCATION: OTHER (COMMENT)

## 2024-07-20 NOTE — PROGRESS NOTES
Per CC doc request call placed to ortho as we have not heard back from consult yesterday. Cc doc updated on pt status

## 2024-07-20 NOTE — CONSULTS
Marvin Kaur  7/19/2024  [id number]    Chief Complaint: Acute saddle pulmonary embolism with acute cor pulmonale (HCC)    Subjective   HPI: Marvin Kaur is a 84 y.o. male with PMHx notable for HTN, HLD, DM2, prior DVT who presented on 7/15 with chest pain, shortness of breath. He went into asystole while being transported to hospital by EMS, underwent CPR w/ ROSC achieved. Workup revealed massive bilateral PE with cor pulmonale. He was started on heparin drip. He had mechanical thrombectomy on 7/15, complicated by right groin bleeding with hypotension and anemia. He required transfusion of blood products, and vasopressor support. He has been experiencing increased left thigh pain, CT today revealed large left rectus femoris hematoma.     Currently patient reports he is doing well. His biggest complaint if left thigh pain and \"stiffness\". Denies presyncope, chest pain. Reports some exertional dyspnea. Reports some generalized weakness.     Past Medical History:   Diagnosis Date    AR (allergic rhinitis)     Dermatitis     Diabetes     Diabetes (HCC)     oral medications    DJD (degenerative joint disease)     knees    Dyslipidemia     ED (erectile dysfunction)     GERD (gastroesophageal reflux disease)     HTN (hypertension)     Hx of blood clots     x 1 after traveling    Hyperlipidemia     Sleep apnea     cpap set at 12    Vitamin D deficiency        Past Surgical History:   Procedure Laterality Date    COLONOSCOPY      FOOT SURGERY      HAND SURGERY      INVASIVE VASCULAR N/A 7/15/2024    Thrombectomy peripheral artery performed by Talat Mills MD at [facility] [place] CARDIAC CATH LAB    JOINT REPLACEMENT Left 8/29/2014    TOTAL KNEE REPLACEMENT    KNEE ARTHROPLASTY Right 1/21/2015    OTHER SURGICAL HISTORY  See note 12/30/14    Was unable to intubate with a peacock/Used a glidescope per Dr Weston dated 12/30/14    OTHER SURGICAL HISTORY Right 03/06/2015    REPAIR OF RUPTURED PATELLAR TENDON RIGHT KNEE,USING TIBIAL

## 2024-07-20 NOTE — PROGRESS NOTES
ONCOLOGY HEMATOLOGY CARE PROGRESS NOTE      SUBJECTIVE:    Feels better hemoglobin is stable      ROS:     14 point review of systems performed negative except for as documented above    OBJECTIVE        Physical    VITALS:  Patient Vitals for the past 24 hrs:   BP Temp Temp src Pulse Resp SpO2 Weight   07/20/24 1138 120/68 97.1 °F (36.2 °C) Temporal 82 16 -- --   07/20/24 0930 122/70 97.5 °F (36.4 °C) Temporal 93 16 99 % --   07/20/24 0620 (!) 138/90 97.7 °F (36.5 °C) Temporal 96 19 95 % --   07/20/24 0400 -- -- -- 87 -- -- --   07/20/24 0133 -- -- -- 88 -- -- 110.3 kg (243 lb 2.7 oz)   07/20/24 0126 129/77 97.3 °F (36.3 °C) Temporal 90 17 95 % --   07/19/24 2022 -- -- -- 84 -- -- --   07/19/24 2000 -- -- -- 87 -- -- --   07/19/24 1951 -- -- -- 94 -- -- --   07/19/24 1945 130/79 97.7 °F (36.5 °C) Temporal 84 18 96 % --   07/19/24 1900 109/73 97.3 °F (36.3 °C) Temporal 82 -- 94 % --   07/19/24 1800 98/83 97.4 °F (36.3 °C) Temporal 91 16 98 % --   07/19/24 1730 128/72 97.5 °F (36.4 °C) -- 90 -- 98 % --   07/19/24 1700 111/71 97.2 °F (36.2 °C) Temporal 86 16 95 % --   07/19/24 1645 (!) 114/95 97.1 °F (36.2 °C) -- 81 16 94 % --   07/19/24 1639 111/69 -- -- 81 16 95 % --   07/19/24 1636 111/69 97.1 °F (36.2 °C) Temporal 86 18 96 % --   07/19/24 1630 110/68 97.3 °F (36.3 °C) -- 84 -- 94 % --   07/19/24 1628 115/67 97.3 °F (36.3 °C) -- 87 16 94 % --   07/19/24 1612 116/72 97.3 °F (36.3 °C) Temporal 82 16 96 % --       24HR INTAKE/OUTPUT:    Intake/Output Summary (Last 24 hours) at 7/20/2024 1416  Last data filed at 7/20/2024 1048  Gross per 24 hour   Intake 1066.22 ml   Output 1875 ml   Net -808.78 ml       CONSTITUTIONAL: awake, alert, cooperative, no apparent distress   LUNGS: no increased work of breathing and clear to auscultation   CARDIOVASCULAR: regular rate and rhythm, normal S1 and S2, no murmur noted  ABDOMEN: normal bowel sounds x 4, soft, non-distended, non-tender, no  creating  beam hardening artifact.  The right hip demonstrates normal alignment.  No  joint effusion.  Mild right hip osteoarthritis.    Limited images of the intrapelvic contents demonstrate no acute abnormality.  Impression: Lobulated hyperattenuating soft tissue within the deep rectus femorals  musculature adjacent to the femoral diaphysis measuring 5.7 x 2.3 x 14.9 cm  suggesting an intramuscular hematoma. Recommend correlation with area of  point tenderness. MRI with and without contrast may be helpful for further  evaluation if there is fluid soft tissue mass.    Nonspecific subcutaneous edema of the left thigh.  This may be related to  recent injury.  No evidence of soft tissue gas.    No acute osseous abnormality.    2 adjacent 17 and 18 mm nodules posterior to the distal right thigh that may  represent lymph nodes, indeterminate.  This could also be evaluated on MRI.      Problem List  Patient Active Problem List   Diagnosis    Controlled type 2 diabetes mellitus with stage 1 chronic kidney disease, without long-term current use of insulin (HCC)    Dyslipidemia    HTN (hypertension)    ED (erectile dysfunction)    Vitamin D deficiency    DJD (degenerative joint disease)    Left knee DJD    GERD (gastroesophageal reflux disease)    Osteoarthritis of right knee    Sleep apnea    Chronic venous insufficiency    Swelling of joint of lower leg    H/O deep venous thrombosis    Pulmonary HTN (HCC)    Cellulitis    Mild reactive airways disease    Severe obesity (BMI 35.0-39.9) with comorbidity (HCC)    pulmonary emboli with cor    Shortness of breath    Acute respiratory failure with hypoxia (HCC)    Acute massive pulmonary embolism (HCC)    Cardiac arrest (HCC)    Acute blood loss anemia       ASSESSMENT AND PLAN:    # Massive PE status post thrombectomy given right heart strain  # Left thigh/hip hematoma secondary to fall  -Given right heart strain patient underwent thrombectomy  -Anticoagulation is currently on

## 2024-07-20 NOTE — PROGRESS NOTES
Perfect served hospitalist made aware of latest hgb of 7.9 hct 23.3. Pt is currently asymptomatic.

## 2024-07-20 NOTE — PLAN OF CARE
Problem: Safety - Adult  Goal: Free from fall injury  Outcome: Not Progressing     Problem: Pain  Goal: Verbalizes/displays adequate comfort level or baseline comfort level  Outcome: Not Progressing     Problem: Skin/Tissue Integrity  Goal: Absence of new skin breakdown  Description: 1.  Monitor for areas of redness and/or skin breakdown  2.  Assess vascular access sites hourly  3.  Every 4-6 hours minimum:  Change oxygen saturation probe site  4.  Every 4-6 hours:  If on nasal continuous positive airway pressure, respiratory therapy assess nares and determine need for appliance change or resting period.  Outcome: Not Progressing     Problem: Chronic Conditions and Co-morbidities  Goal: Patient's chronic conditions and co-morbidity symptoms are monitored and maintained or improved  Outcome: Not Progressing     Problem: Respiratory - Adult  Goal: Achieves optimal ventilation and oxygenation  Outcome: Not Progressing     Problem: Cardiovascular - Adult  Goal: Maintains optimal cardiac output and hemodynamic stability  Outcome: Not Progressing     Problem: Metabolic/Fluid and Electrolytes - Adult  Goal: Electrolytes maintained within normal limits  Outcome: Not Progressing     Problem: Hematologic - Adult  Goal: Maintains hematologic stability  Outcome: Not Progressing

## 2024-07-20 NOTE — PROGRESS NOTES
V2.0    [facility] [place], [place] Progress Note      Name:  Marvin Kaur /Age/Sex: 1940  (84 y.o. male)   MRN & CSN:  [id number] & [id number] Encounter Date/Time: 2024 5:50 PM EDT   Location:  [facility] PCP: Heath Hua MD     Attending:Lyly Vyas MD       Hospital Day: 7    Assessment and Recommendations   Marvin Kaur is a 84 y.o. male with pmh of left leg DVT ~20 years ago, HTN, Pulmonary HTN, LALITA on CPAP presented with c/o chest pain, shortness of breath and worsening lower extremity edema for the past several months and a syncopal episode  the morning of presentation. En route to the hospital by EMS, he went into asystole for a few minutes requiring CPR with ROSC achieved.    In the ED he was found to be hypoxic requiring NRBM.   Also noted to be hypotensive 72/50 .  CTA showed massive bilateral PE with cor pulmonale.  Patient was started on heparin drip and cardiology consulted.  Underwent mechanical thrombectomy on 7/15/24 with recovery period complicated by persistent bleeding from the right groin with associated hypotension and ABLA requiring PRBC transfusion and pressors.      Plan:     Massive bilateral pulmonary emboli with cor pulmonale:  Acute hypoxic respiratory failure:  Syncope and asystole cardiac arrest:  Venous Doppler negative for DVT.  TTE 7/15/2024: LVEF 60 to 65%. Dilated RV with akinetic wall. (Todd's sign +)  Cardiology consult appreciated.  s/p mechanical thrombectomy 7/15/2024.  Currently off Lovenox/Coumadin pending stabilization of hemoglobin.  INR subtherapeutic.  Weaned off supplemental oxygen.  Heme-onc consult appreciated: No indication for hypercoagulable workup.   CT chest abdomen and pelvis with no evidence of metastatic disease.  Outpatient follow-up in 2 to 3 weeks after discharge.  Intensivist consult appreciated.    Left thigh hematoma due to fall with acute blood loss anemia:   Hgb dropped from 12.7-7.1.  Transfused 2 units PRBC this admission.  CT  Right ventricular free wall appears akinetic (McConnel's sig).. Reduced systolic function. TAPSE is 1.4 cm. RV Peak S' is 11 cm/s.   Tricuspid Valve: Mild regurgitation. The estimated RVSP is 54 mmHg.   Image quality is suboptimal.     CT CHEST PULMONARY EMBOLISM W CONTRAST    Result Date: 7/14/2024  EXAMINATION: CTA OF THE CHEST 7/14/2024 1:10 pm TECHNIQUE: CTA of the chest was performed after the administration of intravenous contrast.  Multiplanar reformatted images are provided for review.  MIP images are provided for review. Automated exposure control, iterative reconstruction, and/or weight based adjustment of the mA/kV was utilized to reduce the radiation dose to as low as reasonably achievable. COMPARISON: None. HISTORY: ORDERING SYSTEM PROVIDED HISTORY: Respiratory failure, chest pain, loss of consciousness with 1 minute of CPR performed, history of DVT 25 years ago, eval for pulmonary embolism, iatrogenic injury from CPR TECHNOLOGIST PROVIDED HISTORY: Reason for exam:->Respiratory failure, chest pain, loss of consciousness with 1 minute of CPR performed, history of DVT 25 years ago, eval for pulmonary embolism, iatrogenic injury from CPR Additional Contrast?->1 Reason for Exam: Respiratory failure, chest pain, loss of consciousness with 1 minute of CPR performed, history of DVT 25 years ago, eval for pulmonary embolism, iatrogenic injury from CPR FINDINGS: Pulmonary Arteries: There is a large thrombus burden extending into the right and left main pulmonary artery (no saddle thrombus) as well as thrombus extending into both the right and left upper and lower lobe pulmonary intersegmental arteries. Mediastinum: No evidence of mediastinal lymphadenopathy.  There is slight prominence to the right ventricle and atrium compared to the left.  No pericardial effusion.  Minimal adenopathy within the mediastinum. Lungs/pleura: Tiny left pleural effusion.  No lobar infiltrate effusion or pneumothorax. Upper Abdomen:

## 2024-07-20 NOTE — PROGRESS NOTES
[facility] Pulmonary/CCM Progress note      Admit Date: 7/14/2024    Chief Complaint: Syncope and cardiac arrest    Subjective:     Interval History: No new issues, continues to have left thigh pain/tenderness with difficulty in mobility.  Hemoglobin stable at 8.0-received 1 unit of PRBC yesterday.  No obvious evidence of compartment syndrome noted on exam.    Scheduled Meds:   furosemide  20 mg IntraVENous Daily    enoxaparin  40 mg SubCUTAneous Daily    [Held by provider] enoxaparin  1 mg/kg SubCUTAneous BID    acetaminophen  650 mg Oral TID    sodium chloride flush  5-40 mL IntraVENous 2 times per day    insulin lispro  0-8 Units SubCUTAneous TID WC    insulin lispro  0-4 Units SubCUTAneous Nightly    [Held by provider] aspirin  81 mg Oral Daily    pantoprazole  40 mg Oral QAM AC    rosuvastatin  20 mg Oral Nightly    sodium chloride flush  5-40 mL IntraVENous 2 times per day     Continuous Infusions:   sodium chloride      sodium chloride      dextrose      sodium chloride       PRN Meds:sodium chloride, midodrine, oxyCODONE **OR** oxyCODONE, tiZANidine, sodium chloride flush, sodium chloride, acetaminophen, morphine, dextrose bolus **OR** dextrose bolus, glucagon (rDNA), dextrose, perflutren lipid microspheres, sodium chloride flush, sodium chloride, potassium chloride **OR** potassium alternative oral replacement **OR** potassium chloride, magnesium sulfate, ondansetron **OR** ondansetron, polyethylene glycol, [DISCONTINUED] acetaminophen **OR** acetaminophen, traMADol    Review of Systems  Constitutional: Fatigue and malaise  Ears, nose, mouth, throat: negative for ear drainage, epistaxis, hoarseness, nasal congestion, sore throat and voice change  Respiratory: negative except for dyspnea on exertion and shortness of breath  Cardiovascular: negative for chest pain, chest pressure/discomfort, irregular heart beat, lower extremity edema and palpitations  Gastrointestinal: negative for abdominal pain, constipation,

## 2024-07-21 PROBLEM — Z96.652 HISTORY OF TOTAL KNEE ARTHROPLASTY, LEFT: Status: ACTIVE | Noted: 2024-07-21

## 2024-07-21 PROBLEM — S70.12XA THIGH HEMATOMA, LEFT, INITIAL ENCOUNTER: Status: ACTIVE | Noted: 2024-07-21

## 2024-07-21 LAB
ANION GAP SERPL CALCULATED.3IONS-SCNC: 9 MMOL/L (ref 3–16)
BUN SERPL-MCNC: 18 MG/DL (ref 7–20)
CALCIUM SERPL-MCNC: 8.5 MG/DL (ref 8.3–10.6)
CHLORIDE SERPL-SCNC: 97 MMOL/L (ref 99–110)
CO2 SERPL-SCNC: 26 MMOL/L (ref 21–32)
CREAT SERPL-MCNC: 1.2 MG/DL (ref 0.8–1.3)
DEPRECATED RDW RBC AUTO: 16.1 % (ref 12.4–15.4)
GFR SERPLBLD CREATININE-BSD FMLA CKD-EPI: 59 ML/MIN/{1.73_M2}
GLUCOSE BLD-MCNC: 104 MG/DL (ref 70–99)
GLUCOSE BLD-MCNC: 140 MG/DL (ref 70–99)
GLUCOSE BLD-MCNC: 142 MG/DL (ref 70–99)
GLUCOSE BLD-MCNC: 163 MG/DL (ref 70–99)
GLUCOSE SERPL-MCNC: 104 MG/DL (ref 70–99)
HCT VFR BLD AUTO: 21.4 % (ref 40.5–52.5)
HCT VFR BLD AUTO: 21.9 % (ref 40.5–52.5)
HCT VFR BLD AUTO: 23 % (ref 40.5–52.5)
HCT VFR BLD AUTO: 23.6 % (ref 40.5–52.5)
HGB BLD-MCNC: 7.5 G/DL (ref 13.5–17.5)
HGB BLD-MCNC: 7.5 G/DL (ref 13.5–17.5)
HGB BLD-MCNC: 7.8 G/DL (ref 13.5–17.5)
HGB BLD-MCNC: 7.9 G/DL (ref 13.5–17.5)
INR PPP: 1.47 (ref 0.85–1.15)
MAGNESIUM SERPL-MCNC: 1.7 MG/DL (ref 1.8–2.4)
MCH RBC QN AUTO: 32.8 PG (ref 26–34)
MCHC RBC AUTO-ENTMCNC: 33.9 G/DL (ref 31–36)
MCV RBC AUTO: 96.7 FL (ref 80–100)
PERFORMED ON: ABNORMAL
PHOSPHATE SERPL-MCNC: 3.3 MG/DL (ref 2.5–4.9)
PLATELET # BLD AUTO: 250 K/UL (ref 135–450)
PMV BLD AUTO: 6.3 FL (ref 5–10.5)
POTASSIUM SERPL-SCNC: 4 MMOL/L (ref 3.5–5.1)
PROTHROMBIN TIME: 18 SEC (ref 11.9–14.9)
RBC # BLD AUTO: 2.38 M/UL (ref 4.2–5.9)
SODIUM SERPL-SCNC: 132 MMOL/L (ref 136–145)
WBC # BLD AUTO: 8.6 K/UL (ref 4–11)

## 2024-07-21 PROCEDURE — 99232 SBSQ HOSP IP/OBS MODERATE 35: CPT | Performed by: ORTHOPAEDIC SURGERY

## 2024-07-21 PROCEDURE — 6360000002 HC RX W HCPCS: Performed by: INTERNAL MEDICINE

## 2024-07-21 PROCEDURE — 2580000003 HC RX 258: Performed by: INTERNAL MEDICINE

## 2024-07-21 PROCEDURE — 84100 ASSAY OF PHOSPHORUS: CPT

## 2024-07-21 PROCEDURE — 99233 SBSQ HOSP IP/OBS HIGH 50: CPT | Performed by: INTERNAL MEDICINE

## 2024-07-21 PROCEDURE — 85018 HEMOGLOBIN: CPT

## 2024-07-21 PROCEDURE — 6370000000 HC RX 637 (ALT 250 FOR IP): Performed by: FAMILY MEDICINE

## 2024-07-21 PROCEDURE — 85014 HEMATOCRIT: CPT

## 2024-07-21 PROCEDURE — 2580000003 HC RX 258: Performed by: FAMILY MEDICINE

## 2024-07-21 PROCEDURE — 85027 COMPLETE CBC AUTOMATED: CPT

## 2024-07-21 PROCEDURE — 6370000000 HC RX 637 (ALT 250 FOR IP): Performed by: NURSE PRACTITIONER

## 2024-07-21 PROCEDURE — 80048 BASIC METABOLIC PNL TOTAL CA: CPT

## 2024-07-21 PROCEDURE — 85610 PROTHROMBIN TIME: CPT

## 2024-07-21 PROCEDURE — 6370000000 HC RX 637 (ALT 250 FOR IP): Performed by: INTERNAL MEDICINE

## 2024-07-21 PROCEDURE — 2140000000 HC CCU INTERMEDIATE R&B

## 2024-07-21 PROCEDURE — 83735 ASSAY OF MAGNESIUM: CPT

## 2024-07-21 RX ORDER — MAGNESIUM SULFATE IN WATER 40 MG/ML
2000 INJECTION, SOLUTION INTRAVENOUS ONCE
Status: COMPLETED | OUTPATIENT
Start: 2024-07-21 | End: 2024-07-21

## 2024-07-21 RX ORDER — FUROSEMIDE 40 MG/1
40 TABLET ORAL DAILY
Status: DISCONTINUED | OUTPATIENT
Start: 2024-07-21 | End: 2024-07-26 | Stop reason: HOSPADM

## 2024-07-21 RX ADMIN — ENOXAPARIN SODIUM 120 MG: 150 INJECTION SUBCUTANEOUS at 20:48

## 2024-07-21 RX ADMIN — Medication 10 ML: at 20:47

## 2024-07-21 RX ADMIN — ACETAMINOPHEN 650 MG: 325 TABLET ORAL at 08:43

## 2024-07-21 RX ADMIN — Medication 10 ML: at 20:51

## 2024-07-21 RX ADMIN — MAGNESIUM SULFATE HEPTAHYDRATE 2000 MG: 40 INJECTION, SOLUTION INTRAVENOUS at 08:43

## 2024-07-21 RX ADMIN — ACETAMINOPHEN 650 MG: 325 TABLET ORAL at 20:46

## 2024-07-21 RX ADMIN — PANTOPRAZOLE SODIUM 40 MG: 40 TABLET, DELAYED RELEASE ORAL at 07:01

## 2024-07-21 RX ADMIN — FUROSEMIDE 40 MG: 40 TABLET ORAL at 08:44

## 2024-07-21 RX ADMIN — ROSUVASTATIN 20 MG: 20 TABLET, FILM COATED ORAL at 20:46

## 2024-07-21 RX ADMIN — Medication 10 ML: at 08:46

## 2024-07-21 RX ADMIN — ACETAMINOPHEN 650 MG: 325 TABLET ORAL at 16:15

## 2024-07-21 RX ADMIN — ENOXAPARIN SODIUM 40 MG: 100 INJECTION SUBCUTANEOUS at 08:43

## 2024-07-21 RX ADMIN — OXYCODONE HYDROCHLORIDE 5 MG: 5 TABLET ORAL at 23:20

## 2024-07-21 ASSESSMENT — PAIN DESCRIPTION - DESCRIPTORS
DESCRIPTORS: ACHING

## 2024-07-21 ASSESSMENT — PAIN DESCRIPTION - LOCATION
LOCATION: LEG

## 2024-07-21 ASSESSMENT — PAIN DESCRIPTION - ORIENTATION
ORIENTATION: LEFT
ORIENTATION: LEFT
ORIENTATION: RIGHT;LEFT
ORIENTATION: LEFT;RIGHT
ORIENTATION: RIGHT;LEFT

## 2024-07-21 ASSESSMENT — PAIN SCALES - GENERAL
PAINLEVEL_OUTOF10: 0
PAINLEVEL_OUTOF10: 4
PAINLEVEL_OUTOF10: 1
PAINLEVEL_OUTOF10: 6
PAINLEVEL_OUTOF10: 4
PAINLEVEL_OUTOF10: 6

## 2024-07-21 NOTE — PROGRESS NOTES
[Held by provider] enoxaparin  1 mg/kg SubCUTAneous BID    acetaminophen  650 mg Oral TID    sodium chloride flush  5-40 mL IntraVENous 2 times per day    insulin lispro  0-8 Units SubCUTAneous TID WC    insulin lispro  0-4 Units SubCUTAneous Nightly    [Held by provider] aspirin  81 mg Oral Daily    pantoprazole  40 mg Oral QAM AC    rosuvastatin  20 mg Oral Nightly    sodium chloride flush  5-40 mL IntraVENous 2 times per day      Infusions:    sodium chloride      dextrose      sodium chloride       PRN Meds: midodrine, 10 mg, TID PRN  oxyCODONE, 5 mg, Q4H PRN   Or  oxyCODONE, 10 mg, Q4H PRN  tiZANidine, 4 mg, Q6H PRN  sodium chloride flush, 5-40 mL, PRN  sodium chloride, , PRN  acetaminophen, 650 mg, Q4H PRN  morphine, 2 mg, Q3H PRN  dextrose bolus, 125 mL, PRN   Or  dextrose bolus, 250 mL, PRN  glucagon (rDNA), 1 mg, PRN  dextrose, , Continuous PRN  perflutren lipid microspheres, 1.5 mL, ONCE PRN  sodium chloride flush, 5-40 mL, PRN  sodium chloride, , PRN  potassium chloride, 40 mEq, PRN   Or  potassium alternative oral replacement, 40 mEq, PRN   Or  potassium chloride, 10 mEq, PRN  magnesium sulfate, 2,000 mg, PRN  ondansetron, 4 mg, Q8H PRN   Or  ondansetron, 4 mg, Q6H PRN  polyethylene glycol, 17 g, Daily PRN  acetaminophen, 650 mg, Q6H PRN  traMADol, 50 mg, Q6H PRN        Labs and Imaging   Vascular duplex lower extremity venous bilateral    Result Date: 7/15/2024    No evidence of deep vein or superficial vein thrombosis in the right lower extremity. Vessels demonstrate normal compressibility, color filling, and phasic and spontaneous flow.   No evidence of deep vein or superficial vein thrombosis in the left lower extremity. Vessels demonstrate normal compressibility, color filling, and phasic and spontaneous flow.     Echo (TTE) complete (PRN contrast/bubble/strain/3D)    Result Date: 7/15/2024    Left Ventricle: Normal left ventricular systolic function with a visually estimated EF of 60 - 65%. Left  atrium compared to the left.  No pericardial effusion.  Minimal adenopathy within the mediastinum. Lungs/pleura: Tiny left pleural effusion.  No lobar infiltrate effusion or pneumothorax. Upper Abdomen: Limited images of the upper abdomen are unremarkable. Soft Tissues/Bones: Moderate degenerative changes of the spine.     Large thrombus burden as detailed above. Slight right ventricular strain with prominence to the ventricle and atrium. No obvious lobar infiltrate effusion.  Tiny left pleural effusion. Results communicated to the communication center.     XR CHEST PORTABLE    Result Date: 7/14/2024  EXAMINATION: ONE XRAY VIEW OF THE CHEST 7/14/2024 1:08 pm COMPARISON: 11/14/2020 HISTORY: Acute shortness of breath. FINDINGS: Patient is rotated and there is artifact along the right chest.  Stable borderline cardiomegaly.  Limited depth of inspiration.  No consolidation, pleural effusion, or pneumothorax.     Low lung volumes.  No acute abnormality seen allowing for patient rotation.       CBC:   Recent Labs     07/19/24  0418 07/19/24  1120 07/20/24  0120 07/20/24  0707 07/20/24  1840 07/21/24  0028 07/21/24  0655   WBC 7.8  --  8.6  --   --   --  8.6   HGB 7.1*   < > 7.9*   < > 8.0* 7.5* 7.8*     --  226  --   --   --  250    < > = values in this interval not displayed.     BMP:    Recent Labs     07/19/24  0320 07/20/24  0120 07/21/24  0655   * 134* 132*   K 3.6 4.0 4.0    100 97*   CO2 24 24 26   BUN 21* 19 18   CREATININE 1.3 1.3 1.2   GLUCOSE 114* 116* 104*     Hepatic:   No results for input(s): \"AST\", \"ALT\", \"BILITOT\", \"ALKPHOS\" in the last 72 hours.    Invalid input(s): \"ALB\"    Lipids:   Lab Results   Component Value Date/Time    CHOL 194 12/15/2023 01:17 PM    HDL 99 12/15/2023 01:17 PM    HDL 83 11/16/2011 10:12 AM    TRIG 78 12/15/2023 01:17 PM     Hemoglobin A1C:   Lab Results   Component Value Date/Time    LABA1C 5.6 07/16/2024 04:11 AM     TSH:   Lab Results   Component Value

## 2024-07-21 NOTE — PLAN OF CARE
Problem: Safety - Adult  Goal: Free from fall injury  Outcome: Progressing     Problem: Pain  Goal: Verbalizes/displays adequate comfort level or baseline comfort level  Outcome: Progressing     Problem: Discharge Planning  Goal: Discharge to home or other facility with appropriate resources  Outcome: Progressing     Problem: Skin/Tissue Integrity  Goal: Absence of new skin breakdown  Description: 1.  Monitor for areas of redness and/or skin breakdown  2.  Assess vascular access sites hourly  3.  Every 4-6 hours minimum:  Change oxygen saturation probe site  4.  Every 4-6 hours:  If on nasal continuous positive airway pressure, respiratory therapy assess nares and determine need for appliance change or resting period.  Outcome: Progressing     Problem: Chronic Conditions and Co-morbidities  Goal: Patient's chronic conditions and co-morbidity symptoms are monitored and maintained or improved  Outcome: Progressing     Problem: Respiratory - Adult  Goal: Achieves optimal ventilation and oxygenation  Outcome: Progressing     Problem: Cardiovascular - Adult  Goal: Maintains optimal cardiac output and hemodynamic stability  Outcome: Progressing     Problem: Metabolic/Fluid and Electrolytes - Adult  Goal: Electrolytes maintained within normal limits  Outcome: Progressing

## 2024-07-21 NOTE — PROGRESS NOTES
Ice applied to leg. Pt made aware of using it prn and to ask for new ice as needed   pt seen and examined. d/w NP Deana Barros. Agree with documentation as above with changes made where appropriate.   50F, pmh of HTN, DARIEL, obesity admitted for lap gastric sleeve.   -fe clears  -ivf  -PPI  -incentive spirometry  -monitor spo2  -ambulate when feasible.   -prn vasotec for htn.   -dvt px    thanks, will follow

## 2024-07-21 NOTE — PROGRESS NOTES
[facility] Pulmonary/CCM Progress note      Admit Date: 7/14/2024    Chief Complaint: Syncope and cardiac arrest    Subjective:     Interval History: Remains pretty much the same-still has left thigh pain and tenderness, no increase in girth noted.  Difficulty in mobility due to pain.  Hemoglobin has remained stable-7.9.  No obvious evidence of compartment syndrome noted.    Scheduled Meds:   furosemide  40 mg Oral Daily    enoxaparin  40 mg SubCUTAneous Daily    [Held by provider] enoxaparin  1 mg/kg SubCUTAneous BID    acetaminophen  650 mg Oral TID    sodium chloride flush  5-40 mL IntraVENous 2 times per day    insulin lispro  0-8 Units SubCUTAneous TID WC    insulin lispro  0-4 Units SubCUTAneous Nightly    [Held by provider] aspirin  81 mg Oral Daily    pantoprazole  40 mg Oral QAM AC    rosuvastatin  20 mg Oral Nightly    sodium chloride flush  5-40 mL IntraVENous 2 times per day     Continuous Infusions:   sodium chloride      dextrose      sodium chloride       PRN Meds:midodrine, oxyCODONE **OR** oxyCODONE, tiZANidine, sodium chloride flush, sodium chloride, acetaminophen, morphine, dextrose bolus **OR** dextrose bolus, glucagon (rDNA), dextrose, perflutren lipid microspheres, sodium chloride flush, sodium chloride, potassium chloride **OR** potassium alternative oral replacement **OR** potassium chloride, magnesium sulfate, ondansetron **OR** ondansetron, polyethylene glycol, [DISCONTINUED] acetaminophen **OR** acetaminophen, traMADol    Review of Systems  Constitutional: Fatigue and malaise  Ears, nose, mouth, throat: negative for ear drainage, epistaxis, hoarseness, nasal congestion, sore throat and voice change  Respiratory: negative except for dyspnea on exertion and shortness of breath  Cardiovascular: negative for chest pain, chest pressure/discomfort, irregular heart beat, lower extremity edema and palpitations  Gastrointestinal: negative for abdominal pain, constipation, diarrhea, jaundice, melena,

## 2024-07-21 NOTE — PROGRESS NOTES
Marvin Kaur  7/20/2024  [id number]    Chief Complaint: Acute saddle pulmonary embolism with acute cor pulmonale (HCC)    Subjective   HPI: Marvin Kaur is a 84 y.o. male with PMHx notable for HTN, HLD, DM2, prior DVT who presented on 7/15 with chest pain, shortness of breath. He went into asystole while being transported to hospital by EMS, underwent CPR w/ ROSC achieved. Workup revealed massive bilateral PE with cor pulmonale. He was started on heparin drip. He had mechanical thrombectomy on 7/15, complicated by right groin bleeding with hypotension and anemia. He required transfusion of blood products, and vasopressor support. He has been experiencing increased left thigh pain, CT today revealed large left rectus femoris hematoma.     States he continues to do well today.    Past Medical History:   Diagnosis Date    AR (allergic rhinitis)     Dermatitis     Diabetes     Diabetes (HCC)     oral medications    DJD (degenerative joint disease)     knees    Dyslipidemia     ED (erectile dysfunction)     GERD (gastroesophageal reflux disease)     HTN (hypertension)     Hx of blood clots     x 1 after traveling    Hyperlipidemia     Sleep apnea     cpap set at 12    Vitamin D deficiency        Past Surgical History:   Procedure Laterality Date    COLONOSCOPY      FOOT SURGERY      HAND SURGERY      INVASIVE VASCULAR N/A 7/15/2024    Thrombectomy peripheral artery performed by Talat Mills MD at [facility] CARDIAC CATH LAB    JOINT REPLACEMENT Left 8/29/2014    TOTAL KNEE REPLACEMENT    KNEE ARTHROPLASTY Right 1/21/2015    OTHER SURGICAL HISTORY  See note 12/30/14    Was unable to intubate with a peacock/Used a glidescope per Dr Weston dated 12/30/14    OTHER SURGICAL HISTORY Right 03/06/2015    REPAIR OF RUPTURED PATELLAR TENDON RIGHT KNEE,USING TIBIAL       Social History     Tobacco Use    Smoking status: Never    Smokeless tobacco: Never   Substance Use Topics    Alcohol use: Yes     Alcohol/week: 3.0 standard

## 2024-07-21 NOTE — PROGRESS NOTES
ORTHOPAEDIC PROGRESS NOTE    Chief Complaint   Patient presents with    Chest Pain     Pt had onset of chest pain/pressure that onset about 30 minutes ago. 911 called. No cardiac hx except hypertension. Had syncopal episode while walking to the ambulance. CPR was given for approx 1 minute.        HPI  Ortho re-consulted for left thigh hematoma, deep  Was seen by Basil Banuelos on 7/16  Had syncopal episode, fall onto left knee  Hx of L TKA with Dr Navas in August 2014  During current admission, PE requiring embolectomy  Persistent left thigh pain and swelling  Therefore CT performed with showed hematoma  He reports symptoms/pain stable  Denies N/T  Has been able to WB on LLE      Past Medical History:   Diagnosis Date    AR (allergic rhinitis)     Dermatitis     Diabetes     Diabetes (HCC)     oral medications    DJD (degenerative joint disease)     knees    Dyslipidemia     ED (erectile dysfunction)     GERD (gastroesophageal reflux disease)     HTN (hypertension)     Hx of blood clots     x 1 after traveling    Hyperlipidemia     Sleep apnea     cpap set at 12    Vitamin D deficiency        Past Surgical History:   Procedure Laterality Date    COLONOSCOPY      FOOT SURGERY      HAND SURGERY      INVASIVE VASCULAR N/A 7/15/2024    Thrombectomy peripheral artery performed by Talat Mills MD at [facility] CARDIAC CATH LAB    JOINT REPLACEMENT Left 8/29/2014    TOTAL KNEE REPLACEMENT    KNEE ARTHROPLASTY Right 1/21/2015    OTHER SURGICAL HISTORY  See note 12/30/14    Was unable to intubate with a peacock/Used a glidescope per Dr Weston dated 12/30/14    OTHER SURGICAL HISTORY Right 03/06/2015    REPAIR OF RUPTURED PATELLAR TENDON RIGHT KNEE,USING TIBIAL       No Known Allergies    Current Outpatient Medications   Medication Instructions    aspirin 81 mg, Oral, DAILY    Blood Glucose Monitoring Suppl (FREESTYLE LITE) LISA Test twice daily    blood glucose test strips (FREESTYLE LITE) strip Test twice daily

## 2024-07-22 LAB
ALBUMIN SERPL-MCNC: 2.7 G/DL (ref 3.4–5)
ALBUMIN/GLOB SERPL: 1 {RATIO} (ref 1.1–2.2)
ALP SERPL-CCNC: 71 U/L (ref 40–129)
ALT SERPL-CCNC: 46 U/L (ref 10–40)
ANION GAP SERPL CALCULATED.3IONS-SCNC: 9 MMOL/L (ref 3–16)
AST SERPL-CCNC: 59 U/L (ref 15–37)
BILIRUB SERPL-MCNC: 0.8 MG/DL (ref 0–1)
BUN SERPL-MCNC: 18 MG/DL (ref 7–20)
CALCIUM SERPL-MCNC: 8.6 MG/DL (ref 8.3–10.6)
CHLORIDE SERPL-SCNC: 99 MMOL/L (ref 99–110)
CO2 SERPL-SCNC: 26 MMOL/L (ref 21–32)
CREAT SERPL-MCNC: 1.2 MG/DL (ref 0.8–1.3)
DEPRECATED RDW RBC AUTO: 16 % (ref 12.4–15.4)
GFR SERPLBLD CREATININE-BSD FMLA CKD-EPI: 59 ML/MIN/{1.73_M2}
GLUCOSE BLD-MCNC: 151 MG/DL (ref 70–99)
GLUCOSE BLD-MCNC: 197 MG/DL (ref 70–99)
GLUCOSE BLD-MCNC: 84 MG/DL (ref 70–99)
GLUCOSE BLD-MCNC: 92 MG/DL (ref 70–99)
GLUCOSE SERPL-MCNC: 116 MG/DL (ref 70–99)
HCT VFR BLD AUTO: 21.2 % (ref 40.5–52.5)
HCT VFR BLD AUTO: 23.4 % (ref 40.5–52.5)
HGB BLD-MCNC: 7.2 G/DL (ref 13.5–17.5)
HGB BLD-MCNC: 7.9 G/DL (ref 13.5–17.5)
INR PPP: 1.53 (ref 0.85–1.15)
MAGNESIUM SERPL-MCNC: 1.8 MG/DL (ref 1.8–2.4)
MCH RBC QN AUTO: 32.9 PG (ref 26–34)
MCHC RBC AUTO-ENTMCNC: 34 G/DL (ref 31–36)
MCV RBC AUTO: 96.8 FL (ref 80–100)
PERFORMED ON: ABNORMAL
PERFORMED ON: ABNORMAL
PERFORMED ON: NORMAL
PERFORMED ON: NORMAL
PLATELET # BLD AUTO: 265 K/UL (ref 135–450)
PMV BLD AUTO: 6.5 FL (ref 5–10.5)
POTASSIUM SERPL-SCNC: 3.9 MMOL/L (ref 3.5–5.1)
PROT SERPL-MCNC: 5.3 G/DL (ref 6.4–8.2)
PROTHROMBIN TIME: 18.5 SEC (ref 11.9–14.9)
RBC # BLD AUTO: 2.19 M/UL (ref 4.2–5.9)
SODIUM SERPL-SCNC: 134 MMOL/L (ref 136–145)
WBC # BLD AUTO: 8.2 K/UL (ref 4–11)

## 2024-07-22 PROCEDURE — 6370000000 HC RX 637 (ALT 250 FOR IP): Performed by: INTERNAL MEDICINE

## 2024-07-22 PROCEDURE — 36592 COLLECT BLOOD FROM PICC: CPT

## 2024-07-22 PROCEDURE — 99231 SBSQ HOSP IP/OBS SF/LOW 25: CPT | Performed by: NURSE PRACTITIONER

## 2024-07-22 PROCEDURE — 99233 SBSQ HOSP IP/OBS HIGH 50: CPT | Performed by: INTERNAL MEDICINE

## 2024-07-22 PROCEDURE — 85027 COMPLETE CBC AUTOMATED: CPT

## 2024-07-22 PROCEDURE — 2140000000 HC CCU INTERMEDIATE R&B

## 2024-07-22 PROCEDURE — 83735 ASSAY OF MAGNESIUM: CPT

## 2024-07-22 PROCEDURE — 6370000000 HC RX 637 (ALT 250 FOR IP): Performed by: FAMILY MEDICINE

## 2024-07-22 PROCEDURE — 6360000002 HC RX W HCPCS: Performed by: INTERNAL MEDICINE

## 2024-07-22 PROCEDURE — 85610 PROTHROMBIN TIME: CPT

## 2024-07-22 PROCEDURE — 85018 HEMOGLOBIN: CPT

## 2024-07-22 PROCEDURE — 2580000003 HC RX 258: Performed by: INTERNAL MEDICINE

## 2024-07-22 PROCEDURE — 6370000000 HC RX 637 (ALT 250 FOR IP): Performed by: NURSE PRACTITIONER

## 2024-07-22 PROCEDURE — 85014 HEMATOCRIT: CPT

## 2024-07-22 PROCEDURE — 80053 COMPREHEN METABOLIC PANEL: CPT

## 2024-07-22 RX ORDER — WARFARIN SODIUM 7.5 MG/1
7.5 TABLET ORAL DAILY
Status: DISCONTINUED | OUTPATIENT
Start: 2024-07-22 | End: 2024-07-24

## 2024-07-22 RX ADMIN — ENOXAPARIN SODIUM 120 MG: 150 INJECTION SUBCUTANEOUS at 21:31

## 2024-07-22 RX ADMIN — ENOXAPARIN SODIUM 120 MG: 150 INJECTION SUBCUTANEOUS at 08:22

## 2024-07-22 RX ADMIN — WARFARIN SODIUM 7.5 MG: 7.5 TABLET ORAL at 18:30

## 2024-07-22 RX ADMIN — ASPIRIN 81 MG 81 MG: 81 TABLET ORAL at 08:21

## 2024-07-22 RX ADMIN — Medication 10 ML: at 21:31

## 2024-07-22 RX ADMIN — ACETAMINOPHEN 650 MG: 325 TABLET ORAL at 08:21

## 2024-07-22 RX ADMIN — Medication 10 ML: at 08:25

## 2024-07-22 RX ADMIN — FUROSEMIDE 40 MG: 40 TABLET ORAL at 08:21

## 2024-07-22 RX ADMIN — OXYCODONE HYDROCHLORIDE 5 MG: 5 TABLET ORAL at 21:27

## 2024-07-22 RX ADMIN — ACETAMINOPHEN 650 MG: 325 TABLET ORAL at 21:27

## 2024-07-22 RX ADMIN — PANTOPRAZOLE SODIUM 40 MG: 40 TABLET, DELAYED RELEASE ORAL at 08:21

## 2024-07-22 RX ADMIN — ACETAMINOPHEN 650 MG: 325 TABLET ORAL at 15:58

## 2024-07-22 RX ADMIN — ROSUVASTATIN 20 MG: 20 TABLET, FILM COATED ORAL at 21:27

## 2024-07-22 ASSESSMENT — PAIN SCALES - GENERAL
PAINLEVEL_OUTOF10: 0
PAINLEVEL_OUTOF10: 5
PAINLEVEL_OUTOF10: 0
PAINLEVEL_OUTOF10: 1
PAINLEVEL_OUTOF10: 2
PAINLEVEL_OUTOF10: 0
PAINLEVEL_OUTOF10: 0

## 2024-07-22 ASSESSMENT — PAIN DESCRIPTION - LOCATION: LOCATION: LEG

## 2024-07-22 ASSESSMENT — PAIN DESCRIPTION - ORIENTATION: ORIENTATION: RIGHT;LEFT

## 2024-07-22 ASSESSMENT — PAIN DESCRIPTION - DESCRIPTORS: DESCRIPTORS: ACHING

## 2024-07-22 NOTE — PROGRESS NOTES
Pharmacy to Dose Warfarin    Pharmacy consulted to dose warfarin for PE.    INR Goal: 2-3    INR today: 1.53    Assessment/Plan:  - INR subtherapeutic. Warfarin has been held for 3 days d/t drop in Hgb  - Will give warfarin 7.5 mg tonight, cont lovenox 1 mg/kg BID until INR > 2  - Possible concomitant drug-drug interactions include: lovenox, aspirin, acetaminophen    Pharmacy will continue to follow.    Maria A Wu, PharmD, BCPS  Clinical Pharmacist  [id number]

## 2024-07-22 NOTE — PROGRESS NOTES
Left Ventricle: Normal left ventricular systolic function with a visually estimated EF of 60 - 65%. Left ventricle size is normal. Mildly increased wall thickness. Normal wall motion. Grade I diastolic dysfunction with normal LAP. Average E/e' ratio is 8.1.   Right Ventricle: Right ventricle is dilated. Right ventricular free wall appears akinetic (McConnel's sig).. Reduced systolic function. TAPSE is 1.4 cm. RV Peak S' is 11 cm/s.   Tricuspid Valve: Mild regurgitation. The estimated RVSP is 54 mmHg.   Image quality is suboptimal.     CT CHEST PULMONARY EMBOLISM W CONTRAST    Result Date: 7/14/2024  EXAMINATION: CTA OF THE CHEST 7/14/2024 1:10 pm TECHNIQUE: CTA of the chest was performed after the administration of intravenous contrast.  Multiplanar reformatted images are provided for review.  MIP images are provided for review. Automated exposure control, iterative reconstruction, and/or weight based adjustment of the mA/kV was utilized to reduce the radiation dose to as low as reasonably achievable. COMPARISON: None. HISTORY: ORDERING SYSTEM PROVIDED HISTORY: Respiratory failure, chest pain, loss of consciousness with 1 minute of CPR performed, history of DVT 25 years ago, eval for pulmonary embolism, iatrogenic injury from CPR TECHNOLOGIST PROVIDED HISTORY: Reason for exam:->Respiratory failure, chest pain, loss of consciousness with 1 minute of CPR performed, history of DVT 25 years ago, eval for pulmonary embolism, iatrogenic injury from CPR Additional Contrast?->1 Reason for Exam: Respiratory failure, chest pain, loss of consciousness with 1 minute of CPR performed, history of DVT 25 years ago, eval for pulmonary embolism, iatrogenic injury from CPR FINDINGS: Pulmonary Arteries: There is a large thrombus burden extending into the right and left main pulmonary artery (no saddle thrombus) as well as thrombus extending into both the right and left upper and lower lobe pulmonary intersegmental arteries.  Value Date/Time    LABA1C 5.6 07/16/2024 04:11 AM     TSH:   Lab Results   Component Value Date/Time    TSH 2.01 12/15/2023 01:17 PM     Troponin: No results found for: \"TROPONINT\"  Lactic Acid:   No results for input(s): \"LACTA\" in the last 72 hours.    BNP:   No results for input(s): \"PROBNP\" in the last 72 hours.    UA:  Lab Results   Component Value Date/Time    NITRU Negative 07/14/2024 05:45 PM    COLORU Yellow 07/14/2024 05:45 PM    PHUR 5.0 07/14/2024 05:45 PM    PHUR 7.0 11/14/2020 12:03 PM    WBCUA 1 07/14/2024 05:45 PM    RBCUA 2 07/14/2024 05:45 PM    MUCUS 4+ 12/30/2014 11:25 AM    BACTERIA None Seen 07/14/2024 05:45 PM    CLARITYU Clear 07/14/2024 05:45 PM    LEUKOCYTESUR Negative 07/14/2024 05:45 PM    UROBILINOGEN 1.0 07/14/2024 05:45 PM    BILIRUBINUR Negative 07/14/2024 05:45 PM    BLOODU Negative 07/14/2024 05:45 PM    GLUCOSEU Negative 07/14/2024 05:45 PM    KETUA TRACE 07/14/2024 05:45 PM     Urine Cultures: No results found for: \"LABURIN\"  Blood Cultures:   Lab Results   Component Value Date/Time    BC No Growth after 4 days of incubation. 11/14/2020 12:00 PM     Lab Results   Component Value Date/Time    BLOODCULT2 No Growth after 4 days of incubation. 11/14/2020 12:20 PM     Organism: No results found for: \"ORG\"      Electronically signed by Lyly Vyas MD on 7/22/2024 at 11:15 AM

## 2024-07-22 NOTE — PROGRESS NOTES
ONCOLOGY HEMATOLOGY CARE PROGRESS NOTE      SUBJECTIVE:    Events noted.  The patient is feeling better per RN.  No obvious external bleeding.  Lovenox resumed      ROS:     14 point review of systems performed negative except for as documented above    OBJECTIVE        Physical    VITALS:  Patient Vitals for the past 24 hrs:   BP Temp Temp src Pulse Resp SpO2 Weight   07/22/24 0821 118/70 -- -- -- -- -- --   07/22/24 0800 118/70 98.2 °F (36.8 °C) Temporal 92 14 95 % --   07/22/24 0549 117/66 98 °F (36.7 °C) Temporal 85 20 98 % 111.4 kg (245 lb 9.5 oz)   07/21/24 2350 -- -- -- -- 18 -- --   07/21/24 2322 118/67 97.8 °F (36.6 °C) Temporal 87 20 95 % --   07/21/24 2046 123/75 98 °F (36.7 °C) Temporal 88 20 95 % --   07/21/24 1620 118/68 98 °F (36.7 °C) Temporal 87 18 96 % --   07/21/24 1141 115/64 97.8 °F (36.6 °C) Temporal 84 18 95 % --         24HR INTAKE/OUTPUT:    Intake/Output Summary (Last 24 hours) at 7/22/2024 0948  Last data filed at 7/21/2024 1620  Gross per 24 hour   Intake 519.41 ml   Output 1150 ml   Net -630.59 ml     Conscious alert oriented.    Appears comfortable.    No neck fullness.    Respiratory efforts are normal.    Abdomen is not distended.    LLE Edema ++    No focal deficits.      DATA:  CBC:    Recent Labs     07/22/24  0545 07/21/24  2218 07/21/24  1415 07/21/24  0655 07/20/24  0707 07/20/24  0120 07/19/24  1120 07/19/24  0418 07/18/24  1031 07/15/24  0430 07/14/24  1321   WBC 8.2  --   --  8.6  --  8.6  --  7.8 8.9   < > 10.7   NEUTROABS  --   --   --   --   --   --   --   --  5.1  --  5.6   LYMPHOPCT  --   --   --   --   --   --   --   --  29.2  --  34.1   RBC 2.19*  --   --  2.38*  --  2.41*  --  2.09* 2.43*   < > 3.66*   HGB 7.2* 7.5* 7.9* 7.8*   < > 7.9*   < > 7.1* 8.2*   < > 12.7*   HCT 21.2* 21.9* 23.6* 23.0*   < > 23.3*   < > 20.3* 23.8*   < > 37.2*   MCV 96.8  --   --  96.7  --  96.5  --  97.0 97.8   < > 101.6*   MCH 32.9  --   --  32.8  --   of  the mA/kV was utilized to reduce the radiation dose to as low as reasonably  achievable.    COMPARISON:  None.    HISTORY  ORDERING SYSTEM PROVIDED HISTORY: swollen left thigh  TECHNOLOGIST PROVIDED HISTORY:  Reason for exam:->swollen left thigh  Reason for Exam: swollen left thigh    FINDINGS:  Bones: There is mild osteopenia.  No acute fracture or dislocation.  No  evidence of focal osseous lesion, cortical destruction or periostitis.  There  is a partially imaged left total knee arthroplasty noted.    Soft Tissue: There is nonspecific subcutaneous edema of the left thigh.  No  evidence of soft tissue gas.  The musculature demonstrates fatty atrophy.  Within the deep rectus femoris musculature adjacent to the femoral diaphysis  there is slight hyperattenuating lobulated soft tissue measuring 5.7 x 2.3 cm  in cross-sectional dimension and approximately 14.9 cm in SI dimension  suggesting an intramuscular hematoma.  Recommend correlation with area of  point tenderness.  MRI recommended without contrast may be helpful for  further evaluation.  There are also 2 adjacent 17 and 18 mm nodules posterior  to the distal right thigh that may represent lymph nodes, indeterminate.    Joint: As described above there is a right total knee arthroplasty creating  beam hardening artifact.  The right hip demonstrates normal alignment.  No  joint effusion.  Mild right hip osteoarthritis.    Limited images of the intrapelvic contents demonstrate no acute abnormality.  Impression: Lobulated hyperattenuating soft tissue within the deep rectus femorals  musculature adjacent to the femoral diaphysis measuring 5.7 x 2.3 x 14.9 cm  suggesting an intramuscular hematoma. Recommend correlation with area of  point tenderness. MRI with and without contrast may be helpful for further  evaluation if there is fluid soft tissue mass.    Nonspecific subcutaneous edema of the left thigh.  This may be related to  recent injury.  No evidence of

## 2024-07-22 NOTE — PROGRESS NOTES
ORTHOPAEDIC PROGRESS NOTE    Chief Complaint   Patient presents with    Chest Pain     Pt had onset of chest pain/pressure that onset about 30 minutes ago. 911 called. No cardiac hx except hypertension. Had syncopal episode while walking to the ambulance. CPR was given for approx 1 minute.        Seen sitting up in the bed.  Reports unchanged pain and swelling in the left thigh.  Denies new issues.     Hx of L TKA with Dr Navas in August 2014    Denies N/T  Has been able to WB on LLE      Vitals:    07/21/24 2350 07/22/24 0549 07/22/24 0800 07/22/24 0821   BP:  117/66 118/70 118/70   Pulse:  85 92    Resp: 18 20 14    Temp:  98 °F (36.7 °C) 98.2 °F (36.8 °C)    TempSrc:  Temporal Temporal    SpO2:  98% 95%    Weight:  111.4 kg (245 lb 9.5 oz)     Height:           Physical Exam:  Body mass index is 33.31 kg/m².  NAD, pleasant  Seen in CVICU with RN present  LLE - prior TKA incision well healed   Small superficial abrasion medial and near distal aspect of incision   No evidence of infection   Hip and knee AROM intact, albeit somewhat limited by pain and swelling   Thigh is swollen, but soft and compressible   No ecchymosis seen  LLE SILT SP/DP/T/LFC/sural/saphenous nerve distributions; EHL/FHL/TA/GS intact   DP pulse intact        Imaging:  Images were personally reviewed by myself and discussed with the patient  ONE XRAY VIEW OF THE PELVIS AND TWO XRAY VIEWS LEFT HIP     7/16/2024 10:21 am     COMPARISON:  None.     HISTORY:  ORDERING SYSTEM PROVIDED HISTORY: pain s/p fall  TECHNOLOGIST PROVIDED HISTORY:  Reason for exam:->pain s/p fall  Reason for Exam: Pain s/p fall     FINDINGS:  No acute hip fracture or dislocation.  No osteonecrosis.  Degenerative  changes of the superolateral acetabular roof bilaterally.     IMPRESSION:  No acute fracture or dislocation             2 XRAY VIEWS OF THE LEFT KNEE; 4 XRAY VIEWS OF THE LEFT TIBIA AND FIBULA     7/16/2024 10:21 am     COMPARISON:  None.     HISTORY:  ORDERING

## 2024-07-22 NOTE — PROGRESS NOTES
PULMONARY AND CRITICAL CARE MEDICINE PROGRESS NOTE      SUBJECTIVE: Patient sitting in bed in no apparent respiratory distress.  Reports feeling significantly better.  On just 1 L/min of oxygen supplementation saturating in mid to high 90s.  Denies any chest pain or chest tightness.  Does have stiffness in the left lower extremity at the site of hematoma.  Hemodynamically remained stable.  Currently on heparin drip.    REVIEW OF SYSTEMS:   8 point ROS is negative beside mentioned in subjective section.     MEDICATIONS:      furosemide  40 mg Oral Daily    enoxaparin  1 mg/kg SubCUTAneous BID    acetaminophen  650 mg Oral TID    sodium chloride flush  5-40 mL IntraVENous 2 times per day    insulin lispro  0-8 Units SubCUTAneous TID WC    insulin lispro  0-4 Units SubCUTAneous Nightly    aspirin  81 mg Oral Daily    pantoprazole  40 mg Oral QAM AC    rosuvastatin  20 mg Oral Nightly    sodium chloride flush  5-40 mL IntraVENous 2 times per day      sodium chloride      dextrose      sodium chloride       midodrine, oxyCODONE **OR** oxyCODONE, tiZANidine, sodium chloride flush, sodium chloride, acetaminophen, morphine, dextrose bolus **OR** dextrose bolus, glucagon (rDNA), dextrose, perflutren lipid microspheres, sodium chloride flush, sodium chloride, potassium chloride **OR** potassium alternative oral replacement **OR** potassium chloride, magnesium sulfate, ondansetron **OR** ondansetron, polyethylene glycol, [DISCONTINUED] acetaminophen **OR** acetaminophen, traMADol     ALLERGIES:   Allergies as of 07/14/2024    (No Known Allergies)        OBJECTIVE:   height is 1.829 m (6') and weight is 111.4 kg (245 lb 9.5 oz). His temporal temperature is 98.2 °F (36.8 °C). His blood pressure is 118/70 and his pulse is 92. His respiration is 14 and oxygen saturation is 95%.   No intake/output data recorded.     PHYSICAL EXAM:    CONSTITUTIONAL: He is a 84 y.o.-year-old who appears his stated age. He is alert and

## 2024-07-22 NOTE — PROGRESS NOTES
Marvin Kaur  7/22/2024  [id number]    Chief Complaint: Acute saddle pulmonary embolism with acute cor pulmonale (HCC)    Subjective   Since last seen (on 7/20 by Dr. Shrestha), medical updates:  - Therapeutic Lovenox resumed, bridging to Coumadin starting today.     Patient reports that he is doing well. Left thigh feels about the same, no better or worse since last week. Denies any chest pain, dyspnea. He is motivated to get home to his wife, who is having chemotherapy port placed to begin treatment soon. He understands his own limitations at this time, would be interested in a short inpatient rehab stay to improve his function.          Objective   Objective:  Patient Vitals for the past 24 hrs:   BP Temp Temp src Pulse Resp SpO2 Weight   07/22/24 0821 118/70 -- -- -- -- -- --   07/22/24 0800 118/70 98.2 °F (36.8 °C) Temporal 92 14 95 % --   07/22/24 0549 117/66 98 °F (36.7 °C) Temporal 85 20 98 % 111.4 kg (245 lb 9.5 oz)   07/21/24 2350 -- -- -- -- 18 -- --   07/21/24 2322 118/67 97.8 °F (36.6 °C) Temporal 87 20 95 % --   07/21/24 2046 123/75 98 °F (36.7 °C) Temporal 88 20 95 % --   07/21/24 1620 118/68 98 °F (36.7 °C) Temporal 87 18 96 % --     Gen: No distress, pleasant.   HEENT: Normocephalic, atraumatic.   CV: Extremities warm, perfused.  Resp: No respiratory distress. No increased WOB  Abd: Soft, nontender nondistended  Ext: Left thigh swelling noted.   Neuro: Alert. Moves bilateral upper and lower extremities against gravity.   Psych: Calm, pleasant. Affect congruent with stated mood.     Laboratory data: Available via EMR.     Therapy progress:    PT    Rolling: Level of difficulty - A Lot   Sit to Stand from a Chair: Level of difficulty - A Little  Supine to Sit: Level of difficulty - A Lot     Bed to Chair: Physical Assistance Required - A Little  Ambulate Across Room: Physical Assistance Required - A Little  Ascend 3-5 Steps With HR: Physical Assistance Required - A Little    OT    Eating: Physical  Assistance Required - None  Grooming: Physical Assistance Required - A Little  LB Dressing: Physical Assistance Required - A Little  UB Dressing: Physical Assistance Required - A Little  Bathing: Physical Assistance Required - A Little  Toileting: Physical Assistance Required - A Little    SLP         Body mass index is 33.31 kg/m².       Assessment:  Debility due to:  Bilateral PE w/ cor pulmonale  Asystolic cardiac arrest  Acute blood loss anemia  Left rectus femoris hematoma     Impairments: Generalized weakness, decreased LE ROM, decreased functional endurance, pain control limiting independence with functional mobility and ADLs.     Plan:   - Patient remains a good candidate for acute inpatient rehab, both medically and functionally. Insurance pre-cert still pending.     Vincent Snyder MD 7/22/2024, 3:31 PM    * This document was created using dictation software.  While all precautions were taken to ensure accuracy, errors may have occurred.  Please disregard any typographical errors.

## 2024-07-23 LAB
ALBUMIN SERPL-MCNC: 2.8 G/DL (ref 3.4–5)
ALBUMIN/GLOB SERPL: 1 {RATIO} (ref 1.1–2.2)
ALP SERPL-CCNC: 82 U/L (ref 40–129)
ALT SERPL-CCNC: 42 U/L (ref 10–40)
ANION GAP SERPL CALCULATED.3IONS-SCNC: 8 MMOL/L (ref 3–16)
AST SERPL-CCNC: 42 U/L (ref 15–37)
BASOPHILS # BLD: 0 K/UL (ref 0–0.2)
BASOPHILS NFR BLD: 0.6 %
BILIRUB SERPL-MCNC: 0.8 MG/DL (ref 0–1)
BUN SERPL-MCNC: 18 MG/DL (ref 7–20)
CALCIUM SERPL-MCNC: 8.3 MG/DL (ref 8.3–10.6)
CHLORIDE SERPL-SCNC: 96 MMOL/L (ref 99–110)
CO2 SERPL-SCNC: 26 MMOL/L (ref 21–32)
CREAT SERPL-MCNC: 1.2 MG/DL (ref 0.8–1.3)
DEPRECATED RDW RBC AUTO: 16.3 % (ref 12.4–15.4)
EOSINOPHIL # BLD: 0.2 K/UL (ref 0–0.6)
EOSINOPHIL NFR BLD: 2.3 %
GFR SERPLBLD CREATININE-BSD FMLA CKD-EPI: 59 ML/MIN/{1.73_M2}
GLUCOSE BLD-MCNC: 105 MG/DL (ref 70–99)
GLUCOSE BLD-MCNC: 106 MG/DL (ref 70–99)
GLUCOSE BLD-MCNC: 118 MG/DL (ref 70–99)
GLUCOSE BLD-MCNC: 177 MG/DL (ref 70–99)
GLUCOSE SERPL-MCNC: 107 MG/DL (ref 70–99)
HCT VFR BLD AUTO: 20.1 % (ref 40.5–52.5)
HCT VFR BLD AUTO: 21.5 % (ref 40.5–52.5)
HCT VFR BLD AUTO: 26.7 % (ref 40.5–52.5)
HGB BLD-MCNC: 7 G/DL (ref 13.5–17.5)
HGB BLD-MCNC: 7.4 G/DL (ref 13.5–17.5)
HGB BLD-MCNC: 8.6 G/DL (ref 13.5–17.5)
INR PPP: 1.53 (ref 0.85–1.15)
LYMPHOCYTES # BLD: 1.7 K/UL (ref 1–5.1)
LYMPHOCYTES NFR BLD: 20.9 %
MAGNESIUM SERPL-MCNC: 1.7 MG/DL (ref 1.8–2.4)
MCH RBC QN AUTO: 34.1 PG (ref 26–34)
MCHC RBC AUTO-ENTMCNC: 35.1 G/DL (ref 31–36)
MCV RBC AUTO: 97.1 FL (ref 80–100)
MONOCYTES # BLD: 1.3 K/UL (ref 0–1.3)
MONOCYTES NFR BLD: 16.9 %
NEUTROPHILS # BLD: 4.7 K/UL (ref 1.7–7.7)
NEUTROPHILS NFR BLD: 59.3 %
PERFORMED ON: ABNORMAL
PHOSPHATE SERPL-MCNC: 3 MG/DL (ref 2.5–4.9)
PLATELET # BLD AUTO: 295 K/UL (ref 135–450)
PMV BLD AUTO: 6.1 FL (ref 5–10.5)
POTASSIUM SERPL-SCNC: 4.1 MMOL/L (ref 3.5–5.1)
PROT SERPL-MCNC: 5.6 G/DL (ref 6.4–8.2)
PROTHROMBIN TIME: 18.6 SEC (ref 11.9–14.9)
RBC # BLD AUTO: 2.07 M/UL (ref 4.2–5.9)
SODIUM SERPL-SCNC: 130 MMOL/L (ref 136–145)
WBC # BLD AUTO: 8 K/UL (ref 4–11)

## 2024-07-23 PROCEDURE — 80053 COMPREHEN METABOLIC PANEL: CPT

## 2024-07-23 PROCEDURE — 97116 GAIT TRAINING THERAPY: CPT

## 2024-07-23 PROCEDURE — 85025 COMPLETE CBC W/AUTO DIFF WBC: CPT

## 2024-07-23 PROCEDURE — 2580000003 HC RX 258: Performed by: INTERNAL MEDICINE

## 2024-07-23 PROCEDURE — 2140000000 HC CCU INTERMEDIATE R&B

## 2024-07-23 PROCEDURE — 6360000002 HC RX W HCPCS: Performed by: INTERNAL MEDICINE

## 2024-07-23 PROCEDURE — 97535 SELF CARE MNGMENT TRAINING: CPT

## 2024-07-23 PROCEDURE — 85014 HEMATOCRIT: CPT

## 2024-07-23 PROCEDURE — 85018 HEMOGLOBIN: CPT

## 2024-07-23 PROCEDURE — 85610 PROTHROMBIN TIME: CPT

## 2024-07-23 PROCEDURE — 83735 ASSAY OF MAGNESIUM: CPT

## 2024-07-23 PROCEDURE — 84100 ASSAY OF PHOSPHORUS: CPT

## 2024-07-23 PROCEDURE — 6370000000 HC RX 637 (ALT 250 FOR IP): Performed by: NURSE PRACTITIONER

## 2024-07-23 PROCEDURE — 6370000000 HC RX 637 (ALT 250 FOR IP): Performed by: FAMILY MEDICINE

## 2024-07-23 PROCEDURE — 97530 THERAPEUTIC ACTIVITIES: CPT

## 2024-07-23 PROCEDURE — 6370000000 HC RX 637 (ALT 250 FOR IP): Performed by: INTERNAL MEDICINE

## 2024-07-23 RX ADMIN — ACETAMINOPHEN 650 MG: 325 TABLET ORAL at 15:53

## 2024-07-23 RX ADMIN — FUROSEMIDE 40 MG: 40 TABLET ORAL at 07:59

## 2024-07-23 RX ADMIN — ACETAMINOPHEN 650 MG: 325 TABLET ORAL at 20:24

## 2024-07-23 RX ADMIN — Medication 10 ML: at 08:00

## 2024-07-23 RX ADMIN — ENOXAPARIN SODIUM 120 MG: 150 INJECTION SUBCUTANEOUS at 07:59

## 2024-07-23 RX ADMIN — ENOXAPARIN SODIUM 120 MG: 150 INJECTION SUBCUTANEOUS at 20:24

## 2024-07-23 RX ADMIN — ACETAMINOPHEN 650 MG: 325 TABLET ORAL at 07:58

## 2024-07-23 RX ADMIN — WARFARIN SODIUM 7.5 MG: 7.5 TABLET ORAL at 16:54

## 2024-07-23 RX ADMIN — Medication 10 ML: at 20:27

## 2024-07-23 RX ADMIN — ASPIRIN 81 MG 81 MG: 81 TABLET ORAL at 07:59

## 2024-07-23 RX ADMIN — PANTOPRAZOLE SODIUM 40 MG: 40 TABLET, DELAYED RELEASE ORAL at 05:54

## 2024-07-23 RX ADMIN — ROSUVASTATIN 20 MG: 20 TABLET, FILM COATED ORAL at 20:24

## 2024-07-23 ASSESSMENT — PAIN SCALES - GENERAL
PAINLEVEL_OUTOF10: 4
PAINLEVEL_OUTOF10: 0
PAINLEVEL_OUTOF10: 0
PAINLEVEL_OUTOF10: 2
PAINLEVEL_OUTOF10: 2
PAINLEVEL_OUTOF10: 4
PAINLEVEL_OUTOF10: 0
PAINLEVEL_OUTOF10: 4
PAINLEVEL_OUTOF10: 2
PAINLEVEL_OUTOF10: 0

## 2024-07-23 ASSESSMENT — PAIN DESCRIPTION - ORIENTATION: ORIENTATION: LEFT

## 2024-07-23 ASSESSMENT — PAIN DESCRIPTION - LOCATION: LOCATION: LEG

## 2024-07-23 NOTE — PROGRESS NOTES
Pharmacy to Dose Warfarin    Pharmacy consulted to dose warfarin for PE.    INR Goal: 2-3    INR today: 1.53    Assessment/Plan:  - INR subtherapeutic today - warfarin had been held for 3 days  - Will give warfarin 7.5 mg tonight. Continue lovenox 1 mg/kg BID until INR >2  - Possible concomitant drug-drug interactions include: aspirin, acetaminophen, enoxaparin     Pharmacy will continue to follow.    Maria A Wu, PharmD, BCPS  Clinical Pharmacist  [phone]

## 2024-07-23 NOTE — PROGRESS NOTES
[facility] - Inpatient Rehabilitation Department   Phone: [phone]    Occupational Therapy    [] Initial Evaluation            [x] Daily Treatment Note         [] Discharge Summary      Patient: Marvin Kaur   : 1940   MRN: [id number]   Date of Service:  2024    Admitting Diagnosis:  Acute saddle pulmonary embolism with acute cor pulmonale (HCC)  Current Admission Summary: - Pt had onset of chest pain/pressure ,911 called. No cardiac hx except hypertension. Had syncopal episode while walking to the ambulance. CPR was given for approx 1 minute., CT showed large thrombus in the right an dleft main pulmonary artery, upper and lower lobe of the lungs, Heparin gtts started, NPO, maybe for thrombectomy today.   7/15 Aspiration thrombectomy ([place]). Bleeding R groin. Manual pressure, femstop x2.   Hypotensive. Levo started. RIJ TLC insertion. CT abd done (no RP bleed), 2 PRBC ordered, 1 given. 7/15 PM: Pt bladder scanned, 7ml, Labs sent, PRBC given. Levo able to be titrated off by AM, Pt voided x2. H&H 8.8/26.1. No heparin infusing at this time. BLE dopplers - neg DVT   : Up to chair. Heparin gtt started. AntiXa not therapeutic x3 by AM. Pt on room air, BP stable. Able to ambulate to bathroom with walker. Pt states he does not take his lasix at home \"because it makes him pee too much\". Edema still noted in BLE. Weight is up again today. Lasix 20mg BID IVP ordered.   : H&H trending down, 7.5/21.3. AntiXa and aPTT elevated still: Heparin stopped, ok with Pulm. I UPRBC given, H&H now 7.7.7. HemOnc consult, 5mg Coumadin given, goal INR 2-3.   7: H&H 7.     Past Medical History:  has a past medical history of AR (allergic rhinitis), Dermatitis, Diabetes, Diabetes (HCC), DJD (degenerative joint disease), Dyslipidemia, ED (erectile dysfunction), GERD (gastroesophageal reflux disease), HTN (hypertension), Hx of blood clots, Hyperlipidemia, Sleep apnea, and Vitamin D

## 2024-07-23 NOTE — PLAN OF CARE
Problem: Pain  Goal: Verbalizes/displays adequate comfort level or baseline comfort level  Outcome: Progressing  Pain controlled  On scheduled tylenol      ARU consult noted      Precert pending   Problem: Respiratory - Adult  Goal: Achieves optimal ventilation and oxygenation  Outcome: Progressing   RA sat 98  Problem: Cardiovascular - Adult  Goal: Maintains optimal cardiac output and hemodynamic stability  Outcome: Progressing  K 4.1  Mg 1.7  Lasix 40 scheduled   NSR  Problem: Hematologic - Adult  Goal: Maintains hematologic stability  Outcome: Progressing  H/h 7/20 plt 295  Iron 55

## 2024-07-23 NOTE — PROGRESS NOTES
[facility] - Inpatient Rehabilitation Department   Phone: [phone]    Physical Therapy    [] Initial Evaluation            [x] Daily Treatment Note         [] Discharge Summary      Patient: Marvin Kaur   : 1940   MRN: [id number]   Date of Service:  2024  Admitting Diagnosis: Acute saddle pulmonary embolism with acute cor pulmonale (HCC)  Current Admission Summary: Pt admitted with syncope and cardiac arrest, fall at home, SOB, acute chest pain, acute respiratory failure, acute saddle PE; thrombectomy on 7/15/24, hypotension and anemia post op, received 2u PRBC; reports of L hip/knee pain/edema; xrays negative, ortho consult recommends conservative treatment, WBAT and gentle ROM  Past Medical History:  has a past medical history of AR (allergic rhinitis), Dermatitis, Diabetes, Diabetes (HCC), DJD (degenerative joint disease), Dyslipidemia, ED (erectile dysfunction), GERD (gastroesophageal reflux disease), HTN (hypertension), Hx of blood clots, Hyperlipidemia, Sleep apnea, and Vitamin D deficiency.  Past Surgical History:  has a past surgical history that includes Hand surgery; Foot surgery; Colonoscopy; joint replacement (Left, 2014); Knee Arthroplasty (Right, 2015); other surgical history (See note 14); other surgical history (Right, 2015); and invasive vascular (N/A, 7/15/2024).    Discharge Recommendations: Marvin Kaur scored a 13/24 on the AM-PAC short mobility form. Current research shows that an AM-PAC score of 17 or less is typically not associated with a discharge to the patient's home setting. Based on the patient's AM-PAC score and their current functional mobility deficits, it is recommended that the patient have 5-7 sessions per week of Physical Therapy at d/c to increase the patient's independence.  At this time, this patient demonstrates complex nursing, medical, and rehabilitative needs, and would benefit from intensive rehabilitation services

## 2024-07-23 NOTE — PROGRESS NOTES
Nutrition Note    RECOMMENDATIONS  PO Diet: Continue current diet  ONS: Ordered Ensure Plus HP once daily    NUTRITION ASSESSMENT   Pt triggered for LOS assessment. On cardiac diet with documented meal intakes averaging 51-75% throughout admission. Upon visiting, pt reported he has not been eating as much as he used to while here and for sometime prior to current admission with wt trending down but could not specify timeframe. Wt hx in EMR indicates 14 lb (5.6%) wt loss in 7 months, which is not considered significant. Would like to receive ONS to offer additional nutrition. RD will monitor for adequate po intake.     Nutrition Related Findings: Na 130, Mg 1.70. LBM 7/21. Non-pitting LUE, BLE edema.  Wounds: Surgical Incision  Nutrition Education:  Education not indicated   Nutrition Goals: PO intake 75% or greater, by next RD assessment     MALNUTRITION ASSESSMENT   Chronic Illness  Malnutrition Status: No malnutrition    NUTRITION DIAGNOSIS   Inadequate oral intake related to inadequate protein-energy intake as evidenced by intake 51-75%, poor intake prior to admission    CURRENT NUTRITION THERAPIES  ADULT DIET; Regular; Low Fat/Low Chol/High Fiber/2 gm Na     PO Intake: 51-75%   PO Supplement Intake:None Ordered    ANTHROPOMETRICS  Current Height: 182.9 cm (6')  Current Weight - Scale: 107.9 kg (237 lb 14 oz)    Ideal Body Weight (IBW): 178 lbs  (81 kg)    Usual Bodyweight 113.4 kg (250 lb) (per pt report)       BMI: 32.1    COMPARATIVE STANDARDS  Total Energy Requirements (kcals/day): 4209-5936     Protein (g):         Fluid (mL/day):  6680-6583    The patient will be monitored per nutrition standards of care. Consult dietitian if additional nutrition interventions are needed prior to RD reassessment.     Maru Ospina, MS, RD, LD    Contact: [phone]

## 2024-07-23 NOTE — PROGRESS NOTES
Neuro: Alert and oriented X4  Cardiac : NSR  Respiratory: clear lung sound diminished in basses Room air with Sats in the mid 90s  GI: active bowel sounds with last bowel movement 07/21/2024  Skin: scattered bruising  : urine dark apolonia color   Lines: R hand PIV with no infusions at this time

## 2024-07-23 NOTE — PROGRESS NOTES
Admit Date: 7/14/2024  Diet: ADULT DIET; Regular; Low Fat/Low Chol/High Fiber/2 gm Na  ADULT ORAL NUTRITION SUPPLEMENT; Breakfast; Standard High Calorie/High Protein Oral Supplement    CC: PE    Interval history:   Overnight, there were no acute events. Patient's vitals remained stable    Patient was seen this morning in bed. Plan for the day was discussed. All questions were answered. Patient denies fevers, chills, nausea, vomiting, chest pain, shortness of breath, diarrhea, constipation, dysuria, urinary frequency or urgency.     Plan:     -Continue to monitor Hgb to make sure its not downtrending  -Continue Lovenox-Warfarin bridge  -PT/OT    Assessment:   Marvin Kaur is a 84 y.o. male with PMH of left leg DVT ~20 years ago, HTN, Pulmonary HTN, LALITA on CPAP  who was admitted with Massive PE    Massive bilateral pulmonary emboli with cor pulmonale:  Acute hypoxic respiratory failure:  Syncope and asystole cardiac arrest:  Venous Doppler negative for DVT.  TTE 7/15/2024: LVEF 60 to 65%. Dilated RV with akinetic wall. (Todd's sign +)  Cardiology consult appreciated.  s/p mechanical thrombectomy 7/15/2024.  Coumadin/Lovenox held for 48 hours due to left thigh hematoma with ABLA.  Therapeutic Lovenox resumed 7/21/2024.   Unable to afford DOAC, Resumed Coumadin. INR subtherapeutic.    Weaned off supplemental oxygen.  Heme-onc consult appreciated: No indication for hypercoagulable workup.   CT chest abdomen and pelvis with no evidence of metastatic disease.  Outpatient follow-up in 2 to 3 weeks after discharge.  Repeat 2D echo in 1 month's time.      Left thigh hematoma due to fall with acute blood loss anemia:   Hgb dropped from 12.7-7.1.  Transfused 2 units PRBC this admission.  CT abdomen and pelvis negative for retroperitoneal bleed.  CT left femur 7/19/2024: Lobulated hyperattenuating soft tissue within the deep rectus femorals musculature adjacent to the femoral diaphysis measuring 5.7 x 2.3 x 14.9

## 2024-07-24 LAB
GLUCOSE BLD-MCNC: 125 MG/DL (ref 70–99)
GLUCOSE BLD-MCNC: 150 MG/DL (ref 70–99)
GLUCOSE BLD-MCNC: 167 MG/DL (ref 70–99)
GLUCOSE BLD-MCNC: 93 MG/DL (ref 70–99)
HCT VFR BLD AUTO: 22.3 % (ref 40.5–52.5)
HGB BLD-MCNC: 7.7 G/DL (ref 13.5–17.5)
INR PPP: 1.7 (ref 0.85–1.15)
PERFORMED ON: ABNORMAL
PERFORMED ON: NORMAL
PROTHROMBIN TIME: 20.1 SEC (ref 11.9–14.9)

## 2024-07-24 PROCEDURE — 85018 HEMOGLOBIN: CPT

## 2024-07-24 PROCEDURE — 97530 THERAPEUTIC ACTIVITIES: CPT

## 2024-07-24 PROCEDURE — 6370000000 HC RX 637 (ALT 250 FOR IP): Performed by: NURSE PRACTITIONER

## 2024-07-24 PROCEDURE — 85014 HEMATOCRIT: CPT

## 2024-07-24 PROCEDURE — 97535 SELF CARE MNGMENT TRAINING: CPT

## 2024-07-24 PROCEDURE — 2140000000 HC CCU INTERMEDIATE R&B

## 2024-07-24 PROCEDURE — 94760 N-INVAS EAR/PLS OXIMETRY 1: CPT

## 2024-07-24 PROCEDURE — 6370000000 HC RX 637 (ALT 250 FOR IP): Performed by: STUDENT IN AN ORGANIZED HEALTH CARE EDUCATION/TRAINING PROGRAM

## 2024-07-24 PROCEDURE — 6370000000 HC RX 637 (ALT 250 FOR IP): Performed by: FAMILY MEDICINE

## 2024-07-24 PROCEDURE — 85610 PROTHROMBIN TIME: CPT

## 2024-07-24 PROCEDURE — 2580000003 HC RX 258: Performed by: INTERNAL MEDICINE

## 2024-07-24 PROCEDURE — 2580000003 HC RX 258: Performed by: FAMILY MEDICINE

## 2024-07-24 PROCEDURE — 6360000002 HC RX W HCPCS: Performed by: INTERNAL MEDICINE

## 2024-07-24 PROCEDURE — 6370000000 HC RX 637 (ALT 250 FOR IP): Performed by: INTERNAL MEDICINE

## 2024-07-24 RX ORDER — WARFARIN SODIUM 5 MG/1
5 TABLET ORAL DAILY
Status: DISCONTINUED | OUTPATIENT
Start: 2024-07-24 | End: 2024-07-26 | Stop reason: HOSPADM

## 2024-07-24 RX ADMIN — ACETAMINOPHEN 650 MG: 325 TABLET ORAL at 20:08

## 2024-07-24 RX ADMIN — PANTOPRAZOLE SODIUM 40 MG: 40 TABLET, DELAYED RELEASE ORAL at 04:46

## 2024-07-24 RX ADMIN — ROSUVASTATIN 20 MG: 20 TABLET, FILM COATED ORAL at 20:08

## 2024-07-24 RX ADMIN — FUROSEMIDE 40 MG: 40 TABLET ORAL at 09:13

## 2024-07-24 RX ADMIN — WARFARIN SODIUM 5 MG: 5 TABLET ORAL at 17:06

## 2024-07-24 RX ADMIN — OXYCODONE HYDROCHLORIDE 5 MG: 5 TABLET ORAL at 04:45

## 2024-07-24 RX ADMIN — ENOXAPARIN SODIUM 120 MG: 150 INJECTION SUBCUTANEOUS at 20:08

## 2024-07-24 RX ADMIN — ENOXAPARIN SODIUM 120 MG: 150 INJECTION SUBCUTANEOUS at 09:11

## 2024-07-24 RX ADMIN — ASPIRIN 81 MG 81 MG: 81 TABLET ORAL at 09:13

## 2024-07-24 RX ADMIN — OXYCODONE HYDROCHLORIDE 5 MG: 5 TABLET ORAL at 23:04

## 2024-07-24 RX ADMIN — Medication 10 ML: at 09:14

## 2024-07-24 RX ADMIN — Medication 10 ML: at 20:08

## 2024-07-24 RX ADMIN — ACETAMINOPHEN 650 MG: 325 TABLET ORAL at 09:11

## 2024-07-24 ASSESSMENT — PAIN SCALES - GENERAL
PAINLEVEL_OUTOF10: 0
PAINLEVEL_OUTOF10: 7
PAINLEVEL_OUTOF10: 4
PAINLEVEL_OUTOF10: 0
PAINLEVEL_OUTOF10: 4
PAINLEVEL_OUTOF10: 5

## 2024-07-24 ASSESSMENT — PAIN SCALES - WONG BAKER: WONGBAKER_NUMERICALRESPONSE: NO HURT

## 2024-07-24 NOTE — PROGRESS NOTES
Latest Reference Range & Units 07/23/24 22:39   Hemoglobin Quant 13.5 - 17.5 g/dL 7.4 (L)   Hematocrit 40.5 - 52.5 % 21.5 (L)   (L): Data is abnormally low    Electronically signed by Jeannie Licea RN on 7/23/2024 at 11:02 PM

## 2024-07-24 NOTE — PROGRESS NOTES
[facility] - Inpatient Rehabilitation Department   Phone: [phone]    Occupational Therapy    [] Initial Evaluation            [x] Daily Treatment Note         [] Discharge Summary      Patient: Marvin Kaur   : 1940   MRN: [id number]   Date of Service:  2024    Admitting Diagnosis:  Acute saddle pulmonary embolism with acute cor pulmonale (HCC)  Current Admission Summary: - Pt had onset of chest pain/pressure ,911 called. No cardiac hx except hypertension. Had syncopal episode while walking to the ambulance. CPR was given for approx 1 minute., CT showed large thrombus in the right an dleft main pulmonary artery, upper and lower lobe of the lungs, Heparin gtts started, NPO, maybe for thrombectomy today.   7/15 Aspiration thrombectomy (In[place]). Bleeding R groin. Manual pressure, femstop x2.   Hypotensive. Levo started. RIJ TLC insertion. CT abd done (no RP bleed), 2 PRBC ordered, 1 given. 7/15 PM: Pt bladder scanned, 7ml, Labs sent, PRBC given. Levo able to be titrated off by AM, Pt voided x2. H&H 8.8/26.1. No heparin infusing at this time. BLE dopplers - neg DVT   : Up to chair. Heparin gtt started. AntiXa not therapeutic x3 by AM. Pt on room air, BP stable. Able to ambulate to bathroom with walker. Pt states he does not take his lasix at home \"because it makes him pee too much\". Edema still noted in BLE. Weight is up again today. Lasix 20mg BID IVP ordered.   : H&H trending down, 7.5/21.3. AntiXa and aPTT elevated still: Heparin stopped, ok with Pulm. I UPRBC given, H&H now 7.7.7. HemOnc consult, 5mg Coumadin given, goal INR 2-3.   7: H&H 7.     Past Medical History:  has a past medical history of AR (allergic rhinitis), Dermatitis, Diabetes, Diabetes (HCC), DJD (degenerative joint disease), Dyslipidemia, ED (erectile dysfunction), GERD (gastroesophageal reflux disease), HTN (hypertension), Hx of blood clots, Hyperlipidemia, Sleep apnea, and Vitamin D

## 2024-07-24 NOTE — PLAN OF CARE
Problem: Safety - Adult  Goal: Free from fall injury  Outcome: Progressing  Flowsheets (Taken 7/23/2024 2146)  Free From Fall Injury:   Instruct family/caregiver on patient safety   Based on caregiver fall risk screen, instruct family/caregiver to ask for assistance with transferring infant if caregiver noted to have fall risk factors     Problem: Pain  Goal: Verbalizes/displays adequate comfort level or baseline comfort level  7/23/2024 2146 by Jeannie Licea RN  Outcome: Progressing  Flowsheets (Taken 7/23/2024 2146)  Verbalizes/displays adequate comfort level or baseline comfort level:   Encourage patient to monitor pain and request assistance   Assess pain using appropriate pain scale     Problem: Skin/Tissue Integrity  Goal: Absence of new skin breakdown  Description: 1.  Monitor for areas of redness and/or skin breakdown  2.  Assess vascular access sites hourly  3.  Every 4-6 hours minimum:  Change oxygen saturation probe site  4.  Every 4-6 hours:  If on nasal continuous positive airway pressure, respiratory therapy assess nares and determine need for appliance change or resting period.  Outcome: Progressing      Problem: General Plan of Care (Inpatient Behavioral)  Goal: Team Discussion  Team Plan:   BEHAVIORAL TEAM DISCUSSION     Continued Stay Criteria/Rationale: psychosis  Plan: Petition for civil commitment  Participants: Dr. Levi, Dr. Casas, Resident, DESTINEY Eddy; Chanelle Key RN  Summary/Recommendation: The plan is to assess and patient for mental health and medication needs.  The patient will be prescribed medications to treat the identified symptoms.  Upon discharge the patient will be referred to services as appropriate based on the assessment. Plan to petition for commitment with Romel gonzalez.  Medical/Physical: Deferred to medicine  Progress: Initial     Illness Management Recovery model: Personal Plan of Care     Patient completed Personal Plan of Care, identifying reasons for hospitalization and goals for discharge. Form reviewed in team meeting and signed by patient, physician, writer/CTC and, RN. Form will be scanned into EPIC.

## 2024-07-24 NOTE — PROGRESS NOTES
suprapatellar joint effusion.         XR KNEE LEFT (1-2 VIEWS)   Final Result   1.  Mild cortical irregularity at the tip of the medial malleolus.  Correlate   with location of patient's pain.  This could represent an acute avulsion   fracture or sequela of remote trauma.      2.  Moderate suprapatellar joint effusion.         XR CHEST PORTABLE   Final Result   Right-sided central venous catheter is noted with its tip projecting over the   area of the distal SVC.         CT ABDOMEN PELVIS WO CONTRAST Additional Contrast? None   Final Result   1. No retroperitoneal hematoma.   2. Bilateral perinephric fat stranding which is a nonspecific finding and   favored to be normal given that the patient kidneys enhance normally, but   pyelonephritis cannot be excluded with certainty.   3. No acute findings elsewhere in the abdomen or pelvis.   4. New trace left pleural effusion and mild adjacent left lower lobe airspace   opacities which could represent associated atelectasis or pneumonia.   5. Mild prostatomegaly.   6. Simple bilateral renal cysts as described.  No follow-up imaging is   recommended.   7. Indeterminate subcentimeter left adrenal nodule, probably benign.  No   follow-up imaging is recommended.         Vascular duplex lower extremity venous bilateral   Final Result      XR CHEST PORTABLE   Final Result   Low lung volumes.  No acute abnormality seen allowing for patient rotation.         CT CHEST PULMONARY EMBOLISM W CONTRAST   Final Result   Large thrombus burden as detailed above.      Slight right ventricular strain with prominence to the ventricle and atrium.      No obvious lobar infiltrate effusion.  Tiny left pleural effusion.      Results communicated to the communication center.             Medications:     Scheduled Meds:   warfarin  5 mg Oral Daily    furosemide  40 mg Oral Daily    enoxaparin  1 mg/kg SubCUTAneous BID    acetaminophen  650 mg Oral TID    sodium chloride flush  5-40 mL IntraVENous 2  times per day    insulin lispro  0-8 Units SubCUTAneous TID WC    insulin lispro  0-4 Units SubCUTAneous Nightly    aspirin  81 mg Oral Daily    pantoprazole  40 mg Oral QAM AC    rosuvastatin  20 mg Oral Nightly    sodium chloride flush  5-40 mL IntraVENous 2 times per day     Continuous Infusions:   sodium chloride      dextrose      sodium chloride       PRN Meds:midodrine, oxyCODONE **OR** oxyCODONE, tiZANidine, sodium chloride flush, sodium chloride, acetaminophen, morphine, dextrose bolus **OR** dextrose bolus, glucagon (rDNA), dextrose, perflutren lipid microspheres, sodium chloride flush, sodium chloride, potassium chloride **OR** potassium alternative oral replacement **OR** potassium chloride, magnesium sulfate, ondansetron **OR** ondansetron, polyethylene glycol, [DISCONTINUED] acetaminophen **OR** acetaminophen

## 2024-07-24 NOTE — PROGRESS NOTES
[facility] - Inpatient Rehabilitation Department   Phone: [phone]    Physical Therapy    [] Initial Evaluation            [x] Daily Treatment Note         [] Discharge Summary      Patient: Marvin Kaur   : 1940   MRN: [id number]   Date of Service:  2024  Admitting Diagnosis: Acute saddle pulmonary embolism with acute cor pulmonale (HCC)  Current Admission Summary: Pt admitted with syncope and cardiac arrest, fall at home, SOB, acute chest pain, acute respiratory failure, acute saddle PE; thrombectomy on 7/15/24, hypotension and anemia post op, received 2u PRBC; reports of L hip/knee pain/edema; xrays negative, ortho consult recommends conservative treatment, WBAT and gentle ROM  Past Medical History:  has a past medical history of AR (allergic rhinitis), Dermatitis, Diabetes, Diabetes (HCC), DJD (degenerative joint disease), Dyslipidemia, ED (erectile dysfunction), GERD (gastroesophageal reflux disease), HTN (hypertension), Hx of blood clots, Hyperlipidemia, Sleep apnea, and Vitamin D deficiency.  Past Surgical History:  has a past surgical history that includes Hand surgery; Foot surgery; Colonoscopy; joint replacement (Left, 2014); Knee Arthroplasty (Right, 2015); other surgical history (See note 14); other surgical history (Right, 2015); and invasive vascular (N/A, 7/15/2024).    Discharge Recommendations: Marvin Kaur scored a 14/24 on the AM-PAC short mobility form. Current research shows that an AM-PAC score of 17 or less is typically not associated with a discharge to the patient's home setting. Based on the patient's AM-PAC score and their current functional mobility deficits, it is recommended that the patient have 5-7 sessions per week of Physical Therapy at d/c to increase the patient's independence.  At this time, this patient demonstrates complex nursing, medical, and rehabilitative needs, and would benefit from intensive rehabilitation services  OT note for ADLs  Bandage applied to abdominal region due to minor cut     Functional Outcomes  AM-PAC Inpatient Mobility Raw Score : 14              Cognition  WFL  Orientation:    alert and oriented x 4  Command Following:   WFL    Education  Barriers To Learning: none  Patient Education: patient educated on goals, PT role and benefits, plan of care, weight-bearing education, general safety, functional mobility training, proper use of assistive device/equipment, transfer training, discharge recommendations  Learning Assessment:  patient verbalizes and demonstrates understanding    Assessment  Activity Tolerance: Fair; patient limited by generalized weakness and pain involving L LE   Impairments Requiring Therapeutic Intervention: decreased functional mobility, decreased ROM, decreased strength, decreased endurance, decreased balance, increased pain, decreased posture  Prognosis: good  Clinical Assessment: Patient is 85 y/o male presenting to [facility] secondary to pulmonary embolism. Patient currently presents below baseline function with all mobility. Patient's PLOF includes being mod I with use of hurrycane intermittently for transfers and community ambulation, otherwise, independent. Today, patient demonstrates progression in mobility. Patient completes transfers and ambulation of less than household distances min A with use of RW. Patient continues to be limited by generalized weakness and decreased endurance. Patient is highly motivated to return to PLOF in order to assist his spouse following her chemo sessions. He is currently limited by need for physical assistance with all mobility and 6 steps to access home setting. Recommend patient d/c to ARU to continue skilled PT in order to address noted impairments for a safe return to PLOF with all mobility.   Safety Interventions: patient left in chair, call light within reach, gait belt, patient at risk for falls, nurse notified, and no alarm in use; pt calling

## 2024-07-24 NOTE — PROGRESS NOTES
Pharmacy to Dose Warfarin    Pharmacy consulted to dose warfarin for PE.    INR Goal: 2-3    INR today: 1.7    Assessment/Plan:  - INR subtherapeutic today - warfarin had been held for 3 days  - Will reduce to 5 mg daily  - Continue lovenox 1 mg/kg BID until INR >2  - Possible concomitant drug-drug interactions include: aspirin, acetaminophen, enoxaparin     Pharmacy will continue to follow.  Moy LongD, BCPS  [id number]  7/24/2024 11:16 AM

## 2024-07-24 NOTE — PROGRESS NOTES
Occupational Therapy/Physical Therapy  Marvin Kaur    OT/PT tx attempted x2. Pt declining on both attempts secondary to \"not feeling like it\" on first attempt, and lunch being present on second attempt. Offered to assist pt to recliner for lunch during second attempt but pt declined, stating he would rather eat lunch in bed. Will follow up tomorrow as time/schedule allows.    Thank you,  Deon Vance, OT  Sangita Mckeon PT, DPT, [id number]

## 2024-07-25 LAB
DEPRECATED RDW RBC AUTO: 16.4 % (ref 12.4–15.4)
GLUCOSE BLD-MCNC: 166 MG/DL (ref 70–99)
HCT VFR BLD AUTO: 21 % (ref 40.5–52.5)
HCT VFR BLD AUTO: 22.8 % (ref 40.5–52.5)
HGB BLD-MCNC: 7.2 G/DL (ref 13.5–17.5)
HGB BLD-MCNC: 8 G/DL (ref 13.5–17.5)
INR PPP: 2.23 (ref 0.85–1.15)
MCH RBC QN AUTO: 33.6 PG (ref 26–34)
MCHC RBC AUTO-ENTMCNC: 34.3 G/DL (ref 31–36)
MCV RBC AUTO: 98.1 FL (ref 80–100)
PERFORMED ON: ABNORMAL
PLATELET # BLD AUTO: 324 K/UL (ref 135–450)
PMV BLD AUTO: 5.6 FL (ref 5–10.5)
PROTHROMBIN TIME: 24.7 SEC (ref 11.9–14.9)
RBC # BLD AUTO: 2.14 M/UL (ref 4.2–5.9)
WBC # BLD AUTO: 7.1 K/UL (ref 4–11)

## 2024-07-25 PROCEDURE — 6370000000 HC RX 637 (ALT 250 FOR IP): Performed by: STUDENT IN AN ORGANIZED HEALTH CARE EDUCATION/TRAINING PROGRAM

## 2024-07-25 PROCEDURE — 6370000000 HC RX 637 (ALT 250 FOR IP): Performed by: INTERNAL MEDICINE

## 2024-07-25 PROCEDURE — 6370000000 HC RX 637 (ALT 250 FOR IP): Performed by: NURSE PRACTITIONER

## 2024-07-25 PROCEDURE — 6370000000 HC RX 637 (ALT 250 FOR IP): Performed by: FAMILY MEDICINE

## 2024-07-25 PROCEDURE — 85610 PROTHROMBIN TIME: CPT

## 2024-07-25 PROCEDURE — 85027 COMPLETE CBC AUTOMATED: CPT

## 2024-07-25 PROCEDURE — 2140000000 HC CCU INTERMEDIATE R&B

## 2024-07-25 PROCEDURE — 97530 THERAPEUTIC ACTIVITIES: CPT

## 2024-07-25 PROCEDURE — 85014 HEMATOCRIT: CPT

## 2024-07-25 PROCEDURE — 2580000003 HC RX 258: Performed by: INTERNAL MEDICINE

## 2024-07-25 PROCEDURE — 97535 SELF CARE MNGMENT TRAINING: CPT

## 2024-07-25 PROCEDURE — 85018 HEMOGLOBIN: CPT

## 2024-07-25 RX ORDER — WARFARIN SODIUM 5 MG/1
TABLET ORAL
Qty: 30 TABLET | Refills: 3 | Status: SHIPPED | OUTPATIENT
Start: 2024-07-25 | End: 2024-07-29 | Stop reason: SDUPTHER

## 2024-07-25 RX ORDER — OXYCODONE HYDROCHLORIDE 5 MG/1
5 TABLET ORAL EVERY 6 HOURS PRN
Qty: 20 TABLET | Refills: 0 | Status: SHIPPED | OUTPATIENT
Start: 2024-07-25 | End: 2024-07-26

## 2024-07-25 RX ORDER — NIFEDIPINE 30 MG/1
30 TABLET, EXTENDED RELEASE ORAL DAILY
Qty: 30 TABLET | Refills: 5 | Status: SHIPPED | OUTPATIENT
Start: 2024-08-01

## 2024-07-25 RX ORDER — NEBIVOLOL 5 MG/1
5 TABLET ORAL DAILY
Qty: 30 TABLET | Refills: 5 | Status: SHIPPED | OUTPATIENT
Start: 2024-08-01 | End: 2024-08-01

## 2024-07-25 RX ORDER — DOCUSATE SODIUM 100 MG/1
100 CAPSULE, LIQUID FILLED ORAL 2 TIMES DAILY
Status: DISCONTINUED | OUTPATIENT
Start: 2024-07-25 | End: 2024-07-26 | Stop reason: HOSPADM

## 2024-07-25 RX ORDER — MIDODRINE HYDROCHLORIDE 10 MG/1
10 TABLET ORAL 3 TIMES DAILY PRN
Qty: 90 TABLET | Refills: 3 | Status: SHIPPED | OUTPATIENT
Start: 2024-07-25

## 2024-07-25 RX ORDER — CANDESARTAN CILEXETIL AND HYDROCHLOROTHIAZIDE 32; 12.5 MG/1; MG/1
1 TABLET ORAL DAILY
Qty: 90 TABLET | Refills: 3 | Status: SHIPPED | OUTPATIENT
Start: 2024-08-01 | End: 2025-04-28

## 2024-07-25 RX ORDER — FUROSEMIDE 40 MG/1
40 TABLET ORAL DAILY
Qty: 60 TABLET | Refills: 3 | Status: SHIPPED | OUTPATIENT
Start: 2024-07-26

## 2024-07-25 RX ADMIN — ROSUVASTATIN 20 MG: 20 TABLET, FILM COATED ORAL at 20:25

## 2024-07-25 RX ADMIN — FUROSEMIDE 40 MG: 40 TABLET ORAL at 07:29

## 2024-07-25 RX ADMIN — ASPIRIN 81 MG 81 MG: 81 TABLET ORAL at 07:28

## 2024-07-25 RX ADMIN — ACETAMINOPHEN 650 MG: 325 TABLET ORAL at 20:25

## 2024-07-25 RX ADMIN — DOCUSATE SODIUM 100 MG: 100 CAPSULE, LIQUID FILLED ORAL at 13:00

## 2024-07-25 RX ADMIN — Medication 10 ML: at 20:26

## 2024-07-25 RX ADMIN — DOCUSATE SODIUM 100 MG: 100 CAPSULE, LIQUID FILLED ORAL at 20:25

## 2024-07-25 RX ADMIN — WARFARIN SODIUM 5 MG: 5 TABLET ORAL at 17:52

## 2024-07-25 RX ADMIN — PANTOPRAZOLE SODIUM 40 MG: 40 TABLET, DELAYED RELEASE ORAL at 07:28

## 2024-07-25 RX ADMIN — Medication 10 ML: at 07:31

## 2024-07-25 RX ADMIN — OXYCODONE HYDROCHLORIDE 5 MG: 5 TABLET ORAL at 17:52

## 2024-07-25 ASSESSMENT — PAIN SCALES - GENERAL
PAINLEVEL_OUTOF10: 0
PAINLEVEL_OUTOF10: 5
PAINLEVEL_OUTOF10: 0
PAINLEVEL_OUTOF10: 0
PAINLEVEL_OUTOF10: 1
PAINLEVEL_OUTOF10: 4
PAINLEVEL_OUTOF10: 0
PAINLEVEL_OUTOF10: 2

## 2024-07-25 ASSESSMENT — PAIN DESCRIPTION - LOCATION: LOCATION: LEG

## 2024-07-25 ASSESSMENT — PAIN DESCRIPTION - ORIENTATION: ORIENTATION: LEFT

## 2024-07-25 ASSESSMENT — PAIN DESCRIPTION - DESCRIPTORS: DESCRIPTORS: ACHING

## 2024-07-25 NOTE — PLAN OF CARE
Problem: Safety - Adult  Goal: Free from fall injury  Outcome: Progressing  Flowsheets (Taken 7/24/2024 2035)  Free From Fall Injury:   Based on caregiver fall risk screen, instruct family/caregiver to ask for assistance with transferring infant if caregiver noted to have fall risk factors   Instruct family/caregiver on patient safety     Problem: Skin/Tissue Integrity  Goal: Absence of new skin breakdown  Description: 1.  Monitor for areas of redness and/or skin breakdown  2.  Assess vascular access sites hourly  3.  Every 4-6 hours minimum:  Change oxygen saturation probe site  4.  Every 4-6 hours:  If on nasal continuous positive airway pressure, respiratory therapy assess nares and determine need for appliance change or resting period.  Outcome: Progressing     Problem: Chronic Conditions and Co-morbidities  Goal: Patient's chronic conditions and co-morbidity symptoms are monitored and maintained or improved  Outcome: Progressing  Flowsheets (Taken 7/24/2024 2035)  Care Plan - Patient's Chronic Conditions and Co-Morbidity Symptoms are Monitored and Maintained or Improved:   Monitor and assess patient's chronic conditions and comorbid symptoms for stability, deterioration, or improvement   Collaborate with multidisciplinary team to address chronic and comorbid conditions and prevent exacerbation or deterioration

## 2024-07-25 NOTE — DISCHARGE INSTR - COC
Continuity of Care Form    Patient Name: Marvin Kaur   :  1940  MRN:  [id number]    Admit date:  2024  Discharge date:  24    Code Status Order: Full Code   Advance Directives:     Admitting Physician:  Patti Juarez MD  PCP: Heath Hua MD    Discharging Nurse: Eliazar May  Discharging Hospital Unit/Room#: CVU-2901/2901-01  Discharging Unit Phone Number: [phone]    Emergency Contact:   Extended Emergency Contact Information  Primary Emergency Contact: VincentReshma  Address: [address] [place] [address]            [place] [address] [facility]  Home Phone: [phone]  Relation: Spouse  Secondary Emergency Contact: Kaylee Monterroso  Home Phone: [phone]  Mobile Phone: [phone]  Relation: Child    Past Surgical History:  Past Surgical History:   Procedure Laterality Date    COLONOSCOPY      FOOT SURGERY      HAND SURGERY      INVASIVE VASCULAR N/A 7/15/2024    Thrombectomy peripheral artery performed by Talat Mills MD at [facility] CARDIAC CATH LAB    JOINT REPLACEMENT Left 2014    TOTAL KNEE REPLACEMENT    KNEE ARTHROPLASTY Right 2015    OTHER SURGICAL HISTORY  See note 14    Was unable to intubate with a peacock/Used a glidescope per Dr Weston dated 14    OTHER SURGICAL HISTORY Right 2015    REPAIR OF RUPTURED PATELLAR TENDON RIGHT KNEE,USING TIBIAL       Immunization History:   Immunization History   Administered Date(s) Administered    COVID-19, PFIZER Bivalent, DO NOT Dilute, (age 12y+), IM, 30 mcg/0.3 mL 10/26/2022    COVID-19, PFIZER PURPLE top, DILUTE for use, (age 12 y+), 30mcg/0.3mL 2021, 2021, 2021, 10/26/2022    Influenza 2010, 2011    Influenza Virus Vaccine 2015, 2015    Influenza, FLUAD, (age 65 y+), Adjuvanted, 0.5mL 2021, 10/26/2022, 2023    Influenza, FLUARIX, FLULAVAL, FLUZONE (age 6 mo+) AND AFLURIA, (age 3 y+), PF, 0.5mL 2020    Influenza, FLUZONE (age 65 y+), High Dose, 0.7mL  108.8 kg (239 lb 13.8 oz)     Mental Status:  oriented, alert, and coherent    IV Access:  - None    Nursing Mobility/ADLs:  Walking   Assisted  Transfer  Assisted  Bathing  Assisted  Dressing  Assisted  Toileting  Independent  Feeding  Independent  Med Admin  Independent  Med Delivery   whole    Wound Care Documentation and Therapy:  Incision 08/29/14 Knee Left (Active)   Number of days: 3618       Incision 01/21/15 Knee Right (Active)   Number of days: 3473       Incision 03/06/15 Knee Right (Active)   Number of days: 3428       Incision 07/15/24 Femoral Proximal;Right;Anterior (Active)   Dressing Status Dry;Intact 07/25/24 0800   Incision Cleansed Soap and water 07/19/24 2200   Dressing/Treatment Open to air 07/25/24 0800   Closure Open to air 07/25/24 0800   Margins Approximated 07/25/24 0800   Incision Assessment Dry 07/25/24 0800   Drainage Amount None (dry) 07/25/24 0800   Odor None 07/24/24 0000   Suzanne-incision Assessment Dry/flaky;Intact 07/24/24 0000   Number of days: 10        Elimination:  Continence:   Bowel: Yes  Bladder: Yes  Urinary Catheter: None   Colostomy/Ileostomy/Ileal Conduit: No       Date of Last BM: 7/25/24    Intake/Output Summary (Last 24 hours) at 7/25/2024 1639  Last data filed at 7/25/2024 0513  Gross per 24 hour   Intake --   Output 950 ml   Net -950 ml     I/O last 3 completed shifts:  In: 240 [P.O.:240]  Out: 2550 [Urine:2550]    Safety Concerns:     None    Impairments/Disabilities:      None    Nutrition Therapy:  Current Nutrition Therapy:   - Oral Diet:  General, Carb Control 4 carbs/meal (1800kcals/day), and Low Fat    Routes of Feeding: Oral  Liquids: No Restrictions  Daily Fluid Restriction: yes - amount 1500  Last Modified Barium Swallow with Video (Video Swallowing Test): not done    Treatments at the Time of Hospital Discharge:   Respiratory Treatments: NA  Oxygen Therapy:  is not on home oxygen therapy.  Ventilator:    - No ventilator support    Rehab Therapies: Physical

## 2024-07-25 NOTE — PROGRESS NOTES
Pharmacy to Dose Warfarin    Pharmacy consulted to dose warfarin for PE.    INR Goal: 2-3    INR today: 2.23    Assessment/Plan:  - INR now therapeutic  - continue with 5 mg daily  - can stop Lovenox now INR >2  - Possible concomitant drug-drug interactions include: aspirin, acetaminophen, enoxaparin     Pharmacy will continue to follow.  Uzma Manuel, PharmD, BCPS  [id number]  7/25/2024 7:50 AM

## 2024-07-25 NOTE — PROGRESS NOTES
Admit Date: 7/14/2024  Diet: ADULT DIET; Regular; Low Fat/Low Chol/High Fiber/2 gm Na  ADULT ORAL NUTRITION SUPPLEMENT; Breakfast; Standard High Calorie/High Protein Oral Supplement    CC: PE    Interval history:   Overnight, there were no acute events. Patient's vitals remained stable    Patient was seen this morning in bed. Plan for the day was discussed. All questions were answered. Patient denies fevers, chills, nausea, vomiting, chest pain, shortness of breath, diarrhea, constipation, dysuria, urinary frequency or urgency.     Plan:     -Patient's hemoglobin is stable  -Patient is currently on warfarin and is therapeutic  -Patient is discharged, pre-CERT to ARU pending    Assessment:   Marvin Kaur is a 84 y.o. male with PMH of left leg DVT ~20 years ago, HTN, Pulmonary HTN, LALITA on CPAP  who was admitted with Massive PE    Massive bilateral pulmonary emboli with cor pulmonale:  Acute hypoxic respiratory failure:  Syncope and asystole cardiac arrest:  Venous Doppler negative for DVT.  TTE 7/15/2024: LVEF 60 to 65%. Dilated RV with akinetic wall. (Todd's sign +)  Cardiology consult appreciated.  s/p mechanical thrombectomy 7/15/2024.  Coumadin/Lovenox held for 48 hours due to left thigh hematoma with ABLA.  Therapeutic Lovenox resumed 7/21/2024.   Unable to afford DOAC, Resumed Coumadin. INR subtherapeutic.    Weaned off supplemental oxygen.  Heme-onc consult appreciated: No indication for hypercoagulable workup.   CT chest abdomen and pelvis with no evidence of metastatic disease.  Outpatient follow-up in 2 to 3 weeks after discharge.  Repeat 2D echo in 1 month's time.      Left thigh hematoma due to fall with acute blood loss anemia:   Hgb dropped from 12.7-7.1.  Transfused 2 units PRBC this admission.  CT abdomen and pelvis negative for retroperitoneal bleed.  CT left femur 7/19/2024: Lobulated hyperattenuating soft tissue within the deep rectus femorals musculature adjacent to the femoral

## 2024-07-25 NOTE — PROGRESS NOTES
[facility] - Inpatient Rehabilitation Department   Phone: [phone]    Occupational Therapy    [] Initial Evaluation            [x] Daily Treatment Note         [] Discharge Summary      Patient: Marvin Kaur   : 1940   MRN: [id number]   Date of Service:  2024    Admitting Diagnosis:  Acute saddle pulmonary embolism with acute cor pulmonale (HCC)  Current Admission Summary: - Pt had onset of chest pain/pressure ,911 called. No cardiac hx except hypertension. Had syncopal episode while walking to the ambulance. CPR was given for approx 1 minute., CT showed large thrombus in the right an dleft main pulmonary artery, upper and lower lobe of the lungs, Heparin gtts started, NPO, maybe for thrombectomy today.   7/15 Aspiration thrombectomy ([place]). Bleeding R groin. Manual pressure, femstop x2.   Hypotensive. Levo started. RIJ TLC insertion. CT abd done (no RP bleed), 2 PRBC ordered, 1 given. 7/15 PM: Pt bladder scanned, 7ml, Labs sent, PRBC given. Levo able to be titrated off by AM, Pt voided x2. H&H 8.8/26.1. No heparin infusing at this time. BLE dopplers - neg DVT   : Up to chair. Heparin gtt started. AntiXa not therapeutic x3 by AM. Pt on room air, BP stable. Able to ambulate to bathroom with walker. Pt states he does not take his lasix at home \"because it makes him pee too much\". Edema still noted in BLE. Weight is up again today. Lasix 20mg BID IVP ordered.   : H&H trending down, 7.5/21.3. AntiXa and aPTT elevated still: Heparin stopped, ok with Pulm. I UPRBC given, H&H now 7.7.7. HemOnc consult, 5mg Coumadin given, goal INR 2-3.   7: H&H 7.     Past Medical History:  has a past medical history of AR (allergic rhinitis), Dermatitis, Diabetes, Diabetes (HCC), DJD (degenerative joint disease), Dyslipidemia, ED (erectile dysfunction), GERD (gastroesophageal reflux disease), HTN (hypertension), Hx of blood clots, Hyperlipidemia, Sleep apnea, and Vitamin D  assist with was donning compression socks. Pt is currently requiring up to min A for ADL and CGA for mobility/transfers. Pt is unsafe to return home and would benefit from continued OT to strengthen endurance and increase independence in functional mobility/ADLS.  Safety Interventions: patient left in chair, call light within reach, gait belt, patient at risk for falls, and nurse notified    Plan  Frequency: 3-5 x/per week  Current Treatment Recommendations: strengthening, ROM, balance training, functional mobility training, transfer training, gait training, stair training, endurance training, patient/caregiver education, ADL/self-care training, IADL training, home management training, and home exercise program    Goals  Patient Goals: to go home   Short Term Goals:  Time Frame: d/c  Patient will complete upper body ADL at supervision   Patient will complete lower body ADL at stand by assistance   Patient will complete toileting at supervision   Patient will complete functional transfers at supervision   Patient will complete functional mobility at supervision   Patient will increase [facility] ADL score = to or > than 21/24    Above goals reviewed on 7/25/2024.  All goals are ongoing at this time unless indicated above.       Therapy Session Time     Individual Group Co-treatment   Time In 1110      Time Out 1150      Minutes 40        Timed Code Treatment Minutes:  40    Total Treatment Minutes: 40    Electronically Signed By: TARIK Catherine, OTR/L, CNS ([id number])

## 2024-07-25 NOTE — PROGRESS NOTES
NEURO: A&O x4     CARDIAC: NSR     RESP: 97% on RA     GI: Active BS     SKIN: scattered bruising, 3+ pitting edema LLE, 2+ pitting edema RLE     : Good UO     LINES: PIV RFA     GTTS: NA     Pt showered during the day.  Pt doing well overall, awaiting acceptance at ARU or SNF. Monitoring H&H closely. 5mg Coumadin given this evening, INR 1.7 this AM.         See flowsheets for details, VS, MAR for meds and titration.    Electronically signed by Jeannie Licea RN on 7/24/2024 at 8:36 PM

## 2024-07-26 VITALS
HEIGHT: 72 IN | HEART RATE: 93 BPM | SYSTOLIC BLOOD PRESSURE: 123 MMHG | OXYGEN SATURATION: 96 % | WEIGHT: 238.1 LBS | BODY MASS INDEX: 32.25 KG/M2 | TEMPERATURE: 97.9 F | RESPIRATION RATE: 16 BRPM | DIASTOLIC BLOOD PRESSURE: 67 MMHG

## 2024-07-26 LAB
ALBUMIN SERPL-MCNC: 2.8 G/DL (ref 3.4–5)
ANION GAP SERPL CALCULATED.3IONS-SCNC: 9 MMOL/L (ref 3–16)
BASOPHILS # BLD: 0.1 K/UL (ref 0–0.2)
BASOPHILS NFR BLD: 0.8 %
BUN SERPL-MCNC: 18 MG/DL (ref 7–20)
CALCIUM SERPL-MCNC: 8.4 MG/DL (ref 8.3–10.6)
CHLORIDE SERPL-SCNC: 96 MMOL/L (ref 99–110)
CO2 SERPL-SCNC: 26 MMOL/L (ref 21–32)
CREAT SERPL-MCNC: 1 MG/DL (ref 0.8–1.3)
DEPRECATED RDW RBC AUTO: 16.5 % (ref 12.4–15.4)
EOSINOPHIL # BLD: 0.2 K/UL (ref 0–0.6)
EOSINOPHIL NFR BLD: 2.7 %
GFR SERPLBLD CREATININE-BSD FMLA CKD-EPI: 73 ML/MIN/{1.73_M2}
GLUCOSE BLD-MCNC: 114 MG/DL (ref 70–99)
GLUCOSE BLD-MCNC: 120 MG/DL (ref 70–99)
GLUCOSE SERPL-MCNC: 112 MG/DL (ref 70–99)
HCT VFR BLD AUTO: 20.5 % (ref 40.5–52.5)
HCT VFR BLD AUTO: 21.4 % (ref 40.5–52.5)
HCT VFR BLD AUTO: 22.5 % (ref 40.5–52.5)
HGB BLD-MCNC: 7.3 G/DL (ref 13.5–17.5)
HGB BLD-MCNC: 7.3 G/DL (ref 13.5–17.5)
HGB BLD-MCNC: 7.7 G/DL (ref 13.5–17.5)
INR PPP: 2.14 (ref 0.85–1.15)
IRON SATN MFR SERPL: 36 % (ref 20–50)
IRON SERPL-MCNC: 79 UG/DL (ref 59–158)
LYMPHOCYTES # BLD: 1.7 K/UL (ref 1–5.1)
LYMPHOCYTES NFR BLD: 24 %
MAGNESIUM SERPL-MCNC: 1.7 MG/DL (ref 1.8–2.4)
MCH RBC QN AUTO: 34.5 PG (ref 26–34)
MCHC RBC AUTO-ENTMCNC: 35.4 G/DL (ref 31–36)
MCV RBC AUTO: 97.5 FL (ref 80–100)
MONOCYTES # BLD: 1 K/UL (ref 0–1.3)
MONOCYTES NFR BLD: 14.5 %
NEUTROPHILS # BLD: 4.2 K/UL (ref 1.7–7.7)
NEUTROPHILS NFR BLD: 58 %
PERFORMED ON: ABNORMAL
PERFORMED ON: ABNORMAL
PHOSPHATE SERPL-MCNC: 2.8 MG/DL (ref 2.5–4.9)
PLATELET # BLD AUTO: 354 K/UL (ref 135–450)
PMV BLD AUTO: 5.9 FL (ref 5–10.5)
POTASSIUM SERPL-SCNC: 4 MMOL/L (ref 3.5–5.1)
PROTHROMBIN TIME: 24 SEC (ref 11.9–14.9)
RBC # BLD AUTO: 2.11 M/UL (ref 4.2–5.9)
SODIUM SERPL-SCNC: 131 MMOL/L (ref 136–145)
TIBC SERPL-MCNC: 219 UG/DL (ref 260–445)
WBC # BLD AUTO: 7.2 K/UL (ref 4–11)

## 2024-07-26 PROCEDURE — 6370000000 HC RX 637 (ALT 250 FOR IP): Performed by: NURSE PRACTITIONER

## 2024-07-26 PROCEDURE — 97535 SELF CARE MNGMENT TRAINING: CPT

## 2024-07-26 PROCEDURE — 83540 ASSAY OF IRON: CPT

## 2024-07-26 PROCEDURE — 80069 RENAL FUNCTION PANEL: CPT

## 2024-07-26 PROCEDURE — 85610 PROTHROMBIN TIME: CPT

## 2024-07-26 PROCEDURE — 6370000000 HC RX 637 (ALT 250 FOR IP): Performed by: FAMILY MEDICINE

## 2024-07-26 PROCEDURE — 85018 HEMOGLOBIN: CPT

## 2024-07-26 PROCEDURE — 6370000000 HC RX 637 (ALT 250 FOR IP): Performed by: STUDENT IN AN ORGANIZED HEALTH CARE EDUCATION/TRAINING PROGRAM

## 2024-07-26 PROCEDURE — 83735 ASSAY OF MAGNESIUM: CPT

## 2024-07-26 PROCEDURE — 85025 COMPLETE CBC W/AUTO DIFF WBC: CPT

## 2024-07-26 PROCEDURE — 85014 HEMATOCRIT: CPT

## 2024-07-26 PROCEDURE — 6370000000 HC RX 637 (ALT 250 FOR IP): Performed by: INTERNAL MEDICINE

## 2024-07-26 PROCEDURE — 83550 IRON BINDING TEST: CPT

## 2024-07-26 PROCEDURE — 2580000003 HC RX 258: Performed by: INTERNAL MEDICINE

## 2024-07-26 PROCEDURE — 97530 THERAPEUTIC ACTIVITIES: CPT

## 2024-07-26 PROCEDURE — 97116 GAIT TRAINING THERAPY: CPT

## 2024-07-26 RX ORDER — OXYCODONE HYDROCHLORIDE 5 MG/1
5 TABLET ORAL EVERY 6 HOURS PRN
Qty: 20 TABLET | Refills: 0 | Status: SHIPPED | OUTPATIENT
Start: 2024-07-26 | End: 2024-07-31

## 2024-07-26 RX ORDER — OXYCODONE HYDROCHLORIDE 5 MG/1
5 TABLET ORAL EVERY 6 HOURS PRN
Qty: 20 TABLET | Refills: 0 | Status: SHIPPED | OUTPATIENT
Start: 2024-07-26 | End: 2024-07-26

## 2024-07-26 RX ADMIN — ACETAMINOPHEN 650 MG: 325 TABLET ORAL at 09:01

## 2024-07-26 RX ADMIN — Medication 10 ML: at 09:04

## 2024-07-26 RX ADMIN — WARFARIN SODIUM 5 MG: 5 TABLET ORAL at 17:16

## 2024-07-26 RX ADMIN — PANTOPRAZOLE SODIUM 40 MG: 40 TABLET, DELAYED RELEASE ORAL at 09:02

## 2024-07-26 RX ADMIN — ASPIRIN 81 MG 81 MG: 81 TABLET ORAL at 09:02

## 2024-07-26 RX ADMIN — FUROSEMIDE 40 MG: 40 TABLET ORAL at 09:02

## 2024-07-26 ASSESSMENT — PAIN SCALES - GENERAL
PAINLEVEL_OUTOF10: 0
PAINLEVEL_OUTOF10: 0

## 2024-07-26 NOTE — PROGRESS NOTES
NEURO: A&O x4     CARDIAC: NSR     RESP: 97% on RA     GI: Active BS     SKIN: scattered bruising, 3+ pitting edema LLE, 2+ pitting edema RLE     : Good UO     LINES: PIV RFA     GTTS: NA     Pt doing well overall, awaiting acceptance at ARU or SNF. Monitoring H&H closely. 5mg Coumadin given this evening, INR 2.23 this AM.         See flowsheets for details, VS, MAR for meds and titration.     Electronically signed by Jeannie Licea RN on 7/24/2024 at 8:36 PM

## 2024-07-26 NOTE — PROGRESS NOTES
[facility] - Inpatient Rehabilitation Department   Phone: [phone]    Physical Therapy    [] Initial Evaluation            [x] Daily Treatment Note         [] Discharge Summary      Patient: Marvin Kaur   : 1940   MRN: [id number]   Date of Service:  2024  Admitting Diagnosis: Acute saddle pulmonary embolism with acute cor pulmonale (HCC)  Current Admission Summary: Pt admitted with syncope and cardiac arrest, fall at home, SOB, acute chest pain, acute respiratory failure, acute saddle PE; thrombectomy on 7/15/24, hypotension and anemia post op, received 2u PRBC; reports of L hip/knee pain/edema; xrays negative, ortho consult recommends conservative treatment, WBAT and gentle ROM.  Past Medical History:  has a past medical history of AR (allergic rhinitis), Dermatitis, Diabetes, Diabetes (HCC), DJD (degenerative joint disease), Dyslipidemia, ED (erectile dysfunction), GERD (gastroesophageal reflux disease), HTN (hypertension), Hx of blood clots, Hyperlipidemia, Sleep apnea, and Vitamin D deficiency.  Past Surgical History:  has a past surgical history that includes Hand surgery; Foot surgery; Colonoscopy; joint replacement (Left, 2014); Knee Arthroplasty (Right, 2015); other surgical history (See note 14); other surgical history (Right, 2015); and invasive vascular (N/A, 7/15/2024).    Discharge Recommendations: Marvin Kaur scored a 17/24 on the AM-PAC short mobility form. Current research shows that an AM-PAC score of 17 or less is typically not associated with a discharge to the patient's home setting. Based on the patient's AM-PAC score and their current functional mobility deficits, it is recommended that the patient have 3-5 sessions per week of Physical Therapy at d/c to increase the patient's independence.  Please see assessment section for further patient specific details.    If patient discharges prior to next session this note will serve as a

## 2024-07-26 NOTE — PROGRESS NOTES
Pharmacy to Dose Warfarin    Pharmacy consulted to dose warfarin for PE.    INR Goal: 2-3    INR today: 2.14    Assessment/Plan:  - INR therapeutic  - continue with 5 mg daily  - Possible concomitant drug-drug interactions include: aspirin, acetaminophen, enoxaparin     Pharmacy will continue to follow.  zUma Manuel, PharmD, [facility]  [phone]  7/26/2024 8:04 AM

## 2024-07-26 NOTE — PROGRESS NOTES
Discharge instructions reviewed with patient and family member.  Patient and family verbalized understanding.  All home medications have been reviewed, questions answered and patient voiced understanding. All medication side effects reviewed and patient and family verbalized understanding. Follow up appointment(s) reviewed with patient and all attempts made to schedule within 7-10 days of discharge.  Patient given prescriptions, discharge instructions, and appointment times.  Patient left for [place]. Transport picked patient up.

## 2024-07-26 NOTE — PLAN OF CARE
Patient met all requirements for discharge to [facility].   Problem: Safety - Adult  Goal: Free from fall injury  Outcome: Adequate for Discharge     Problem: Pain  Goal: Verbalizes/displays adequate comfort level or baseline comfort level  Outcome: Adequate for Discharge     Problem: Discharge Planning  Goal: Discharge to home or other facility with appropriate resources  Outcome: Adequate for Discharge     Problem: Skin/Tissue Integrity  Goal: Absence of new skin breakdown  Description: 1.  Monitor for areas of redness and/or skin breakdown  2.  Assess vascular access sites hourly  3.  Every 4-6 hours minimum:  Change oxygen saturation probe site  4.  Every 4-6 hours:  If on nasal continuous positive airway pressure, respiratory therapy assess nares and determine need for appliance change or resting period.  Outcome: Adequate for Discharge     Problem: Chronic Conditions and Co-morbidities  Goal: Patient's chronic conditions and co-morbidity symptoms are monitored and maintained or improved  Outcome: Adequate for Discharge     Problem: Respiratory - Adult  Goal: Achieves optimal ventilation and oxygenation  Outcome: Adequate for Discharge     Problem: Cardiovascular - Adult  Goal: Maintains optimal cardiac output and hemodynamic stability  Outcome: Adequate for Discharge  Goal: Absence of cardiac dysrhythmias or at baseline  Outcome: Adequate for Discharge     Problem: Metabolic/Fluid and Electrolytes - Adult  Goal: Electrolytes maintained within normal limits  Outcome: Adequate for Discharge  Goal: Hemodynamic stability and optimal renal function maintained  Outcome: Adequate for Discharge     Problem: Hematologic - Adult  Goal: Maintains hematologic stability  Outcome: Adequate for Discharge     Problem: Nutrition Deficit:  Goal: Optimize nutritional status  Outcome: Adequate for Discharge

## 2024-07-26 NOTE — CARE COORDINATION
07/26/24 0832   IMM Letter   IMM Letter given to Patient/Family/Significant other/Guardian/POA/by: Christiana Linton RN CM   IMM Letter date given: 07/26/24   IMM Letter time given: 0820  (copy made for hard chart)       
ARU pre cert still pending.      Christiana Linton RN, BSN  [phone]   
CM messaged MD for sign narcotic script pt discharging with so CM can fax to facility. Message received back will have Dr Bronson sign. RN aware to fax this to facility once signed.    Case Management -  Discharge Note      Patient Name: Marvin Kaur                   YOB: 1940            Readmission Risk (Low < 19, Mod (19-27), High > 27): Readmission Risk Score: 15.3    Current PCP: Heath Hua MD    ([facility]) Important Message from Medicare:    Date: 7/26/2024    PT AM-PAC: 17 /24  OT AM-PAC: 17 /24    Patient/patient representative has been educated on the benefits of SNF  as well as the possible risks of declining recommended services. Patient/patient representative has acknowledged the information provided and decided on the following discharge plan. Patient/ patient representative has been provided freedom of choice regarding service provider, supported by basic dialogue that supports the patient's individualized plan of care/goals.    [facility]  [address] [place] [address]  Report: [phone]  Fax: [phone]       Financial    Payor: UHC MEDICARE / Plan: UHC MEDICARE COMPLETE / Product Type: *No Product type* /     Pharmacy:  Potential assistance Purchasing Medications: No  Meds-to-Beds request:        [id number] - [place], [place] - [address] [phone] - F [phone]  [address] [address]  Phone: [phone] Fax: [phone]    [facility] - [place], [place] - [address] - P [phone] - F [phone]  [address] [facility] [address]  [address]  [facility] [address]  Phone: [phone] Fax: [phone]    [facility] - [place], [place] - [address] -  [phone] - F [phone] [address] [facility] [address]  Phone: [phone] Fax: [phone]    [facility]) [place], [place] - [address] [place] [address] [phone] - C 
CM met with patient and encouraged him to provided snf choices in case he is denied ARU. Pt has had list for a couple of days. He states he will find out from his daughter which facilities she wants.     Christiana Linton RN, BSN  [phone]   
CM reviewed chart at this time for discharge planning.    Pt from home. Has insurance and PCP.    Per notes pt will have aspiration thrombectomy today.    CM will follow for discharge needs.    Christiana Linton RN, BSN  [phone]   
Chart reviewed for discharge planning    CM/SW has continued to follow patient progress to anticipate potential discharge needs. At this time, patient is not ready for discharge.       Inpatient day #- 3    Barrier(s) to discharge-patient- Bridging Heparin to Coumadin. Today Anti-Xa high. Continues on 1 L per [place].     Tentative discharge plan-  pending     Tentative discharge date- TBD    Case management will continue to follow progress and update discharge plan as needed.     Electronically signed by Ginette Riley on 7/17/2024 at 8:53 AM   
Chart reviewed for discharge planning.     Ortho surgery consult pending.    On heparin.    Will probably need therapy evaluations when stable.     Will continue to follow for discharge plan and needs.    Christiana Linton RN, BSN  [phone]   
Jose [facility]nebys authorization received via [place] Access Portal    Plan Auth ID:  not provided   [employer] Auth ID:  [id number]   Service:  [facility]  Approval Dates:  07/26/2024-07/30/2024   Next Review Date:   07/30/2024     CM notified Mercedes with [facility] [phone] that auth received and arranging transport for 5pm. Pt also updated. Submitted for transport per [facility], waiting for it to be claimed.    Christiana Linton RN, BSN  [phone]   
Mercedes tapia met with patient and he is agreeable to going to their [facility] facility.       [facility] pre-cert request submitted via [place] Access Portal.    Requested Facility:  [facility]   Anticipated Admit Date to SNF:  07/26/2024  [facility] #:   [id number]     Christiana Linton RN, BSN  [phone]   
Message received from José SUAREZ liadilma that P to P is requested but wanted to make sure what pt's plan is : going home vs rehab due to spouse starting chemo.    CM met with patient and he states he does feel he needs rehab. States spouse is starting chemo next week but wants to be able to be stronger. States they have a daughter in area that can assist.    CM provided another snf and ratings list with CM phone number for pt to make snf choices in case denied ARU. CM explained if ARU denied that would be the next level of care and if he decided to go home then will work on home care services. Pt verbalized understanding.    Therapy on unit and will see pt for updated therapy notes.    Per [facility] liaison P to P is at 1300.    Christiana Linton RN, BSN  [phone]     
Per IDR's ARU pre cert was started Friday and pending.    Christiana Linton RN, BSN  [phone]   
Per message from ARU liaison José peer to peer was denied due to pt not being medically stable d/t drop in H & H. Per José will probably re-submit tomorrow.    Pt updated on the above and verbalized understanding of making choices from SNF list provided. He wants his daughter to see the list also.      Christiana Linton RN, BSN  [phone]   
Per notes ARU pre cert pending.     Christiana Linton, RN, BSN  [phone]   
Prior Authorization submitted for ARU approval with a reference number: [id number]     ARU will continue to follow progress and update discharge plan as needed.    STEFAN RaeN, [phone]    
Pt was denied ARU.    Therapy informed CM that he would like to go to [facility] but was not on his list. CM will follow up with pt but CM did call and leave  for Melisa with [facility] [phone] to see if they accept patient's insurance.       CM also sent referral over [facility].    CM will follow up for other choices.    Christiana Linton RN, BSN  [phone]     Updated at 11:29 AM     CM met with patient and informed him referred to [facility] but not on his insurance approved list.     He also wants referred to [facility], [facility].    CM spoke to Mercedes with [facility] [phone] and she will review pt.    Referrals sent over [facility] to the above facilities.    Christiana Linton RN, BSN  [phone]   
[place], [place] - [address] - P [phone] - F [phone]  [address]  [facility] [address]  Phone: [phone] Fax: [phone]      Notes:    Factors facilitating achievement of predicted outcomes: Family support    Barriers to discharge: Pain and Decreased endurance    Additional Case Management Notes: CM met with Pt reports he worked with [facility] this morning, going from bed to chair.   Pt reports uses hurry cane at home, but independent otherwise, still drives.   Lives in apartment with his wife, who was recently dx with pancreatic cancer.   Pt reports he has a daughter and son n law, that helps if they need assistance.     CM educated the different levels of care.   CM provided Freedom of choice list for HHC and SNF.     PT/OT evaluation notes pending at this time.     CM team will follow pt for any discharge planning needs.     CM and Pt discussed COA, Pt is agreeable to referral. CM sent to [facility] via fax.       The Plan for Transition of Care is related to the following treatment goals of Syncope and collapse [R55]  Shortness of breath [R06.02]  Hypomagnesemia [E83.42]  Acute chest pain [R07.9]  Acute respiratory failure with hypoxia (HCC) [J96.01]  Acute saddle pulmonary embolism with acute cor pulmonale (HCC) [I26.02]  Acute pulmonary embolism with acute cor pulmonale, unspecified pulmonary embolism type (HCC) [I26.09]    IF APPLICABLE: The Patient and/or patient representative Marvin and his family were provided with a choice of provider and agrees with the discharge plan. Freedom of choice list with basic dialogue that supports the patient's individualized plan of care/goals and shares the quality data associated with the providers was provided to:     Patient Representative Name:       The Patient and/or Patient Representative Agree with the Discharge Plan?      Ginette Riley  Case Management Department  Ph: [phone] Fax:

## 2024-07-26 NOTE — PROGRESS NOTES
ONCOLOGY HEMATOLOGY CARE PROGRESS NOTE      SUBJECTIVE:    Events noted.  Left leg is swollen.  Pain has decreased.  No external bleeding.      ROS:     14 point review of systems performed negative except for as documented above    OBJECTIVE        Physical    VITALS:  Patient Vitals for the past 24 hrs:   BP Temp Temp src Pulse Resp SpO2 Weight   07/26/24 0901 126/66 97.3 °F (36.3 °C) Temporal 90 16 96 % --   07/26/24 0900 -- -- -- 91 -- -- --   07/26/24 0429 119/68 97 °F (36.1 °C) Temporal 79 14 95 % 108 kg (238 lb 1.6 oz)   07/25/24 2226 (!) 96/57 97.2 °F (36.2 °C) Temporal 82 16 96 % --   07/25/24 1924 112/64 97.2 °F (36.2 °C) Temporal 90 18 95 % --   07/25/24 1531 119/71 97.2 °F (36.2 °C) Temporal 88 20 95 % --         24HR INTAKE/OUTPUT:    Intake/Output Summary (Last 24 hours) at 7/26/2024 1330  Last data filed at 7/26/2024 0800  Gross per 24 hour   Intake 480 ml   Output 1150 ml   Net -670 ml     Conscious alert oriented.    Appears comfortable.  No neck fullness.  Respiratory efforts are normal.  Abdomen is not distended.  LLE Edema ++  No focal deficits.      DATA:  CBC:    Recent Labs     07/26/24  0612 07/26/24  0430 07/25/24  1530 07/25/24  0515 07/23/24  1420 07/23/24  0546 07/22/24  1302 07/22/24  0545 07/19/24  0418 07/18/24  1031 07/15/24  0430 07/14/24  1321   WBC  --  7.2  --  7.1  --  8.0  --  8.2   < > 8.9   < > 10.7   NEUTROABS  --  4.2  --   --   --  4.7  --   --   --  5.1  --  5.6   LYMPHOPCT  --  24.0  --   --   --  20.9  --   --   --  29.2  --  34.1   RBC  --  2.11*  --  2.14*  --  2.07*  --  2.19*   < > 2.43*   < > 3.66*   HGB 7.3* 7.3* 8.0* 7.2*   < > 7.0*   < > 7.2*   < > 8.2*   < > 12.7*   HCT 21.4* 20.5* 22.8* 21.0*   < > 20.1*   < > 21.2*   < > 23.8*   < > 37.2*   MCV  --  97.5  --  98.1  --  97.1  --  96.8   < > 97.8   < > 101.6*   MCH  --  34.5*  --  33.6  --  34.1*  --  32.9   < > 33.9   < > 34.7*   MCHC  --  35.4  --  34.3  --  35.1  --  34.0  thigh  Reason for Exam: swollen left thigh    FINDINGS:  Bones: There is mild osteopenia.  No acute fracture or dislocation.  No  evidence of focal osseous lesion, cortical destruction or periostitis.  There  is a partially imaged left total knee arthroplasty noted.    Soft Tissue: There is nonspecific subcutaneous edema of the left thigh.  No  evidence of soft tissue gas.  The musculature demonstrates fatty atrophy.  Within the deep rectus femoris musculature adjacent to the femoral diaphysis  there is slight hyperattenuating lobulated soft tissue measuring 5.7 x 2.3 cm  in cross-sectional dimension and approximately 14.9 cm in SI dimension  suggesting an intramuscular hematoma.  Recommend correlation with area of  point tenderness.  MRI recommended without contrast may be helpful for  further evaluation.  There are also 2 adjacent 17 and 18 mm nodules posterior  to the distal right thigh that may represent lymph nodes, indeterminate.    Joint: As described above there is a right total knee arthroplasty creating  beam hardening artifact.  The right hip demonstrates normal alignment.  No  joint effusion.  Mild right hip osteoarthritis.    Limited images of the intrapelvic contents demonstrate no acute abnormality.  Impression: Lobulated hyperattenuating soft tissue within the deep rectus femorals  musculature adjacent to the femoral diaphysis measuring 5.7 x 2.3 x 14.9 cm  suggesting an intramuscular hematoma. Recommend correlation with area of  point tenderness. MRI with and without contrast may be helpful for further  evaluation if there is fluid soft tissue mass.    Nonspecific subcutaneous edema of the left thigh.  This may be related to  recent injury.  No evidence of soft tissue gas.    No acute osseous abnormality.    2 adjacent 17 and 18 mm nodules posterior to the distal right thigh that may  represent lymph nodes, indeterminate.  This could also be evaluated on MRI.      Problem List  Patient Active

## 2024-07-26 NOTE — PROGRESS NOTES
Latest Reference Range & Units 07/26/24 04:30 07/26/24 06:12   Hemoglobin Quant 13.5 - 17.5 g/dL 7.3 (L) 7.3 (L)   Hematocrit 40.5 - 52.5 % 20.5 (LL) 21.4 (L)   (LL): Data is critically low  (L): Data is abnormally low    H&H verified with second draw

## 2024-07-26 NOTE — PLAN OF CARE
Problem: Safety - Adult  Goal: Free from fall injury  Outcome: Progressing  Flowsheets (Taken 7/24/2024 2035)  Free From Fall Injury:   Based on caregiver fall risk screen, instruct family/caregiver to ask for assistance with transferring infant if caregiver noted to have fall risk factors   Instruct family/caregiver on patient safety     Problem: Pain  Goal: Verbalizes/displays adequate comfort level or baseline comfort level  Outcome: Progressing  Flowsheets (Taken 7/25/2024 2207)  Verbalizes/displays adequate comfort level or baseline comfort level:   Encourage patient to monitor pain and request assistance   Assess pain using appropriate pain scale     Problem: Chronic Conditions and Co-morbidities  Goal: Patient's chronic conditions and co-morbidity symptoms are monitored and maintained or improved  Outcome: Progressing  Flowsheets (Taken 7/24/2024 2035)  Care Plan - Patient's Chronic Conditions and Co-Morbidity Symptoms are Monitored and Maintained or Improved:   Monitor and assess patient's chronic conditions and comorbid symptoms for stability, deterioration, or improvement   Collaborate with multidisciplinary team to address chronic and comorbid conditions and prevent exacerbation or deterioration

## 2024-07-26 NOTE — PROGRESS NOTES
[facility] - Inpatient Rehabilitation Department   Phone: [phone]    Occupational Therapy    [] Initial Evaluation            [x] Daily Treatment Note         [] Discharge Summary      Patient: Marvin Kaur   : 1940   MRN: [id number]   Date of Service:  2024    Admitting Diagnosis:  Acute saddle pulmonary embolism with acute cor pulmonale (HCC)  Current Admission Summary: - Pt had onset of chest pain/pressure ,911 called. No cardiac hx except hypertension. Had syncopal episode while walking to the ambulance. CPR was given for approx 1 minute., CT showed large thrombus in the right an dleft main pulmonary artery, upper and lower lobe of the lungs, Heparin gtts started, NPO, maybe for thrombectomy today.   7/15 Aspiration thrombectomy ([place]). Bleeding R groin. Manual pressure, femstop x2.   Hypotensive. Levo started. RIJ TLC insertion. CT abd done (no RP bleed), 2 PRBC ordered, 1 given. 7/15 PM: Pt bladder scanned, 7ml, Labs sent, PRBC given. Levo able to be titrated off by AM, Pt voided x2. H&H 8.8/.1. No heparin infusing at this time. BLE dopplers - neg DVT   : Up to chair. Heparin gtt started. AntiXa not therapeutic x3 by AM. Pt on room air, BP stable. Able to ambulate to bathroom with walker. Pt states he does not take his lasix at home \"because it makes him pee too much\". Edema still noted in BLE. Weight is up again today. Lasix 20mg BID IVP ordered.   : H&H trending down, 7.5/21.3. AntiXa and aPTT elevated still: Heparin stopped, ok with Pulm. I UPRBC given, H&H now 7.7.7. HemOnc consult, 5mg Coumadin given, goal INR 2-3.   7: H&H 7.     Past Medical History:  has a past medical history of AR (allergic rhinitis), Dermatitis, Diabetes, Diabetes (HCC), DJD (degenerative joint disease), Dyslipidemia, ED (erectile dysfunction), GERD (gastroesophageal reflux disease), HTN (hypertension), Hx of blood clots, Hyperlipidemia, Sleep apnea, and Vitamin D  counter  Comments: no LOB. Pt completed STS from toilet x2 at min A to RW.   Functional Mobility  Functional Mobility Activity: to/from bathroom  Device Use: rolling walker  Required Assistance: contact guard assistance    Balance:  Static Sitting Balance: good: independent with functional balance in unsupported position  Dynamic Sitting Balance: fair (+): maintains balance at SBA/supervision without use of UE support  Static Standing Balance: fair (-): maintains balance at CGA with use of UE support  Dynamic Standing Balance: fair (-): maintains balance at CGA with use of UE support  Comments:    Other Therapeutic Interventions    Functional Outcomes  AM-PAC Inpatient Daily Activity Raw Score: 17          Cognition  WFL  Orientation:    alert and oriented x 4  Command Following:   WFL     Education  Barriers To Learning: none  Patient Education: patient educated on goals, OT role and benefits, plan of care, precautions, ADL adaptive strategies, proper use of assistive device/equipment, adaptive device training, pressure relief, transfer training, discharge recommendations  Learning Assessment:  patient verbalizes and demonstrates understanding    Assessment  Activity Tolerance: Tolerated fair, easily fatigued with activity  Impairments Requiring Therapeutic Intervention: decreased functional mobility, decreased ADL status, decreased ROM, decreased strength, decreased endurance, decreased balance, decreased IADL, increased pain, decreased posture  Prognosis: good  Clinical Assessment: Pt had a saddle embolism and thrombectomy with complications of hypotension and anemia.  Pt is feeling weaker and more tired than at his baseline and presented with above defecits. Before this admission, pt's PLOF was Mod I for mobility with intermittent use of hurrycane, but the only ADL pt needed assist with was donning compression socks. Pt is currently requiring up to min-mod A for ADL and min A-CGA for mobility/transfers. Pt is

## 2024-07-29 RX ORDER — WARFARIN SODIUM 5 MG/1
TABLET ORAL
Qty: 30 TABLET | Refills: 1 | Status: SHIPPED | OUTPATIENT
Start: 2024-07-29

## 2024-07-29 RX ORDER — WARFARIN SODIUM 5 MG/1
TABLET ORAL
Qty: 30 TABLET | Refills: 1 | Status: SHIPPED | OUTPATIENT
Start: 2024-07-29 | End: 2024-07-29 | Stop reason: SDUPTHER

## 2024-07-29 NOTE — TELEPHONE ENCOUNTER
Wife states pt did not get script for Warfain before leaving the hospital. Asking to please send script to local Pharmacy [id number] - [place], [place] - [address] [place] [phone] - F [phone]

## 2024-08-01 ENCOUNTER — OFFICE VISIT (OUTPATIENT)
Dept: CARDIOLOGY CLINIC | Age: 84
End: 2024-08-01
Payer: MEDICARE

## 2024-08-01 VITALS
HEIGHT: 72 IN | BODY MASS INDEX: 31 KG/M2 | WEIGHT: 228.9 LBS | OXYGEN SATURATION: 98 % | HEART RATE: 71 BPM | SYSTOLIC BLOOD PRESSURE: 107 MMHG | DIASTOLIC BLOOD PRESSURE: 71 MMHG

## 2024-08-01 DIAGNOSIS — Z09 HOSPITAL DISCHARGE FOLLOW-UP: ICD-10-CM

## 2024-08-01 DIAGNOSIS — I26.02 ACUTE SADDLE PULMONARY EMBOLISM WITH ACUTE COR PULMONALE (HCC): Primary | ICD-10-CM

## 2024-08-01 DIAGNOSIS — I10 HYPERTENSION, UNSPECIFIED TYPE: ICD-10-CM

## 2024-08-01 DIAGNOSIS — R60.0 LEG EDEMA: ICD-10-CM

## 2024-08-01 PROCEDURE — G8427 DOCREV CUR MEDS BY ELIG CLIN: HCPCS | Performed by: NURSE PRACTITIONER

## 2024-08-01 PROCEDURE — 1036F TOBACCO NON-USER: CPT | Performed by: NURSE PRACTITIONER

## 2024-08-01 PROCEDURE — G8417 CALC BMI ABV UP PARAM F/U: HCPCS | Performed by: NURSE PRACTITIONER

## 2024-08-01 PROCEDURE — 1111F DSCHRG MED/CURRENT MED MERGE: CPT | Performed by: NURSE PRACTITIONER

## 2024-08-01 PROCEDURE — 1123F ACP DISCUSS/DSCN MKR DOCD: CPT | Performed by: NURSE PRACTITIONER

## 2024-08-01 PROCEDURE — 99214 OFFICE O/P EST MOD 30 MIN: CPT | Performed by: NURSE PRACTITIONER

## 2024-08-01 RX ORDER — METOPROLOL SUCCINATE 50 MG/1
50 TABLET, EXTENDED RELEASE ORAL DAILY
COMMUNITY

## 2024-08-01 RX ORDER — ATORVASTATIN CALCIUM 40 MG/1
40 TABLET, FILM COATED ORAL DAILY
COMMUNITY

## 2024-08-01 RX ORDER — OXYCODONE HYDROCHLORIDE 5 MG/1
5 CAPSULE ORAL EVERY 4 HOURS PRN
COMMUNITY

## 2024-08-01 NOTE — PROGRESS NOTES
[facility]     Outpatient Follow Up Note    CHIEF COMPLAINT / HPI: Hospital Follow Up secondary to pulmonary embolism     Hospital record has been reviewed  Hospital Course progressed as follows per discharge summary:     Brief HPI: Marvin Kaur is a 84 y.o. male with PMH of left leg DVT ~20 years ago, HTN, Pulmonary HTN, LALITA on CPAP who was admitted with Massive PE. He underwent mechanical thrombectomy with clot retrieval and was anticoagulated with Warfarin. Prior to admission, he had a fall leading to a hematoma in his left thigh. He required multiple blood transfusions for this and his hemoglobin eventually stabilized prior to discharge.      Brief Problem Focused Hospital Course:      Massive bilateral pulmonary emboli with cor pulmonale:  Acute hypoxic respiratory failure:  Syncope and asystole cardiac arrest:  Venous Doppler negative for DVT.  TTE 7/15/2024: LVEF 60 to 65%. Dilated RV with akinetic wall. (Todd's sign +)  Cardiology consult appreciated.  s/p mechanical thrombectomy 7/15/2024.  Coumadin/Lovenox held for 48 hours due to left thigh hematoma with ABLA.  Therapeutic Lovenox resumed 7/21/2024.   Unable to afford DOAC, Resumed Coumadin. INR subtherapeutic.    Weaned off supplemental oxygen.  Heme-onc consult appreciated: No indication for hypercoagulable workup.   CT chest abdomen and pelvis with no evidence of metastatic disease.  Outpatient follow-up in 2 to 3 weeks after discharge.  Repeat 2D echo in 1 month's time.      Left thigh hematoma due to fall with acute blood loss anemia:   Hgb dropped from 12.7-7.1.  Transfused 2 units PRBC this admission.  CT abdomen and pelvis negative for retroperitoneal bleed.  CT left femur 7/19/2024: Lobulated hyperattenuating soft tissue within the deep rectus femorals musculature adjacent to the femoral diaphysis measuring 5.7 x 2.3 x 14.9 cm  suggesting an intramuscular hematoma.  Ortho consult appreciated: Recommend conservative

## 2024-08-01 NOTE — PATIENT INSTRUCTIONS
Increase lasix 40mg to twice daily for 5 days. Swelling/ symptoms can be reassessed during office visit with PCP on 8/9.    Weigh yourself daily in the Morning. Record weights and date in a diary. Call if you have a weight gain of 3lbs in a day and/or 5lbs in 1 week. Call if you start noticing increased shortness of breath and/or swelling. Limit salt intake to 2g (2,000mg) / day. Limit fluid intake to 64oz / day (this includes all liquids; coffee, water, soup, popsicle, ect.).         Plan for INR management through the [facility]. I will notify them you will be leaving rehab in a week.     Echo in 1 month     Follow up in 3 months

## 2024-08-09 ENCOUNTER — TELEPHONE (OUTPATIENT)
Dept: CARDIOLOGY CLINIC | Age: 84
End: 2024-08-09

## 2024-08-09 RX ORDER — WARFARIN SODIUM 5 MG/1
5 TABLET ORAL DAILY
Qty: 5 TABLET | Refills: 0 | Status: SHIPPED | OUTPATIENT
Start: 2024-08-09

## 2024-08-10 NOTE — TELEPHONE ENCOUNTER
Patient's wife called the on-call Cardiology answering service on Friday night, 8/9 at 10:36 PM regarding RX for warfarin.  Patient was discharged today from a rehab facility and they did not realize that he was not discharged with Warfarin RX (pulmonary embolism and thrombectomy recently performed).  Reported that he is taking 5 mg nightly.  They were unsure last INR result.  I informed them I would send RX for 5 mg for only 5 days without refills and to call the  Pharmacy on Monday (including Suyapa Rollins, Pharmacist, see TE 7/19).  Including my Nurse on this message for follow up.

## 2024-08-12 ENCOUNTER — TELEPHONE (OUTPATIENT)
Dept: PHARMACY | Age: 84
End: 2024-08-12

## 2024-08-12 RX ORDER — WARFARIN SODIUM 5 MG/1
TABLET ORAL
Qty: 90 TABLET | Refills: 1 | Status: SHIPPED | OUTPATIENT
Start: 2024-08-12

## 2024-08-12 NOTE — TELEPHONE ENCOUNTER
----- Message from Tara IZQUIERDO sent at 8/12/2024  7:31 AM EDT -----  Regarding: Needs warfarin refill  Contact: Patient's wife  Patient's wife LVM on Saturday, 8/10 stating he was discharged from [facility] to home and is out of warfarin.  Wife requested a call back.  [phone].

## 2024-08-12 NOTE — TELEPHONE ENCOUNTER
Received message from MD office over the weekend that 5 ds of warfarin 5 mg tablets was sent to pt pharmacy.     Returned call to pt wife. Verified that patient was able to p/u 5 ds of warfarin. He started on Sat so will have enough until Thurs which is date of initial appt in clinic. Will provide additional script for warfarin then.

## 2024-08-15 ENCOUNTER — ANTI-COAG VISIT (OUTPATIENT)
Dept: PHARMACY | Age: 84
End: 2024-08-15
Payer: MEDICARE

## 2024-08-15 DIAGNOSIS — I26.99 ACUTE MASSIVE PULMONARY EMBOLISM (HCC): Primary | ICD-10-CM

## 2024-08-15 LAB
F2 C.20210G>A GENO BLD/T: NEGATIVE
F5 P.R506Q BLD/T QL: NEGATIVE
INTERNATIONAL NORMALIZATION RATIO, POC: 1.5
PROTHROMBIN TIME, POC: 0
SPECIMEN SOURCE: NORMAL
SPECIMEN SOURCE: NORMAL

## 2024-08-15 PROCEDURE — 85610 PROTHROMBIN TIME: CPT

## 2024-08-15 PROCEDURE — 99213 OFFICE O/P EST LOW 20 MIN: CPT

## 2024-08-15 NOTE — TELEPHONE ENCOUNTER
Warfarin prescription phoned into [facility] to [address] [id number] under Talat Otoole  Warfarin 5 mg tabs  Take 5mg daily   90 days   0 refills    Lindsey Malone, PharmD, [facility]

## 2024-08-15 NOTE — PROGRESS NOTES
Mr. Marvin Kaur is a 84 y.o. y/o male with history of  PE with RV Strain  who presents today for anticoagulation monitoring and adjustment.    Pertinent PMH: Patient was put on warfarin for 3 mo dt PE. Will be reassessing in November when sees Dr. Mills. Was dcd to [facility] for 2 weeks and dosing there is unknown/not reported. Patient presented with son-in-law and brought some papers that stated 5mg tablets but no dosing and no INRs. Wife started chemo on Monday.     Patient Reported Findings:  Yes     No  [x]   []       Patient verifies current dosing regimen as listed- patient cannot   []   [x]       S/S bleeding/bruising/swelling/SOB- denies   []   [x]       Blood in urine or stool- denies   []   [x]       Procedures scheduled in the future at this time- none  []   [x]       Missed Dose  []   [x]       Extra Dose  []   [x]       Change in medications- will review med list and let us know   []   [x]       Change in health/diet/appetite- eats greens once/week (spinach and broccoli)   []   [x]       Change in alcohol use- nothing in past 6 weeks, may go back to drinking daily (scotch/bourbon, wine), doesn't smoke   []   [x]       Change in activity  []   [x]       Hospital admission  []   [x]       Emergency department visit  []   [x]       Other complaints    Clinical Outcomes:  Yes     No  []   [x]       Major bleeding event  []   [x]       Thromboembolic event    Duration of warfarin Therapy: 3 months   INR Range:  2.0-3.0    Educated patient of signs and symptoms of bleeding and clotting, when to seek emergent care, the need to maintain a consistent diet and notification of clinic for any new medications including over the counter medications or abnormal bleeding. Patient acknowledges working in consult agreement with pharmacist as referred by his/her physician.    Warfarin 5mg tablets, taking noon/1pm.     INR is 1.5 today  Went home Friday 8/9 from [facility], started 5mg daily Saturday. Out of warfarin.

## 2024-08-19 ENCOUNTER — ANTI-COAG VISIT (OUTPATIENT)
Dept: PHARMACY | Age: 84
End: 2024-08-19
Payer: MEDICARE

## 2024-08-19 ENCOUNTER — TELEPHONE (OUTPATIENT)
Dept: PHARMACY | Age: 84
End: 2024-08-19

## 2024-08-19 DIAGNOSIS — I26.99 ACUTE MASSIVE PULMONARY EMBOLISM (HCC): Primary | ICD-10-CM

## 2024-08-19 LAB
INR BLD: 1.9
PROTIME: NORMAL

## 2024-08-19 NOTE — TELEPHONE ENCOUNTER
Patient LVM stating his HHN would be taking his INR on Monday, 8/19 and calling it into the clinic.  Changed his clinic appt for today to .

## 2024-08-19 NOTE — PROGRESS NOTES
HHN called in INR of 1.9 today, confirmed dose. No changes. Take 7.5mg (one and a half tablets) tomorrow then continue taking 5mg (1 tablet) daily. Recheck Friday 8/23.    Lindsey Malone, PharmD, [facility]    For Pharmacy Admin Tracking Only    Intervention Detail: Dose Adjustment: 1, reason: Therapy Optimization  Total # of Interventions Recommended: 1  Total # of Interventions Accepted: 1  Time Spent (min): 15

## 2024-08-22 NOTE — TELEPHONE ENCOUNTER
Medication:   Requested Prescriptions     Pending Prescriptions Disp Refills    Blood Glucose Monitoring Suppl (FREESTYLE LITE) LISA 1 each 0     Sig: Test twice daily       Last Filled:      Patient Phone Number: [phone] (home)     Last appt: 12/15/2023   Next appt: 8/30/2024    Last Labs DM:   Lab Results   Component Value Date/Time    LABA1C 5.6 07/16/2024 04:11 AM       Last OARRS:        No data to display                Preferred Pharmacy:   [id number] - [place], [place] - [address] -  [phone] - F [phone]  [address] [facility] [address]  Phone: [phone] Fax: [phone]    [facility] - [place], [place] - [address] -  [phone] - f [phone]  [address] [facility] [address]  Phone: [phone] Fax: [phone]    [facility] - [place], [place] - [address] [phone] - F [phone]  [facility] [address]  Phone: [phone] Fax: [phone]    [facility] - [place], [place] - [address] [facility] [phone] - F [phone]  [facility] [address]  Phone: [phone] Fax: [phone]    [id number] - [place], [place] - [address] [facility] [phone] - F [phone]  [address] [facility] [address]  Phone: [phone] Fax: [phone]    [id number] - [place], [place] - [address] [phone] - F [phone]  [address]  [facility] [address]  Phone: [phone] Fax: [phone]

## 2024-08-22 NOTE — TELEPHONE ENCOUNTER
Patient called requesting new system. Patient states he lost his system. Blood Glucose Monitoring Suppl (FREESTYLE LITE) LISA is what he is missing and requesting a new one of. Patient also needs his diabetes medicine refilled as well.

## 2024-08-23 ENCOUNTER — HOSPITAL ENCOUNTER (OUTPATIENT)
Age: 84
Discharge: HOME | End: 2024-08-23
Payer: MEDICARE

## 2024-08-23 ENCOUNTER — OFFICE VISIT (OUTPATIENT)
Dept: PULMONOLOGY | Age: 84
End: 2024-08-23
Payer: MEDICARE

## 2024-08-23 ENCOUNTER — ANTI-COAG VISIT (OUTPATIENT)
Dept: PHARMACY | Age: 84
End: 2024-08-23

## 2024-08-23 VITALS
OXYGEN SATURATION: 97 % | SYSTOLIC BLOOD PRESSURE: 110 MMHG | HEIGHT: 72 IN | WEIGHT: 224 LBS | DIASTOLIC BLOOD PRESSURE: 72 MMHG | HEART RATE: 100 BPM | BODY MASS INDEX: 30.34 KG/M2

## 2024-08-23 DIAGNOSIS — I26.99 ACUTE MASSIVE PULMONARY EMBOLISM (HCC): Primary | ICD-10-CM

## 2024-08-23 DIAGNOSIS — R06.02 SHORTNESS OF BREATH: ICD-10-CM

## 2024-08-23 DIAGNOSIS — I26.09 OTHER ACUTE PULMONARY EMBOLISM WITH ACUTE COR PULMONALE (HCC): Primary | ICD-10-CM

## 2024-08-23 DIAGNOSIS — I26.09 OTHER ACUTE PULMONARY EMBOLISM WITH ACUTE COR PULMONALE (HCC): ICD-10-CM

## 2024-08-23 LAB
BASOPHILS # BLD: 0 K/UL (ref 0–0.2)
BASOPHILS NFR BLD: 0.7 %
DEPRECATED RDW RBC AUTO: 16.7 % (ref 12.4–15.4)
EOSINOPHIL # BLD: 0.1 K/UL (ref 0–0.6)
EOSINOPHIL NFR BLD: 1.9 %
HCT VFR BLD AUTO: 33.2 % (ref 40.5–52.5)
HGB BLD-MCNC: 11.4 G/DL (ref 13.5–17.5)
INR BLD: 1.8
LYMPHOCYTES # BLD: 2 K/UL (ref 1–5.1)
LYMPHOCYTES NFR BLD: 29.8 %
MCH RBC QN AUTO: 33.9 PG (ref 26–34)
MCHC RBC AUTO-ENTMCNC: 34.4 G/DL (ref 31–36)
MCV RBC AUTO: 98.3 FL (ref 80–100)
MONOCYTES # BLD: 0.8 K/UL (ref 0–1.3)
MONOCYTES NFR BLD: 11.7 %
NEUTROPHILS # BLD: 3.7 K/UL (ref 1.7–7.7)
NEUTROPHILS NFR BLD: 55.9 %
PLATELET # BLD AUTO: 298 K/UL (ref 135–450)
PMV BLD AUTO: 7.5 FL (ref 5–10.5)
PROTIME: NORMAL
RBC # BLD AUTO: 3.37 M/UL (ref 4.2–5.9)
WBC # BLD AUTO: 6.6 K/UL (ref 4–11)

## 2024-08-23 PROCEDURE — G8428 CUR MEDS NOT DOCUMENT: HCPCS | Performed by: INTERNAL MEDICINE

## 2024-08-23 PROCEDURE — 85025 COMPLETE CBC W/AUTO DIFF WBC: CPT

## 2024-08-23 PROCEDURE — 99214 OFFICE O/P EST MOD 30 MIN: CPT | Performed by: INTERNAL MEDICINE

## 2024-08-23 PROCEDURE — 1123F ACP DISCUSS/DSCN MKR DOCD: CPT | Performed by: INTERNAL MEDICINE

## 2024-08-23 PROCEDURE — 36415 COLL VENOUS BLD VENIPUNCTURE: CPT

## 2024-08-23 PROCEDURE — 1111F DSCHRG MED/CURRENT MED MERGE: CPT | Performed by: INTERNAL MEDICINE

## 2024-08-23 PROCEDURE — G8417 CALC BMI ABV UP PARAM F/U: HCPCS | Performed by: INTERNAL MEDICINE

## 2024-08-23 PROCEDURE — 1036F TOBACCO NON-USER: CPT | Performed by: INTERNAL MEDICINE

## 2024-08-23 RX ORDER — BLOOD-GLUCOSE METER
KIT MISCELLANEOUS
Qty: 1 EACH | Refills: 0 | Status: SHIPPED | OUTPATIENT
Start: 2024-08-23

## 2024-08-23 ASSESSMENT — ENCOUNTER SYMPTOMS
STRIDOR: 0
ABDOMINAL PAIN: 0
SINUS PRESSURE: 0
CHEST TIGHTNESS: 0
ABDOMINAL DISTENTION: 0
COLOR CHANGE: 0
DIARRHEA: 0
BACK PAIN: 1
CONSTIPATION: 0
COUGH: 0
SHORTNESS OF BREATH: 1
WHEEZING: 0
CHOKING: 0
APNEA: 0
RHINORRHEA: 0
VOICE CHANGE: 0
SORE THROAT: 0
VOMITING: 0
BLOOD IN STOOL: 0

## 2024-08-23 NOTE — PROGRESS NOTES
Marvin Kaur    YOB: 1940     Date of Service:  8/23/2024     Chief Complaint   Patient presents with    Follow-Up from Hospital       HPI patient has been accompanied by his wife to office today.  Patient was recently hospitalized between 7/14 and 7/26 for massive PE with saddle embolus/RV strain, status post mechanical thrombectomy on 7/15.  Postoperative complication with hypotension related to left rectus femoris hematoma related to a recent fall requiring multiple blood transfusion.  Patient had short stay in rehab facility-now been transition to home.  Uses walker for mobility.    States that he still feels winded with activity, improved left hip/thigh pain.    No Known Allergies  Outpatient Medications Marked as Taking for the 8/23/24 encounter (Office Visit) with Rocky Simpson MD   Medication Sig Dispense Refill    warfarin (COUMADIN) 5 MG tablet Review INR prior to administration.  Hazardous med- See facility policy for handling/disposal 90 tablet 1    metoprolol succinate (TOPROL XL) 50 MG extended release tablet Take 1 tablet by mouth daily      oxyCODONE 5 MG capsule Take 1 capsule by mouth every 4 hours as needed for Pain.      furosemide (LASIX) 40 MG tablet Take 1 tablet by mouth daily 60 tablet 3    midodrine (PROAMATINE) 10 MG tablet Take 1 tablet by mouth 3 times daily as needed (for SBP < 90) 90 tablet 3    NIFEdipine (PROCARDIA XL) 30 MG extended release tablet Take 1 tablet by mouth daily 30 tablet 5    candesartan-hydroCHLOROthiazide (ATACAND HCT) 32-12.5 MG per tablet Take 1 tablet by mouth daily 90 tablet 3    omeprazole (PRILOSEC) 20 MG delayed release capsule TAKE 1 CAPSULE BY MOUTH DAILY 90 capsule 3    Handicap Placard MISC by Does not apply route Exp: 03/03/2025 1 each 0    FreeStyle Lancets MISC Test twice daily 100 each 5    Blood Glucose Monitoring Suppl (FREESTYLE LITE) LISA Test twice daily 1 Device 0    aspirin 81 MG tablet Take 1 tablet by mouth  Please ask the patient to schedule an appointment to discuss other options for blood pressure.

## 2024-08-23 NOTE — PROGRESS NOTES
PT 22.1 / INR 1.8 Patient took 7.5mg warfarin on Tuesday and 5mg all other days.  Please call Elly GREGORY [phone] with warfarin dosing and order for next INR check.     Returned call to Elly GREGORY and LVM. Instructed for patient to take 7.5 mg today and tomorrow and 5 mg on Sun. Recheck INR on Mon 8/26. Any questions or concerns return call to clinic.       For Pharmacy Admin Tracking Only    Intervention Detail: Dose Adjustment: 1, reason: Therapy Optimization  Total # of Interventions Recommended: 1  Total # of Interventions Accepted: 1  Time Spent (min): 10

## 2024-08-26 ENCOUNTER — ANTI-COAG VISIT (OUTPATIENT)
Dept: PHARMACY | Age: 84
End: 2024-08-26

## 2024-08-26 DIAGNOSIS — I26.99 ACUTE MASSIVE PULMONARY EMBOLISM (HCC): Primary | ICD-10-CM

## 2024-08-26 LAB
INR BLD: 2
PROTIME: NORMAL

## 2024-08-26 NOTE — PROGRESS NOTES
PT 24.1 / INR 2.0 Patient took 7.5mg warfarin on Fri/Sat and 5mg on Sunday.  Please call Karen GREGORY [phone] with warfarin dosing and order for next INR check.       Returned call to Karen GREGORY and LVM. Instructed for patient to take 7.5 mg on Mon, Wed and Fri and 5 mg all other days of the week. Recheck INR on Thurs 8/29. Any questions or concerns return call to clinic.       For Pharmacy Admin Tracking Only    Intervention Detail: Dose Adjustment: 1, reason: Therapy Optimization  Total # of Interventions Recommended: 1  Total # of Interventions Accepted: 1  Time Spent (min): 10

## 2024-08-29 ENCOUNTER — OFFICE VISIT (OUTPATIENT)
Dept: INTERNAL MEDICINE CLINIC | Age: 84
End: 2024-08-29
Payer: MEDICARE

## 2024-08-29 ENCOUNTER — ANTI-COAG VISIT (OUTPATIENT)
Dept: PHARMACY | Age: 84
End: 2024-08-29

## 2024-08-29 VITALS
WEIGHT: 222.2 LBS | TEMPERATURE: 97.4 F | BODY MASS INDEX: 30.1 KG/M2 | OXYGEN SATURATION: 98 % | HEART RATE: 98 BPM | DIASTOLIC BLOOD PRESSURE: 61 MMHG | SYSTOLIC BLOOD PRESSURE: 91 MMHG | HEIGHT: 72 IN

## 2024-08-29 DIAGNOSIS — E11.22 CONTROLLED TYPE 2 DIABETES MELLITUS WITH STAGE 1 CHRONIC KIDNEY DISEASE, WITHOUT LONG-TERM CURRENT USE OF INSULIN (HCC): ICD-10-CM

## 2024-08-29 DIAGNOSIS — I27.20 PULMONARY HTN (HCC): ICD-10-CM

## 2024-08-29 DIAGNOSIS — E78.2 MIXED HYPERLIPIDEMIA: ICD-10-CM

## 2024-08-29 DIAGNOSIS — I26.99 ACUTE MASSIVE PULMONARY EMBOLISM (HCC): Primary | ICD-10-CM

## 2024-08-29 DIAGNOSIS — Z00.00 MEDICARE ANNUAL WELLNESS VISIT, SUBSEQUENT: Primary | ICD-10-CM

## 2024-08-29 DIAGNOSIS — I26.09 OTHER PULMONARY EMBOLISM WITH ACUTE COR PULMONALE, UNSPECIFIED CHRONICITY (HCC): ICD-10-CM

## 2024-08-29 DIAGNOSIS — I10 PRIMARY HYPERTENSION: ICD-10-CM

## 2024-08-29 DIAGNOSIS — N18.1 CONTROLLED TYPE 2 DIABETES MELLITUS WITH STAGE 1 CHRONIC KIDNEY DISEASE, WITHOUT LONG-TERM CURRENT USE OF INSULIN (HCC): ICD-10-CM

## 2024-08-29 LAB
INR BLD: 2.1
PROTIME: NORMAL

## 2024-08-29 PROCEDURE — 82962 GLUCOSE BLOOD TEST: CPT | Performed by: INTERNAL MEDICINE

## 2024-08-29 PROCEDURE — 99214 OFFICE O/P EST MOD 30 MIN: CPT | Performed by: INTERNAL MEDICINE

## 2024-08-29 PROCEDURE — 1123F ACP DISCUSS/DSCN MKR DOCD: CPT | Performed by: INTERNAL MEDICINE

## 2024-08-29 PROCEDURE — 3044F HG A1C LEVEL LT 7.0%: CPT | Performed by: INTERNAL MEDICINE

## 2024-08-29 PROCEDURE — 1036F TOBACCO NON-USER: CPT | Performed by: INTERNAL MEDICINE

## 2024-08-29 PROCEDURE — G8427 DOCREV CUR MEDS BY ELIG CLIN: HCPCS | Performed by: INTERNAL MEDICINE

## 2024-08-29 PROCEDURE — G8417 CALC BMI ABV UP PARAM F/U: HCPCS | Performed by: INTERNAL MEDICINE

## 2024-08-29 PROCEDURE — G0439 PPPS, SUBSEQ VISIT: HCPCS | Performed by: INTERNAL MEDICINE

## 2024-08-29 RX ORDER — ROSUVASTATIN CALCIUM 10 MG/1
10 TABLET, COATED ORAL DAILY
Qty: 90 TABLET | Refills: 1 | Status: SHIPPED | OUTPATIENT
Start: 2024-08-29

## 2024-08-29 SDOH — ECONOMIC STABILITY: INCOME INSECURITY: HOW HARD IS IT FOR YOU TO PAY FOR THE VERY BASICS LIKE FOOD, HOUSING, MEDICAL CARE, AND HEATING?: NOT HARD AT ALL

## 2024-08-29 SDOH — ECONOMIC STABILITY: FOOD INSECURITY: WITHIN THE PAST 12 MONTHS, YOU WORRIED THAT YOUR FOOD WOULD RUN OUT BEFORE YOU GOT MONEY TO BUY MORE.: NEVER TRUE

## 2024-08-29 SDOH — ECONOMIC STABILITY: FOOD INSECURITY: WITHIN THE PAST 12 MONTHS, THE FOOD YOU BOUGHT JUST DIDN'T LAST AND YOU DIDN'T HAVE MONEY TO GET MORE.: NEVER TRUE

## 2024-08-29 ASSESSMENT — PATIENT HEALTH QUESTIONNAIRE - PHQ9
1. LITTLE INTEREST OR PLEASURE IN DOING THINGS: NOT AT ALL
2. FEELING DOWN, DEPRESSED OR HOPELESS: NOT AT ALL
SUM OF ALL RESPONSES TO PHQ QUESTIONS 1-9: 0
SUM OF ALL RESPONSES TO PHQ9 QUESTIONS 1 & 2: 0
SUM OF ALL RESPONSES TO PHQ QUESTIONS 1-9: 0

## 2024-08-29 ASSESSMENT — LIFESTYLE VARIABLES
HOW OFTEN DURING THE LAST YEAR HAVE YOU BEEN UNABLE TO REMEMBER WHAT HAPPENED THE NIGHT BEFORE BECAUSE YOU HAD BEEN DRINKING: NEVER
HOW OFTEN DURING THE LAST YEAR HAVE YOU FOUND THAT YOU WERE NOT ABLE TO STOP DRINKING ONCE YOU HAD STARTED: NEVER
HOW OFTEN DURING THE LAST YEAR HAVE YOU FAILED TO DO WHAT WAS NORMALLY EXPECTED FROM YOU BECAUSE OF DRINKING: NEVER
HOW MANY STANDARD DRINKS CONTAINING ALCOHOL DO YOU HAVE ON A TYPICAL DAY: 1 OR 2
HOW OFTEN DURING THE LAST YEAR HAVE YOU NEEDED AN ALCOHOLIC DRINK FIRST THING IN THE MORNING TO GET YOURSELF GOING AFTER A NIGHT OF HEAVY DRINKING: NEVER
HAS A RELATIVE, FRIEND, DOCTOR, OR ANOTHER HEALTH PROFESSIONAL EXPRESSED CONCERN ABOUT YOUR DRINKING OR SUGGESTED YOU CUT DOWN: NO
HAVE YOU OR SOMEONE ELSE BEEN INJURED AS A RESULT OF YOUR DRINKING: NO
HOW OFTEN DURING THE LAST YEAR HAVE YOU HAD A FEELING OF GUILT OR REMORSE AFTER DRINKING: NEVER
HOW OFTEN DO YOU HAVE A DRINK CONTAINING ALCOHOL: 4 OR MORE TIMES A WEEK

## 2024-08-29 NOTE — PROGRESS NOTES
INR 2.1 Patient taking 7.5mg warfarin on Mon/Wed and 5mg all other days.  Please call Karen GREGORY [phone] with warfarin dosing and order for next INR check.     Called HHN. Take 5mg Sun, Tues, Thurs and 7.5mg all other days. Recheck Tues 9/3.    Was prescribed Eliquis by doctor. Advised to check on price with insurance, has not picked up. Knows need to transition so will not start before talking to us and checking INR as has to be <2.    Lindsey Malone, PharmD, [facility]  For Pharmacy Admin Tracking Only    Intervention Detail: Dose Adjustment: 1, reason: Therapy Optimization  Total # of Interventions Recommended: 1  Total # of Interventions Accepted: 1  Time Spent (min): 15

## 2024-09-03 ENCOUNTER — ANTI-COAG VISIT (OUTPATIENT)
Dept: PHARMACY | Age: 84
End: 2024-09-03

## 2024-09-03 DIAGNOSIS — I26.99 ACUTE MASSIVE PULMONARY EMBOLISM (HCC): Primary | ICD-10-CM

## 2024-09-03 LAB
INR BLD: 2.1
PROTIME: NORMAL

## 2024-09-03 NOTE — PROGRESS NOTES
PT 24.8 / INR 2.1 Patient took 5mg warfarin on Thu/Sun and 7.5mg all other days.  Please call Karen GREGORY [phone] with warfarin dosing and order for next INR check.     Returned call to Karen GREGORY. Instructed for patient to take 5 mg on Sun, Tues and Thurs and 7.5 mg all other days of the week. Recheck INR in 1 week, 9/10.       For Pharmacy Admin Tracking Only    Intervention Detail: Dose Adjustment: 1, reason: Therapy Optimization  Total # of Interventions Recommended: 1  Total # of Interventions Accepted: 1  Time Spent (min): 15

## 2024-09-04 ENCOUNTER — TELEPHONE (OUTPATIENT)
Dept: INTERNAL MEDICINE CLINIC | Age: 84
End: 2024-09-04

## 2024-09-10 ENCOUNTER — ANTI-COAG VISIT (OUTPATIENT)
Dept: PHARMACY | Age: 84
End: 2024-09-10

## 2024-09-10 DIAGNOSIS — I26.99 ACUTE MASSIVE PULMONARY EMBOLISM (HCC): Primary | ICD-10-CM

## 2024-09-10 LAB
INR BLD: 2
PROTIME: NORMAL

## 2024-09-17 ENCOUNTER — ANTI-COAG VISIT (OUTPATIENT)
Dept: PHARMACY | Age: 84
End: 2024-09-17

## 2024-09-17 DIAGNOSIS — I26.99 ACUTE MASSIVE PULMONARY EMBOLISM (HCC): Primary | ICD-10-CM

## 2024-09-17 LAB
INR BLD: 2.4
PROTIME: NORMAL

## 2024-09-24 ENCOUNTER — ANTI-COAG VISIT (OUTPATIENT)
Dept: PHARMACY | Age: 84
End: 2024-09-24

## 2024-09-24 DIAGNOSIS — I26.99 ACUTE MASSIVE PULMONARY EMBOLISM (HCC): Primary | ICD-10-CM

## 2024-09-24 LAB
INR BLD: 2.2
PROTIME: NORMAL

## 2024-10-01 ENCOUNTER — ANTI-COAG VISIT (OUTPATIENT)
Dept: PHARMACY | Age: 84
End: 2024-10-01

## 2024-10-01 DIAGNOSIS — I26.99 ACUTE MASSIVE PULMONARY EMBOLISM (HCC): Primary | ICD-10-CM

## 2024-10-01 LAB
INR BLD: 2.5
PROTIME: NORMAL

## 2024-10-01 NOTE — PROGRESS NOTES
PT 29.9 / INR 2.5 Patient took 5mg warfarin on Tue/Thu/Sun and 7.5mg all other days.  Please call Karen GREGORY [phone] with warfarin dosing and order for next INR check.     Called [facility].  Take 5mg Sun, Tues, Thurs and 7.5mg all other days.  Recheck in 1 week, 10/8.  Lindsey Malone, PharmD, [facility]    For Pharmacy Admin Tracking Only    Intervention Detail: Dose Adjustment: 1, reason: Therapy Optimization  Total # of Interventions Recommended: 1  Total # of Interventions Accepted: 1  Time Spent (min): 15

## 2024-10-02 ENCOUNTER — HOSPITAL ENCOUNTER (OUTPATIENT)
Age: 84
Discharge: HOME | End: 2024-10-04
Payer: MEDICARE

## 2024-10-02 VITALS
SYSTOLIC BLOOD PRESSURE: 122 MMHG | WEIGHT: 222 LBS | DIASTOLIC BLOOD PRESSURE: 72 MMHG | BODY MASS INDEX: 30.07 KG/M2 | HEIGHT: 72 IN

## 2024-10-02 DIAGNOSIS — I26.02 ACUTE SADDLE PULMONARY EMBOLISM WITH ACUTE COR PULMONALE (HCC): ICD-10-CM

## 2024-10-02 LAB
ECHO AO ASC DIAM: 4 CM
ECHO AO ASCENDING AORTA INDEX: 1.79 CM/M2
ECHO AO ROOT DIAM: 3.7 CM
ECHO AO ROOT INDEX: 1.66 CM/M2
ECHO BSA: 2.26 M2
ECHO EST RA PRESSURE: 3 MMHG
ECHO LA DIAMETER INDEX: 2.11 CM/M2
ECHO LA DIAMETER: 4.7 CM
ECHO LA TO AORTIC ROOT RATIO: 1.27
ECHO LV EDV A2C: 63 ML
ECHO LV EDV A4C: 69 ML
ECHO LV EDV INDEX A4C: 31 ML/M2
ECHO LV EDV NDEX A2C: 28 ML/M2
ECHO LV EJECTION FRACTION A2C: 57 %
ECHO LV EJECTION FRACTION A4C: 68 %
ECHO LV EJECTION FRACTION BIPLANE: 61 % (ref 55–100)
ECHO LV ESV A2C: 27 ML
ECHO LV ESV A4C: 22 ML
ECHO LV ESV INDEX A2C: 12 ML/M2
ECHO LV ESV INDEX A4C: 10 ML/M2
ECHO LV FRACTIONAL SHORTENING: 42 % (ref 28–44)
ECHO LV INTERNAL DIMENSION DIASTOLE INDEX: 1.93 CM/M2
ECHO LV INTERNAL DIMENSION DIASTOLIC: 4.3 CM (ref 4.2–5.9)
ECHO LV INTERNAL DIMENSION SYSTOLIC INDEX: 1.12 CM/M2
ECHO LV INTERNAL DIMENSION SYSTOLIC: 2.5 CM
ECHO LV IVSD: 1.3 CM (ref 0.6–1)
ECHO LV MASS 2D: 162.9 G (ref 88–224)
ECHO LV MASS INDEX 2D: 73.1 G/M2 (ref 49–115)
ECHO LV POSTERIOR WALL DIASTOLIC: 0.9 CM (ref 0.6–1)
ECHO LV RELATIVE WALL THICKNESS RATIO: 0.42
ECHO RIGHT VENTRICULAR SYSTOLIC PRESSURE (RVSP): 28 MMHG
ECHO TV REGURGITANT MAX VELOCITY: 2.52 M/S
ECHO TV REGURGITANT PEAK GRADIENT: 25 MMHG

## 2024-10-02 PROCEDURE — 93308 TTE F-UP OR LMTD: CPT

## 2024-10-04 ENCOUNTER — TELEPHONE (OUTPATIENT)
Dept: CARDIOLOGY CLINIC | Age: 84
End: 2024-10-04

## 2024-10-04 NOTE — TELEPHONE ENCOUNTER
----- Message from PILLO Ray CNP sent at 10/4/2024  2:53 PM EDT -----  Your right ventricular function has improved. Follow up as planned.

## 2024-10-08 ENCOUNTER — ANTI-COAG VISIT (OUTPATIENT)
Dept: PHARMACY | Age: 84
End: 2024-10-08

## 2024-10-08 DIAGNOSIS — I26.99 ACUTE MASSIVE PULMONARY EMBOLISM (HCC): Primary | ICD-10-CM

## 2024-10-08 LAB
INR BLD: 3.3
PROTIME: NORMAL

## 2024-10-08 NOTE — PROGRESS NOTES
PT 39.3 / INR 3.3 Patient taking 5mg warfarin on Sun/Tue/Thu and 7.5mg all other days.  Please call Karen GREGORY [phone] with warfarin dosing and order for next INR check.     Returned call to Karen GREGORY and LVM. Instructed for patient to take 7.5 mg on Mon, Wed and Fri and 5 mg all other days of the week. Recheck INR in 1 week, 10/15. Any questions or concerns return call to clinic       For Pharmacy Admin Tracking Only    Intervention Detail: Dose Adjustment: 1, reason: Therapy Optimization  Total # of Interventions Recommended: 1  Total # of Interventions Accepted: 1  Time Spent (min): 10

## 2024-10-10 ENCOUNTER — OFFICE VISIT (OUTPATIENT)
Dept: INTERNAL MEDICINE CLINIC | Age: 84
End: 2024-10-10

## 2024-10-10 VITALS
BODY MASS INDEX: 29.7 KG/M2 | HEART RATE: 78 BPM | OXYGEN SATURATION: 98 % | SYSTOLIC BLOOD PRESSURE: 128 MMHG | DIASTOLIC BLOOD PRESSURE: 83 MMHG | WEIGHT: 219 LBS

## 2024-10-10 DIAGNOSIS — I87.2 VENOUS INSUFFICIENCY OF BOTH LOWER EXTREMITIES: ICD-10-CM

## 2024-10-10 DIAGNOSIS — E11.22 CONTROLLED TYPE 2 DIABETES MELLITUS WITH STAGE 1 CHRONIC KIDNEY DISEASE, WITHOUT LONG-TERM CURRENT USE OF INSULIN (HCC): Primary | ICD-10-CM

## 2024-10-10 DIAGNOSIS — I42.8 OTHER CARDIOMYOPATHY (HCC): ICD-10-CM

## 2024-10-10 DIAGNOSIS — I10 PRIMARY HYPERTENSION: ICD-10-CM

## 2024-10-10 DIAGNOSIS — E78.2 MIXED HYPERLIPIDEMIA: ICD-10-CM

## 2024-10-10 DIAGNOSIS — Z23 NEEDS FLU SHOT: ICD-10-CM

## 2024-10-10 DIAGNOSIS — N18.1 CONTROLLED TYPE 2 DIABETES MELLITUS WITH STAGE 1 CHRONIC KIDNEY DISEASE, WITHOUT LONG-TERM CURRENT USE OF INSULIN (HCC): Primary | ICD-10-CM

## 2024-10-10 DIAGNOSIS — I26.99 PULMONARY EMBOLISM, UNSPECIFIED CHRONICITY, UNSPECIFIED PULMONARY EMBOLISM TYPE, UNSPECIFIED WHETHER ACUTE COR PULMONALE PRESENT (HCC): ICD-10-CM

## 2024-10-10 LAB
CHP ED QC CHECK: NORMAL
GLUCOSE BLD-MCNC: 131 MG/DL
HBA1C MFR BLD: 5.6 %

## 2024-10-10 NOTE — PROGRESS NOTES
Patient: Marvin Kaur is a 84 y.o. male who presents today with the following Chief Complaint(s):    Chief Complaint   Patient presents with    Follow-up    Diabetes         [facility] ECHO          Discuss with rx rep.          100, 90, 97. FBS.     Current Outpatient Medications   Medication Sig Dispense Refill    rosuvastatin (CRESTOR) 10 MG tablet Take 1 tablet by mouth daily 90 tablet 1    Blood Glucose Monitoring Suppl (FREESTYLE LITE) LISA Test twice daily 1 each 0    warfarin (COUMADIN) 5 MG tablet Review INR prior to administration.  Hazardous med- See facility policy for handling/disposal 90 tablet 1    midodrine (PROAMATINE) 10 MG tablet Take 1 tablet by mouth 3 times daily as needed (for SBP < 90) 90 tablet 3    candesartan-hydroCHLOROthiazide (ATACAND HCT) 32-12.5 MG per tablet Take 1 tablet by mouth daily 90 tablet 3    FreeStyle Lancets MISC Test twice daily 100 each 5    aspirin 81 MG tablet Take 1 tablet by mouth daily      Multiple Vitamins-Minerals (CENTRUM SILVER ADULT 50+ PO) Take 1 tablet by mouth daily      Cholecalciferol (VITAMIN D3) 1000 UNITS CAPS Take 1 capsule by mouth daily      apixaban (ELIQUIS) 2.5 MG TABS tablet Take 1 tablet by mouth 2 times daily 60 tablet 0    oxyCODONE 5 MG capsule Take 1 capsule by mouth every 4 hours as needed for Pain. (Patient not taking: Reported on 10/10/2024)      omeprazole (PRILOSEC) 20 MG delayed release capsule TAKE 1 CAPSULE BY MOUTH DAILY (Patient not taking: Reported on 10/10/2024) 90 capsule 3    Handicap Placard MISC by Does not apply route Exp: 03/03/2025 1 each 0     No current facility-administered medications for this visit.       Patient's past medical history, surgical history, family history, medications,and allergies  were all reviewed and updated as appropriate today.      Review of Systems      Physical Exam    Vitals:    10/10/24 1301   BP: 128/83   Pulse: 78   SpO2: 98%       Assessment:  Encounter Diagnoses   Name Primary?

## 2024-10-11 LAB — NT-PROBNP SERPL-MCNC: 184 PG/ML (ref 0–449)

## 2024-10-15 ENCOUNTER — ANTI-COAG VISIT (OUTPATIENT)
Dept: PHARMACY | Age: 84
End: 2024-10-15

## 2024-10-15 DIAGNOSIS — I26.99 ACUTE MASSIVE PULMONARY EMBOLISM (HCC): Primary | ICD-10-CM

## 2024-10-15 LAB
INR BLD: 2.2
PROTIME: NORMAL

## 2024-10-15 NOTE — PROGRESS NOTES
PT 26.6 / INR 2.2 Patient took 7.5mg warfarin on Mon/Wed/Fri and 5mg all other days.  Please call Karen GREGORY [phone] with warfarin dosing and order for next INR check.       Returned call to Karen GREGORY and LVM. Instructed for patient to take 7.5 mg on Mon, Wed and Fri and 5 mg all other days of the week. Recheck INR in 1 week, 10/22. Any questions or concerns return call to clinic       For Pharmacy Admin Tracking Only    Intervention Detail: Dose Adjustment: 1, reason: Therapy Optimization  Total # of Interventions Recommended: 1  Total # of Interventions Accepted: 1  Time Spent (min): 15

## 2024-10-22 ENCOUNTER — ANTI-COAG VISIT (OUTPATIENT)
Dept: PHARMACY | Age: 84
End: 2024-10-22

## 2024-10-22 DIAGNOSIS — I26.99 ACUTE MASSIVE PULMONARY EMBOLISM (HCC): Primary | ICD-10-CM

## 2024-10-22 LAB
INR BLD: 2.1
PROTIME: NORMAL

## 2024-10-22 NOTE — PROGRESS NOTES
INR-2.1 P.T-25.4, Pt is taking 7.5 mg Monday, Wednesday and Friday and taking 5 mg all other day. Please call Viri GREGORY [phone] for next INR check and dosing adjustments.     Called BRI. CLIVE.    Take 7.5 mg on Mon, Wed and Fri and 5mg all other days. Recheck in 1 week, 10/29.    Lindsey Malone, PharmD, [facility]    For Pharmacy Admin Tracking Only  Total # of Interventions Recommended: 0  Total # of Interventions Accepted: 0  Time Spent (min): 15

## 2024-10-23 NOTE — TELEPHONE ENCOUNTER
Marvin Kaur (1940) needs a refill on his warfarin called into [facility] on [address]     Warfarin prescription phoned into [id number] - [facility] [address] - P [phone] - F [phone] under [address]   Warfarin 5 mg tabs  Take 7.5 mg (5 mg x 1.5) every Mon, Wed, Fri; 5 mg (5 mg x 1) all other days   90 days   2 refills    Lindsey Malone, PharmD, [facility]    For Pharmacy Admin Tracking Only    Intervention Detail: Refill(s) Provided  Total # of Interventions Recommended: 1  Total # of Interventions Accepted: 1  Time Spent (min): 15

## 2024-10-29 ENCOUNTER — ANTI-COAG VISIT (OUTPATIENT)
Dept: PHARMACY | Age: 84
End: 2024-10-29

## 2024-10-29 DIAGNOSIS — I26.99 ACUTE MASSIVE PULMONARY EMBOLISM (HCC): Primary | ICD-10-CM

## 2024-10-29 LAB
INR BLD: 2.3
PROTIME: NORMAL

## 2024-10-29 NOTE — PROGRESS NOTES
PT 28.2 / INR 2.3 Patient taking 7.5mg warfarin on M/W/F and 5mgall other days. Please call Karen GREGORY [phone] with warfarin dosing and order for next INR check.       Returned call to Karen GREGORY and LVM. Instructed for patient to continue to take 7.5 mg on Mon, Wed and Fri and 5 mg all other days of the week. Recheck INR in 1 week. 11/12      For Pharmacy Admin Tracking Only    Total # of Interventions Recommended: 0  Total # of Interventions Accepted: 0  Time Spent (min): 15

## 2024-10-31 NOTE — PROGRESS NOTES
Transportation (Non-Medical): No   Physical Activity: Insufficiently Active (8/29/2024)    Exercise Vital Sign     Days of Exercise per Week: 1 day     Minutes of Exercise per Session: 30 min   Stress: Not on file   Social Connections: Not on file   Intimate Partner Violence: Unknown (1/22/2024)    Received from [facility], [facility]    Interpersonal Safety     Feel physically or emotionally unsafe where currently live: Not on file     Harm by anyone: Not on file     Emotionally Harmed: Not on file   Housing Stability: Unknown (8/29/2024)    Housing Stability Vital Sign     Unable to Pay for Housing in the Last Year: No     Number of Times Moved in the Last Year: Not on file     Homeless in the Last Year: No       Family History:   History reviewed. No pertinent family history.  Family history has been reviewed and not pertinent except as noted above.     Review of Systems:   Constitutional: there has been no unanticipated weight loss. No change in energy or activity level   Eyes: No visual changes   ENT: No Headaches, hearing loss or vertigo. No mouth sores or sore throat.  Cardiovascular: Reviewed in HPI  Respiratory: No cough or wheezing, no sputum production.   Gastrointestinal: No abdominal pain, appetite loss, blood in stools. No change in bowel or bladder habits.  Genitourinary: No nocturia, dysuria, trouble voiding  Musculoskeletal:  No gait disturbance, weakness or joint complaints.  Integumentary: No rash or pruritis.  Neurological: No headache, change in muscle strength, numbness or tingling. No change in gait, balance, coordination, mood, affect, memory, mentation, behavior.  Psychiatric: No anxiety or depression  Endocrine: No malaise or fever  Hematologic/Lymphatic: No abnormal bruising or bleeding, blood clots or swollen lymph nodes.  Allergic/Immunologic: No nasal congestion or hives.    Physical Examination:    Vitals:    11/06/24 1326

## 2024-11-06 ENCOUNTER — TELEPHONE (OUTPATIENT)
Dept: PHARMACY | Age: 84
End: 2024-11-06

## 2024-11-06 ENCOUNTER — OFFICE VISIT (OUTPATIENT)
Dept: CARDIOLOGY CLINIC | Age: 84
End: 2024-11-06

## 2024-11-06 VITALS
HEIGHT: 72 IN | BODY MASS INDEX: 30.2 KG/M2 | DIASTOLIC BLOOD PRESSURE: 70 MMHG | SYSTOLIC BLOOD PRESSURE: 118 MMHG | OXYGEN SATURATION: 98 % | WEIGHT: 223 LBS | HEART RATE: 82 BPM

## 2024-11-06 DIAGNOSIS — E11.22 CONTROLLED TYPE 2 DIABETES MELLITUS WITH STAGE 1 CHRONIC KIDNEY DISEASE, WITHOUT LONG-TERM CURRENT USE OF INSULIN (HCC): ICD-10-CM

## 2024-11-06 DIAGNOSIS — N18.1 CONTROLLED TYPE 2 DIABETES MELLITUS WITH STAGE 1 CHRONIC KIDNEY DISEASE, WITHOUT LONG-TERM CURRENT USE OF INSULIN (HCC): ICD-10-CM

## 2024-11-06 DIAGNOSIS — I26.02 ACUTE SADDLE PULMONARY EMBOLISM WITH ACUTE COR PULMONALE (HCC): Primary | ICD-10-CM

## 2024-11-06 DIAGNOSIS — I10 HYPERTENSION, UNSPECIFIED TYPE: ICD-10-CM

## 2024-11-06 RX ORDER — FUROSEMIDE 20 MG/1
20 TABLET ORAL DAILY
COMMUNITY

## 2024-11-06 NOTE — TELEPHONE ENCOUNTER
Noted, will adjust/manage until Jan 31, 2025.   Lindsey Malone, PharmD, [facility]  For Pharmacy Admin Tracking Only  Total # of Interventions Recommended: 0  Total # of Interventions Accepted: 0  Time Spent (min): 15

## 2024-11-06 NOTE — TELEPHONE ENCOUNTER
----- Message from GUSTAVO WOODY RN sent at 11/6/2024  1:49 PM EST -----  Regarding: Discontinuing therapy  Hi!     We saw Mr. Kaur in office today. Dr. Mills feels he safe to stop anticoagulation at the end of January 2025. Let me know if you need anything else from our office.     Thank you,   Deena

## 2024-11-09 ASSESSMENT — ENCOUNTER SYMPTOMS
EYES NEGATIVE: 1
GASTROINTESTINAL NEGATIVE: 1

## 2024-11-12 ENCOUNTER — ANTI-COAG VISIT (OUTPATIENT)
Dept: PHARMACY | Age: 84
End: 2024-11-12

## 2024-11-12 DIAGNOSIS — I26.99 ACUTE MASSIVE PULMONARY EMBOLISM (HCC): Primary | ICD-10-CM

## 2024-11-12 LAB
INR BLD: 2.9
PROTIME: NORMAL

## 2024-11-12 NOTE — PROGRESS NOTES
PT 35.1 / INR 2.9 Patient taking 7.5mg warfarin on M/W/F and 5mg all other days.  Please call Karen GREGORY [phone] with warfarin dosing and order for next INR check.     Returned call to Karen GREGORY and LVM. Instructed for patient to take 7.5mg warfarin on M/W/F and 5mg all other days. Recheck INR in 2 weeks, 11/26.      For Pharmacy Admin Tracking Only    Total # of Interventions Recommended: 0  Total # of Interventions Accepted: 0  Time Spent (min): 15

## 2024-11-14 ENCOUNTER — OFFICE VISIT (OUTPATIENT)
Dept: PULMONOLOGY | Age: 84
End: 2024-11-14

## 2024-11-14 VITALS
OXYGEN SATURATION: 98 % | WEIGHT: 223.6 LBS | BODY MASS INDEX: 30.28 KG/M2 | HEIGHT: 72 IN | HEART RATE: 99 BPM | DIASTOLIC BLOOD PRESSURE: 72 MMHG | SYSTOLIC BLOOD PRESSURE: 116 MMHG

## 2024-11-14 DIAGNOSIS — I26.02 ACUTE SADDLE PULMONARY EMBOLISM WITH ACUTE COR PULMONALE (HCC): Primary | ICD-10-CM

## 2024-11-14 DIAGNOSIS — D62 ACUTE BLOOD LOSS ANEMIA: ICD-10-CM

## 2024-11-14 ASSESSMENT — ENCOUNTER SYMPTOMS
CHOKING: 0
SHORTNESS OF BREATH: 0
APNEA: 0
VOMITING: 0
VOICE CHANGE: 0
SORE THROAT: 0
COUGH: 0
RHINORRHEA: 0
CONSTIPATION: 0
BACK PAIN: 0
CHEST TIGHTNESS: 0
ABDOMINAL PAIN: 0
STRIDOR: 0
COLOR CHANGE: 0
SINUS PRESSURE: 0
DIARRHEA: 0
ABDOMINAL DISTENTION: 0
WHEEZING: 0
BLOOD IN STOOL: 0

## 2024-11-26 ENCOUNTER — ANTI-COAG VISIT (OUTPATIENT)
Dept: PHARMACY | Age: 84
End: 2024-11-26

## 2024-11-26 DIAGNOSIS — I26.99 ACUTE MASSIVE PULMONARY EMBOLISM (HCC): Primary | ICD-10-CM

## 2024-11-26 LAB
INR BLD: 2.3
PROTIME: NORMAL

## 2024-11-26 NOTE — PROGRESS NOTES
PT 27.2 / INR 2.3 Patient took 7.5mg warfarin on M/W/F and 5mg all other days.  Please call Karen GREGORY [phone] with warfarin dosing and order for next INR check.       Returned call to Karen GREGORY. Instructed for patient to continue same dose of warfarin, 7.5 mg on Mon, Wed and Fri and 5 mg all other days of the week. Recheck INR in 2 weeks, 12/10. Per Karen, pt is certified with  until mid December then will try to recertify if insurance allows. Will need to f/u.       For Pharmacy Admin Tracking Only    Total # of Interventions Recommended: 0  Total # of Interventions Accepted: 0  Time Spent (min): 15

## 2024-12-10 ENCOUNTER — ANTI-COAG VISIT (OUTPATIENT)
Dept: PHARMACY | Age: 84
End: 2024-12-10

## 2024-12-10 DIAGNOSIS — I26.99 ACUTE MASSIVE PULMONARY EMBOLISM (HCC): Primary | ICD-10-CM

## 2024-12-10 LAB
INR BLD: 1.4
PROTIME: NORMAL

## 2024-12-10 NOTE — PROGRESS NOTES
[facility] called in INR of 1.4 today. Confirmed dose. Denies missed doses, med changes, or extra greens.     Take 10mg today then take 7.5 mg on Mon, Wed and Fri and 5mg all other days. Last HH check, scheduled to RTC on .    Lindsey Malone, PharmD, [facility]  For Pharmacy Admin Tracking Only    Intervention Detail: Adherence Monitorin and Dose Adjustment: 1, reason: Therapy Optimization  Total # of Interventions Recommended: 2  Total # of Interventions Accepted: 2  Time Spent (min): 15

## 2024-12-17 ENCOUNTER — TELEPHONE (OUTPATIENT)
Dept: PHARMACY | Age: 84
End: 2024-12-17

## 2024-12-17 ENCOUNTER — ANTI-COAG VISIT (OUTPATIENT)
Dept: PHARMACY | Age: 84
End: 2024-12-17
Payer: MEDICARE

## 2024-12-17 DIAGNOSIS — I26.99 ACUTE MASSIVE PULMONARY EMBOLISM (HCC): Primary | ICD-10-CM

## 2024-12-17 LAB
INTERNATIONAL NORMALIZATION RATIO, POC: 1.8
PROTHROMBIN TIME, POC: 0

## 2024-12-17 PROCEDURE — 99212 OFFICE O/P EST SF 10 MIN: CPT

## 2024-12-17 PROCEDURE — 85610 PROTHROMBIN TIME: CPT

## 2024-12-17 NOTE — PROGRESS NOTES
Re: Marvin NEELY Vincent    Dear Provider,    Your patient is enrolled in a [facility] for warfarin management, and it is my recommendation to discontinue aspirin therapy for Marvin's safety.     According to recent updates in guidelines for the use of aspirin for primary prevention of ASCVD, this patient has been identified as taking aspirin likely without an appropriate indication for therapy. The 2019 ACC/AHA and 2022 [place] Preventive Task Force Guidelines for Primary Prevention both recommend against the addition of low-dose aspirin ( mg/day) in patients at an increased risk of bleeding.1,2 Concurrent oral anticoagulation including warfarin can significantly increase a patient's risk of bleeding.     A recent study comparing bleeding rates in those on oral anticoagulation with aspirin compared to those on oral anticoagulation alone showed an approximate 140% increase in the odds of bleeding with the addition of aspirin.3     Next Steps:   Please respond to this communication indicating whether you are agreeable to aspirin discontinuation:  Yes, I am agreeable for this patient to discontinue aspirin   No, I am not agreeable for this patient to discontinue aspirin at this time, as I find it to still be indicated for the following reason:  ________________________________________________  Please respond to this message by 12/20.  If agreeable, Medication Management Pharmacy Staff will contact the patient with educational materials advising the patient to discontinue use.     Thank you for your time and consideration. I look forward to your response. Please contact me with questions.    Lindsey Burch, PharmD  PGY1 Pharmacy Resident  [facility]  Direct: [phone]  Main Pharmacy: [phone]  Fax (Please Attn: Lindsey): [phone]  Email: [email or web address]    Please be advised the above patient-specific recommendation was conducted based on retrospective

## 2024-12-17 NOTE — PROGRESS NOTES
PCP and will discuss with everyone then.     INR is 1.8 today  Pt denies taking boosted dose last week Tuesday. States he took normal dose.   Increase dose to 7.5mg Mon, Wed, Fri and Sat and 5mg all other days (5.9%)  Encouraged to maintain a consistency of vegetables/salads.   Recheck INR in 2 weeks,   Consent signed 8/15/24    Referring Dr. Mills   INR (no units)   Date Value   12/10/2024 1.40   2024 2.30   2024 2.90   10/29/2024 2.30     For Pharmacy Admin Tracking Only    Intervention Detail: Adherence Monitorin and Dose Adjustment: 1, reason: Therapy Optimization  Total # of Interventions Recommended: 2  Total # of Interventions Accepted: 2  Time Spent (min): 15

## 2024-12-17 NOTE — PROGRESS NOTES
LVM for call back. Okay to DC aspirin per cardiology. Pt has appointment with [facility] medication management today - will route as FYI in case patient does not call back.    Please call with any questions,  Moy CamposD  PGY1 Pharmacy Resident  Wireless: [phone]  12/17/2024 10:31 AM

## 2024-12-19 NOTE — PROGRESS NOTES
Patient agreeable to discontinuing aspirin for primary prevention d/t increased risk of bleeding and guideline recommendations. Pt understands to continue warfarin as directed by clinic for anticoagulation. Aspirin has been removed from medication list.    Patient noted warfarin may be stopped at the end of January by cardiology. Advised patient to further discuss with involved providers and discuss appropriateness of restarting aspirin if warfarin is discontinued.    Please call with any questions,  Lindsey Burch, PharmD  PGY1 Pharmacy Resident  [phone]  12/19/2024 1:40 PM

## 2024-12-31 ENCOUNTER — ANTI-COAG VISIT (OUTPATIENT)
Dept: PHARMACY | Age: 84
End: 2024-12-31
Payer: MEDICARE

## 2024-12-31 DIAGNOSIS — I26.99 ACUTE MASSIVE PULMONARY EMBOLISM (HCC): Primary | ICD-10-CM

## 2024-12-31 LAB
INTERNATIONAL NORMALIZATION RATIO, POC: 2.6
PROTHROMBIN TIME, POC: 0

## 2024-12-31 PROCEDURE — 85610 PROTHROMBIN TIME: CPT

## 2024-12-31 PROCEDURE — 99211 OFF/OP EST MAY X REQ PHY/QHP: CPT

## 2024-12-31 NOTE — PROGRESS NOTES
Mr. Marvin Kaur is a 84 y.o. y/o male with history of  PE with RV Strain  who presents today for anticoagulation monitoring and adjustment.    Pertinent PMH: Patient was put on warfarin for 3 mo dt PE. Will be reassessing in November when sees Dr. Mills. Was dcd to [facility] for 2 weeks and dosing there is unknown/not reported. Patient presented with son-in-law and brought some papers that stated 5mg tablets but no dosing and no INRs. Wife started chemo on Monday.     Patient Reported Findings:  Yes     No  [x]   []       Patient verifies current dosing regimen as listed- patient cannot ---> confirmed dose --> cannot confirm dose but states he marks his calendar with what dose to take based off AVS  []   [x]       S/S bleeding/bruising/swelling/SOB- denies   []   [x]       Blood in urine or stool- denies   []   [x]       Procedures scheduled in the future at this time- none  []   [x]       Missed Dose  []   [x]       Extra Dose  []   [x]       Change in medications- will review med list and let us know ---> stopping ASA? Denies --> stopped ASA  []   [x]       Change in health/diet/appetite- eats greens once/week (spinach and broccoli) ---> no greens recently --> has not been eating a lot due to stressors (wife is on hospice)  []   [x]       Change in alcohol use- nothing in past 6 weeks, may go back to drinking daily (scotch/bourbon, wine), doesn't smoke --> increased alcohol consumption, at least 6 days/week, 1-3 drinks   []   [x]       Change in activity  []   [x]       Hospital admission  []   [x]       Emergency department visit  []   [x]       Other complaints - wife is now on hospice     Clinical Outcomes:  Yes     No  []   [x]       Major bleeding event  []   [x]       Thromboembolic event    Duration of warfarin Therapy: 3 months   INR Range:  2.0-3.0    Warfarin 5mg tablets, taking noon/1pm.     RTC from . Cleared to STOP warfarin Jan 31, 2025 by cardio Dr. Mills, but pulm said to stay on it

## 2025-01-17 ENCOUNTER — TELEPHONE (OUTPATIENT)
Dept: PHARMACY | Age: 85
End: 2025-01-17

## 2025-01-22 ENCOUNTER — APPOINTMENT (OUTPATIENT)
Dept: PHARMACY | Age: 85
End: 2025-01-22
Payer: MEDICARE

## 2025-01-24 ENCOUNTER — ANTI-COAG VISIT (OUTPATIENT)
Dept: PHARMACY | Age: 85
End: 2025-01-24
Payer: MEDICARE

## 2025-01-24 DIAGNOSIS — I26.99 ACUTE MASSIVE PULMONARY EMBOLISM (HCC): Primary | ICD-10-CM

## 2025-01-24 LAB
INTERNATIONAL NORMALIZATION RATIO, POC: 2.7
PROTHROMBIN TIME, POC: 0

## 2025-01-24 PROCEDURE — 99211 OFF/OP EST MAY X REQ PHY/QHP: CPT

## 2025-01-24 PROCEDURE — 85610 PROTHROMBIN TIME: CPT

## 2025-01-24 NOTE — PROGRESS NOTES
Mr. Marvin Kaur is a 84 y.o. y/o male with history of  PE with RV Strain  who presents today for anticoagulation monitoring and adjustment.    Pertinent PMH: Patient was put on warfarin for 3 mo dt PE. Will be reassessing in November when sees Dr. Mills. Was dcd to [facility] for 2 weeks and dosing there is unknown/not reported. Patient presented with son-in-law and brought some papers that stated 5mg tablets but no dosing and no INRs. Wife started chemo on Monday.     Patient Reported Findings:  Yes     No  [x]   []       Patient verifies current dosing regimen as listed- patient cannot ---> confirmed dose --> cannot confirm dose but states he marks his calendar with what dose to take based off AVS  []   [x]       S/S bleeding/bruising/swelling/SOB- denies   []   [x]       Blood in urine or stool- denies   []   [x]       Procedures scheduled in the future at this time- none  []   [x]       Missed Dose  []   [x]       Extra Dose  []   [x]       Change in medications- will review med list and let us know ---> stopping ASA? Denies --> stopped ASA  []   [x]       Change in health/diet/appetite- eats greens once/week (spinach and broccoli) ---> no greens recently --> has not been eating a lot due to stressors (wife is on hospice)  []   [x]       Change in alcohol use- nothing in past 6 weeks, may go back to drinking daily (scotch/bourbon, wine), doesn't smoke --> increased alcohol consumption, at least 6 days/week, 1-3 drinks   []   [x]       Change in activity  []   [x]       Hospital admission  []   [x]       Emergency department visit  [x]   []       Other complaints - wife passed away yesterday     Clinical Outcomes:  Yes     No  []   [x]       Major bleeding event  []   [x]       Thromboembolic event    Duration of warfarin Therapy: 3 months   INR Range:  2.0-3.0    Warfarin 5mg tablets, taking noon/1pm.     RTC from . Cleared to STOP warfarin Jan 31, 2025 by cardio Dr. Mills, but pulm said to stay on it

## 2025-02-12 ENCOUNTER — ANTI-COAG VISIT (OUTPATIENT)
Dept: PHARMACY | Age: 85
End: 2025-02-12
Payer: MEDICARE

## 2025-02-12 DIAGNOSIS — I26.99 ACUTE MASSIVE PULMONARY EMBOLISM (HCC): Primary | ICD-10-CM

## 2025-02-12 LAB
INTERNATIONAL NORMALIZATION RATIO, POC: 3.6
PROTHROMBIN TIME, POC: 0

## 2025-02-12 PROCEDURE — 99212 OFFICE O/P EST SF 10 MIN: CPT | Performed by: PHYSICIAN ASSISTANT

## 2025-02-12 PROCEDURE — 85610 PROTHROMBIN TIME: CPT | Performed by: PHYSICIAN ASSISTANT

## 2025-02-12 NOTE — PROGRESS NOTES
Mr. Marvin Kaur is a 84 y.o. y/o male with history of  PE with RV Strain  who presents today for anticoagulation monitoring and adjustment.    Pertinent PMH: Patient was put on warfarin for 3 mo dt PE. Will be reassessing in November when sees Dr. Mills. Was dcd to [facility] for 2 weeks and dosing there is unknown/not reported. Patient presented with son-in-law and brought some papers that stated 5mg tablets but no dosing and no INRs. Wife started chemo on Monday.     Patient Reported Findings:  Yes     No  [x]   []       Patient verifies current dosing regimen as listed- patient cannot ---> confirmed dose --> cannot confirm dose but states he marks his calendar with what dose to take based off AVS --> verified dose   []   [x]       S/S bleeding/bruising/swelling/SOB- denies   []   [x]       Blood in urine or stool- denies   []   [x]       Procedures scheduled in the future at this time- none  []   [x]       Missed Dose  []   [x]       Extra Dose  []   [x]       Change in medications- will review med list and let us know ---> stopping ASA? Denies --> stopped ASA --> no changes   [x]   []       Change in health/diet/appetite- eats greens once/week (spinach and broccoli) ---> no greens recently --> has not been eating a lot due to stressors (wife is on hospice) --> appetite returning to normal   [x]   []       Change in alcohol use- nothing in past 6 weeks, may go back to drinking daily (scotch/bourbon, wine), doesn't smoke --> increased alcohol consumption, at least 6 days/week, 1-3 drinks --> more alcohol recently with wife passing   []   [x]       Change in activity  []   [x]       Hospital admission  []   [x]       Emergency department visit  [x]   []       Other complaints - wife passed away yesterday --> services for wife were this past weekend     Clinical Outcomes:  Yes     No  []   [x]       Major bleeding event  []   [x]       Thromboembolic event    Duration of warfarin Therapy: 3 months   INR Range:

## 2025-02-14 ENCOUNTER — OFFICE VISIT (OUTPATIENT)
Dept: INTERNAL MEDICINE CLINIC | Age: 85
End: 2025-02-14

## 2025-02-14 VITALS
OXYGEN SATURATION: 98 % | SYSTOLIC BLOOD PRESSURE: 138 MMHG | WEIGHT: 221 LBS | BODY MASS INDEX: 29.96 KG/M2 | DIASTOLIC BLOOD PRESSURE: 74 MMHG | HEART RATE: 76 BPM

## 2025-02-14 DIAGNOSIS — I87.2 VENOUS INSUFFICIENCY OF BOTH LOWER EXTREMITIES: ICD-10-CM

## 2025-02-14 DIAGNOSIS — I10 PRIMARY HYPERTENSION: ICD-10-CM

## 2025-02-14 DIAGNOSIS — Z86.711 HX OF PULMONARY EMBOLUS: ICD-10-CM

## 2025-02-14 DIAGNOSIS — E11.22 CONTROLLED TYPE 2 DIABETES MELLITUS WITH STAGE 1 CHRONIC KIDNEY DISEASE, WITHOUT LONG-TERM CURRENT USE OF INSULIN (HCC): Primary | ICD-10-CM

## 2025-02-14 DIAGNOSIS — N18.1 CONTROLLED TYPE 2 DIABETES MELLITUS WITH STAGE 1 CHRONIC KIDNEY DISEASE, WITHOUT LONG-TERM CURRENT USE OF INSULIN (HCC): Primary | ICD-10-CM

## 2025-02-14 DIAGNOSIS — E78.2 MIXED HYPERLIPIDEMIA: ICD-10-CM

## 2025-02-14 DIAGNOSIS — F43.21 GRIEF: ICD-10-CM

## 2025-02-14 LAB
CHP ED QC CHECK: NORMAL
GLUCOSE BLD-MCNC: 126 MG/DL
HBA1C MFR BLD: 4.9 %

## 2025-02-14 SDOH — ECONOMIC STABILITY: FOOD INSECURITY: WITHIN THE PAST 12 MONTHS, YOU WORRIED THAT YOUR FOOD WOULD RUN OUT BEFORE YOU GOT MONEY TO BUY MORE.: NEVER TRUE

## 2025-02-14 SDOH — ECONOMIC STABILITY: FOOD INSECURITY: WITHIN THE PAST 12 MONTHS, THE FOOD YOU BOUGHT JUST DIDN'T LAST AND YOU DIDN'T HAVE MONEY TO GET MORE.: NEVER TRUE

## 2025-02-14 ASSESSMENT — PATIENT HEALTH QUESTIONNAIRE - PHQ9
SUM OF ALL RESPONSES TO PHQ QUESTIONS 1-9: 0
SUM OF ALL RESPONSES TO PHQ9 QUESTIONS 1 & 2: 0
SUM OF ALL RESPONSES TO PHQ QUESTIONS 1-9: 0
SUM OF ALL RESPONSES TO PHQ QUESTIONS 1-9: 0
2. FEELING DOWN, DEPRESSED OR HOPELESS: NOT AT ALL
1. LITTLE INTEREST OR PLEASURE IN DOING THINGS: NOT AT ALL
SUM OF ALL RESPONSES TO PHQ QUESTIONS 1-9: 0

## 2025-02-14 NOTE — PROGRESS NOTES
Patient: Marvin Kaur is a 84 y.o. male who presents today with the following Chief Complaint(s):    Chief Complaint   Patient presents with    Follow-up    Diabetes         HPIwife  grieving. Sleeping so so.   PE, warfarin. Since July.   - + leg ankle edema. No cellulitis.   Current Outpatient Medications   Medication Sig Dispense Refill    furosemide (LASIX) 20 MG tablet Take 1 tablet by mouth daily      rosuvastatin (CRESTOR) 10 MG tablet Take 1 tablet by mouth daily 90 tablet 1    Blood Glucose Monitoring Suppl (FREESTYLE LITE) LISA Test twice daily 1 each 0    warfarin (COUMADIN) 5 MG tablet Review INR prior to administration.  Hazardous med- See facility policy for handling/disposal 90 tablet 1    midodrine (PROAMATINE) 10 MG tablet Take 1 tablet by mouth 3 times daily as needed (for SBP < 90) 90 tablet 3    candesartan-hydroCHLOROthiazide (ATACAND HCT) 32-12.5 MG per tablet Take 1 tablet by mouth daily 90 tablet 3    omeprazole (PRILOSEC) 20 MG delayed release capsule TAKE 1 CAPSULE BY MOUTH DAILY 90 capsule 3    Handicap Placard MISC by Does not apply route Exp: 2025 1 each 0    FreeStyle Lancets MISC Test twice daily 100 each 5    Multiple Vitamins-Minerals (CENTRUM SILVER ADULT 50+ PO) Take 1 tablet by mouth daily      Cholecalciferol (VITAMIN D3) 1000 UNITS CAPS Take 1 capsule by mouth daily      oxyCODONE 5 MG capsule Take 1 capsule by mouth every 4 hours as needed for Pain.       No current facility-administered medications for this visit.       Patient's past medical history, surgical history, family history, medications,and allergies  were all reviewed and updated as appropriate today.      Review of Systems      Physical Exam    Vitals:    25 1202   BP: 138/74   Pulse: 76   SpO2: 98%       Assessment:  Encounter Diagnosis   Name Primary?    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

## 2025-02-17 ENCOUNTER — TELEPHONE (OUTPATIENT)
Dept: PHARMACY | Age: 85
End: 2025-02-17

## 2025-02-17 NOTE — TELEPHONE ENCOUNTER
Patient called about lab work.  Explained he could come into the OP lab any time to have these drawn, without an appt.  Lab hours provided.

## 2025-02-20 ENCOUNTER — TELEPHONE (OUTPATIENT)
Dept: INTERNAL MEDICINE CLINIC | Age: 85
End: 2025-02-20

## 2025-02-22 ASSESSMENT — ENCOUNTER SYMPTOMS
GASTROINTESTINAL NEGATIVE: 1
EYES NEGATIVE: 1

## 2025-02-22 NOTE — PROGRESS NOTES
Patient: Marvin Kaur is a 84 y.o. male who presents today with the following Chief Complaint(s):    Chief Complaint   Patient presents with    Follow-up    Diabetes         Diabetes    He is here for a check up and f/u on hypertension, hyperlipidemia, and diabetes, type 2. He is taking medication as prescribed, continues to focus on more balanced meal plan and regular physical activity.   H/O venous insufficieny lower extremities. No ulcers, minimal leg swelling. Using diuretic and wears compression hose. Takes pioglitazone qod.   H/O reactive airways disease and has not been wheezing recently. Maintenance inhalers, trelegy and symbicort  have not helped.    Diabetic eye exam no retinopathy. Sees podiatrist every 10 weeks.   H/O sleep apnea. Wears CPAP 6 to 8 hours nightly.   H/O PE and is maintained on coumadin.   He is grieving the death of his wife who  recently of pancreatic cancer. Doing the best he can in the process.         Current Outpatient Medications   Medication Sig Dispense Refill    furosemide (LASIX) 20 MG tablet Take 1 tablet by mouth daily      rosuvastatin (CRESTOR) 10 MG tablet Take 1 tablet by mouth daily 90 tablet 1    Blood Glucose Monitoring Suppl (FREESTYLE LITE) LISA Test twice daily 1 each 0    warfarin (COUMADIN) 5 MG tablet Review INR prior to administration.  Hazardous med- See facility policy for handling/disposal 90 tablet 1    midodrine (PROAMATINE) 10 MG tablet Take 1 tablet by mouth 3 times daily as needed (for SBP < 90) 90 tablet 3    candesartan-hydroCHLOROthiazide (ATACAND HCT) 32-12.5 MG per tablet Take 1 tablet by mouth daily 90 tablet 3    omeprazole (PRILOSEC) 20 MG delayed release capsule TAKE 1 CAPSULE BY MOUTH DAILY 90 capsule 3    Handicap Placard MISC by Does not apply route Exp: 2025 1 each 0    FreeStyle Lancets MISC Test twice daily 100 each 5    Multiple Vitamins-Minerals (CENTRUM SILVER ADULT 50+ PO) Take 1 tablet by mouth daily      Cholecalciferol

## 2025-02-26 ENCOUNTER — APPOINTMENT (OUTPATIENT)
Dept: PHARMACY | Age: 85
End: 2025-02-26
Payer: MEDICARE

## 2025-02-27 ENCOUNTER — HOSPITAL ENCOUNTER (OUTPATIENT)
Age: 85
Discharge: HOME | End: 2025-02-27
Payer: MEDICARE

## 2025-02-27 ENCOUNTER — ANTI-COAG VISIT (OUTPATIENT)
Dept: PHARMACY | Age: 85
End: 2025-02-27
Payer: MEDICARE

## 2025-02-27 DIAGNOSIS — I26.99 ACUTE MASSIVE PULMONARY EMBOLISM (HCC): Primary | ICD-10-CM

## 2025-02-27 DIAGNOSIS — I42.8 OTHER CARDIOMYOPATHY (HCC): ICD-10-CM

## 2025-02-27 DIAGNOSIS — I10 PRIMARY HYPERTENSION: ICD-10-CM

## 2025-02-27 DIAGNOSIS — E78.2 MIXED HYPERLIPIDEMIA: ICD-10-CM

## 2025-02-27 LAB
ALBUMIN SERPL-MCNC: 3.9 G/DL (ref 3.4–5)
ALBUMIN/GLOB SERPL: 1.3 {RATIO} (ref 1.1–2.2)
ALP SERPL-CCNC: 68 U/L (ref 40–129)
ALT SERPL-CCNC: 12 U/L (ref 10–40)
ANION GAP SERPL CALCULATED.3IONS-SCNC: 13 MMOL/L (ref 3–16)
AST SERPL-CCNC: 23 U/L (ref 15–37)
BACTERIA URNS QL MICRO: NORMAL /HPF
BASOPHILS # BLD: 0.1 K/UL (ref 0–0.2)
BASOPHILS NFR BLD: 0.7 %
BILIRUB SERPL-MCNC: 0.3 MG/DL (ref 0–1)
BILIRUB UR QL STRIP.AUTO: NEGATIVE
BUN SERPL-MCNC: 29 MG/DL (ref 7–20)
CALCIUM SERPL-MCNC: 9.8 MG/DL (ref 8.3–10.6)
CHLORIDE SERPL-SCNC: 96 MMOL/L (ref 99–110)
CHOLEST SERPL-MCNC: 213 MG/DL (ref 0–199)
CLARITY UR: CLEAR
CO2 SERPL-SCNC: 26 MMOL/L (ref 21–32)
COLOR UR: ABNORMAL
CREAT SERPL-MCNC: 1.2 MG/DL (ref 0.8–1.3)
DEPRECATED RDW RBC AUTO: 14.3 % (ref 12.4–15.4)
EOSINOPHIL # BLD: 0.1 K/UL (ref 0–0.6)
EOSINOPHIL NFR BLD: 1.1 %
EPI CELLS #/AREA URNS AUTO: 0 /HPF (ref 0–5)
GFR SERPLBLD CREATININE-BSD FMLA CKD-EPI: 59 ML/MIN/{1.73_M2}
GLUCOSE SERPL-MCNC: 97 MG/DL (ref 70–99)
GLUCOSE UR STRIP.AUTO-MCNC: NEGATIVE MG/DL
HCT VFR BLD AUTO: 38.5 % (ref 40.5–52.5)
HDLC SERPL-MCNC: 122 MG/DL (ref 40–60)
HGB BLD-MCNC: 13 G/DL (ref 13.5–17.5)
HGB UR QL STRIP.AUTO: NEGATIVE
HYALINE CASTS #/AREA URNS AUTO: 1 /LPF (ref 0–8)
INTERNATIONAL NORMALIZATION RATIO, POC: 2.4
KETONES UR STRIP.AUTO-MCNC: ABNORMAL MG/DL
LDLC SERPL CALC-MCNC: 80 MG/DL
LEUKOCYTE ESTERASE UR QL STRIP.AUTO: NEGATIVE
LYMPHOCYTES # BLD: 1.8 K/UL (ref 1–5.1)
LYMPHOCYTES NFR BLD: 22.6 %
MCH RBC QN AUTO: 34.7 PG (ref 26–34)
MCHC RBC AUTO-ENTMCNC: 33.9 G/DL (ref 31–36)
MCV RBC AUTO: 102.4 FL (ref 80–100)
MONOCYTES # BLD: 1 K/UL (ref 0–1.3)
MONOCYTES NFR BLD: 12.3 %
NEUTROPHILS # BLD: 5.1 K/UL (ref 1.7–7.7)
NEUTROPHILS NFR BLD: 63.3 %
NITRITE UR QL STRIP.AUTO: NEGATIVE
NT-PROBNP SERPL-MCNC: 135 PG/ML (ref 0–449)
PH UR STRIP.AUTO: 5.5 [PH] (ref 5–8)
PLATELET # BLD AUTO: 254 K/UL (ref 135–450)
PMV BLD AUTO: 7.6 FL (ref 5–10.5)
POTASSIUM SERPL-SCNC: 4.7 MMOL/L (ref 3.5–5.1)
PROT SERPL-MCNC: 6.8 G/DL (ref 6.4–8.2)
PROT UR STRIP.AUTO-MCNC: 30 MG/DL
PROTHROMBIN TIME, POC: 0
RBC # BLD AUTO: 3.76 M/UL (ref 4.2–5.9)
RBC CLUMPS #/AREA URNS AUTO: 2 /HPF (ref 0–4)
SODIUM SERPL-SCNC: 135 MMOL/L (ref 136–145)
SP GR UR STRIP.AUTO: 1.02 (ref 1–1.03)
TRIGL SERPL-MCNC: 54 MG/DL (ref 0–150)
TSH SERPL DL<=0.005 MIU/L-ACNC: 1.54 UIU/ML (ref 0.27–4.2)
UA DIPSTICK W REFLEX MICRO PNL UR: YES
URN SPEC COLLECT METH UR: ABNORMAL
UROBILINOGEN UR STRIP-ACNC: 1 E.U./DL
VLDLC SERPL CALC-MCNC: 11 MG/DL
WBC # BLD AUTO: 8.1 K/UL (ref 4–11)
WBC #/AREA URNS AUTO: 1 /HPF (ref 0–5)

## 2025-02-27 PROCEDURE — 80053 COMPREHEN METABOLIC PANEL: CPT

## 2025-02-27 PROCEDURE — 80061 LIPID PANEL: CPT

## 2025-02-27 PROCEDURE — 84443 ASSAY THYROID STIM HORMONE: CPT

## 2025-02-27 PROCEDURE — 85025 COMPLETE CBC W/AUTO DIFF WBC: CPT

## 2025-02-27 PROCEDURE — 99211 OFF/OP EST MAY X REQ PHY/QHP: CPT | Performed by: PHARMACIST

## 2025-02-27 PROCEDURE — 85610 PROTHROMBIN TIME: CPT | Performed by: PHARMACIST

## 2025-02-27 PROCEDURE — 83880 ASSAY OF NATRIURETIC PEPTIDE: CPT

## 2025-02-27 PROCEDURE — 81001 URINALYSIS AUTO W/SCOPE: CPT

## 2025-02-27 PROCEDURE — 36415 COLL VENOUS BLD VENIPUNCTURE: CPT

## 2025-02-27 NOTE — PROGRESS NOTES
Mr. Marvin Kaur is a 84 y.o. y/o male with history of  PE with RV Strain  who presents today for anticoagulation monitoring and adjustment.    Pertinent PMH: Patient was put on warfarin for 3 mo dt PE. Will be reassessing in November when sees Dr. Mills. Was dcd to [facility] for 2 weeks and dosing there is unknown/not reported. Patient presented with son-in-law and brought some papers that stated 5mg tablets but no dosing and no INRs. Wife started chemo on Monday.     Patient Reported Findings:  Yes     No  [x]   []       Patient verifies current dosing regimen as listed- patient cannot ---> confirmed dose --> cannot confirm dose but states he marks his calendar with what dose to take based off AVS --> verified dose   []   [x]       S/S bleeding/bruising/swelling/SOB- denies   []   [x]       Blood in urine or stool- denies   []   [x]       Procedures scheduled in the future at this time- none  []   [x]       Missed Dose  []   [x]       Extra Dose  []   [x]       Change in medications- will review med list and let us know ---> stopping ASA? Denies --> stopped ASA --> no changes   []   [x]       Change in health/diet/appetite- eats greens once/week (spinach and broccoli) ---> no greens recently --> has not been eating a lot due to stressors (wife is on hospice) --> appetite returning to normal --> denies  []   [x]       Change in alcohol use- nothing in past 6 weeks, may go back to drinking daily (scotch/bourbon, wine), doesn't smoke --> increased alcohol consumption, at least 6 days/week, 1-3 drinks --> more alcohol recently with wife passing --> denies  []   [x]       Change in activity  []   [x]       Hospital admission  []   [x]       Emergency department visit  []   [x]       Other complaints - wife passed away yesterday --> services for wife were this past weekend     Clinical Outcomes:  Yes     No  []   [x]       Major bleeding event  []   [x]       Thromboembolic event    Duration of warfarin Therapy: 3

## 2025-03-05 ENCOUNTER — TELEPHONE (OUTPATIENT)
Dept: INTERNAL MEDICINE CLINIC | Age: 85
End: 2025-03-05

## 2025-03-05 RX ORDER — OMEPRAZOLE 20 MG/1
CAPSULE, DELAYED RELEASE ORAL
Qty: 90 CAPSULE | Refills: 3 | Status: SHIPPED | OUTPATIENT
Start: 2025-03-05

## 2025-03-05 NOTE — TELEPHONE ENCOUNTER
Medication:   Requested Prescriptions     Pending Prescriptions Disp Refills    omeprazole (PRILOSEC) 20 MG delayed release capsule [Pharmacy Med Name: OMEPRAZOLE 20MG CAPSULES] 90 capsule 3     Sig: TAKE 1 CAPSULE BY MOUTH DAILY        Last Filled:      Patient Phone Number: [phone] (home)     Last appt: 2/14/2025   Next appt: 8/14/2025    Last OARRS:        No data to display

## 2025-03-05 NOTE — TELEPHONE ENCOUNTER
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 See Attached... Any prior studies performed? Vascular consult should be considered. Do you have a reference, could send to Katarina Eden MD at [facility].

## 2025-03-17 DIAGNOSIS — E78.2 MIXED HYPERLIPIDEMIA: ICD-10-CM

## 2025-03-17 NOTE — TELEPHONE ENCOUNTER
Medication:   Requested Prescriptions     Pending Prescriptions Disp Refills    rosuvastatin (CRESTOR) 10 MG tablet [Pharmacy Med Name: ROSUVASTATIN 10MG TABLETS] 90 tablet 1     Sig: TAKE 1 TABLET BY MOUTH DAILY        Last Filled:      Patient Phone Number: [phone] (home)     Last appt: 2/14/2025   Next appt: 8/14/2025    Last OARRS:        No data to display

## 2025-03-19 RX ORDER — ROSUVASTATIN CALCIUM 10 MG/1
10 TABLET, COATED ORAL DAILY
Qty: 90 TABLET | Refills: 1 | Status: SHIPPED | OUTPATIENT
Start: 2025-03-19

## 2025-03-27 ENCOUNTER — TELEPHONE (OUTPATIENT)
Dept: PULMONOLOGY | Age: 85
End: 2025-03-27

## 2025-03-27 ENCOUNTER — ANTI-COAG VISIT (OUTPATIENT)
Dept: PHARMACY | Age: 85
End: 2025-03-27
Payer: MEDICARE

## 2025-03-27 DIAGNOSIS — I26.99 ACUTE MASSIVE PULMONARY EMBOLISM (HCC): Primary | ICD-10-CM

## 2025-03-27 LAB
INTERNATIONAL NORMALIZATION RATIO, POC: 3.2
PROTHROMBIN TIME, POC: 0

## 2025-03-27 PROCEDURE — 85610 PROTHROMBIN TIME: CPT

## 2025-03-27 PROCEDURE — 99211 OFF/OP EST MAY X REQ PHY/QHP: CPT

## 2025-03-27 NOTE — TELEPHONE ENCOUNTER
[facility] called and they are managing patients Warfarin and had a question for dr said per cardiology note in November patient was good to stop taking Warfarin. But from pulmonary note patient was said to continue taking. Need clarification     PH: [phone]

## 2025-03-27 NOTE — PROGRESS NOTES
Mr. Marvin Kaur is a 84 y.o. y/o male with history of  PE with RV Strain  who presents today for anticoagulation monitoring and adjustment.    Pertinent PMH: Patient was put on warfarin for 3 mo dt PE. Will be reassessing in November when sees Dr. Mills. Was dcd to [facility] for 2 weeks and dosing there is unknown/not reported. Patient presented with son-in-law and brought some papers that stated 5mg tablets but no dosing and no INRs. Wife started chemo on Monday.     Patient Reported Findings:  Yes     No  [x]   []       Patient verifies current dosing regimen as listed- patient cannot ---> confirmed dose --> cannot confirm dose but states he marks his calendar with what dose to take based off AVS --> verified dose   []   [x]       S/S bleeding/bruising/swelling/SOB- denies   []   [x]       Blood in urine or stool- denies   []   [x]       Procedures scheduled in the future at this time- none   []   [x]       Missed Dose  []   [x]       Extra Dose   []   [x]       Change in medications- will review med list and let us know ---> stopping ASA? Denies --> stopped ASA --> no changes   []   [x]       Change in health/diet/appetite- eats greens once/week (spinach and broccoli) ---> no greens recently --> has not been eating a lot due to stressors (wife is on hospice) --> appetite returning to normal --> denies   []   [x]       Change in alcohol use- nothing in past 6 weeks, may go back to drinking daily (scotch/bourbon, wine), doesn't smoke --> increased alcohol consumption, at least 6 days/week, 1-3 drinks --> more alcohol recently with wife passing --> denies  []   [x]       Change in activity  []   [x]       Hospital admission  []   [x]       Emergency department visit  []   [x]       Other complaints - wife passed away yesterday --> services for wife were this past weekend     Clinical Outcomes:  Yes     No  []   [x]       Major bleeding event  []   [x]       Thromboembolic event    Duration of warfarin Therapy:

## 2025-03-27 NOTE — PROGRESS NOTES
Called pulmonology office to determine if pt should continue long term anticoagulation as cardiology suggested to stop anticoagulation since >6 months since PE but pulmonology last note advised for pt to continue long term anticoagulation. Awaiting response

## 2025-04-07 ENCOUNTER — APPOINTMENT (OUTPATIENT)
Dept: GENERAL RADIOLOGY | Age: 85
End: 2025-04-07
Payer: MEDICARE

## 2025-04-07 ENCOUNTER — HOSPITAL ENCOUNTER (EMERGENCY)
Age: 85
Discharge: HOME | End: 2025-04-07
Attending: EMERGENCY MEDICINE
Payer: MEDICARE

## 2025-04-07 ENCOUNTER — HOSPITAL ENCOUNTER (EMERGENCY)
Dept: VASCULAR LAB | Age: 85
Discharge: HOME | End: 2025-04-09
Payer: MEDICARE

## 2025-04-07 VITALS
HEART RATE: 97 BPM | HEIGHT: 69 IN | WEIGHT: 236.9 LBS | BODY MASS INDEX: 35.09 KG/M2 | TEMPERATURE: 98 F | SYSTOLIC BLOOD PRESSURE: 128 MMHG | DIASTOLIC BLOOD PRESSURE: 80 MMHG | OXYGEN SATURATION: 93 % | RESPIRATION RATE: 20 BRPM

## 2025-04-07 DIAGNOSIS — Z79.01 ANTICOAGULATED ON WARFARIN: ICD-10-CM

## 2025-04-07 DIAGNOSIS — L03.116 CELLULITIS OF LEFT LOWER EXTREMITY: Primary | ICD-10-CM

## 2025-04-07 LAB
ANION GAP SERPL CALCULATED.3IONS-SCNC: 13 MMOL/L (ref 3–16)
APTT BLD: 37.8 SEC (ref 22.1–36.4)
BASOPHILS # BLD: 0.1 K/UL (ref 0–0.2)
BASOPHILS NFR BLD: 0.7 %
BUN SERPL-MCNC: 27 MG/DL (ref 7–20)
CALCIUM SERPL-MCNC: 9.5 MG/DL (ref 8.3–10.6)
CHLORIDE SERPL-SCNC: 97 MMOL/L (ref 99–110)
CO2 SERPL-SCNC: 25 MMOL/L (ref 21–32)
CREAT SERPL-MCNC: 1.2 MG/DL (ref 0.8–1.3)
DEPRECATED RDW RBC AUTO: 13.7 % (ref 12.4–15.4)
EOSINOPHIL # BLD: 0.1 K/UL (ref 0–0.6)
EOSINOPHIL NFR BLD: 0.7 %
GFR SERPLBLD CREATININE-BSD FMLA CKD-EPI: 59 ML/MIN/{1.73_M2}
GLUCOSE SERPL-MCNC: 112 MG/DL (ref 70–99)
HCT VFR BLD AUTO: 39.1 % (ref 40.5–52.5)
HGB BLD-MCNC: 13.1 G/DL (ref 13.5–17.5)
INR PPP: 3.22 (ref 0.85–1.15)
LACTATE BLDV-SCNC: 1.2 MMOL/L (ref 0.4–1.9)
LACTATE BLDV-SCNC: 2.4 MMOL/L (ref 0.4–1.9)
LYMPHOCYTES # BLD: 1.7 K/UL (ref 1–5.1)
LYMPHOCYTES NFR BLD: 21.1 %
MCH RBC QN AUTO: 33.5 PG (ref 26–34)
MCHC RBC AUTO-ENTMCNC: 33.6 G/DL (ref 31–36)
MCV RBC AUTO: 99.7 FL (ref 80–100)
MONOCYTES # BLD: 0.9 K/UL (ref 0–1.3)
MONOCYTES NFR BLD: 10.5 %
NEUTROPHILS # BLD: 5.5 K/UL (ref 1.7–7.7)
NEUTROPHILS NFR BLD: 67 %
PLATELET # BLD AUTO: 325 K/UL (ref 135–450)
PMV BLD AUTO: 6.9 FL (ref 5–10.5)
POTASSIUM SERPL-SCNC: 4.5 MMOL/L (ref 3.5–5.1)
PROCALCITONIN SERPL IA-MCNC: 0.1 NG/ML (ref 0–0.15)
PROTHROMBIN TIME: 32.7 SEC (ref 11.9–14.9)
RBC # BLD AUTO: 3.92 M/UL (ref 4.2–5.9)
SODIUM SERPL-SCNC: 135 MMOL/L (ref 136–145)
WBC # BLD AUTO: 8.1 K/UL (ref 4–11)

## 2025-04-07 PROCEDURE — 6360000002 HC RX W HCPCS: Performed by: PHYSICIAN ASSISTANT

## 2025-04-07 PROCEDURE — 85730 THROMBOPLASTIN TIME PARTIAL: CPT

## 2025-04-07 PROCEDURE — 85610 PROTHROMBIN TIME: CPT

## 2025-04-07 PROCEDURE — 80048 BASIC METABOLIC PNL TOTAL CA: CPT

## 2025-04-07 PROCEDURE — 96374 THER/PROPH/DIAG INJ IV PUSH: CPT

## 2025-04-07 PROCEDURE — 87040 BLOOD CULTURE FOR BACTERIA: CPT

## 2025-04-07 PROCEDURE — 93005 ELECTROCARDIOGRAM TRACING: CPT | Performed by: PHYSICIAN ASSISTANT

## 2025-04-07 PROCEDURE — 2500000003 HC RX 250 WO HCPCS: Performed by: PHYSICIAN ASSISTANT

## 2025-04-07 PROCEDURE — 85025 COMPLETE CBC W/AUTO DIFF WBC: CPT

## 2025-04-07 PROCEDURE — 84145 PROCALCITONIN (PCT): CPT

## 2025-04-07 PROCEDURE — 71045 X-RAY EXAM CHEST 1 VIEW: CPT

## 2025-04-07 PROCEDURE — 99285 EMERGENCY DEPT VISIT HI MDM: CPT

## 2025-04-07 PROCEDURE — 93971 EXTREMITY STUDY: CPT

## 2025-04-07 PROCEDURE — 83605 ASSAY OF LACTIC ACID: CPT

## 2025-04-07 PROCEDURE — 2580000003 HC RX 258: Performed by: PHYSICIAN ASSISTANT

## 2025-04-07 RX ORDER — 0.9 % SODIUM CHLORIDE 0.9 %
500 INTRAVENOUS SOLUTION INTRAVENOUS ONCE
Status: COMPLETED | OUTPATIENT
Start: 2025-04-07 | End: 2025-04-07

## 2025-04-07 RX ORDER — CEFUROXIME AXETIL 250 MG/1
500 TABLET ORAL 2 TIMES DAILY
Qty: 28 TABLET | Refills: 0 | Status: SHIPPED | OUTPATIENT
Start: 2025-04-07 | End: 2025-04-14

## 2025-04-07 RX ADMIN — SODIUM CHLORIDE 500 ML: 0.9 INJECTION, SOLUTION INTRAVENOUS at 19:59

## 2025-04-07 RX ADMIN — WATER 2000 MG: 1 INJECTION INTRAMUSCULAR; INTRAVENOUS; SUBCUTANEOUS at 19:52

## 2025-04-07 ASSESSMENT — ENCOUNTER SYMPTOMS
SHORTNESS OF BREATH: 0
ABDOMINAL PAIN: 0
COLOR CHANGE: 1
COUGH: 0

## 2025-04-07 NOTE — ED PROVIDER NOTES
I have personally performed a face to face diagnostic evaluation on this patient. I have fully participated in the care of this patient I personally saw the patient and performed a substantive portion of the visit including all aspects of the medical decision making.  I have reviewed and agree with all pertinent clinical information including history, physical exam, diagnostic tests, and the plan.    Medical Decision Making  Patient presented to the emergency department today with left lower extremity edema and redness.  He has also had pain in the area.  On exam, patient has +2 pitting edema in the left lower extremity.  He also has extensive erythema, induration, and warmth localized over the anterior left lower leg as well as circumferentially just proximal to the left ankle.  The area is tender to palpation.  Patient is neurovascularly intact distally.    Given the patient's presentation, we were most concerned for DVT or cellulitis.  Fortunately, Doppler ultrasound of the leg revealed no evidence of DVT.  Patient did not have a leukocytosis to suggest infection.  However, the appearance is near classic for cellulitis, especially considering the tenderness he is experiencing.  He has also had cellulitis here before.  His lactic acid was mildly elevated despite not meeting SIRS criteria for sepsis.  Further, the patient's exam is not consistent with a necrotizing infection or compartment syndrome.  Most likely this is due to poor fluid intake, so we treated with IV fluids.  However, given the patient's cellulitic appearance, we are also treating with IV antibiotics.  Given the patient is well-appearing, nontoxic, in no acute distress, and has stable vitals, I believe he will be safe for discharge home with outpatient therapy.  We will recheck his lactic acid upon completion of his IV fluids and send him home on oral antibiotics.    EKG  The Ekg interpreted by me in the absence of a cardiologist shows.  normal 
times daily for 7 days       DISCONTINUED MEDICATIONS:  Discontinued Medications    No medications on file              (Please note that portions of this note were completed with a voice recognition program.  Efforts were made to edit the dictations but occasionally words are mis-transcribed.)    Cash Segal PA-C (electronically signed)        Cash Segal PA-C  04/07/25 [id number]       Cash Segal PA-C  04/07/25 [id number]

## 2025-04-08 ENCOUNTER — TELEPHONE (OUTPATIENT)
Dept: PHARMACY | Age: 85
End: 2025-04-08

## 2025-04-08 LAB — ECHO BSA: 2.29 M2

## 2025-04-08 PROCEDURE — 93971 EXTREMITY STUDY: CPT | Performed by: SURGERY

## 2025-04-08 NOTE — PROGRESS NOTES
Problem: Skin Injury Risk Increased  Goal: Skin Health and Integrity  Outcome: Adequate for Care Transition     Problem: Adult Inpatient Plan of Care  Goal: Plan of Care Review  Outcome: Adequate for Care Transition  Goal: Patient-Specific Goal (Individualized)  Outcome: Adequate for Care Transition  Goal: Absence of Hospital-Acquired Illness or Injury  Outcome: Adequate for Care Transition  Goal: Optimal Comfort and Wellbeing  Outcome: Adequate for Care Transition  Goal: Readiness for Transition of Care  Outcome: Adequate for Care Transition     Problem: Infection  Goal: Absence of Infection Signs and Symptoms  Outcome: Adequate for Care Transition     Problem: Coping Ineffective  Goal: Effective Coping  Outcome: Adequate for Care Transition     Problem: Diabetes Comorbidity  Goal: Blood Glucose Level Within Targeted Range  Outcome: Adequate for Care Transition     Problem: Fall Injury Risk  Goal: Absence of Fall and Fall-Related Injury  Outcome: Adequate for Care Transition      NEURO: A&O x4     CARDIAC: NSR     RESP: 97% on RA     GI: Active BS     SKIN: scattered bruising, 3+ pitting edema LLE, 2+ pitting edema RLE     : Good UO     LINES: PIV RFA     GTTS: NA    Pt refusing shower tonight. Pt doing well overall, awaiting acceptance at ARU or SNF. Monitoring H&H closely. 7.5mg Coumadin given this evening, INR 1.53 this AM.         See flowsheets for details, VS, MAR for meds and titration.

## 2025-04-08 NOTE — TELEPHONE ENCOUNTER
ED 4/8 for cellulitus. INR 3.2. Was d/c on cefuroxime x 7 d.       Called and spoke to patient. Since INR still supratherapeutic and was supratherapeutic at last visit, will lower weekly dose slightly. Advised for pt to decrease weekly dose to 7.5 mg on Mon, Wed and Fri and 5 mg all other days of the week. Maintain INR check for 4/24 to assess weekly dose changes

## 2025-04-09 LAB
EKG ATRIAL RATE: 97 BPM
EKG DIAGNOSIS: NORMAL
EKG P AXIS: 89 DEGREES
EKG P-R INTERVAL: 188 MS
EKG Q-T INTERVAL: 330 MS
EKG QRS DURATION: 68 MS
EKG QTC CALCULATION (BAZETT): 419 MS
EKG R AXIS: 18 DEGREES
EKG T AXIS: 8 DEGREES
EKG VENTRICULAR RATE: 97 BPM

## 2025-04-09 PROCEDURE — 93010 ELECTROCARDIOGRAM REPORT: CPT | Performed by: INTERNAL MEDICINE

## 2025-04-10 ENCOUNTER — OFFICE VISIT (OUTPATIENT)
Dept: INTERNAL MEDICINE CLINIC | Age: 85
End: 2025-04-10
Payer: MEDICARE

## 2025-04-10 VITALS
OXYGEN SATURATION: 96 % | WEIGHT: 229 LBS | BODY MASS INDEX: 33.82 KG/M2 | SYSTOLIC BLOOD PRESSURE: 116 MMHG | HEART RATE: 88 BPM | DIASTOLIC BLOOD PRESSURE: 70 MMHG

## 2025-04-10 DIAGNOSIS — E11.22 CONTROLLED TYPE 2 DIABETES MELLITUS WITH STAGE 1 CHRONIC KIDNEY DISEASE, WITHOUT LONG-TERM CURRENT USE OF INSULIN (HCC): ICD-10-CM

## 2025-04-10 DIAGNOSIS — F43.21 GRIEF: ICD-10-CM

## 2025-04-10 DIAGNOSIS — I10 PRIMARY HYPERTENSION: ICD-10-CM

## 2025-04-10 DIAGNOSIS — N18.1 CONTROLLED TYPE 2 DIABETES MELLITUS WITH STAGE 1 CHRONIC KIDNEY DISEASE, WITHOUT LONG-TERM CURRENT USE OF INSULIN (HCC): ICD-10-CM

## 2025-04-10 DIAGNOSIS — E78.2 MIXED HYPERLIPIDEMIA: ICD-10-CM

## 2025-04-10 DIAGNOSIS — I27.20 PULMONARY HTN (HCC): ICD-10-CM

## 2025-04-10 DIAGNOSIS — L03.116 CELLULITIS OF LEFT LOWER EXTREMITY: Primary | ICD-10-CM

## 2025-04-10 LAB
CHP ED QC CHECK: NORMAL
GLUCOSE BLD-MCNC: 128 MG/DL

## 2025-04-10 PROCEDURE — 82962 GLUCOSE BLOOD TEST: CPT | Performed by: INTERNAL MEDICINE

## 2025-04-10 PROCEDURE — 1123F ACP DISCUSS/DSCN MKR DOCD: CPT | Performed by: INTERNAL MEDICINE

## 2025-04-10 PROCEDURE — G8417 CALC BMI ABV UP PARAM F/U: HCPCS | Performed by: INTERNAL MEDICINE

## 2025-04-10 PROCEDURE — 1036F TOBACCO NON-USER: CPT | Performed by: INTERNAL MEDICINE

## 2025-04-10 PROCEDURE — 3044F HG A1C LEVEL LT 7.0%: CPT | Performed by: INTERNAL MEDICINE

## 2025-04-10 PROCEDURE — 1159F MED LIST DOCD IN RCRD: CPT | Performed by: INTERNAL MEDICINE

## 2025-04-10 PROCEDURE — 99214 OFFICE O/P EST MOD 30 MIN: CPT | Performed by: INTERNAL MEDICINE

## 2025-04-10 PROCEDURE — G8427 DOCREV CUR MEDS BY ELIG CLIN: HCPCS | Performed by: INTERNAL MEDICINE

## 2025-04-10 PROCEDURE — G2211 COMPLEX E/M VISIT ADD ON: HCPCS | Performed by: INTERNAL MEDICINE

## 2025-04-10 RX ORDER — CEFUROXIME AXETIL 500 MG/1
500 TABLET ORAL 2 TIMES DAILY
Qty: 14 TABLET | Refills: 0 | Status: SHIPPED | OUTPATIENT
Start: 2025-04-10 | End: 2025-04-17

## 2025-04-10 NOTE — PROGRESS NOTES
Patient: Marvin Kaur is a 84 y.o. male who presents today with the following Chief Complaint(s):    Chief Complaint   Patient presents with    Follow-up    Diabetes         HPInote patient in ER for cellulitis. Treatment includes ivf and antibiotics in er and home on ceftin.   Left leg edematous, 2+ and also tender, erythematous.   Current Outpatient Medications   Medication Sig Dispense Refill    cefUROXime (CEFTIN) 250 MG tablet Take 2 tablets by mouth 2 times daily for 7 days 28 tablet 0    warfarin (COUMADIN) 5 MG tablet Take 1 tablet by mouth three times a week AND 1.5 tablets four times a week. 116 tablet 3    rosuvastatin (CRESTOR) 10 MG tablet TAKE 1 TABLET BY MOUTH DAILY 90 tablet 1    omeprazole (PRILOSEC) 20 MG delayed release capsule TAKE 1 CAPSULE BY MOUTH DAILY 90 capsule 3    furosemide (LASIX) 20 MG tablet Take 1 tablet by mouth daily      Blood Glucose Monitoring Suppl (FREESTYLE LITE) LISA Test twice daily 1 each 0    midodrine (PROAMATINE) 10 MG tablet Take 1 tablet by mouth 3 times daily as needed (for SBP < 90) 90 tablet 3    candesartan-hydroCHLOROthiazide (ATACAND HCT) 32-12.5 MG per tablet Take 1 tablet by mouth daily 90 tablet 3    Handicap Placard MISC by Does not apply route Exp: 03/03/2025 1 each 0    FreeStyle Lancets MISC Test twice daily 100 each 5    Multiple Vitamins-Minerals (CENTRUM SILVER ADULT 50+ PO) Take 1 tablet by mouth daily      Cholecalciferol (VITAMIN D3) 1000 UNITS CAPS Take 1 capsule by mouth daily       No current facility-administered medications for this visit.       Patient's past medical history, surgical history, family history, medications,and allergies  were all reviewed and updated as appropriate today.      Review of Systems      Physical Exam    Vitals:    04/10/25 1002   BP: (!) 148/82   Pulse:    SpO2:        Assessment:  Encounter Diagnosis   Name Primary?    Controlled type 2 diabetes mellitus with stage 1 chronic kidney disease, without long-term

## 2025-04-11 LAB — BACTERIA BLD CULT: NORMAL

## 2025-04-12 LAB — BACTERIA BLD CULT ORG #2: NORMAL

## 2025-04-15 ENCOUNTER — TELEPHONE (OUTPATIENT)
Dept: INTERNAL MEDICINE CLINIC | Age: 85
End: 2025-04-15

## 2025-04-15 NOTE — TELEPHONE ENCOUNTER
Patient wanted provider to know that swelling has gone down and the redness and tenderness has subsided he still has 3 days of medication to take. Fyi.

## 2025-04-24 ENCOUNTER — ANTI-COAG VISIT (OUTPATIENT)
Dept: PHARMACY | Age: 85
End: 2025-04-24
Payer: MEDICARE

## 2025-04-24 DIAGNOSIS — I26.99 ACUTE MASSIVE PULMONARY EMBOLISM (HCC): Primary | ICD-10-CM

## 2025-04-24 LAB
INTERNATIONAL NORMALIZATION RATIO, POC: 2
PROTHROMBIN TIME, POC: 0

## 2025-04-24 PROCEDURE — 85610 PROTHROMBIN TIME: CPT

## 2025-04-24 PROCEDURE — 99211 OFF/OP EST MAY X REQ PHY/QHP: CPT

## 2025-04-24 NOTE — TELEPHONE ENCOUNTER
ERROR, RF was sent on 3/27.  Lindsey Malone, PharmD, [facility]

## 2025-04-24 NOTE — PROGRESS NOTES
Mr. Marvin Kaur is a 85 y.o. y/o male with history of  PE with RV Strain  who presents today for anticoagulation monitoring and adjustment.    Pertinent PMH: Patient was put on warfarin for 3 mo dt PE. Will be reassessing in November when sees Dr. Mills. Was dcd to [facility] for 2 weeks and dosing there is unknown/not reported. Patient presented with son-in-law and brought some papers that stated 5mg tablets but no dosing and no INRs. Wife started chemo on Monday.     Patient Reported Findings:  Yes     No  [x]   []       Patient verifies current dosing regimen as listed- patient cannot ---> confirmed dose --> cannot confirm dose but states he marks his calendar with what dose to take based off AVS --> verified dose   []   [x]       S/S bleeding/bruising/swelling/SOB- denies   []   [x]       Blood in urine or stool- denies   []   [x]       Procedures scheduled in the future at this time- none   []   [x]       Missed Dose  []   [x]       Extra Dose   []   [x]       Change in medications- will review med list and let us know ---> stopping ASA? Denies --> stopped ASA --> was on ceftin twice for 7 days, finished last round Tuesday   []   [x]       Change in health/diet/appetite- eats greens once/week (spinach and broccoli) ---> no greens recently --> has not been eating a lot due to stressors (wife is on hospice) --> appetite returning to normal --> denies   []   [x]       Change in alcohol use- nothing in past 6 weeks, may go back to drinking daily (scotch/bourbon, wine), doesn't smoke --> increased alcohol consumption, at least 6 days/week, 1-3 drinks --> more alcohol recently with wife passing --> denies  []   [x]       Change in activity  []   [x]       Hospital admission  []   [x]       Emergency department visit  ED 4/8 for cellulitus. INR 3.2. Was d/c on cefuroxime x 7 d.   Called and spoke to patient. Since INR still supratherapeutic and was supratherapeutic at last visit, will lower weekly dose slightly.

## 2025-04-25 PROBLEM — J96.01 ACUTE RESPIRATORY FAILURE WITH HYPOXIA (HCC): Status: RESOLVED | Noted: 2024-07-15 | Resolved: 2025-04-25

## 2025-04-25 ASSESSMENT — ENCOUNTER SYMPTOMS
GASTROINTESTINAL NEGATIVE: 1
EYES NEGATIVE: 1

## 2025-04-25 NOTE — PROGRESS NOTES
[facility]  Consultation Note    Chief Complaint / Reason for Consultation  PAD    History of Present Illness  Patient is a 85 y.o. male with history of diabetes, hypertension, hyperlipidemia referred today by Mikal for peripheral vascular disease.  Patient underwent Quanta flow testing in his office showing moderate disease in the left leg.  Report from outside hospital was reviewed under the media tab in the medical record showing a right BEATRIZ of 0.38 and a left BEATRIZ of 0.92.  Patient previously seen by me in 2018 for cellulitis of his right lower extremity.  Patient with history of radiofrequency ablation of the right greater saphenous vein March 14, 2018.  He was noted at that time to have both superficial and deep venous insufficiency with recommendation for compression.  Patient denies any pain or complaints.  Recently had a bout of cellulitis in his left leg which is better    Review of Systems     Denies fevers, chills, chest pain, shortness of breath, nausea, vomiting, hematemesis, diarrhea, constipation, melena, hematochezia, wt changes, vision problems, blindness, hearing problems, facial droop, slurred speech, extremity weakness, extremity numbness, dysuria.    Past Medical History:   has a past medical history of AR (allergic rhinitis), Dermatitis, Diabetes, Diabetes (HCC), DJD (degenerative joint disease), Dyslipidemia, ED (erectile dysfunction), GERD (gastroesophageal reflux disease), HTN (hypertension), Hx of blood clots, Hyperlipidemia, Sleep apnea, and Vitamin D deficiency.     Past Surgical History:   has a past surgical history that includes Hand surgery; Foot surgery; Colonoscopy; joint replacement (Left, 8/29/2014); Knee Arthroplasty (Right, 1/21/2015); other surgical history (See note 12/30/14); other surgical history (Right, 03/06/2015); and invasive vascular (N/A, 7/15/2024).     Medications:  Current Outpatient Medications on File Prior to Visit   Medication

## 2025-04-25 NOTE — PROGRESS NOTES
Patient: Marvin Kaur is a 85 y.o. male who presents today with the following Chief Complaint(s):    Chief Complaint   Patient presents with    Follow-up    Diabetes         Diabetes    He is here for a check up. H/O hypertension, hyperlipidemia, and diabetes, type 2. He is taking medication as prescribed, continues to focus on more balanced meal plan and regular physical activity.   H/O venous insufficieny lower extremities. Recently evaluated and treated in the ER for left leg cellulitis. Given iv antibiotic and sent home to complete treatment course with ceftin. Leg has improved with decrease swelling redness and pain but still has a ways to go. No ulcers.    H/O reactive airways disease and has not been wheezing recently. Maintenance inhalers, trelegy and symbicort  have not helped.    Diabetic eye exam no retinopathy. Sees podiatrist every 10 weeks.   H/O sleep apnea. Wears CPAP 6 to 8 hours nightly.   H/O PE and is maintained on coumadin.   He is grieving the death of his wife who  recently of pancreatic cancer. Doing the best he can in the process.         Current Outpatient Medications   Medication Sig Dispense Refill    warfarin (COUMADIN) 5 MG tablet Take 1 tablet by mouth three times a week AND 1.5 tablets four times a week. 116 tablet 3    rosuvastatin (CRESTOR) 10 MG tablet TAKE 1 TABLET BY MOUTH DAILY 90 tablet 1    omeprazole (PRILOSEC) 20 MG delayed release capsule TAKE 1 CAPSULE BY MOUTH DAILY 90 capsule 3    furosemide (LASIX) 20 MG tablet Take 1 tablet by mouth daily      Blood Glucose Monitoring Suppl (FREESTYLE LITE) LISA Test twice daily 1 each 0    midodrine (PROAMATINE) 10 MG tablet Take 1 tablet by mouth 3 times daily as needed (for SBP < 90) 90 tablet 3    candesartan-hydroCHLOROthiazide (ATACAND HCT) 32-12.5 MG per tablet Take 1 tablet by mouth daily 90 tablet 3    Handicap Placard MISC by Does not apply route Exp: 2025 1 each 0    FreeStyle Lancets MISC Test twice daily 100

## 2025-04-28 ENCOUNTER — OFFICE VISIT (OUTPATIENT)
Dept: VASCULAR SURGERY | Age: 85
End: 2025-04-28
Payer: MEDICARE

## 2025-04-28 VITALS
SYSTOLIC BLOOD PRESSURE: 124 MMHG | WEIGHT: 225 LBS | BODY MASS INDEX: 35.31 KG/M2 | HEIGHT: 67 IN | DIASTOLIC BLOOD PRESSURE: 78 MMHG

## 2025-04-28 DIAGNOSIS — I73.9 PAD (PERIPHERAL ARTERY DISEASE): Primary | ICD-10-CM

## 2025-04-28 PROCEDURE — 1123F ACP DISCUSS/DSCN MKR DOCD: CPT | Performed by: SURGERY

## 2025-04-28 PROCEDURE — 99203 OFFICE O/P NEW LOW 30 MIN: CPT | Performed by: SURGERY

## 2025-04-28 PROCEDURE — 1159F MED LIST DOCD IN RCRD: CPT | Performed by: SURGERY

## 2025-04-28 PROCEDURE — G8417 CALC BMI ABV UP PARAM F/U: HCPCS | Performed by: SURGERY

## 2025-04-28 PROCEDURE — 1036F TOBACCO NON-USER: CPT | Performed by: SURGERY

## 2025-04-28 PROCEDURE — G8427 DOCREV CUR MEDS BY ELIG CLIN: HCPCS | Performed by: SURGERY

## 2025-05-01 ENCOUNTER — HOSPITAL ENCOUNTER (OUTPATIENT)
Dept: VASCULAR LAB | Age: 85
Discharge: HOME | End: 2025-05-03
Attending: SURGERY
Payer: MEDICARE

## 2025-05-01 DIAGNOSIS — I73.9 PAD (PERIPHERAL ARTERY DISEASE): ICD-10-CM

## 2025-05-01 LAB
VAS LEFT ABI: 1.23
VAS LEFT ANKLE BP: 177 MMHG
VAS LEFT ARM BP: 144 MMHG
VAS LEFT CALF PRESSURE: 166 MMHG
VAS LEFT DORSALIS PEDIS BP: 138 MMHG
VAS LEFT PTA BP: 177 MMHG
VAS LEFT TBI: 0.86
VAS LEFT THIGH PRESSURE: 159 MMHG
VAS LEFT TOE PRESSURE: 124 MMHG
VAS RIGHT ABI: 1.15
VAS RIGHT ANKLE BP: 166 MMHG
VAS RIGHT ARM BP: 133 MMHG
VAS RIGHT CALF PRESSURE: 166 MMHG
VAS RIGHT DORSALIS PEDIS BP: 166 MMHG
VAS RIGHT PTA BP: 162 MMHG
VAS RIGHT TBI: 0.78
VAS RIGHT THIGH PRESSURE: 165 MMHG
VAS RIGHT TOE PRESSURE: 112 MMHG

## 2025-05-01 PROCEDURE — 93923 UPR/LXTR ART STDY 3+ LVLS: CPT

## 2025-05-06 ENCOUNTER — TELEPHONE (OUTPATIENT)
Dept: VASCULAR SURGERY | Age: 85
End: 2025-05-06

## 2025-05-06 NOTE — TELEPHONE ENCOUNTER
Left message with results of BLE noninvasive testing which showed normal ABIs with no evidence of significant arterial disease in BLE.

## 2025-05-08 ENCOUNTER — ANTI-COAG VISIT (OUTPATIENT)
Dept: PHARMACY | Age: 85
End: 2025-05-08
Payer: MEDICARE

## 2025-05-08 DIAGNOSIS — I26.99 ACUTE MASSIVE PULMONARY EMBOLISM (HCC): Primary | ICD-10-CM

## 2025-05-08 LAB
INTERNATIONAL NORMALIZATION RATIO, POC: 1.7
PROTHROMBIN TIME, POC: 0

## 2025-05-08 PROCEDURE — 99212 OFFICE O/P EST SF 10 MIN: CPT

## 2025-05-08 PROCEDURE — 85610 PROTHROMBIN TIME: CPT

## 2025-05-08 NOTE — PROGRESS NOTES
Mr. Marvin Kaur is a 85 y.o. y/o male with history of  PE with RV Strain  who presents today for anticoagulation monitoring and adjustment.    Pertinent PMH: Patient was put on warfarin for 3 mo dt PE. Will be reassessing in November when sees Dr. Mills. Was dcd to [facility] for 2 weeks and dosing there is unknown/not reported. Patient presented with son-in-law and brought some papers that stated 5mg tablets but no dosing and no INRs. Wife started chemo on Monday.     Patient Reported Findings:  Yes     No  [x]   []       Patient verifies current dosing regimen as listed- patient cannot ---> confirmed dose --> cannot confirm dose but states he marks his calendar with what dose to take based off AVS --> verified dose ---> states doing 5mg four days/week and 7.5mg MWF  []   [x]       S/S bleeding/bruising/swelling/SOB- denies   []   [x]       Blood in urine or stool- denies   []   [x]       Procedures scheduled in the future at this time- none   []   [x]       Missed Dose  []   [x]       Extra Dose   []   [x]       Change in medications- will review med list and let us know ---> stopping ASA? Denies --> stopped ASA --> was on ceftin twice for 7 days, finished last round Tuesday---> denies    []   [x]       Change in health/diet/appetite- eats greens once/week (spinach and broccoli) ---> no greens recently --> has not been eating a lot due to stressors (wife is on hospice) --> appetite returning to normal --> denies   []   [x]       Change in alcohol use- nothing in past 6 weeks, may go back to drinking daily (scotch/bourbon, wine), doesn't smoke --> increased alcohol consumption, at least 6 days/week, 1-3 drinks --> more alcohol recently with wife passing --> denies  []   [x]       Change in activity  []   [x]       Hospital admission  []   [x]       Emergency department visit  ED 4/8 for cellulitus. INR 3.2. Was d/c on cefuroxime x 7 d.   Called and spoke to patient. Since INR still supratherapeutic and was

## 2025-05-16 ENCOUNTER — OFFICE VISIT (OUTPATIENT)
Dept: INTERNAL MEDICINE CLINIC | Age: 85
End: 2025-05-16
Payer: MEDICARE

## 2025-05-16 VITALS
WEIGHT: 229 LBS | BODY MASS INDEX: 35.87 KG/M2 | DIASTOLIC BLOOD PRESSURE: 70 MMHG | HEART RATE: 95 BPM | SYSTOLIC BLOOD PRESSURE: 128 MMHG | OXYGEN SATURATION: 95 %

## 2025-05-16 DIAGNOSIS — E78.2 MIXED HYPERLIPIDEMIA: ICD-10-CM

## 2025-05-16 DIAGNOSIS — E11.8 TYPE 2 DIABETES MELLITUS WITH COMPLICATIONS (HCC): ICD-10-CM

## 2025-05-16 DIAGNOSIS — Z00.00 PHYSICAL EXAM: Primary | ICD-10-CM

## 2025-05-16 DIAGNOSIS — I10 PRIMARY HYPERTENSION: ICD-10-CM

## 2025-05-16 DIAGNOSIS — F43.21 GRIEF: ICD-10-CM

## 2025-05-16 DIAGNOSIS — D68.59 THROMBOPHILIA: ICD-10-CM

## 2025-05-16 DIAGNOSIS — L03.116 LEFT LEG CELLULITIS: ICD-10-CM

## 2025-05-16 PROCEDURE — 1123F ACP DISCUSS/DSCN MKR DOCD: CPT | Performed by: INTERNAL MEDICINE

## 2025-05-16 PROCEDURE — 86580 TB INTRADERMAL TEST: CPT | Performed by: INTERNAL MEDICINE

## 2025-05-16 PROCEDURE — 99214 OFFICE O/P EST MOD 30 MIN: CPT | Performed by: INTERNAL MEDICINE

## 2025-05-16 PROCEDURE — G2211 COMPLEX E/M VISIT ADD ON: HCPCS | Performed by: INTERNAL MEDICINE

## 2025-05-16 PROCEDURE — G8427 DOCREV CUR MEDS BY ELIG CLIN: HCPCS | Performed by: INTERNAL MEDICINE

## 2025-05-16 PROCEDURE — 3044F HG A1C LEVEL LT 7.0%: CPT | Performed by: INTERNAL MEDICINE

## 2025-05-16 PROCEDURE — G8417 CALC BMI ABV UP PARAM F/U: HCPCS | Performed by: INTERNAL MEDICINE

## 2025-05-16 PROCEDURE — 1036F TOBACCO NON-USER: CPT | Performed by: INTERNAL MEDICINE

## 2025-05-19 ENCOUNTER — CLINICAL SUPPORT (OUTPATIENT)
Dept: INTERNAL MEDICINE CLINIC | Age: 85
End: 2025-05-19

## 2025-05-19 VITALS — SYSTOLIC BLOOD PRESSURE: 128 MMHG | DIASTOLIC BLOOD PRESSURE: 70 MMHG

## 2025-05-19 DIAGNOSIS — Z00.00 PHYSICAL EXAM: Primary | ICD-10-CM

## 2025-05-19 NOTE — PROGRESS NOTES
PPD Reading Note  PPD read and results entered in [employer].  Result: 0 mm induration.  Interpretation: NEGATIVE  If test not read within 48-72 hours of initial placement, patient advised to repeat in other arm 1-3 weeks after this test.  Allergic reaction: no

## 2025-05-22 ENCOUNTER — ANTI-COAG VISIT (OUTPATIENT)
Dept: PHARMACY | Age: 85
End: 2025-05-22
Payer: MEDICARE

## 2025-05-22 DIAGNOSIS — I26.99 ACUTE MASSIVE PULMONARY EMBOLISM (HCC): Primary | ICD-10-CM

## 2025-05-22 LAB
INTERNATIONAL NORMALIZATION RATIO, POC: 2.3
PROTHROMBIN TIME, POC: 0

## 2025-05-22 PROCEDURE — 85610 PROTHROMBIN TIME: CPT | Performed by: PHYSICIAN ASSISTANT

## 2025-05-22 PROCEDURE — 99211 OFF/OP EST MAY X REQ PHY/QHP: CPT | Performed by: PHYSICIAN ASSISTANT

## 2025-05-22 NOTE — PROGRESS NOTES
Mr. Marvin Kaur is a 85 y.o. y/o male with history of  PE with RV Strain  who presents today for anticoagulation monitoring and adjustment.    Pertinent PMH: Patient was put on warfarin for 3 mo dt PE but pulmonology referred indefinitely   Patient had left lower extremity DVT around 20 years ago for which he took warfarin for a few months.  Patient Reported Findings:  Yes     No  [x]   []       Patient verifies current dosing regimen as listed- patient cannot ---> confirmed dose --> cannot confirm dose but states he marks his calendar with what dose to take based off AVS --> verified dose ---> states doing 5mg four days/week and 7.5mg MWF  []   [x]       S/S bleeding/bruising/swelling/SOB- denies   []   [x]       Blood in urine or stool- denies   []   [x]       Procedures scheduled in the future at this time- none   [x]   []       Missed Dose - one day last week   []   [x]       Extra Dose denies  []   [x]       Change in medications- was on ceftin twice for 7 days, finished last round Tuesday---> has been off MVI for past few weeks   []   [x]       Change in health/diet/appetite- eats greens once/week (spinach and broccoli) ---> no greens recently --> has not been eating a lot due to stressors (wife is on hospice) --> appetite returning to normal --> no changes   []   [x]       Change in alcohol use- nothing in past 6 weeks, may go back to drinking daily (scotch/bourbon, wine), doesn't smoke --> increased alcohol consumption, at least 6 days/week, 1-3 drinks --> more alcohol recently with wife passing --> denies  []   [x]       Change in activity  []   [x]       Hospital admission  []   [x]       Emergency department visit  ED 4/8 for cellulitus. INR 3.2. Was d/c on cefuroxime x 7 d.   Called and spoke to patient. Since INR still supratherapeutic and was supratherapeutic at last visit, will lower weekly dose slightly. Advised for pt to decrease weekly dose to 7.5 mg on Mon, Wed and Fri and 5 mg all other days 
Adult

## 2025-05-27 ASSESSMENT — ENCOUNTER SYMPTOMS
EYES NEGATIVE: 1
GASTROINTESTINAL NEGATIVE: 1

## 2025-05-27 NOTE — PROGRESS NOTES
Tuberculin skin test applied to left ventral forearm. Explained how to read the test, measuring induration not just erythema; he will come into office if test appears positive.   
Rhythm: Normal rate and regular rhythm.      Heart sounds: Normal heart sounds.   Pulmonary:      Effort: Pulmonary effort is normal.      Breath sounds: Normal breath sounds.   Abdominal:      General: Bowel sounds are normal.      Palpations: Abdomen is soft. There is no mass.   Musculoskeletal:      Cervical back: Normal range of motion and neck supple.      Comments: Left leg edema, 2+, with erythema, and tenderness to palpate c/w known cellulitis.     Lymphadenopathy:      Cervical: No cervical adenopathy.   Skin:     General: Skin is warm and dry.   Neurological:      Mental Status: He is alert and oriented to person, place, and time.      Deep Tendon Reflexes: Reflexes are normal and symmetric.   Psychiatric:         Behavior: Behavior normal.         Thought Content: Thought content normal.         Judgment: Judgment normal.         Vitals:    05/16/25 1241   BP: 128/70   Pulse: 95   SpO2: 95%       Assessment:   Diagnosis Orders   1. Type 2 diabetes mellitus treated without insulin (HCC)  Diet, physical activity, med compliance and weight reduction discussed.  POCT Glucose    POCT glycosylated hemoglobin (Hb A1C)   2. Mixed hyperlipidemia  Heart healthy diet and statin compliance discussed.    3. Essential hypertension  Continue ARB, diuretic.  DASH and low sodium diet discussed.    4. Left leg cellulitis history.  Infection clinically resolved.    Weight reduction, low sodium, compression hose discussed, blood sugar control all discussed.      5.     H/O PE: continue anticoagulation with coumadin. Factor Xa inhibitors not covered by insurance.   6.     Grief: continued counseling and discussed the grieving process.   7.     Patient is generally physically fit for his age and is an excellent candidate for assisted living.   8.     PPD placed and to be read in 48 hours.   Plan:  See plans above.

## 2025-06-05 ENCOUNTER — ANTI-COAG VISIT (OUTPATIENT)
Dept: PHARMACY | Age: 85
End: 2025-06-05
Payer: MEDICARE

## 2025-06-05 DIAGNOSIS — I26.99 ACUTE MASSIVE PULMONARY EMBOLISM (HCC): Primary | ICD-10-CM

## 2025-06-05 LAB
INTERNATIONAL NORMALIZATION RATIO, POC: 3.1
PROTHROMBIN TIME, POC: 0

## 2025-06-05 PROCEDURE — 85610 PROTHROMBIN TIME: CPT

## 2025-06-05 PROCEDURE — 99211 OFF/OP EST MAY X REQ PHY/QHP: CPT

## 2025-06-05 NOTE — PROGRESS NOTES
Mr. Marvin Kaur is a 85 y.o. y/o male with history of  PE with RV Strain  who presents today for anticoagulation monitoring and adjustment.    Pertinent PMH: Patient was put on warfarin for 3 mo dt PE but pulmonology referred indefinitely   Patient had left lower extremity DVT around 20 years ago for which he took warfarin for a few months.  Patient Reported Findings:  Yes     No  [x]   []       Patient verifies current dosing regimen as listed- patient cannot ---> confirmed dose --> cannot confirm dose but states he marks his calendar with what dose to take based off AVS --> verified dose ---> states doing 5mg four days/week and 7.5mg MWF---> confirmed   []   [x]       S/S bleeding/bruising/swelling/SOB- denies   []   [x]       Blood in urine or stool- denies   []   [x]       Procedures scheduled in the future at this time- none   []   [x]       Missed Dose - one day last week ---> denies   []   [x]       Extra Dose denies  []   [x]       Change in medications- was on ceftin twice for 7 days, finished last round Tuesday---> has been off MVI for past few weeks ----> resumed MVI last week   []   [x]       Change in health/diet/appetite- eats greens once/week (spinach and broccoli) ---> no greens recently --> has not been eating a lot due to stressors (wife is on hospice) --> appetite returning to normal --> no changes   []   [x]       Change in alcohol use- nothing in past 6 weeks, may go back to drinking daily (scotch/bourbon, wine), doesn't smoke --> increased alcohol consumption, at least 6 days/week, 1-3 drinks --> more alcohol recently with wife passing --> denies  []   [x]       Change in activity  []   [x]       Hospital admission  []   [x]       Emergency department visit  ED 4/8 for cellulitus. INR 3.2. Was d/c on cefuroxime x 7 d.   Called and spoke to patient. Since INR still supratherapeutic and was supratherapeutic at last visit, will lower weekly dose slightly. Advised for pt to decrease weekly dose

## 2025-06-19 ENCOUNTER — ANTI-COAG VISIT (OUTPATIENT)
Dept: PHARMACY | Age: 85
End: 2025-06-19
Payer: MEDICARE

## 2025-06-19 DIAGNOSIS — I26.99 ACUTE MASSIVE PULMONARY EMBOLISM (HCC): Primary | ICD-10-CM

## 2025-06-19 LAB
INTERNATIONAL NORMALIZATION RATIO, POC: 2.8
PROTHROMBIN TIME, POC: 0

## 2025-06-19 PROCEDURE — 85610 PROTHROMBIN TIME: CPT

## 2025-06-19 PROCEDURE — 99211 OFF/OP EST MAY X REQ PHY/QHP: CPT

## 2025-06-19 NOTE — PROGRESS NOTES
visit, will lower weekly dose slightly. Advised for pt to decrease weekly dose to 7.5 mg on Mon, Wed and Fri and 5 mg all other days of the week. Maintain INR check for 4/24 to assess weekly dose changes   []   [x]       Other complaints - wife passed away yesterday --> services for wife were this past weekend   Unable to afford [facility] $160/month copay for Eliquis at this time.    Clinical Outcomes:  Yes     No  []   [x]       Major bleeding event  []   [x]       Thromboembolic event    Duration of warfarin Therapy: indefinite  INR Range:  2.0-3.0    Warfarin 5mg tablets, taking noon/1pm.     RTC from . Cleared to STOP warfarin Jan 31, 2025 by cardio Dr. Mills, but pulm said to stay on it (Discussed with patient about the duration of anticoagulation. In the light of life-threatening PE and patient's mobility related issues, would advocate long-term treatment with anticoagulation. This can be discontinued if patient develops increased risk of bleeding). Seeing PCP and will discuss with everyone then. --> pulmonology signed referral for pt to continue warfarin indefinitely     INR is 2.8 today after resuming MVI last appointment  Continue taking dose of 5 mg Sun, Tue, and Thurs and 7.5 mg all other days  Encouraged to maintain a consistency of vegetables/salads.   Recheck INR in 3 weeks, 7/9  Consent signed 8/15/24    Referring is pulmonologist Dr. Simpson   INR (no units)   Date Value   04/07/2025 3.22 (H)   12/10/2024 1.40   11/26/2024 2.30   11/12/2024 2.90     INR,(POC) (no units)   Date Value   06/05/2025 3.1   05/22/2025 2.3   05/08/2025 1.7   04/24/2025 2.0     For Pharmacy Admin Tracking Only    Total # of Interventions Recommended: 0  Total # of Interventions Accepted: 0  Time Spent (min): 15

## 2025-07-09 ENCOUNTER — ANTI-COAG VISIT (OUTPATIENT)
Dept: PHARMACY | Age: 85
End: 2025-07-09
Payer: MEDICARE

## 2025-07-09 DIAGNOSIS — I26.99 ACUTE MASSIVE PULMONARY EMBOLISM (HCC): Primary | ICD-10-CM

## 2025-07-09 LAB
INTERNATIONAL NORMALIZATION RATIO, POC: 2.5
PROTHROMBIN TIME, POC: 0

## 2025-07-09 PROCEDURE — 99211 OFF/OP EST MAY X REQ PHY/QHP: CPT | Performed by: PHARMACIST

## 2025-07-09 PROCEDURE — 85610 PROTHROMBIN TIME: CPT | Performed by: PHARMACIST

## 2025-07-09 NOTE — PROGRESS NOTES
Mr. Marvin Kaur is a 85 y.o. y/o male with history of PE with RV Strain who presents today for anticoagulation monitoring and adjustment.    Pertinent PMH: Patient was put on warfarin for 3 mo dt PE but pulmonology referred indefinitely   Patient had left lower extremity DVT around 20 years ago for which he took warfarin for a few months.  Patient Reported Findings:  Yes     No  [x]   []       Patient verifies current dosing regimen as listed- patient cannot ---> confirmed dose --> cannot confirm dose but states he marks his calendar with what dose to take based off AVS --> verified dose ---> states doing 5mg four days/week and 7.5mg MWF---> confirmed   []   [x]       S/S bleeding/bruising/swelling/SOB- denies   []   [x]       Blood in urine or stool- denies   []   [x]       Procedures scheduled in the future at this time- none   []   [x]       Missed Dose - one day last week ---> denies --> missed Tue last week  []   [x]       Extra Dose denies  []   [x]       Change in medications- was on ceftin twice for 7 days, finished last round Tuesday---> has been off MVI for past few weeks ----> resumed MVI last week   [x]   []       Change in health/diet/appetite- eats greens once/week (spinach and broccoli) ---> no greens recently --> has not been eating a lot due to stressors (wife is on hospice) --> appetite returning to normal --> no changes --> drinks ensure 5x per week, consistent for a while --> Ensure 4x per week  []   [x]       Change in alcohol use- nothing in past 6 weeks, may go back to drinking daily (scotch/bourbon, wine), doesn't smoke --> increased alcohol consumption, at least 6 days/week, 1-3 drinks --> more alcohol recently with wife passing --> denies  []   [x]       Change in activity  []   [x]       Hospital admission  []   [x]       Emergency department visit  ED 4/8 for cellulitus. INR 3.2. Was d/c on cefuroxime x 7 d.   Called and spoke to patient. Since INR still supratherapeutic and was

## 2025-07-10 ENCOUNTER — OFFICE VISIT (OUTPATIENT)
Dept: INTERNAL MEDICINE CLINIC | Age: 85
End: 2025-07-10
Payer: MEDICARE

## 2025-07-10 VITALS
WEIGHT: 226 LBS | OXYGEN SATURATION: 96 % | SYSTOLIC BLOOD PRESSURE: 110 MMHG | DIASTOLIC BLOOD PRESSURE: 80 MMHG | BODY MASS INDEX: 35.4 KG/M2 | HEART RATE: 93 BPM

## 2025-07-10 DIAGNOSIS — I10 PRIMARY HYPERTENSION: ICD-10-CM

## 2025-07-10 DIAGNOSIS — I87.2 VENOUS INSUFFICIENCY OF BOTH LOWER EXTREMITIES: ICD-10-CM

## 2025-07-10 DIAGNOSIS — E78.2 MIXED HYPERLIPIDEMIA: ICD-10-CM

## 2025-07-10 DIAGNOSIS — N18.2 TYPE 2 DIABETES MELLITUS WITH STAGE 2 CHRONIC KIDNEY DISEASE, WITHOUT LONG-TERM CURRENT USE OF INSULIN (HCC): Primary | ICD-10-CM

## 2025-07-10 DIAGNOSIS — D68.59 THROMBOPHILIA: ICD-10-CM

## 2025-07-10 DIAGNOSIS — F43.21 GRIEF: ICD-10-CM

## 2025-07-10 DIAGNOSIS — E11.22 TYPE 2 DIABETES MELLITUS WITH STAGE 2 CHRONIC KIDNEY DISEASE, WITHOUT LONG-TERM CURRENT USE OF INSULIN (HCC): Primary | ICD-10-CM

## 2025-07-10 LAB
CHP ED QC CHECK: NORMAL
GLUCOSE BLD-MCNC: 119 MG/DL
HBA1C MFR BLD: 5.1 %

## 2025-07-10 PROCEDURE — 1123F ACP DISCUSS/DSCN MKR DOCD: CPT | Performed by: INTERNAL MEDICINE

## 2025-07-10 PROCEDURE — 82962 GLUCOSE BLOOD TEST: CPT | Performed by: INTERNAL MEDICINE

## 2025-07-10 PROCEDURE — G8417 CALC BMI ABV UP PARAM F/U: HCPCS | Performed by: INTERNAL MEDICINE

## 2025-07-10 PROCEDURE — 1036F TOBACCO NON-USER: CPT | Performed by: INTERNAL MEDICINE

## 2025-07-10 PROCEDURE — 1159F MED LIST DOCD IN RCRD: CPT | Performed by: INTERNAL MEDICINE

## 2025-07-10 PROCEDURE — 83036 HEMOGLOBIN GLYCOSYLATED A1C: CPT | Performed by: INTERNAL MEDICINE

## 2025-07-10 PROCEDURE — G8427 DOCREV CUR MEDS BY ELIG CLIN: HCPCS | Performed by: INTERNAL MEDICINE

## 2025-07-10 PROCEDURE — G2211 COMPLEX E/M VISIT ADD ON: HCPCS | Performed by: INTERNAL MEDICINE

## 2025-07-10 PROCEDURE — 99214 OFFICE O/P EST MOD 30 MIN: CPT | Performed by: INTERNAL MEDICINE

## 2025-07-10 PROCEDURE — 3044F HG A1C LEVEL LT 7.0%: CPT | Performed by: INTERNAL MEDICINE

## 2025-07-10 NOTE — PROGRESS NOTES
Patient: Marvin Kaur is a 85 y.o. male who presents today with the following Chief Complaint(s):    Chief Complaint   Patient presents with    3 Month Follow-Up    Diabetes         [facility], [place] healthy.   Getting use to it. Independent living.   Every 2 hours to used the rest room. Can usually hold it. Discussed with urologist. Psa normal.   Left leg edema, > right. No s/o inflammation.   Current Outpatient Medications   Medication Sig Dispense Refill    warfarin (COUMADIN) 5 MG tablet Take 1 tablet by mouth three times a week AND 1.5 tablets four times a week. (Patient taking differently: Take 5 mg Sun, Tue, and Thurs and 7.5 mg all other days. Dose adjusted by [facility].) 116 tablet 3    rosuvastatin (CRESTOR) 10 MG tablet TAKE 1 TABLET BY MOUTH DAILY 90 tablet 1    omeprazole (PRILOSEC) 20 MG delayed release capsule TAKE 1 CAPSULE BY MOUTH DAILY 90 capsule 3    furosemide (LASIX) 20 MG tablet Take 1 tablet by mouth daily      Blood Glucose Monitoring Suppl (FREESTYLE LITE) LISA Test twice daily 1 each 0    midodrine (PROAMATINE) 10 MG tablet Take 1 tablet by mouth 3 times daily as needed (for SBP < 90) 90 tablet 3    Handicap Placard MISC by Does not apply route Exp: 03/03/2025 1 each 0    FreeStyle Lancets MISC Test twice daily 100 each 5    Multiple Vitamins-Minerals (CENTRUM SILVER ADULT 50+ PO) Take 1 tablet by mouth daily      Cholecalciferol (VITAMIN D3) 1000 UNITS CAPS Take 1 capsule by mouth daily      candesartan-hydroCHLOROthiazide (ATACAND HCT) 32-12.5 MG per tablet Take 1 tablet by mouth daily 90 tablet 3     No current facility-administered medications for this visit.       Patient's past medical history, surgical history, family history, medications,and allergies  were all reviewed and updated as appropriate today.      Review of Systems      Physical Exam    Vitals:    07/10/25 1430   BP: 110/80   Pulse: 93   SpO2: 96%       Assessment:  Encounter Diagnosis   Name Primary?

## 2025-07-25 NOTE — TELEPHONE ENCOUNTER
Please refill furosemide sent to [facility] [address] please note new pharmacy listed in request attached

## 2025-07-28 RX ORDER — FUROSEMIDE 20 MG/1
20 TABLET ORAL DAILY
Qty: 60 TABLET | Refills: 2 | Status: SHIPPED | OUTPATIENT
Start: 2025-07-28 | End: 2025-07-28

## 2025-07-28 RX ORDER — FUROSEMIDE 20 MG/1
20 TABLET ORAL DAILY
Qty: 90 TABLET | Refills: 0 | Status: SHIPPED | OUTPATIENT
Start: 2025-07-28

## 2025-07-28 NOTE — TELEPHONE ENCOUNTER
Medication:   Requested Prescriptions     Pending Prescriptions Disp Refills    furosemide (LASIX) 20 MG tablet [Pharmacy Med Name: FUROSEMIDE 20MG TABLETS] 90 tablet      Sig: TAKE 1 TABLET BY MOUTH DAILY        Last Filled:      Patient Phone Number: [phone] (home)     Last appt: 7/10/2025   Next appt: 8/14/2025    Last OARRS:        No data to display

## 2025-07-28 NOTE — TELEPHONE ENCOUNTER
Warfarin prescription phoned into [id number] - [facility] [address] - P [phone] - F [phone] under Rocky Jones MD   Warfarin 5 mg tabs  Take 5 mg (5 mg x 1) every Sun, Tue, Thu; 7.5 mg (5 mg x 1.5) all other days   90 days   2 refills    Lindsey Malone, PharmD, [facility]    For Pharmacy Admin Tracking Only    Intervention Detail: Refill(s) Provided  Total # of Interventions Recommended: 1  Total # of Interventions Accepted: 1  Time Spent (min): 15

## 2025-08-06 ENCOUNTER — ANTI-COAG VISIT (OUTPATIENT)
Dept: PHARMACY | Age: 85
End: 2025-08-06
Payer: MEDICARE

## 2025-08-06 DIAGNOSIS — I26.99 ACUTE MASSIVE PULMONARY EMBOLISM (HCC): Primary | ICD-10-CM

## 2025-08-06 LAB
INTERNATIONAL NORMALIZATION RATIO, POC: 2.2
PROTHROMBIN TIME, POC: 0

## 2025-08-06 PROCEDURE — 99211 OFF/OP EST MAY X REQ PHY/QHP: CPT

## 2025-08-06 PROCEDURE — 85610 PROTHROMBIN TIME: CPT

## (undated) DEVICE — CATHETER 5FR CORDIS PIG 145DEG 110CM

## (undated) DEVICE — 150CM STANDARD JWIRE

## (undated) DEVICE — KIT AT-X65 ANGIOTOUCH HAND CONTROLLER

## (undated) DEVICE — Device: Brand: TRIEVER24

## (undated) DEVICE — DILATOR: Brand: COOK

## (undated) DEVICE — GUIDEWIRE: Brand: AMPLATZ SUPER STIFF™

## (undated) DEVICE — TRIEVER 20 CATHETER, 20 FR, 105 CM: Brand: TRIEVER 20 CURVE

## (undated) DEVICE — KIT MFLD ISOLATN NACL CNTRST PRT TBNG SPIK W/ PRSS TRNSDUC

## (undated) DEVICE — PAD, DEFIB, ADULT, RADIOTRANS, PHYSIO: Brand: MEDLINE

## (undated) DEVICE — INTRI24™ INTRODUCER SHEATH (SHEATH): Brand: INTRI24 INTRODUCER SHEATH

## (undated) DEVICE — CATHETER ANGIO JR4 AD 52 FRX125 CM STR SUPER TORQUE +

## (undated) DEVICE — PERCLOSE™ PROSTYLE™ SUTURE-MEDIATED CLOSURE AND REPAIR SYSTEM: Brand: PERCLOSE™ PROSTYLE™

## (undated) DEVICE — PINNACLE INTRODUCER SHEATH: Brand: PINNACLE

## (undated) DEVICE — CATH LAB PACK: Brand: MEDLINE INDUSTRIES, INC.

## (undated) DEVICE — INTRODUCER SHTH 0.018 IN 4 FRX70 CM 21 GAX7 CM KT MIC VSI

## (undated) DEVICE — FLOWTRIEVER THROMBECTOMY SYSTEM PRICE PER PROCEDURE

## (undated) DEVICE — Device: Brand: NOMOLINE™ LH ADULT NASAL CO2 CANNULA WITH O2 4M

## (undated) DEVICE — FLOWSAVER: Brand: FLOWSAVER